# Patient Record
Sex: FEMALE | Race: WHITE | NOT HISPANIC OR LATINO | Employment: OTHER | URBAN - METROPOLITAN AREA
[De-identification: names, ages, dates, MRNs, and addresses within clinical notes are randomized per-mention and may not be internally consistent; named-entity substitution may affect disease eponyms.]

---

## 2017-01-16 DIAGNOSIS — F98.8 ADD (ATTENTION DEFICIT DISORDER): ICD-10-CM

## 2017-01-16 RX ORDER — DEXTROAMPHETAMINE SACCHARATE, AMPHETAMINE ASPARTATE, DEXTROAMPHETAMINE SULFATE AND AMPHETAMINE SULFATE 5; 5; 5; 5 MG/1; MG/1; MG/1; MG/1
1 TABLET ORAL 2 TIMES DAILY
Qty: 60 TABLET | Refills: 0 | Status: SHIPPED | OUTPATIENT
Start: 2017-01-16 | End: 2017-02-14 | Stop reason: SDUPTHER

## 2017-02-01 ENCOUNTER — OFFICE VISIT (OUTPATIENT)
Dept: INTERNAL MEDICINE | Facility: CLINIC | Age: 37
End: 2017-02-01
Payer: MEDICAID

## 2017-02-01 VITALS
DIASTOLIC BLOOD PRESSURE: 82 MMHG | RESPIRATION RATE: 20 BRPM | BODY MASS INDEX: 28.66 KG/M2 | HEIGHT: 65 IN | SYSTOLIC BLOOD PRESSURE: 100 MMHG | WEIGHT: 172 LBS

## 2017-02-01 DIAGNOSIS — M54.42 ACUTE LEFT-SIDED LOW BACK PAIN WITH LEFT-SIDED SCIATICA: Primary | ICD-10-CM

## 2017-02-01 PROCEDURE — 99214 OFFICE O/P EST MOD 30 MIN: CPT | Mod: S$PBB,,, | Performed by: INTERNAL MEDICINE

## 2017-02-01 PROCEDURE — 99999 PR PBB SHADOW E&M-EST. PATIENT-LVL III: CPT | Mod: PBBFAC,,, | Performed by: INTERNAL MEDICINE

## 2017-02-01 PROCEDURE — 99213 OFFICE O/P EST LOW 20 MIN: CPT | Mod: PBBFAC,PN,25 | Performed by: INTERNAL MEDICINE

## 2017-02-01 PROCEDURE — 96372 THER/PROPH/DIAG INJ SC/IM: CPT | Mod: PBBFAC,PN

## 2017-02-01 RX ORDER — METHYLPREDNISOLONE ACETATE 40 MG/ML
40 INJECTION, SUSPENSION INTRA-ARTICULAR; INTRALESIONAL; INTRAMUSCULAR; SOFT TISSUE
Status: COMPLETED | OUTPATIENT
Start: 2017-02-01 | End: 2017-02-01

## 2017-02-01 RX ORDER — GABAPENTIN 100 MG/1
100 CAPSULE ORAL 3 TIMES DAILY
Qty: 90 CAPSULE | Refills: 5 | Status: SHIPPED | OUTPATIENT
Start: 2017-02-01 | End: 2017-06-17

## 2017-02-01 RX ORDER — KETOROLAC TROMETHAMINE 30 MG/ML
30 INJECTION, SOLUTION INTRAMUSCULAR; INTRAVENOUS
Status: COMPLETED | OUTPATIENT
Start: 2017-02-01 | End: 2017-02-01

## 2017-02-01 RX ADMIN — KETOROLAC TROMETHAMINE 30 MG: 30 INJECTION, SOLUTION INTRAMUSCULAR; INTRAVENOUS at 08:02

## 2017-02-01 RX ADMIN — METHYLPREDNISOLONE ACETATE 40 MG: 40 INJECTION, SUSPENSION INTRA-ARTICULAR; INTRALESIONAL; INTRAMUSCULAR; SOFT TISSUE at 08:02

## 2017-02-01 NOTE — PATIENT INSTRUCTIONS
Back Care Tips    Caring for your back  These are things you can do to prevent a recurrence of acute back pain and to reduce symptoms from chronic back pain:  · Maintain a healthy weight. If you are overweight, losing weight will help most types of back pain.  · Exercise is an important part of recovery from most types of back pain. The muscles behind and in front of the spine support the back. This means strengthening both the back muscles and the abdominal muscles will provide better support for your spine.   · Swimming and brisk walking are good overall exercises to improve your fitness level.  · Practice safe lifting methods (below).  · Practice good posture when sitting, standing and walking. Avoid prolonged sitting. This puts more stress on the lower back than standing or walking.  · Wear quality shoes with sufficient arch support. Foot and ankle alignment can affect back symptoms. Women should avoid wearing high heels.  · Therapeutic massage can help relax the back muscles without stretching them.  · During the first 24 to 72 hours after an acute injury or flare-up of chronic back pain, apply an ice pack to the painful area for 20 minutes and then remove it for 20 minutes, over a period of 60 to 90 minutes, or several times a day. As a safety precaution, do not use a heating pad at bedtime. Sleeping on a heating pad can lead to skin burns or tissue damage.  · You can alternate ice and heat therapies.  Medications  Talk to your healthcare provider before using medicines, especially if you have other medical problems or are taking other medicines.  · You may use acetaminophen or ibuprofen to control pain, unless your healthcare provider prescribed other pain medicine. If you have chronic conditions like diabetes, liver or kidney disease, stomach ulcers, or gastrointestinal bleeding, or are taking blood thinners, talk with your healthcare provider before taking any medicines.  · Be careful if you are given  prescription pain medicines, narcotics, or medicine for muscle spasm. They can cause drowsiness, affect your coordination, reflexes, and judgment. Do not drive or operate heavy machinery while taking these types of medicines. Take prescription pain medicine only as prescribed by your healthcare provider.  Lumbar stretch  Here is a simple stretching exercise that will help relax muscle spasm and keep your back more limber. If exercise makes your back pain worse, dont do it.  · Lie on your back with your knees bent and both feet on the ground.  · Slowly raise your left knee to your chest as you flatten your lower back against the floor. Hold for 5 seconds.  · Relax and repeat the exercise with your right knee.  · Do 10 of these exercises for each leg.  Safe lifting method  · Dont bend over at the waist to lift an object off the floor.  Instead, bend your knees and hips in a squat.   · Keep your back and head upright  · Hold the object close to your body, directly in front of you.  · Straighten your legs to lift the object.   · Lower the object to the floor in the reverse fashion.  · If you must slide something across the floor, push it.  Posture tips  Sitting  Sit in chairs with straight backs or low-back support. Keep your knees lower than your hips, with your feet flat on the floor.  When driving, sit up straight. Adjust the seat forward so you are not leaning toward the steering wheel.  A small pillow or rolled towel behind your lower back may help if you are driving long distances.   Standing  When standing for long periods, shift most of your weight to one leg at a time. Alternate legs every few minutes.   Sleeping  The best way to sleep is on your side with your knees bent. Put a low pillow under your head to support your neck in a neutral spine position. Avoid thick pillows that bend your neck to one side. Put a pillow between your legs to further relax your lower back. If you sleep on your back, put pillows  under your knees to support your legs in a slightly flexed position. Use a firm mattress. If your mattress sags, replace it, or use a 1/2-inch plywood board under the mattress to add support.  Follow-up care  Follow up with your healthcare provider, or as advised.  If X-rays, a CT scan or an MRI scan were taken, they will be reviewed by a radiologist. You will be notified of any new findings that may affect your care.  Call 911  Seek emergency medical care if any of the following occur:  · Trouble breathing  · Confusion  · Very drowsy  · Fainting or loss of consciousness  · Rapid or very slow heart rate  · Loss of  bowel or bladder control  When to seek medical care  Call your healthcare provider if any of the following occur:  · Pain becomes worse or spreads to your arms or legs  · Weakness or numbness in one or both arms or legs  · Numbness in the groin area  © 7209-9401 [employer]. [address] [place] [address], [place] [address]. All rights reserved. This information is not intended as a substitute for professional medical care. Always follow your healthcare professional's instructions.          Take 100mg gabapentin every day for 3 days then,  Take 100mg gabapentin twice a day for 3 days then,  Take 100mg gabapentin three times a day    Call if not better and we will increase the dose.

## 2017-02-01 NOTE — PROGRESS NOTES
Subjective:       Patient ID: Joe Henson is a 36 y.o. female.    Chief Complaint: Back Pain (in L. side ; pain running from lower back down into leg PS:10)      HPI:  Patient is new to me but known to clinic and presents with left sided back pain. Has had pain for 6-7 weeks. She is waiting on neurosurgery appointment at [facility]. Pain is getting worse. She is taking tramadol, oxaprozin and Ibuprofen 800mg all without relief. Pain is worse with standing or walking. Pain is constant. Pain is left is left sided and radiates down the back of leg. Described as throbbing sensation and shooting down the leg. She denies saddles anesthesia, bowel/bladder incontinence. No new injuries. She is tearful during interview due to pain.    MRI L-spine from 16 reviewed today showing L5-S1 spondylolithesis and left sided foraminal encroachment, severe.    Past Medical History   Diagnosis Date    Abnormal Pap smear age 30     Pos. HPV,     ADHD (attention deficit hyperactivity disorder)     Arthritis     OCD (obsessive compulsive disorder)        Family History   Problem Relation Age of Onset    Diabetes Maternal Grandmother     Hypertension Father     Colon cancer Father 60    Breast cancer Paternal Aunt     Breast cancer Paternal Aunt     Breast cancer Paternal Aunt     Breast cancer Paternal Aunt     Coronary artery disease Maternal Grandfather      labor Neg Hx        Social History     Social History    Marital status: Single     Spouse name: N/A    Number of children: N/A    Years of education: N/A     Occupational History     [employer]     Social History Main Topics    Smoking status: Current Every Day Smoker     Packs/day: 1.00     Years: 15.00     Start date: 1997    Smokeless tobacco: Never Used      Comment: told about brochure and smoking clinic program    Alcohol use Yes      Comment: Socially-2 times a week-3 beer    Drug use: No    Sexual activity: Yes     Partners: Male     Birth  control/ protection: Surgical, See Surgical Hx      Comment: - has a boyfriend     Other Topics Concern    Not on file     Social History Narrative       Review of Systems   Constitutional: Negative for activity change, fatigue, fever and unexpected weight change.   HENT: Negative for congestion, ear pain, hearing loss, rhinorrhea and sore throat.    Eyes: Negative for pain, redness and visual disturbance.   Respiratory: Negative for cough, shortness of breath and wheezing.    Cardiovascular: Negative for chest pain, palpitations and leg swelling.   Gastrointestinal: Negative for abdominal pain, constipation, diarrhea, nausea and vomiting.   Genitourinary: Negative for dysuria, frequency, pelvic pain and urgency.   Musculoskeletal: Positive for back pain. Negative for joint swelling and neck pain.   Skin: Negative for color change, rash and wound.   Neurological: Positive for numbness (left leg). Negative for dizziness, tremors, weakness, light-headedness and headaches.         Objective:      Physical Exam   Constitutional: She is oriented to person, place, and time. She appears well-developed and well-nourished. No distress.   HENT:   Head: Normocephalic and atraumatic.   Right Ear: External ear normal.   Left Ear: External ear normal.   Eyes: Conjunctivae and EOM are normal. Pupils are equal, round, and reactive to light. Right eye exhibits no discharge. Left eye exhibits no discharge.   Neck: Neck supple. No tracheal deviation present.   Cardiovascular: Normal rate and regular rhythm.  Exam reveals no gallop and no friction rub.    No murmur heard.  Pulmonary/Chest: Effort normal and breath sounds normal. No respiratory distress. She has no wheezes. She has no rales.   Abdominal: Soft. Bowel sounds are normal. She exhibits no distension. There is no tenderness.   Musculoskeletal: She exhibits tenderness (left low back).   Lymphadenopathy:     She has no cervical adenopathy.   Neurological: She is alert  and oriented to person, place, and time. No cranial nerve deficit.   Skin: Skin is warm and dry.   Psychiatric: She has a normal mood and affect. Her behavior is normal.   Vitals reviewed.      Assessment:       1. Acute left-sided low back pain with left-sided sciatica        Plan:       Joe was seen today for back pain.    Diagnoses and all orders for this visit:    Acute left-sided low back pain with left-sided sciatica  -     gabapentin (NEURONTIN) 100 MG capsule; Take 1 capsule (100 mg total) by mouth 3 (three) times daily.  -     ketorolac injection 30 mg; Inject 30 mg into the muscle one time.  -     methylPREDNISolone acetate injection 40 mg; Inject 1 mL (40 mg total) into the muscle one time.  Add gabapentin, titrate up dose if not effective  Consider cymbalta  Trying to avoid narcotics  Can increase tramadol to 100mg from 50mg  Continue Ibuprofen  Keep neurosurg appt  Advised to try and find pain management that will accept her insurance for injections

## 2017-02-01 NOTE — MR AVS SNAPSHOT
[facility] Internal Medicine  [address] [place] [address]  Phone: [phone]  Fax: [phone]                  Joe Henson   2017 9:15 AM   Office Visit    Description:  Female : 1980   Provider:  Little Espinosa MD   Department:  [facility] Internal Medicine           Reason for Visit     Back Pain           Diagnoses this Visit        Comments    Acute left-sided low back pain with left-sided sciatica    -  Primary            To Do List           Future Appointments        Provider Department Dept Phone    2017 9:15 AM Little Espinosa MD [facility] Medicine [phone]      Goals (5 Years of Data)     None      Follow-Up and Disposition     Return if symptoms worsen or fail to improve.       These Medications        Disp Refills Start End    gabapentin (NEURONTIN) 100 MG capsule 90 capsule 5 2017    Take 1 capsule (100 mg total) by mouth 3 (three) times daily. - Oral    Pharmacy: [facility] [address] Ph #: [phone]         [facility] On Call     [facility] On Call Nurse Care Line -  Assistance  Registered nurses in the [facility] On Call Center provide clinical advisement, health education, appointment booking, and other advisory services.  Call for this free service at [phone].             Medications           Message regarding Medications     Verify the changes and/or additions to your medication regime listed below are the same as discussed with your clinician today.  If any of these changes or additions are incorrect, please notify your healthcare provider.        START taking these NEW medications        Refills    gabapentin (NEURONTIN) 100 MG capsule 5    Sig: Take 1 capsule (100 mg total) by mouth 3 (three) times daily.    Class: Normal    Route: Oral      These medications were administered today        Dose Freq    ketorolac injection 30 mg 30 mg Clinic/HOD 1 time    Sig: Inject 30 mg  "into the muscle one time.    Class: Normal    Route: Intramuscular    methylPREDNISolone acetate injection 40 mg 40 mg Clinic/HOD 1 time    Sig: Inject 1 mL (40 mg total) into the muscle one time.    Class: Normal    Route: Intramuscular           Verify that the below list of medications is an accurate representation of the medications you are currently taking.  If none reported, the list may be blank. If incorrect, please contact your healthcare provider. Carry this list with you in case of emergency.           Current Medications     albuterol 90 mcg/actuation inhaler Inhale 2 puffs into the lungs every 6 (six) hours as needed for Wheezing.    clonazePAM (KLONOPIN) 0.5 MG tablet Take 1 tablet (0.5 mg total) by mouth 3 (three) times daily as needed for Anxiety.    dextroamphetamine-amphetamine (ADDERALL) 20 mg tablet Take 1 tablet by mouth 2 (two) times daily.    diclofenac (VOLTAREN) 75 MG EC tablet Take 1 tablet (75 mg total) by mouth 2 (two) times daily.    oxaprozin (DAYPRO) 600 mg tablet Take 1 tablet (600 mg total) by mouth once daily.    tramadol (ULTRAM) 50 mg tablet Take 1-2 tablets ( mg total) by mouth every 6 (six) hours as needed for Pain.    gabapentin (NEURONTIN) 100 MG capsule Take 1 capsule (100 mg total) by mouth 3 (three) times daily.           Clinical Reference Information           Vital Signs - Last Recorded  Most recent update: 2/1/2017  8:37 AM by Anya Carmona MA    BP Resp Ht Wt LMP BMI    100/82 20 5' 5" (1.651 m) 78 kg (172 lb) 12/01/2016 28.62 kg/m2      Blood Pressure          Most Recent Value    BP  100/82      Allergies as of 2/1/2017     No Known Allergies      Immunizations Administered on Date of Encounter - 2/1/2017     None      Instructions      Back Care Tips    Caring for your back  These are things you can do to prevent a recurrence of acute back pain and to reduce symptoms from chronic back pain:  · Maintain a healthy weight. If you are overweight, losing " weight will help most types of back pain.  · Exercise is an important part of recovery from most types of back pain. The muscles behind and in front of the spine support the back. This means strengthening both the back muscles and the abdominal muscles will provide better support for your spine.   · Swimming and brisk walking are good overall exercises to improve your fitness level.  · Practice safe lifting methods (below).  · Practice good posture when sitting, standing and walking. Avoid prolonged sitting. This puts more stress on the lower back than standing or walking.  · Wear quality shoes with sufficient arch support. Foot and ankle alignment can affect back symptoms. Women should avoid wearing high heels.  · Therapeutic massage can help relax the back muscles without stretching them.  · During the first 24 to 72 hours after an acute injury or flare-up of chronic back pain, apply an ice pack to the painful area for 20 minutes and then remove it for 20 minutes, over a period of 60 to 90 minutes, or several times a day. As a safety precaution, do not use a heating pad at bedtime. Sleeping on a heating pad can lead to skin burns or tissue damage.  · You can alternate ice and heat therapies.  Medications  Talk to your healthcare provider before using medicines, especially if you have other medical problems or are taking other medicines.  · You may use acetaminophen or ibuprofen to control pain, unless your healthcare provider prescribed other pain medicine. If you have chronic conditions like diabetes, liver or kidney disease, stomach ulcers, or gastrointestinal bleeding, or are taking blood thinners, talk with your healthcare provider before taking any medicines.  · Be careful if you are given prescription pain medicines, narcotics, or medicine for muscle spasm. They can cause drowsiness, affect your coordination, reflexes, and judgment. Do not drive or operate heavy machinery while taking these types of  medicines. Take prescription pain medicine only as prescribed by your healthcare provider.  Lumbar stretch  Here is a simple stretching exercise that will help relax muscle spasm and keep your back more limber. If exercise makes your back pain worse, dont do it.  · Lie on your back with your knees bent and both feet on the ground.  · Slowly raise your left knee to your chest as you flatten your lower back against the floor. Hold for 5 seconds.  · Relax and repeat the exercise with your right knee.  · Do 10 of these exercises for each leg.  Safe lifting method  · Dont bend over at the waist to lift an object off the floor.  Instead, bend your knees and hips in a squat.   · Keep your back and head upright  · Hold the object close to your body, directly in front of you.  · Straighten your legs to lift the object.   · Lower the object to the floor in the reverse fashion.  · If you must slide something across the floor, push it.  Posture tips  Sitting  Sit in chairs with straight backs or low-back support. Keep your knees lower than your hips, with your feet flat on the floor.  When driving, sit up straight. Adjust the seat forward so you are not leaning toward the steering wheel.  A small pillow or rolled towel behind your lower back may help if you are driving long distances.   Standing  When standing for long periods, shift most of your weight to one leg at a time. Alternate legs every few minutes.   Sleeping  The best way to sleep is on your side with your knees bent. Put a low pillow under your head to support your neck in a neutral spine position. Avoid thick pillows that bend your neck to one side. Put a pillow between your legs to further relax your lower back. If you sleep on your back, put pillows under your knees to support your legs in a slightly flexed position. Use a firm mattress. If your mattress sags, replace it, or use a 1/2-inch plywood board under the mattress to add support.  Follow-up care  Follow  up with your healthcare provider, or as advised.  If X-rays, a CT scan or an MRI scan were taken, they will be reviewed by a radiologist. You will be notified of any new findings that may affect your care.  Call 911  Seek emergency medical care if any of the following occur:  · Trouble breathing  · Confusion  · Very drowsy  · Fainting or loss of consciousness  · Rapid or very slow heart rate  · Loss of  bowel or bladder control  When to seek medical care  Call your healthcare provider if any of the following occur:  · Pain becomes worse or spreads to your arms or legs  · Weakness or numbness in one or both arms or legs  · Numbness in the groin area  © 1483-1496 [employer]. [address] [place] [address], [place] [address]. All rights reserved. This information is not intended as a substitute for professional medical care. Always follow your healthcare professional's instructions.          Take 100mg gabapentin every day for 3 days then,  Take 100mg gabapentin twice a day for 3 days then,  Take 100mg gabapentin three times a day    Call if not better and we will increase the dose.        Smoking Cessation     If you would like to quit smoking:   You may be eligible for free services if you are a [place] resident and started smoking cigarettes before September 1, 1988.  Call the [employer]oking [employer]essation [employer] (S[employer]) toll free at (731) 878-9695 or (188) 275-3032.   Call 3-800-QUIT-NOW if you do not meet the above criteria.

## 2017-02-14 DIAGNOSIS — R06.02 SOB (SHORTNESS OF BREATH) ON EXERTION: ICD-10-CM

## 2017-02-14 DIAGNOSIS — F98.8 ADD (ATTENTION DEFICIT DISORDER): ICD-10-CM

## 2017-02-15 RX ORDER — DEXTROAMPHETAMINE SACCHARATE, AMPHETAMINE ASPARTATE, DEXTROAMPHETAMINE SULFATE AND AMPHETAMINE SULFATE 5; 5; 5; 5 MG/1; MG/1; MG/1; MG/1
1 TABLET ORAL 2 TIMES DAILY
Qty: 60 TABLET | Refills: 0 | Status: SHIPPED | OUTPATIENT
Start: 2017-02-15 | End: 2017-03-13 | Stop reason: SDUPTHER

## 2017-02-15 RX ORDER — ALBUTEROL SULFATE 90 UG/1
AEROSOL, METERED RESPIRATORY (INHALATION)
Qty: 1 INHALER | Refills: 5 | Status: SHIPPED | OUTPATIENT
Start: 2017-02-15 | End: 2017-03-13 | Stop reason: SDUPTHER

## 2017-03-13 DIAGNOSIS — F98.8 ADD (ATTENTION DEFICIT DISORDER): ICD-10-CM

## 2017-03-13 DIAGNOSIS — R06.02 SOB (SHORTNESS OF BREATH) ON EXERTION: ICD-10-CM

## 2017-03-13 RX ORDER — DEXTROAMPHETAMINE SACCHARATE, AMPHETAMINE ASPARTATE, DEXTROAMPHETAMINE SULFATE AND AMPHETAMINE SULFATE 5; 5; 5; 5 MG/1; MG/1; MG/1; MG/1
1 TABLET ORAL 2 TIMES DAILY
Qty: 60 TABLET | Refills: 0 | Status: SHIPPED | OUTPATIENT
Start: 2017-03-13 | End: 2017-04-12 | Stop reason: SDUPTHER

## 2017-03-13 RX ORDER — ALBUTEROL SULFATE 90 UG/1
AEROSOL, METERED RESPIRATORY (INHALATION)
Qty: 1 INHALER | Refills: 5 | Status: SHIPPED | OUTPATIENT
Start: 2017-03-13 | End: 2017-04-12 | Stop reason: SDUPTHER

## 2017-03-13 NOTE — TELEPHONE ENCOUNTER
----- Message from Ventura Cote sent at 3/13/2017 10:02 AM CDT -----  Contact: Patient  Joe Henson  MRN: [id number]  : 1980  PCP: Estela Munguia  Home Phone      [phone]  Work Phone      Not on file.  Mobile          [phone]      MESSAGE: requesting Rx for Adderall & refill on Albuterol inhaler -- uses [facility]    Call [phone]    PCP: Ezra

## 2017-04-12 DIAGNOSIS — R06.02 SOB (SHORTNESS OF BREATH) ON EXERTION: ICD-10-CM

## 2017-04-12 DIAGNOSIS — F98.8 ADD (ATTENTION DEFICIT DISORDER): ICD-10-CM

## 2017-04-12 RX ORDER — ALBUTEROL SULFATE 90 UG/1
AEROSOL, METERED RESPIRATORY (INHALATION)
Qty: 1 INHALER | Refills: 5 | Status: SHIPPED | OUTPATIENT
Start: 2017-04-12 | End: 2017-05-15 | Stop reason: SDUPTHER

## 2017-04-12 RX ORDER — DEXTROAMPHETAMINE SACCHARATE, AMPHETAMINE ASPARTATE, DEXTROAMPHETAMINE SULFATE AND AMPHETAMINE SULFATE 5; 5; 5; 5 MG/1; MG/1; MG/1; MG/1
1 TABLET ORAL 2 TIMES DAILY
Qty: 60 TABLET | Refills: 0 | Status: SHIPPED | OUTPATIENT
Start: 2017-04-12 | End: 2017-05-11 | Stop reason: SDUPTHER

## 2017-04-12 NOTE — TELEPHONE ENCOUNTER
----- Message from Tea Vanessa sent at 2017  3:14 PM CDT -----  Contact: self  Joe Henson  MRN: [id number]  : 1980  PCP: Estela Munguia  Home Phone      [phone]  Work Phone      Not on file.  Mobile          [phone]      MESSAGE:   Needs a refill on adderall and inhaler    Phone:   [phone]    [facility]:   [facility] [place]

## 2017-04-13 DIAGNOSIS — F41.1 GAD (GENERALIZED ANXIETY DISORDER): ICD-10-CM

## 2017-04-13 DIAGNOSIS — G47.00 INSOMNIA: ICD-10-CM

## 2017-04-13 DIAGNOSIS — F41.0 PANIC: ICD-10-CM

## 2017-04-13 RX ORDER — CLONAZEPAM 0.5 MG/1
0.5 TABLET ORAL 3 TIMES DAILY PRN
Qty: 90 TABLET | Refills: 5 | Status: SHIPPED | OUTPATIENT
Start: 2017-04-13 | End: 2017-05-15 | Stop reason: SDUPTHER

## 2017-04-13 RX ORDER — CLONAZEPAM 0.5 MG/1
TABLET ORAL
Qty: 90 TABLET | Refills: 3 | OUTPATIENT
Start: 2017-04-13

## 2017-04-13 NOTE — TELEPHONE ENCOUNTER
----- Message from Ventura Cote sent at 2017  1:14 PM CDT -----  Contact: Patient  Joe Henson  MRN: [id number]  : 1980  PCP: Estela Munguia  Home Phone      [phone]  Work Phone      Not on file.  Mobile          [phone]      MESSAGE: requesting refill on Klonopin -- uses [facility] (close @ 6:00)    Call [phone]    PCP: Ezra

## 2017-04-28 ENCOUNTER — HOSPITAL ENCOUNTER (EMERGENCY)
Facility: HOSPITAL | Age: 37
Discharge: HOME | End: 2017-04-28
Attending: SURGERY
Payer: MEDICAID

## 2017-04-28 VITALS
SYSTOLIC BLOOD PRESSURE: 119 MMHG | RESPIRATION RATE: 20 BRPM | DIASTOLIC BLOOD PRESSURE: 75 MMHG | HEART RATE: 88 BPM | TEMPERATURE: 98 F

## 2017-04-28 DIAGNOSIS — L73.9 FOLLICULITIS: Primary | ICD-10-CM

## 2017-04-28 PROCEDURE — 63600175 PHARM REV CODE 636 W HCPCS: Performed by: SURGERY

## 2017-04-28 PROCEDURE — 99283 EMERGENCY DEPT VISIT LOW MDM: CPT | Mod: 25

## 2017-04-28 PROCEDURE — 96372 THER/PROPH/DIAG INJ SC/IM: CPT

## 2017-04-28 PROCEDURE — 25000003 PHARM REV CODE 250: Performed by: SURGERY

## 2017-04-28 RX ORDER — KETOROLAC TROMETHAMINE 30 MG/ML
60 INJECTION, SOLUTION INTRAMUSCULAR; INTRAVENOUS
Status: COMPLETED | OUTPATIENT
Start: 2017-04-28 | End: 2017-04-28

## 2017-04-28 RX ORDER — CLINDAMYCIN PHOSPHATE 150 MG/ML
600 INJECTION, SOLUTION INTRAVENOUS
Status: DISCONTINUED | OUTPATIENT
Start: 2017-04-28 | End: 2017-04-28

## 2017-04-28 RX ORDER — CLINDAMYCIN HYDROCHLORIDE 300 MG/1
300 CAPSULE ORAL 4 TIMES DAILY
Qty: 28 CAPSULE | Refills: 0 | Status: SHIPPED | OUTPATIENT
Start: 2017-04-28 | End: 2017-05-05

## 2017-04-28 RX ORDER — MUPIROCIN 20 MG/G
OINTMENT TOPICAL 3 TIMES DAILY
Qty: 15 G | Refills: 0 | Status: SHIPPED | OUTPATIENT
Start: 2017-04-28 | End: 2017-05-08

## 2017-04-28 RX ORDER — KETOROLAC TROMETHAMINE 10 MG/1
10 TABLET, FILM COATED ORAL EVERY 6 HOURS PRN
Qty: 15 TABLET | Refills: 0 | Status: SHIPPED | OUTPATIENT
Start: 2017-04-28 | End: 2017-06-17

## 2017-04-28 RX ORDER — CLINDAMYCIN PHOSPHATE 150 MG/ML
600 INJECTION, SOLUTION INTRAVENOUS
Status: COMPLETED | OUTPATIENT
Start: 2017-04-28 | End: 2017-04-28

## 2017-04-28 RX ADMIN — KETOROLAC TROMETHAMINE 60 MG: 30 INJECTION, SOLUTION INTRAMUSCULAR at 04:04

## 2017-04-28 RX ADMIN — CLINDAMYCIN PHOSPHATE 600 MG: 150 INJECTION, SOLUTION INTRAMUSCULAR; INTRAVENOUS at 04:04

## 2017-04-28 NOTE — ED AVS SNAPSHOT
[facility] [address]  [place] [address]               Joe Henson   2017  4:25 PM   ED    Description:  Female : 1980   Department:  [facility]           Your Care was Coordinated By:     Provider Role From To    Hitesh Gilliam MD Attending Provider 17 0516 --      Reason for Visit     Recurrent Skin Infections           Diagnoses this Visit        Comments    Folliculitis    -  Primary       ED Disposition     ED Disposition Condition Comment    Discharge             To Do List           Follow-up Information     Follow up with Estela Munguia NP. Schedule an appointment as soon as possible for a visit in 2 days.    Specialty:  Family Medicine    Contact information:    [address] [place] [address]  [phone]         These Medications        Disp Refills Start End    mupirocin (BACTROBAN) 2 % ointment 15 g 0 2017    Apply topically 3 (three) times daily. - Topical (Top)    Pharmacy: [address] Ph #: [phone]       clindamycin (CLEOCIN) 300 MG capsule 28 capsule 0 2017    Take 1 capsule (300 mg total) by mouth 4 (four) times daily. - Oral    Pharmacy: [address] Ph #: [phone]       ketorolac (TORADOL) 10 mg tablet 15 tablet 0 2017     Take 1 tablet (10 mg total) by mouth every 6 (six) hours as needed for Pain. - Oral    Pharmacy: [address] Ph #: [phone]         [facility]     [facility] Care [facility] -  Assistance  Unless otherwise directed by your provider, please contact [facility], our nurse care line that is available for  assistance.     Registered nurses in the [facility] provide: appointment scheduling, clinical advisement, health education, and other advisory services.  Call:  [phone] (toll free)               Medications           Message regarding Medications     Verify the changes and/or additions to your medication regime listed below are the same as discussed with your clinician today.  If any of these changes or additions are incorrect, please notify your healthcare provider.        START taking these NEW medications        Refills    mupirocin (BACTROBAN) 2 % ointment 0    Sig: Apply topically 3 (three) times daily.    Class: Normal    Route: Topical (Top)    clindamycin (CLEOCIN) 300 MG capsule 0    Sig: Take 1 capsule (300 mg total) by mouth 4 (four) times daily.    Class: Normal    Route: Oral    ketorolac (TORADOL) 10 mg tablet 0    Sig: Take 1 tablet (10 mg total) by mouth every 6 (six) hours as needed for Pain.    Class: Normal    Route: Oral      These medications were administered today        Dose Freq    clindamycin injection 600 mg 600 mg ED 1 Time    Sig: Inject 4 mLs (600 mg total) into the muscle ED 1 Time.    Class: Normal    Route: Intramuscular    ketorolac injection 60 mg 60 mg ED 1 Time    Sig: Inject 2 mLs (60 mg total) into the muscle ED 1 Time.    Class: Normal    Route: Intramuscular           Verify that the below list of medications is an accurate representation of the medications you are currently taking.  If none reported, the list may be blank. If incorrect, please contact your healthcare provider. Carry this list with you in case of emergency.           Current Medications     clindamycin (CLEOCIN) 300 MG capsule Take 1 capsule (300 mg total) by mouth 4 (four) times daily.    clindamycin injection 600 mg Inject 4 mLs (600 mg total) into the muscle ED 1 Time.    clonazePAM (KLONOPIN) 0.5 MG tablet Take 1 tablet (0.5 mg total) by mouth 3 (three) times daily as needed for Anxiety.    dextroamphetamine-amphetamine (ADDERALL) 20 mg tablet Take 1 tablet by mouth 2 (two) times daily.    diclofenac (VOLTAREN) 75 MG EC tablet Take 1 tablet (75 mg total)  by mouth 2 (two) times daily.    gabapentin (NEURONTIN) 100 MG capsule Take 1 capsule (100 mg total) by mouth 3 (three) times daily.    ketorolac (TORADOL) 10 mg tablet Take 1 tablet (10 mg total) by mouth every 6 (six) hours as needed for Pain.    ketorolac injection 60 mg Inject 2 mLs (60 mg total) into the muscle ED 1 Time.    mupirocin (BACTROBAN) 2 % ointment Apply topically 3 (three) times daily.    oxaprozin (DAYPRO) 600 mg tablet Take 1 tablet (600 mg total) by mouth once daily.    tramadol (ULTRAM) 50 mg tablet Take 1-2 tablets ( mg total) by mouth every 6 (six) hours as needed for Pain.    VENTOLIN HFA 90 mcg/actuation inhaler INHALE 2 PUFFS EVERY 6   HOURS AS NEEDED FOR      WHEEZING           Clinical Reference Information           Your Vitals Were     BP Pulse Temp Resp          119/75 88 98.1 °F (36.7 °C) (Oral) 20        Allergies as of 4/28/2017     No Known Allergies      Immunizations Administered on Date of Encounter - 4/28/2017     None      ED Micro, Lab, POCT     None      ED Imaging Orders     None        Discharge Instructions         Folliculitis  Folliculitis is an infection of a hair follicle. A hair follicle is the little pocket where a hair grows out of the skin. Bacteria normally live on the skin. But sometimes bacteria can get trapped in a follicle and cause inflammation. This causes a bumpy rash. The area over the follicles is red and raised. It may itch or be painful. The bumps may have fluid (pus) inside. The pus may leak and then form crusts. Sores can spread to other areas of the body. Once it goes away, folliculitis can come back at any time. Severe cases may cause permanent hair loss and scarring.  Folliculitis can happen anywhere on the body that hair grows. It can be caused by rubbing from tight clothing. Ingrown hairs can cause it. Soaking in a hot tub or swimming pool that has bacteria in the water can cause it. It may also occur if a hair follicle is blocked by a  bandage.  Sores often go away in a few days with no treatment. In some cases, medication may be given. A small piece of skin or pus may be taken to find the type of bacteria causing the infection.  Home care  The health care provider may prescribe an antibiotic cream or ointment.  Oral antibiotics may also be prescribed. Or you may be told to use an over-the-counter antibiotic cream. Follow all instructions when using any of these medications.  General care:  · Apply warm, moist compresses to the sores for 20 minutes up to 3 times a day. You can make a compress by soaking a cloth in warm water. Squeeze out excess water.  · Dont cut, poke, or squeeze the sores. This can be painful and spread infection.  · Dont scratch the affected area. Scratching can delay healing.  · Dont shave the areas affected by folliculitis.  · If the sores leak fluid, cover the area with a nonstick gauze bandage. Use as little tape as possible. Carefully discard all soiled bandages.  · Dress in loose cotton clothing.  · Change sheets and blankets if they are soiled by pus. Wash all clothes, towels, sheets, and cloth diapers in soap and hot water. Do not share clothes, towels, or sheets with other family members.  · Do not soak the sores in bath water. This can spread infection. Instead, keep the area clean by gently washing sores with soap and warm water.  · Wash your hands or use antibacterial gels often to prevent spreading the bacteria.  Follow-up care  Follow up with your health care provider.  When to seek medical advice  Call your health care provider right away if any of these occur:  · Fever of 100.4°F (38°C) or higher, rectal  · Spreading of the rash  · Rash does not get better with treatment  · Redness or swelling that gets worse  · Rash becomes more painful  · Foul-smelling fluid leaking from the skin  · Rash improves, but then comes back   Date Last Reviewed: 7/23/2014  © 2052-9748 [employer]. [address] [place] [address]  Road, [place] [address]. All rights reserved. This information is not intended as a substitute for professional medical care. Always follow your healthcare professional's instructions.           [facility] complies with applicable Federal civil rights laws and does not discriminate on the basis of race, color, national origin, age, disability, or sex.        Language Assistance Services     ATTENTION: Language assistance services are available, free of charge. Please call [phone].      ATENCIÓN: Si habla español, tiene a stein disposición servicios gratuitos de asistencia lingüística. Llame al [phone].     CHÚ Ý: N?u b?n nói Ti?ng Vi?t, có các d?ch v? h? tr? ngôn ng? mi?n phí dành cho b?n. G?i s? [phone].

## 2017-04-28 NOTE — ED PROVIDER NOTES
[facility]                                        2017                   Chief Complaint  36 y.o. female with Recurrent Skin Infections (perineal area and posterior left leg)    History of Present Illness  Joe Henson presents to the emergency room with folliculitis issues this week  Patient on exam has mild folliculitis without abscess or cellulitic spread noted  Patient has a history of MRSA infections, no abscess noted on evaluation today  Patient has mild folliculitis without fluctuance or drainage, shaved this past week  Patient has no systemic fever or other complications, afebrile and VSS on arrival    The history is provided by the patient    Past Medical History   -- Abnormal Pap smear    -- ADHD (attention deficit hyperactivity disorder)    -- Arthritis    -- OCD (obsessive compulsive disorder)      Past Surgical History   --  SECTION     -- DILATION AND CURETTAGE OF UTERUS     -- ENDOMETRIAL ABLATION     -- KNEE SURGERY     -- TUBAL LIGATION        No Known Allergies     Review of Systems and Physical Exam     Review of Systems  -- Constitution - no fever, denies fatigue, no weakness, no chills  -- Eyes - no tearing or redness, no visual disturbance  -- Ear, Nose - no tinnitus or earache, no nasal congestion or discharge  -- Mouth,Throat - no sore throat, no toothache, normal voice, normal swallowing  -- Respiratory - denies cough and congestion, no shortness of breath, no OCONNELL  -- Cardiovascular - denies chest pain, no palpitations, denies claudication  -- Gastrointestinal - denies abdominal pain, nausea, vomiting, or diarrhea  -- Musculoskeletal - denies back pain, negative for myalgias and arthralgias   -- Neurological - no headache, denies weakness or seizure; no LOC  -- Skin - folliculitis on the lower abdomen    Vital Signs  -- Her blood pressure is 119/75 and her pulse is 88. Her respiration is 20.      Physical Exam  -- Nursing note and vitals reviewed  --  Constitutional: Appears well-developed and well-nourished  -- Head: Atraumatic. Normocephalic. No obvious abnormality  -- Eyes: Pupils are equal and reactive to light. Normal conjunctiva and lids  -- Cardiac: Normal rate, regular rhythm and normal heart sounds  -- Pulmonary: Normal respiratory effort, breath sounds clear to auscultation  -- Abdominal: Soft, no tenderness. Normal bowel sounds. Normal liver edge  -- Musculoskeletal: Normal range of motion, no effusions. Joints stable   -- Neurological: No focal deficits. Showed good interaction with staffed  -- Skin: folliculitis on the lower abdomen    Emergency Room Course     Medications Given  -- clindamycin injection 600 mg (not administered)   -- ketorolac injection 60 mg (not administered)     Diagnosis  -- The encounter diagnosis was Folliculitis.    Disposition and Plan  -- Disposition: home  -- Condition: stable  -- Follow-up: Patient to follow up with Estela Munguia NP in 1-2 days.  -- I advised the patient that we have found no life threatening condition today  -- At this time, I believe the patient is clinically stable for discharge.   -- The patient acknowledges that close follow up with a MD is required   -- Patient agrees to comply with all instruction and direction given in the ER    This note is dictated on Dragon Natural Speaking word recognition program.  There are word recognition mistakes that are occasionally missed on review.           Hitesh Gilliam MD  04/28/17 2785

## 2017-04-28 NOTE — DISCHARGE INSTRUCTIONS
Folliculitis  Folliculitis is an infection of a hair follicle. A hair follicle is the little pocket where a hair grows out of the skin. Bacteria normally live on the skin. But sometimes bacteria can get trapped in a follicle and cause inflammation. This causes a bumpy rash. The area over the follicles is red and raised. It may itch or be painful. The bumps may have fluid (pus) inside. The pus may leak and then form crusts. Sores can spread to other areas of the body. Once it goes away, folliculitis can come back at any time. Severe cases may cause permanent hair loss and scarring.  Folliculitis can happen anywhere on the body that hair grows. It can be caused by rubbing from tight clothing. Ingrown hairs can cause it. Soaking in a hot tub or swimming pool that has bacteria in the water can cause it. It may also occur if a hair follicle is blocked by a bandage.  Sores often go away in a few days with no treatment. In some cases, medication may be given. A small piece of skin or pus may be taken to find the type of bacteria causing the infection.  Home care  The health care provider may prescribe an antibiotic cream or ointment.  Oral antibiotics may also be prescribed. Or you may be told to use an over-the-counter antibiotic cream. Follow all instructions when using any of these medications.  General care:  · Apply warm, moist compresses to the sores for 20 minutes up to 3 times a day. You can make a compress by soaking a cloth in warm water. Squeeze out excess water.  · Dont cut, poke, or squeeze the sores. This can be painful and spread infection.  · Dont scratch the affected area. Scratching can delay healing.  · Dont shave the areas affected by folliculitis.  · If the sores leak fluid, cover the area with a nonstick gauze bandage. Use as little tape as possible. Carefully discard all soiled bandages.  · Dress in loose cotton clothing.  · Change sheets and blankets if they are soiled by pus. Wash all clothes,  towels, sheets, and cloth diapers in soap and hot water. Do not share clothes, towels, or sheets with other family members.  · Do not soak the sores in bath water. This can spread infection. Instead, keep the area clean by gently washing sores with soap and warm water.  · Wash your hands or use antibacterial gels often to prevent spreading the bacteria.  Follow-up care  Follow up with your health care provider.  When to seek medical advice  Call your health care provider right away if any of these occur:  · Fever of 100.4°F (38°C) or higher, rectal  · Spreading of the rash  · Rash does not get better with treatment  · Redness or swelling that gets worse  · Rash becomes more painful  · Foul-smelling fluid leaking from the skin  · Rash improves, but then comes back   Date Last Reviewed: 7/23/2014  © 8677-7452 [employer]. [address] [place] [address], [place] [address]. All rights reserved. This information is not intended as a substitute for professional medical care. Always follow your healthcare professional's instructions.

## 2017-05-11 DIAGNOSIS — F98.8 ADD (ATTENTION DEFICIT DISORDER): ICD-10-CM

## 2017-05-11 RX ORDER — DEXTROAMPHETAMINE SACCHARATE, AMPHETAMINE ASPARTATE, DEXTROAMPHETAMINE SULFATE AND AMPHETAMINE SULFATE 5; 5; 5; 5 MG/1; MG/1; MG/1; MG/1
1 TABLET ORAL 2 TIMES DAILY
Qty: 60 TABLET | Refills: 0 | Status: SHIPPED | OUTPATIENT
Start: 2017-05-11 | End: 2017-06-07 | Stop reason: SDUPTHER

## 2017-05-11 NOTE — TELEPHONE ENCOUNTER
----- Message from [place] sent at 2017 11:21 AM CDT -----  Contact: self  Joe Henson  MRN: [id number]  : 1980  PCP: Estela Munguia  Home Phone      [phone]  Work Phone      Not on file.  Mobile          [phone]      MESSAGE: needs refill on adderall    Phone: eugene    Phone: [phone]

## 2017-05-15 DIAGNOSIS — R06.02 SOB (SHORTNESS OF BREATH) ON EXERTION: ICD-10-CM

## 2017-05-15 DIAGNOSIS — F41.1 GAD (GENERALIZED ANXIETY DISORDER): ICD-10-CM

## 2017-05-15 DIAGNOSIS — F41.0 PANIC: ICD-10-CM

## 2017-05-15 RX ORDER — ALBUTEROL SULFATE 90 UG/1
AEROSOL, METERED RESPIRATORY (INHALATION)
Qty: 1 INHALER | Refills: 5 | Status: SHIPPED | OUTPATIENT
Start: 2017-05-15 | End: 2017-08-30 | Stop reason: SDUPTHER

## 2017-05-15 RX ORDER — CLONAZEPAM 0.5 MG/1
0.5 TABLET ORAL 3 TIMES DAILY PRN
Qty: 90 TABLET | Refills: 0 | Status: SHIPPED | OUTPATIENT
Start: 2017-05-15 | End: 2017-06-07 | Stop reason: SDUPTHER

## 2017-05-15 NOTE — TELEPHONE ENCOUNTER
----- Message from Jyothi Garza sent at 5/15/2017 10:10 AM CDT -----  Contact: SELF   Joe Henson  MRN: [id number]  : 1980  PCP: Estela Munguia  Home Phone      [phone]  Work Phone      Not on file.  Mobile          [phone]      MESSAGE: NEEDS REFILL ON KLONOPIN AND INHALER    PHONE: [phone]    PHARMACY: [facility]

## 2017-06-06 ENCOUNTER — TELEPHONE (OUTPATIENT)
Dept: FAMILY MEDICINE | Facility: CLINIC | Age: 37
End: 2017-06-06

## 2017-06-06 DIAGNOSIS — F98.8 ADD (ATTENTION DEFICIT DISORDER): ICD-10-CM

## 2017-06-06 DIAGNOSIS — F41.1 GAD (GENERALIZED ANXIETY DISORDER): ICD-10-CM

## 2017-06-06 DIAGNOSIS — F41.0 PANIC: ICD-10-CM

## 2017-06-06 NOTE — TELEPHONE ENCOUNTER
----- Message from Jyothi Garza sent at 2017 12:27 PM CDT -----  Contact: self  Joe Henson  MRN: [id number]  : 1980  PCP: Estela Munguia  Home Phone      [phone]  Work Phone      Not on file.  Mobile          [phone]      MESSAGE: NEEDS REFILL ON ADDERALL AND KLONOPIN. PT IS COMPLETELY OUT. IF IT CAN BE FILLED BY SOMEONE ELSE TODAY, SHE WOULD LIKE IT TO BE DONE.     PHONE: [phone]    PHARMACY: [facility]

## 2017-06-07 RX ORDER — DEXTROAMPHETAMINE SACCHARATE, AMPHETAMINE ASPARTATE, DEXTROAMPHETAMINE SULFATE AND AMPHETAMINE SULFATE 5; 5; 5; 5 MG/1; MG/1; MG/1; MG/1
1 TABLET ORAL 2 TIMES DAILY
Qty: 60 TABLET | Refills: 0 | Status: SHIPPED | OUTPATIENT
Start: 2017-06-07 | End: 2017-08-30 | Stop reason: SDUPTHER

## 2017-06-07 RX ORDER — CLONAZEPAM 0.5 MG/1
0.5 TABLET ORAL 3 TIMES DAILY PRN
Qty: 90 TABLET | Refills: 0 | Status: ON HOLD | OUTPATIENT
Start: 2017-06-07 | End: 2017-11-27

## 2017-06-17 ENCOUNTER — HOSPITAL ENCOUNTER (EMERGENCY)
Facility: HOSPITAL | Age: 37
Discharge: HOME | End: 2017-06-17
Attending: SURGERY
Payer: MEDICAID

## 2017-06-17 VITALS
SYSTOLIC BLOOD PRESSURE: 135 MMHG | WEIGHT: 173 LBS | BODY MASS INDEX: 28.82 KG/M2 | TEMPERATURE: 98 F | OXYGEN SATURATION: 99 % | HEART RATE: 87 BPM | DIASTOLIC BLOOD PRESSURE: 81 MMHG | RESPIRATION RATE: 20 BRPM | HEIGHT: 65 IN

## 2017-06-17 DIAGNOSIS — G89.29 CHRONIC DENTAL PAIN: Primary | ICD-10-CM

## 2017-06-17 DIAGNOSIS — K08.9 CHRONIC DENTAL PAIN: Primary | ICD-10-CM

## 2017-06-17 PROCEDURE — 96372 THER/PROPH/DIAG INJ SC/IM: CPT

## 2017-06-17 PROCEDURE — 63600175 PHARM REV CODE 636 W HCPCS: Performed by: SURGERY

## 2017-06-17 PROCEDURE — 99283 EMERGENCY DEPT VISIT LOW MDM: CPT | Mod: 25

## 2017-06-17 RX ORDER — AMOXICILLIN 875 MG/1
875 TABLET, FILM COATED ORAL 2 TIMES DAILY
Qty: 14 TABLET | Refills: 0 | Status: SHIPPED | OUTPATIENT
Start: 2017-06-17 | End: 2017-06-17

## 2017-06-17 RX ORDER — ONDANSETRON 2 MG/ML
4 INJECTION INTRAMUSCULAR; INTRAVENOUS
Status: COMPLETED | OUTPATIENT
Start: 2017-06-17 | End: 2017-06-17

## 2017-06-17 RX ORDER — KETOROLAC TROMETHAMINE 10 MG/1
10 TABLET, FILM COATED ORAL EVERY 6 HOURS PRN
Qty: 15 TABLET | Refills: 0 | Status: SHIPPED | OUTPATIENT
Start: 2017-06-17 | End: 2017-08-30

## 2017-06-17 RX ORDER — AMOXICILLIN 875 MG/1
875 TABLET, FILM COATED ORAL 2 TIMES DAILY
Qty: 14 TABLET | Refills: 0 | Status: SHIPPED | OUTPATIENT
Start: 2017-06-17 | End: 2017-06-24

## 2017-06-17 RX ORDER — HYDROMORPHONE HYDROCHLORIDE 1 MG/ML
1 INJECTION, SOLUTION INTRAMUSCULAR; INTRAVENOUS; SUBCUTANEOUS
Status: COMPLETED | OUTPATIENT
Start: 2017-06-17 | End: 2017-06-17

## 2017-06-17 RX ORDER — KETOROLAC TROMETHAMINE 10 MG/1
10 TABLET, FILM COATED ORAL EVERY 6 HOURS PRN
Qty: 15 TABLET | Refills: 0 | Status: SHIPPED | OUTPATIENT
Start: 2017-06-17 | End: 2017-06-17

## 2017-06-17 RX ADMIN — ONDANSETRON 4 MG: 2 INJECTION INTRAMUSCULAR; INTRAVENOUS at 05:06

## 2017-06-17 RX ADMIN — HYDROMORPHONE HYDROCHLORIDE 1 MG: 1 INJECTION, SOLUTION INTRAMUSCULAR; INTRAVENOUS; SUBCUTANEOUS at 05:06

## 2017-06-17 RX ADMIN — PENICILLIN G BENZATHINE 1.2 MILLION UNITS: 1200000 INJECTION, SUSPENSION INTRAMUSCULAR at 05:06

## 2017-06-17 NOTE — ED PROVIDER NOTES
[facility]                                        2017                   Chief Complaint  36 y.o. female with Dental Pain    History of Present Illness  Joe Henson presents to the emergency room with chronic dental pain issues  Patient states she cracked a right upper molar last week, severe dental cavities  Patient on exam is upper lower molar cavities with no facial swelling or abscess  Patient has no fever on presentation, 10/10 dental pain this past week with meals  Patient states that she cannot afford a dentist due to her financial hardship lately  Patient states she is going to local clinic this week in [place] for free dental care    The history is provided by the patient     Past Medical History   -- Abnormal Pap smear     -- ADHD (attention deficit hyperactivity disorder)     -- Arthritis     -- OCD (obsessive compulsive disorder)        Past Surgical History   --  SECTION       -- DILATION AND CURETTAGE OF UTERUS       -- ENDOMETRIAL ABLATION       -- KNEE SURGERY       -- TUBAL LIGATION          No Known Allergies     Review of Systems and Physical Exam     Review of Systems  -- Constitution - no fever, denies fatigue, no weakness, no chills  -- Eyes - no tearing or redness, no visual disturbance  -- Ear, Nose - no tinnitus or earache, no nasal congestion or discharge  -- Mouth,Throat - toothache, normal voice, normal swallowing  -- Respiratory - denies cough and congestion, no shortness of breath, no OCONNELL  -- Cardiovascular - denies chest pain, no palpitations, denies claudication  -- Gastrointestinal - denies abdominal pain, nausea, vomiting, or diarrhea  -- Musculoskeletal - denies back pain, negative for myalgias and arthralgias   -- Neurological - no headache, denies weakness or seizure; no LOC  -- Skin - denies pallor, rash, or changes in skin. no hives or welts noted    Vital Signs  -- Her tympanic temperature is 98.5 °F (36.9 °C).   -- Her blood pressure is  139/91 (abnormal) and her pulse is 91.   -- Her respiration is 18 and oxygen saturation is 99%.      Physical Exam  -- Nursing note and vitals reviewed  -- Constitutional: Appears well-developed and well-nourished  -- Head: Atraumatic. Normocephalic. No obvious abnormality  -- Eyes: Pupils are equal and reactive to light. Normal conjunctiva and lids  -- Nose: Nose normal in appearance, nares grossly normal. No discharge  -- Throat: several upper and lower bilateral molar cavities with no facial swelling     -- Ears: External ears and TM normal bilaterally. Normal hearing and no drainage  -- Neck: Normal range of motion. Neck supple. No masses, trachea midline  -- Cardiac: Normal rate, regular rhythm and normal heart sounds  -- Pulmonary: Normal respiratory effort, breath sounds clear to auscultation  -- Abdominal: Soft, no tenderness. Normal bowel sounds. Normal liver edge  -- Musculoskeletal: Normal range of motion, no effusions. Joints stable   -- Neurological: No focal deficits. Showed good interaction with staff    Emergency Room Course     Treatment and Evaluation  -- IM 1.2 million units Bicillin given today in the ER  -- IM 1 mg Dilaudid given today in the ER  -- IM 4 mg Zofran given today in the ER     Diagnosis  -- The encounter diagnosis was Chronic dental pain.    Disposition and Plan  -- Disposition: to dentist ASAP  -- Condition: stable  -- Pt given instructions; take all medications prescribed in the ER as directed.   -- Patient agrees to comply with all instructions- needs appropriate dental care  -- Pt agrees to return to ER if any symptoms reoccur; symptom-free on discharge  -- Patient to follow up with a dentist in 1-2 days. Has been given follow up information  -- The patient acknowledges that dental follow up is required for this issue     This note is dictated on Dragon Natural Speaking word recognition program.  There are word recognition mistakes that are occasionally missed on  review.             Hitesh Gilliam MD  06/17/17 6178

## 2017-07-13 ENCOUNTER — HOSPITAL ENCOUNTER (EMERGENCY)
Facility: HOSPITAL | Age: 37
Discharge: HOME | End: 2017-07-13
Attending: SURGERY
Payer: MEDICAID

## 2017-07-13 VITALS
SYSTOLIC BLOOD PRESSURE: 119 MMHG | WEIGHT: 170 LBS | RESPIRATION RATE: 18 BRPM | DIASTOLIC BLOOD PRESSURE: 80 MMHG | HEART RATE: 86 BPM | TEMPERATURE: 96 F | BODY MASS INDEX: 28.29 KG/M2

## 2017-07-13 DIAGNOSIS — K08.9 CHRONIC DENTAL PAIN: Primary | ICD-10-CM

## 2017-07-13 DIAGNOSIS — G89.29 CHRONIC DENTAL PAIN: Primary | ICD-10-CM

## 2017-07-13 PROCEDURE — 96372 THER/PROPH/DIAG INJ SC/IM: CPT

## 2017-07-13 PROCEDURE — 63600175 PHARM REV CODE 636 W HCPCS: Performed by: SURGERY

## 2017-07-13 PROCEDURE — 99283 EMERGENCY DEPT VISIT LOW MDM: CPT | Mod: 25

## 2017-07-13 RX ORDER — KETOROLAC TROMETHAMINE 30 MG/ML
60 INJECTION, SOLUTION INTRAMUSCULAR; INTRAVENOUS
Status: COMPLETED | OUTPATIENT
Start: 2017-07-13 | End: 2017-07-13

## 2017-07-13 RX ORDER — AMOXICILLIN 875 MG/1
875 TABLET, FILM COATED ORAL 2 TIMES DAILY
Qty: 14 TABLET | Refills: 0 | Status: SHIPPED | OUTPATIENT
Start: 2017-07-13 | End: 2017-07-20

## 2017-07-13 RX ORDER — HYDROCODONE BITARTRATE AND ACETAMINOPHEN 5; 325 MG/1; MG/1
1 TABLET ORAL EVERY 4 HOURS PRN
Qty: 8 TABLET | Refills: 0 | Status: SHIPPED | OUTPATIENT
Start: 2017-07-13 | End: 2017-07-23

## 2017-07-13 RX ADMIN — PENICILLIN G BENZATHINE 1.2 MILLION UNITS: 1200000 INJECTION, SUSPENSION INTRAMUSCULAR at 11:07

## 2017-07-13 RX ADMIN — KETOROLAC TROMETHAMINE 60 MG: 30 INJECTION, SOLUTION INTRAMUSCULAR at 11:07

## 2017-07-13 NOTE — ED NOTES
No s/s adverse reaction to medications given.  Discharge instructions and rx x2 given, patient voiced understanding.  Discharged to home in stable condition, ambulatory to discharge window w steady gait, NAD.

## 2017-07-13 NOTE — ED PROVIDER NOTES
[facility]                                        2017                   Chief Complaint  37 y.o. female with Dental Pain    History of Present Illness  Joe Henson presents to the emergency room with dental pain today  Patient on exam is upper lower molar cavities with no facial swelling  Patient has no fever or signs of dental abscess on ER evaluation today  Patient states she has 10/10 pain with any chewing or meals the last week  Patient states she has a dental appointment on Monday, afebrile and VSS    The history is provided by the patient     Past Medical History   -- Abnormal Pap smear     -- ADHD (attention deficit hyperactivity disorder)     -- Arthritis     -- OCD (obsessive compulsive disorder)        Past Surgical History   --  SECTION       -- DILATION AND CURETTAGE OF UTERUS       -- ENDOMETRIAL ABLATION       -- KNEE SURGERY       -- TUBAL LIGATION          No Known Allergies     Review of Systems and Physical Exam     Review of Systems  -- Constitution - no fever, denies fatigue, no weakness, no chills  -- Eyes - no tearing or redness, no visual disturbance  -- Ear, Nose - no tinnitus or earache, no nasal congestion or discharge  -- Mouth,Throat - toothache, normal voice, normal swallowing  -- Respiratory - denies cough and congestion, no shortness of breath, no OCONNELL  -- Cardiovascular - denies chest pain, no palpitations, denies claudication  -- Gastrointestinal - denies abdominal pain, nausea, vomiting, or diarrhea  -- Musculoskeletal - denies back pain, negative for myalgias and arthralgias   -- Neurological - no headache, denies weakness or seizure; no LOC  -- Skin - denies pallor, rash, or changes in skin. no hives or welts noted    Vital Signs  -- Her blood pressure is 119/80 and her pulse is 86. Her respiration is 18.      Physical Exam  -- Nursing note and vitals reviewed  -- Constitutional: Appears well-developed and well-nourished  -- Head: Atraumatic.  Normocephalic. No obvious abnormality  -- Eyes: Pupils are equal and reactive to light. Normal conjunctiva and lids  -- Nose: Nose normal in appearance, nares grossly normal. No discharge  -- Throat: several upper and lower bilateral molar cavities with no facial swelling     -- Ears: External ears and TM normal bilaterally. Normal hearing and no drainage  -- Neck: Normal range of motion. Neck supple. No masses, trachea midline  -- Cardiac: Normal rate, regular rhythm and normal heart sounds  -- Pulmonary: Normal respiratory effort, breath sounds clear to auscultation  -- Abdominal: Soft, no tenderness. Normal bowel sounds. Normal liver edge  -- Musculoskeletal: Normal range of motion, no effusions. Joints stable   -- Neurological: No focal deficits. Showed good interaction with staff    Emergency Room Course     Treatment and Evaluation  -- IM 1.2 million units Bicillin given today in the ER  -- IM 60 mg Toradol given today in the ER    Diagnosis  -- The encounter diagnosis was Chronic dental pain.    Disposition and Plan  -- Disposition: to dentist ASAP  -- Condition: stable  -- Pt given instructions; take all medications prescribed in the ER as directed.   -- Patient agrees to comply with all instructions- needs appropriate dental care  -- Pt agrees to return to ER if any symptoms reoccur; symptom-free on discharge  -- Patient to follow up with a dentist in 1-2 days. Has been given follow up information  -- The patient acknowledges that dental follow up is required for this issue     This note is dictated on Dragon [employer] word recognition program.  There are word recognition mistakes that are occasionally missed on review.           Hitesh Gilliam MD  07/13/17 1940

## 2017-07-14 ENCOUNTER — HOSPITAL ENCOUNTER (EMERGENCY)
Facility: HOSPITAL | Age: 37
Discharge: PSYCHIATRIC OR SUBSTANCE USE FACILITY | End: 2017-07-15
Attending: SURGERY
Payer: MEDICAID

## 2017-07-14 DIAGNOSIS — F32.A DEPRESSION WITH SUICIDAL IDEATION: Primary | ICD-10-CM

## 2017-07-14 DIAGNOSIS — R45.851 DEPRESSION WITH SUICIDAL IDEATION: Primary | ICD-10-CM

## 2017-07-14 LAB
25(OH)D3+25(OH)D2 SERPL-MCNC: 34 NG/ML
ALBUMIN SERPL BCP-MCNC: 3.7 G/DL
ALP SERPL-CCNC: 81 U/L
ALT SERPL W/O P-5'-P-CCNC: 13 U/L
AMPHET+METHAMPHET UR QL: NEGATIVE
ANION GAP SERPL CALC-SCNC: 9 MMOL/L
APAP SERPL-MCNC: <3 UG/ML
AST SERPL-CCNC: 11 U/L
B-HCG UR QL: NEGATIVE
BACTERIA #/AREA URNS HPF: NORMAL /HPF
BARBITURATES UR QL SCN>200 NG/ML: NEGATIVE
BASOPHILS # BLD AUTO: 0.04 K/UL
BASOPHILS NFR BLD: 0.4 %
BENZODIAZ UR QL SCN>200 NG/ML: NEGATIVE
BILIRUB SERPL-MCNC: 0.1 MG/DL
BILIRUB UR QL STRIP: NEGATIVE
BUN SERPL-MCNC: 13 MG/DL
BZE UR QL SCN: NEGATIVE
CALCIUM SERPL-MCNC: 9.1 MG/DL
CANNABINOIDS UR QL SCN: NEGATIVE
CHLORIDE SERPL-SCNC: 106 MMOL/L
CLARITY UR: CLEAR
CO2 SERPL-SCNC: 24 MMOL/L
COLOR UR: YELLOW
CREAT SERPL-MCNC: 0.8 MG/DL
CREAT UR-MCNC: 72.7 MG/DL
DIFFERENTIAL METHOD: ABNORMAL
EOSINOPHIL # BLD AUTO: 0.1 K/UL
EOSINOPHIL NFR BLD: 1.4 %
ERYTHROCYTE [DISTWIDTH] IN BLOOD BY AUTOMATED COUNT: 14 %
EST. GFR  (AFRICAN AMERICAN): >60 ML/MIN/1.73 M^2
EST. GFR  (NON AFRICAN AMERICAN): >60 ML/MIN/1.73 M^2
ETHANOL SERPL-MCNC: <10 MG/DL
GLUCOSE SERPL-MCNC: 105 MG/DL
GLUCOSE UR QL STRIP: NEGATIVE
HCT VFR BLD AUTO: 39.9 %
HGB BLD-MCNC: 13 G/DL
HGB UR QL STRIP: ABNORMAL
KETONES UR QL STRIP: NEGATIVE
LEUKOCYTE ESTERASE UR QL STRIP: NEGATIVE
LYMPHOCYTES # BLD AUTO: 2.5 K/UL
LYMPHOCYTES NFR BLD: 25.3 %
MCH RBC QN AUTO: 29.7 PG
MCHC RBC AUTO-ENTMCNC: 32.6 %
MCV RBC AUTO: 91 FL
METHADONE UR QL SCN>300 NG/ML: NEGATIVE
MICROSCOPIC COMMENT: NORMAL
MONOCYTES # BLD AUTO: 0.8 K/UL
MONOCYTES NFR BLD: 8 %
NEUTROPHILS # BLD AUTO: 6.5 K/UL
NEUTROPHILS NFR BLD: 64.9 %
NITRITE UR QL STRIP: NEGATIVE
OPIATES UR QL SCN: NORMAL
PCP UR QL SCN>25 NG/ML: NEGATIVE
PH UR STRIP: 6 [PH] (ref 5–8)
PLATELET # BLD AUTO: 376 K/UL
PMV BLD AUTO: 10.4 FL
POTASSIUM SERPL-SCNC: 4.1 MMOL/L
PROT SERPL-MCNC: 7.5 G/DL
PROT UR QL STRIP: NEGATIVE
RBC # BLD AUTO: 4.37 M/UL
RBC #/AREA URNS HPF: 2 /HPF (ref 0–4)
SALICYLATES SERPL-MCNC: <5 MG/DL
SODIUM SERPL-SCNC: 139 MMOL/L
SP GR UR STRIP: 1.02 (ref 1–1.03)
T3FREE SERPL-MCNC: 2.3 PG/ML
T4 FREE SERPL-MCNC: 0.87 NG/DL
TOXICOLOGY INFORMATION: NORMAL
TSH SERPL DL<=0.005 MIU/L-ACNC: 0.64 UIU/ML
URN SPEC COLLECT METH UR: ABNORMAL
UROBILINOGEN UR STRIP-ACNC: NEGATIVE EU/DL
WBC # BLD AUTO: 10.05 K/UL
WBC #/AREA URNS HPF: 2 /HPF (ref 0–5)

## 2017-07-14 PROCEDURE — 36415 COLL VENOUS BLD VENIPUNCTURE: CPT

## 2017-07-14 PROCEDURE — 84443 ASSAY THYROID STIM HORMONE: CPT

## 2017-07-14 PROCEDURE — 99285 EMERGENCY DEPT VISIT HI MDM: CPT | Mod: 25

## 2017-07-14 PROCEDURE — 81000 URINALYSIS NONAUTO W/SCOPE: CPT

## 2017-07-14 PROCEDURE — 80329 ANALGESICS NON-OPIOID 1 OR 2: CPT

## 2017-07-14 PROCEDURE — 80307 DRUG TEST PRSMV CHEM ANLYZR: CPT

## 2017-07-14 PROCEDURE — 82607 VITAMIN B-12: CPT

## 2017-07-14 PROCEDURE — 82746 ASSAY OF FOLIC ACID SERUM: CPT

## 2017-07-14 PROCEDURE — 25000003 PHARM REV CODE 250: Performed by: SURGERY

## 2017-07-14 PROCEDURE — 80053 COMPREHEN METABOLIC PANEL: CPT

## 2017-07-14 PROCEDURE — 96372 THER/PROPH/DIAG INJ SC/IM: CPT

## 2017-07-14 PROCEDURE — 85025 COMPLETE CBC W/AUTO DIFF WBC: CPT

## 2017-07-14 PROCEDURE — 84439 ASSAY OF FREE THYROXINE: CPT

## 2017-07-14 PROCEDURE — 84481 FREE ASSAY (FT-3): CPT

## 2017-07-14 PROCEDURE — 80320 DRUG SCREEN QUANTALCOHOLS: CPT

## 2017-07-14 PROCEDURE — 82306 VITAMIN D 25 HYDROXY: CPT

## 2017-07-14 PROCEDURE — 81025 URINE PREGNANCY TEST: CPT

## 2017-07-14 PROCEDURE — 63600175 PHARM REV CODE 636 W HCPCS: Performed by: SURGERY

## 2017-07-14 RX ORDER — AMOXICILLIN 500 MG/1
1000 CAPSULE ORAL
Status: COMPLETED | OUTPATIENT
Start: 2017-07-14 | End: 2017-07-14

## 2017-07-14 RX ORDER — HYDROCODONE BITARTRATE AND ACETAMINOPHEN 5; 325 MG/1; MG/1
1 TABLET ORAL
Status: COMPLETED | OUTPATIENT
Start: 2017-07-14 | End: 2017-07-14

## 2017-07-14 RX ORDER — HALOPERIDOL 5 MG/ML
5 INJECTION INTRAMUSCULAR EVERY 4 HOURS PRN
Status: DISCONTINUED | OUTPATIENT
Start: 2017-07-14 | End: 2017-07-15 | Stop reason: HOSPADM

## 2017-07-14 RX ORDER — IBUPROFEN 200 MG
1 TABLET ORAL
Status: DISCONTINUED | OUTPATIENT
Start: 2017-07-14 | End: 2017-07-15 | Stop reason: HOSPADM

## 2017-07-14 RX ORDER — DIPHENHYDRAMINE HYDROCHLORIDE 50 MG/ML
50 INJECTION INTRAMUSCULAR; INTRAVENOUS EVERY 4 HOURS PRN
Status: DISCONTINUED | OUTPATIENT
Start: 2017-07-14 | End: 2017-07-15 | Stop reason: HOSPADM

## 2017-07-14 RX ORDER — LORAZEPAM 2 MG/ML
2 INJECTION INTRAMUSCULAR EVERY 4 HOURS PRN
Status: DISCONTINUED | OUTPATIENT
Start: 2017-07-14 | End: 2017-07-15 | Stop reason: HOSPADM

## 2017-07-14 RX ADMIN — HALOPERIDOL LACTATE 5 MG: 5 INJECTION, SOLUTION INTRAMUSCULAR at 08:07

## 2017-07-14 RX ADMIN — DIPHENHYDRAMINE HYDROCHLORIDE 50 MG: 50 INJECTION, SOLUTION INTRAMUSCULAR; INTRAVENOUS at 08:07

## 2017-07-14 RX ADMIN — AMOXICILLIN 1000 MG: 500 CAPSULE ORAL at 07:07

## 2017-07-14 RX ADMIN — LORAZEPAM 2 MG: 2 INJECTION INTRAMUSCULAR; INTRAVENOUS at 08:07

## 2017-07-14 RX ADMIN — NICOTINE 1 PATCH: 21 PATCH TRANSDERMAL at 07:07

## 2017-07-14 RX ADMIN — HYDROCODONE BITARTRATE AND ACETAMINOPHEN 1 TABLET: 5; 325 TABLET ORAL at 07:07

## 2017-07-14 NOTE — ED NOTES
Spoke with Saranya simpson at [facility]. She reports they do possibly have a bed on [facility] for patient. Faxed: face sheet, suicide note, and PEC to [facility]. Fax number [phone] (provided by Saranya robles supervisor). Patient awaiting medical clearance.

## 2017-07-14 NOTE — ED PROVIDER NOTES
[facility]                                        2017                   Chief Complaint  37 y.o. female with Depression    History of Present Illness  Joe Henson presents to the emergency room with depression issues today  Patient brought in by the police out of concern of the family this afternoon PTA  Patient had written goodbye letters in her journal, 12-year-old son read letter  Patient has been on the inpatient psychiatric unit 5 times over the past years  Pt states she is depressed but not suicidal, recently broke up with her boyfriend  Family is was concerned that the patient is suicidal, wanted her to be evaluated    The history is provided by the patient     Past Medical History   -- Abnormal Pap smear     -- ADHD (attention deficit hyperactivity disorder)     -- Arthritis     -- OCD (obsessive compulsive disorder)        Past Surgical History   --  SECTION       -- DILATION AND CURETTAGE OF UTERUS       -- ENDOMETRIAL ABLATION       -- KNEE SURGERY       -- TUBAL LIGATION          No Known Allergies      Review of Systems and Physical Exam     Review of Systems  -- Constitution - no fever, denies fatigue, no weakness, no chills  -- Eyes - no tearing or redness, no visual disturbance  -- Ear, Nose - no tinnitus or earache, no nasal congestion or discharge  -- Mouth,Throat - no sore throat, no toothache, normal voice, normal swallowing  -- Respiratory - denies cough and congestion, no shortness of breath, no OCONNELL  -- Cardiovascular - denies chest pain, no palpitations, denies claudication  -- Gastrointestinal - denies abdominal pain, nausea, vomiting, or diarrhea  -- Genitourinary - no dysuria, no denies flank pain, no hematuria or frequency   -- Musculoskeletal - denies back pain, negative for myalgias and arthralgias   -- Neurological - no headache, denies weakness or seizure; no LOC  -- Psychiatric - suicidal ideation, no psychosis or fractured thought noted      Vital Signs  -- Her blood pressure is 180/95 and her pulse is 89. Her respiration is 18.      Physical Exam  -- Nursing note and vitals reviewed  -- Constitutional: Appears well-developed and well-nourished  -- Head: Atraumatic. Normocephalic. No obvious abnormality  -- Eyes: Pupils are equal and reactive to light. Normal conjunctiva and lids  -- Cardiac: Normal rate, regular rhythm and normal heart sounds  -- Pulmonary: Normal respiratory effort, breath sounds clear to auscultation  -- Abdominal: Soft, no tenderness. Normal bowel sounds. Normal liver edge  -- Musculoskeletal: Normal range of motion, no effusions. Joints stable   -- Neurological: No focal deficits. Showed good interaction with staff  -- Vascular: Posterior tibial, dorsalis pedis and radial pulses 2+ bilaterally      Emergency Room Course     Diagnosis  -- The encounter diagnosis was Depression with suicidal ideation.    Disposition and Plan  -- Disposition: PEC  -- Condition: stable  -- Pt will be placed in a psychiatric facility  -- The patient is a direct observation until placement  -- The patient has been made aware of his or her rights while under PEC in the ER  -- All questions have been answered; will follow ER protocols until placement    Lab work was performed in the ER, this patient is cleared for psychiatric placement     This note is dictated on Dragon Natural Speaking word recognition program.  There are word recognition mistakes that are occasionally missed on review.           Hitesh Gilliam MD  07/14/17 4088

## 2017-07-15 VITALS
TEMPERATURE: 98 F | HEART RATE: 74 BPM | RESPIRATION RATE: 16 BRPM | DIASTOLIC BLOOD PRESSURE: 68 MMHG | WEIGHT: 170 LBS | SYSTOLIC BLOOD PRESSURE: 102 MMHG | BODY MASS INDEX: 28.29 KG/M2

## 2017-07-15 LAB
FOLATE SERPL-MCNC: 6.5 NG/ML
VIT B12 SERPL-MCNC: 299 PG/ML

## 2017-07-15 PROCEDURE — 25000003 PHARM REV CODE 250: Performed by: INTERNAL MEDICINE

## 2017-07-15 RX ORDER — HYDROCODONE BITARTRATE AND ACETAMINOPHEN 5; 325 MG/1; MG/1
1 TABLET ORAL
Status: COMPLETED | OUTPATIENT
Start: 2017-07-15 | End: 2017-07-15

## 2017-07-15 RX ADMIN — HYDROCODONE BITARTRATE AND ACETAMINOPHEN 1 TABLET: 5; 325 TABLET ORAL at 05:07

## 2017-07-15 NOTE — ED NOTES
Hourly rounding completed. ID band on. Patient resting quietly. Updated on plan of care. Refer to flow sheet or progress note for assessment, vital signs, and medication administration. Bed locked and in lowest position, side rail up X1. Patient visualized per sitter at bedside. Elopement precautions maintained. Patient in no acute distress. Awaiting additional orders and/or placement. Will continue to monitor patient closely.

## 2017-07-26 ENCOUNTER — LAB VISIT (OUTPATIENT)
Dept: LAB | Facility: HOSPITAL | Age: 37
End: 2017-07-26
Attending: INTERNAL MEDICINE
Payer: MEDICAID

## 2017-07-26 DIAGNOSIS — Z51.81 ENCOUNTER FOR THERAPEUTIC DRUG MONITORING: Primary | ICD-10-CM

## 2017-07-26 LAB — LITHIUM SERPL-SCNC: 0.9 MMOL/L

## 2017-07-26 PROCEDURE — 36415 COLL VENOUS BLD VENIPUNCTURE: CPT

## 2017-07-26 PROCEDURE — 80178 ASSAY OF LITHIUM: CPT

## 2017-08-07 ENCOUNTER — TELEPHONE (OUTPATIENT)
Dept: SMOKING CESSATION | Facility: CLINIC | Age: 37
End: 2017-08-07

## 2017-08-07 NOTE — TELEPHONE ENCOUNTER
Left message for Ms. Diaz to call [phone] back if interested in rescheduling Smoking Cessation Intake appointment. Attempt # 1

## 2017-08-10 ENCOUNTER — CLINICAL SUPPORT (OUTPATIENT)
Dept: SMOKING CESSATION | Facility: CLINIC | Age: 37
End: 2017-08-10
Payer: COMMERCIAL

## 2017-08-10 DIAGNOSIS — F17.200 NICOTINE DEPENDENCE: Primary | ICD-10-CM

## 2017-08-10 PROCEDURE — 99404 PREV MED CNSL INDIV APPRX 60: CPT | Mod: S$GLB,,,

## 2017-08-10 RX ORDER — ASPIRIN/CALCIUM CARB/MAGNESIUM 325 MG
4 TABLET ORAL
Qty: 24 LOZENGE | Refills: 0 | Status: SHIPPED | OUTPATIENT
Start: 2017-08-10 | End: 2017-08-24

## 2017-08-10 RX ORDER — GABAPENTIN 600 MG/1
600 TABLET ORAL NIGHTLY
Status: ON HOLD | COMMUNITY
End: 2017-11-29 | Stop reason: HOSPADM

## 2017-08-10 RX ORDER — LITHIUM CARBONATE 600 MG/1
600 CAPSULE ORAL 2 TIMES DAILY WITH MEALS
Status: ON HOLD | COMMUNITY
End: 2017-11-27

## 2017-08-10 RX ORDER — TRAZODONE HYDROCHLORIDE 100 MG/1
100 TABLET ORAL NIGHTLY
COMMUNITY
End: 2017-08-30

## 2017-08-10 RX ORDER — IBUPROFEN 200 MG
1 TABLET ORAL DAILY
Qty: 14 PATCH | Refills: 0 | Status: SHIPPED | OUTPATIENT
Start: 2017-08-10 | End: 2017-08-24

## 2017-08-10 RX ORDER — DIPHENHYDRAMINE HCL 25 MG
4 CAPSULE ORAL
Qty: 40 EACH | Refills: 0 | Status: SHIPPED | OUTPATIENT
Start: 2017-08-10 | End: 2017-08-24

## 2017-08-10 RX ORDER — LURASIDONE HYDROCHLORIDE 40 MG/1
20 TABLET, FILM COATED ORAL
COMMUNITY
End: 2017-08-30

## 2017-08-14 ENCOUNTER — HOSPITAL ENCOUNTER (EMERGENCY)
Facility: HOSPITAL | Age: 37
Discharge: PSYCHIATRIC OR SUBSTANCE USE FACILITY | End: 2017-08-14
Attending: FAMILY MEDICINE
Payer: MEDICAID

## 2017-08-14 VITALS
DIASTOLIC BLOOD PRESSURE: 82 MMHG | TEMPERATURE: 98 F | SYSTOLIC BLOOD PRESSURE: 127 MMHG | RESPIRATION RATE: 18 BRPM | HEART RATE: 82 BPM | BODY MASS INDEX: 31.76 KG/M2 | WEIGHT: 185 LBS

## 2017-08-14 DIAGNOSIS — R45.851 SUICIDAL IDEATION: Primary | ICD-10-CM

## 2017-08-14 DIAGNOSIS — F32.A DEPRESSION, UNSPECIFIED DEPRESSION TYPE: ICD-10-CM

## 2017-08-14 LAB
ALBUMIN SERPL BCP-MCNC: 3.5 G/DL
ALP SERPL-CCNC: 86 U/L
ALT SERPL W/O P-5'-P-CCNC: 27 U/L
AMPHET+METHAMPHET UR QL: NEGATIVE
ANION GAP SERPL CALC-SCNC: 10 MMOL/L
APAP SERPL-MCNC: <3 UG/ML
AST SERPL-CCNC: 20 U/L
BARBITURATES UR QL SCN>200 NG/ML: NEGATIVE
BASOPHILS # BLD AUTO: 0.05 K/UL
BASOPHILS NFR BLD: 0.4 %
BENZODIAZ UR QL SCN>200 NG/ML: NEGATIVE
BILIRUB SERPL-MCNC: 0.2 MG/DL
BILIRUB UR QL STRIP: NEGATIVE
BUN SERPL-MCNC: 12 MG/DL
BZE UR QL SCN: NEGATIVE
CALCIUM SERPL-MCNC: 8.8 MG/DL
CANNABINOIDS UR QL SCN: NEGATIVE
CHLORIDE SERPL-SCNC: 106 MMOL/L
CLARITY UR: CLEAR
CO2 SERPL-SCNC: 22 MMOL/L
COLOR UR: YELLOW
CREAT SERPL-MCNC: 0.8 MG/DL
CREAT UR-MCNC: 79.3 MG/DL
DIFFERENTIAL METHOD: ABNORMAL
EOSINOPHIL # BLD AUTO: 0.2 K/UL
EOSINOPHIL NFR BLD: 2 %
ERYTHROCYTE [DISTWIDTH] IN BLOOD BY AUTOMATED COUNT: 14.1 %
EST. GFR  (AFRICAN AMERICAN): >60 ML/MIN/1.73 M^2
EST. GFR  (NON AFRICAN AMERICAN): >60 ML/MIN/1.73 M^2
ETHANOL SERPL-MCNC: <10 MG/DL
GLUCOSE SERPL-MCNC: 93 MG/DL
GLUCOSE UR QL STRIP: NEGATIVE
HCT VFR BLD AUTO: 35.7 %
HGB BLD-MCNC: 11.6 G/DL
HGB UR QL STRIP: ABNORMAL
KETONES UR QL STRIP: NEGATIVE
LEUKOCYTE ESTERASE UR QL STRIP: NEGATIVE
LYMPHOCYTES # BLD AUTO: 3 K/UL
LYMPHOCYTES NFR BLD: 25 %
MCH RBC QN AUTO: 29.1 PG
MCHC RBC AUTO-ENTMCNC: 32.5 G/DL
MCV RBC AUTO: 90 FL
METHADONE UR QL SCN>300 NG/ML: NEGATIVE
MONOCYTES # BLD AUTO: 0.9 K/UL
MONOCYTES NFR BLD: 7.2 %
NEUTROPHILS # BLD AUTO: 7.9 K/UL
NEUTROPHILS NFR BLD: 65.4 %
NITRITE UR QL STRIP: NEGATIVE
OPIATES UR QL SCN: NEGATIVE
PCP UR QL SCN>25 NG/ML: NEGATIVE
PH UR STRIP: 8 [PH] (ref 5–8)
PLATELET # BLD AUTO: 450 K/UL
PMV BLD AUTO: 9.8 FL
POTASSIUM SERPL-SCNC: 3.8 MMOL/L
PROT SERPL-MCNC: 7.3 G/DL
PROT UR QL STRIP: NEGATIVE
RBC # BLD AUTO: 3.98 M/UL
SODIUM SERPL-SCNC: 138 MMOL/L
SP GR UR STRIP: 1.02 (ref 1–1.03)
TOXICOLOGY INFORMATION: NORMAL
URN SPEC COLLECT METH UR: ABNORMAL
UROBILINOGEN UR STRIP-ACNC: NEGATIVE EU/DL
WBC # BLD AUTO: 12.02 K/UL

## 2017-08-14 PROCEDURE — 25000003 PHARM REV CODE 250: Performed by: FAMILY MEDICINE

## 2017-08-14 PROCEDURE — 85025 COMPLETE CBC W/AUTO DIFF WBC: CPT

## 2017-08-14 PROCEDURE — S4991 NICOTINE PATCH NONLEGEND: HCPCS | Performed by: FAMILY MEDICINE

## 2017-08-14 PROCEDURE — 80307 DRUG TEST PRSMV CHEM ANLYZR: CPT

## 2017-08-14 PROCEDURE — 36415 COLL VENOUS BLD VENIPUNCTURE: CPT

## 2017-08-14 PROCEDURE — 80329 ANALGESICS NON-OPIOID 1 OR 2: CPT

## 2017-08-14 PROCEDURE — 81003 URINALYSIS AUTO W/O SCOPE: CPT

## 2017-08-14 PROCEDURE — 80053 COMPREHEN METABOLIC PANEL: CPT

## 2017-08-14 PROCEDURE — 80320 DRUG SCREEN QUANTALCOHOLS: CPT

## 2017-08-14 PROCEDURE — 99285 EMERGENCY DEPT VISIT HI MDM: CPT

## 2017-08-14 RX ORDER — GABAPENTIN 300 MG/1
600 CAPSULE ORAL ONCE
Status: COMPLETED | OUTPATIENT
Start: 2017-08-14 | End: 2017-08-14

## 2017-08-14 RX ORDER — KETOROLAC TROMETHAMINE 10 MG/1
10 TABLET, FILM COATED ORAL
Status: COMPLETED | OUTPATIENT
Start: 2017-08-14 | End: 2017-08-14

## 2017-08-14 RX ORDER — IBUPROFEN 200 MG
1 TABLET ORAL
Status: DISCONTINUED | OUTPATIENT
Start: 2017-08-14 | End: 2017-08-14 | Stop reason: HOSPADM

## 2017-08-14 RX ORDER — ACETAMINOPHEN 500 MG
1000 TABLET ORAL
Status: COMPLETED | OUTPATIENT
Start: 2017-08-14 | End: 2017-08-14

## 2017-08-14 RX ORDER — HYDROXYZINE PAMOATE 25 MG/1
25 CAPSULE ORAL 2 TIMES DAILY
Status: ON HOLD | COMMUNITY
End: 2017-11-27

## 2017-08-14 RX ADMIN — KETOROLAC TROMETHAMINE 10 MG: 10 TABLET, FILM COATED ORAL at 11:08

## 2017-08-14 RX ADMIN — GABAPENTIN 600 MG: 300 CAPSULE ORAL at 02:08

## 2017-08-14 RX ADMIN — ACETAMINOPHEN 1000 MG: 500 TABLET ORAL at 12:08

## 2017-08-14 RX ADMIN — NICOTINE 1 PATCH: 21 PATCH, EXTENDED RELEASE TRANSDERMAL at 12:08

## 2017-08-14 NOTE — ED NOTES
Records faxed to [facility], [facility], [facility], [place], [facility], [facility], [facility], [place], [facility], [facility], [facility], [facility], [facility], [facility], [facility], [facility], [facility], [facility]

## 2017-08-14 NOTE — ED TRIAGE NOTES
37 y.o. female presents to ER ED 03 /ED 03B   Chief Complaint   Patient presents with    Depression   pt states she is having thoughts of hurting herself. NOMI

## 2017-08-14 NOTE — ED NOTES
The patient is resting comfortably with eyes closed, easily arousable. Airway is open and patent, respirations are spontaneous, normal respiratory effort and rate noted, skin warm and dry, full ROM in all extremities, appearance: no apparent distress noted, resting comfortably.  No change from previous assessment.  Bed in low, locked position, SR x2, HOB 30 degrees.  Pt able to change position independently.  Sitter present at bedside.  Will continue to monitor. Pt requesting home gabapentin, notified MD.

## 2017-08-14 NOTE — ED PROVIDER NOTES
Encounter Date: 8/14/2017       History     Chief Complaint   Patient presents with    Depression     The history is provided by the patient. No  was used.   Mental Health Problem   The primary symptoms include dysphoric mood and negative symptoms. The primary symptoms do not include delusions, hallucinations, bizarre behavior, disorganized speech or somatic symptoms. The current episode started several weeks ago. This is a recurrent problem.   The degree of incapacity that she is experiencing as a consequence of her illness is mild. Sequelae of the illness include harmed interpersonal relations. Additional symptoms of the illness include anhedonia and feelings of worthlessness. Additional symptoms of the illness do not include no insomnia, no hypersomnia, no appetite change, no unexpected weight change, no fatigue, no agitation, no psychomotor retardation, no attention impairment, no euphoric mood, no increased goal-directed activity, no flight of ideas, no inflated self-esteem, no decreased need for sleep, not distractible, no poor judgment, no visual change, no headaches, no abdominal pain or no seizures. She admits to suicidal ideas. She does not have a plan to commit suicide. She contemplates harming herself. She has not already injured self. She does not contemplate injuring another person. She has not already  injured another person. Risk factors that are present for mental illness include a history of mental illness.     Patient came in today because of increasing symptoms of depression and feelings of suicidal thoughts.  She was just discharged from [facility] in [facility] about 2 or 3 weeks ago.  She was feeling better but feels she needs a long-term care facility.  She has no plan for suicide but is feeling alone.  Her medications include lithium gabapentin Vistaril and trazodone.  She does smoke cigarettes.  She had a uterine ablation.  She's had some chronic left tooth ache  but no fever chills nausea or vomiting.    Review of patient's allergies indicates:  No Known Allergies  Past Medical History:   Diagnosis Date    Abnormal Pap smear age 30    Pos. HPV,     ADHD (attention deficit hyperactivity disorder)     Arthritis     Bipolar disorder     Depression     OCD (obsessive compulsive disorder)     PTSD (post-traumatic stress disorder)      Past Surgical History:   Procedure Laterality Date     SECTION  ;     DILATION AND CURETTAGE OF UTERUS  2016    ENDOMETRIAL ABLATION  2016    KNEE SURGERY Right 2011    hardware-bone from hip to repair    TUBAL LIGATION       Family History   Problem Relation Age of Onset    Diabetes Maternal Grandmother     Hypertension Father     Colon cancer Father 60    Breast cancer Paternal Aunt     Breast cancer Paternal Aunt     Breast cancer Paternal Aunt     Breast cancer Paternal Aunt     Coronary artery disease Maternal Grandfather      labor Neg Hx      Social History   Substance Use Topics    Smoking status: Current Every Day Smoker     Packs/day: 1.00     Years: 15.00     Types: Cigarettes     Start date: 1997    Smokeless tobacco: Never Used      Comment: told about brochure and smoking clinic program    Alcohol use Yes      Comment: Socially-2 times a week-3 beer     Review of Systems   Constitutional: Negative for appetite change, fatigue and unexpected weight change.   Gastrointestinal: Negative for abdominal pain.   Neurological: Negative for seizures and headaches.   Psychiatric/Behavioral: Positive for dysphoric mood. Negative for agitation and hallucinations. The patient does not have insomnia.        Physical Exam     Initial Vitals [17 1109]   BP Pulse Resp Temp SpO2   131/88 94 20 98 °F (36.7 °C) --      MAP       102.33         Physical Exam    Nursing note and vitals reviewed.  Constitutional: She appears well-developed and well-nourished.   Alert cooperative  patient in no distress.   HENT:   Head: Normocephalic and atraumatic.   Right Ear: External ear normal.   Left Ear: External ear normal.   Nose: Nose normal.   Mouth/Throat: Oropharynx is clear and moist. Dental caries present.   Eyes: Conjunctivae and EOM are normal. Pupils are equal, round, and reactive to light.   Neck: Normal range of motion. Neck supple.   Cardiovascular: Normal rate, regular rhythm and normal heart sounds.   Pulmonary/Chest: Breath sounds normal.   Abdominal: Soft. Bowel sounds are normal.   Musculoskeletal: Normal range of motion.   Neurological: She is alert and oriented to person, place, and time. She has normal strength.   Skin: Skin is warm and dry.   Psychiatric: Her speech is normal. Judgment normal. Her mood appears not anxious. Her affect is not angry, not blunt, not labile and not inappropriate. She is withdrawn. She is not agitated, not aggressive, not hyperactive, not slowed and not combative. Thought content is not paranoid and not delusional. Cognition and memory are normal. She does not exhibit a depressed mood. She expresses suicidal ideation. She expresses no homicidal ideation. She expresses no suicidal plans and no homicidal plans.         ED Course   Procedures  Labs Reviewed   CBC W/ AUTO DIFFERENTIAL   COMPREHENSIVE METABOLIC PANEL   URINALYSIS   DRUG SCREEN PANEL, URINE EMERGENCY   ALCOHOL,MEDICAL (ETHANOL)   ACETAMINOPHEN LEVEL                               ED Course     Clinical Impression:   The primary encounter diagnosis was Suicidal ideation. A diagnosis of Depression, unspecified depression type was also pertinent to this visit.                           Tay Maya MD  08/14/17 1509

## 2017-08-14 NOTE — ED NOTES
Meal tray provided to patient. The patient is resting comfortably with eyes closed, easily arousable. Airway is open and patent, respirations are spontaneous, normal respiratory effort and rate noted, skin warm and dry, full ROM in all extremities, appearance: no apparent distress noted, resting comfortably.  No change from previous assessment.  Bed in low, locked position, SR x2, HOB 30 degrees.  Pt able to change position independently.  Sitter present at bedside.  Will continue to monitor.

## 2017-08-24 ENCOUNTER — TELEPHONE (OUTPATIENT)
Dept: SMOKING CESSATION | Facility: CLINIC | Age: 37
End: 2017-08-24

## 2017-08-24 NOTE — TELEPHONE ENCOUNTER
Left message for Ms. Diaz to call [phone] back for phone follow up/medication review and to see if she was still interested in participating in the Smoking Cessation Program. Attempt # 1

## 2017-08-29 ENCOUNTER — TELEPHONE (OUTPATIENT)
Dept: SMOKING CESSATION | Facility: CLINIC | Age: 37
End: 2017-08-29

## 2017-08-29 NOTE — TELEPHONE ENCOUNTER
Left message for Ms. Diaz to call [phone] back for phone follow up/medication review and to see if she was still interested in participating in the Smoking Cessation Program. Attempt # 2

## 2017-08-30 ENCOUNTER — OFFICE VISIT (OUTPATIENT)
Dept: FAMILY MEDICINE | Facility: CLINIC | Age: 37
End: 2017-08-30
Payer: MEDICAID

## 2017-08-30 VITALS
SYSTOLIC BLOOD PRESSURE: 102 MMHG | HEART RATE: 64 BPM | HEIGHT: 65 IN | WEIGHT: 200 LBS | DIASTOLIC BLOOD PRESSURE: 64 MMHG | BODY MASS INDEX: 33.32 KG/M2

## 2017-08-30 DIAGNOSIS — F98.8 ATTENTION DEFICIT DISORDER, UNSPECIFIED HYPERACTIVITY PRESENCE: Primary | ICD-10-CM

## 2017-08-30 DIAGNOSIS — R06.02 SOB (SHORTNESS OF BREATH) ON EXERTION: ICD-10-CM

## 2017-08-30 PROCEDURE — 99212 OFFICE O/P EST SF 10 MIN: CPT | Mod: PBBFAC | Performed by: NURSE PRACTITIONER

## 2017-08-30 PROCEDURE — 99213 OFFICE O/P EST LOW 20 MIN: CPT | Mod: S$PBB,,, | Performed by: NURSE PRACTITIONER

## 2017-08-30 PROCEDURE — 99999 PR PBB SHADOW E&M-EST. PATIENT-LVL II: CPT | Mod: PBBFAC,,, | Performed by: NURSE PRACTITIONER

## 2017-08-30 PROCEDURE — 3008F BODY MASS INDEX DOCD: CPT | Mod: ,,, | Performed by: NURSE PRACTITIONER

## 2017-08-30 RX ORDER — QUETIAPINE FUMARATE 200 MG/1
TABLET, FILM COATED ORAL
COMMUNITY
Start: 2017-08-28 | End: 2017-10-23

## 2017-08-30 RX ORDER — DEXTROAMPHETAMINE SACCHARATE, AMPHETAMINE ASPARTATE, DEXTROAMPHETAMINE SULFATE AND AMPHETAMINE SULFATE 5; 5; 5; 5 MG/1; MG/1; MG/1; MG/1
1 TABLET ORAL 2 TIMES DAILY
Qty: 60 TABLET | Refills: 0 | Status: SHIPPED | OUTPATIENT
Start: 2017-08-30 | End: 2017-09-14 | Stop reason: SDUPTHER

## 2017-08-30 RX ORDER — ALBUTEROL SULFATE 90 UG/1
AEROSOL, METERED RESPIRATORY (INHALATION)
Qty: 1 INHALER | Refills: 5 | Status: SHIPPED | OUTPATIENT
Start: 2017-08-30 | End: 2017-09-14 | Stop reason: SDUPTHER

## 2017-08-30 RX ORDER — VENLAFAXINE 75 MG/1
TABLET ORAL
COMMUNITY
Start: 2017-08-28 | End: 2017-11-22

## 2017-08-30 RX ORDER — GABAPENTIN 300 MG/1
CAPSULE ORAL
COMMUNITY
Start: 2017-07-24 | End: 2017-08-30

## 2017-08-30 RX ORDER — SIMVASTATIN 20 MG/1
TABLET, FILM COATED ORAL
Status: ON HOLD | COMMUNITY
Start: 2017-08-17 | End: 2017-11-27

## 2017-08-30 RX ORDER — MIRTAZAPINE 15 MG/1
TABLET, FILM COATED ORAL
Status: ON HOLD | COMMUNITY
Start: 2017-08-28 | End: 2017-11-27

## 2017-08-30 NOTE — PROGRESS NOTES
Subjective:       Patient ID: Joe Henson is a 37 y.o. female.    Chief Complaint: Follow-up    Here for refill of meds      Review of Systems   Constitutional: Negative.  Negative for appetite change, fatigue and fever.   HENT: Negative.  Negative for congestion, ear pain and sore throat.    Eyes: Negative.  Negative for visual disturbance.   Respiratory: Negative.  Negative for cough, shortness of breath and wheezing.    Cardiovascular: Negative.    Gastrointestinal: Negative.  Negative for abdominal pain, diarrhea, nausea and vomiting.   Endocrine: Negative.    Genitourinary: Negative.  Negative for difficulty urinating and urgency.   Musculoskeletal: Negative.  Negative for arthralgias and myalgias.   Skin: Negative.  Negative for color change.   Allergic/Immunologic: Negative.    Neurological: Negative.  Negative for dizziness and headaches.   Hematological: Negative.    Psychiatric/Behavioral: Negative.  Negative for sleep disturbance.   All other systems reviewed and are negative.      Objective:      Physical Exam   Constitutional: She is oriented to person, place, and time. She appears well-developed and well-nourished.   HENT:   Head: Normocephalic and atraumatic.   Right Ear: External ear normal.   Left Ear: External ear normal.   Nose: Nose normal.   Eyes: Conjunctivae and EOM are normal. Pupils are equal, round, and reactive to light.   Neck: Normal range of motion. Neck supple. No thyromegaly present.   Cardiovascular: Normal rate, regular rhythm and normal heart sounds.    No murmur heard.  Pulmonary/Chest: Effort normal and breath sounds normal. No respiratory distress.   Abdominal: Soft.   Musculoskeletal: Normal range of motion.   Lymphadenopathy:     She has no cervical adenopathy.   Neurological: She is alert and oriented to person, place, and time. She has normal reflexes.   Skin: Skin is warm and dry. No rash noted.   Psychiatric: She has a normal mood and affect.   Nursing note and  vitals reviewed.      Assessment:       1. Attention deficit disorder, unspecified hyperactivity presence    2. SOB (shortness of breath) on exertion        Plan:   Joe was seen today for follow-up.    Diagnoses and all orders for this visit:    Attention deficit disorder, unspecified hyperactivity presence  -     dextroamphetamine-amphetamine (ADDERALL) 20 mg tablet; Take 1 tablet by mouth 2 (two) times daily.    SOB (shortness of breath) on exertion  -     VENTOLIN HFA 90 mcg/actuation inhaler; INHALE 2 PUFFS EVERY 6   HOURS AS NEEDED FOR      WHEEZING    RTC 3 months for recheck

## 2017-09-14 DIAGNOSIS — F98.8 ATTENTION DEFICIT DISORDER, UNSPECIFIED HYPERACTIVITY PRESENCE: ICD-10-CM

## 2017-09-14 DIAGNOSIS — R06.02 SOB (SHORTNESS OF BREATH) ON EXERTION: ICD-10-CM

## 2017-09-14 NOTE — TELEPHONE ENCOUNTER
----- Message from Hallie Galdamez sent at 2017 11:57 AM CDT -----  Contact: Self  Joe Henson  MRN: [id number]  : 1980  PCP: Estela Munguia  Home Phone      [phone]  Work Phone      Not on file.  Mobile          [phone]    MESSAGE:   Requesting RX dextroamphetamine-amphetamine (ADDERALL) 20 mg tablet refilled    Pharmacy: [facility] in [address]  in [place], [place]   [phone]    Phone:  [phone]

## 2017-09-15 RX ORDER — DEXTROAMPHETAMINE SACCHARATE, AMPHETAMINE ASPARTATE, DEXTROAMPHETAMINE SULFATE AND AMPHETAMINE SULFATE 5; 5; 5; 5 MG/1; MG/1; MG/1; MG/1
1 TABLET ORAL 2 TIMES DAILY
Qty: 60 TABLET | Refills: 0 | Status: SHIPPED | OUTPATIENT
Start: 2017-09-15 | End: 2017-10-04 | Stop reason: SDUPTHER

## 2017-09-15 RX ORDER — ALBUTEROL SULFATE 90 UG/1
AEROSOL, METERED RESPIRATORY (INHALATION)
Qty: 1 INHALER | Refills: 5 | Status: SHIPPED | OUTPATIENT
Start: 2017-09-15 | End: 2018-06-13 | Stop reason: SDUPTHER

## 2017-09-15 RX ORDER — DEXTROAMPHETAMINE SACCHARATE, AMPHETAMINE ASPARTATE, DEXTROAMPHETAMINE SULFATE AND AMPHETAMINE SULFATE 5; 5; 5; 5 MG/1; MG/1; MG/1; MG/1
1 TABLET ORAL 2 TIMES DAILY
Qty: 60 TABLET | Refills: 0 | Status: CANCELLED | OUTPATIENT
Start: 2017-09-15

## 2017-09-18 ENCOUNTER — TELEPHONE (OUTPATIENT)
Dept: SMOKING CESSATION | Facility: CLINIC | Age: 37
End: 2017-09-18

## 2017-09-18 NOTE — TELEPHONE ENCOUNTER
Left message for Ms. Diaz to call [phone] back for phone follow up/medication review and to see if she was still interested in participating in the Smoking Cessation Program.  Attempt # 3

## 2017-10-04 DIAGNOSIS — F98.8 ATTENTION DEFICIT DISORDER, UNSPECIFIED HYPERACTIVITY PRESENCE: ICD-10-CM

## 2017-10-04 RX ORDER — DEXTROAMPHETAMINE SACCHARATE, AMPHETAMINE ASPARTATE, DEXTROAMPHETAMINE SULFATE AND AMPHETAMINE SULFATE 5; 5; 5; 5 MG/1; MG/1; MG/1; MG/1
1 TABLET ORAL 2 TIMES DAILY
Qty: 60 TABLET | Refills: 0 | Status: SHIPPED | OUTPATIENT
Start: 2017-10-04 | End: 2017-11-03 | Stop reason: SDUPTHER

## 2017-10-04 NOTE — TELEPHONE ENCOUNTER
----- Message from Hallie Galdamez sent at 10/4/2017  3:56 PM CDT -----  Contact: Self  Joe Henson  MRN: [id number]  : 1980  PCP: Estela Munguia  Home Phone      [phone]  Work Phone      Not on file.  Mobile          [phone]    MESSAGE:   Needs RX dextroamphetamine-amphetamine (ADDERALL) 20 mg tablet refilled    Pharmacy:  [facility] in [place]    Phone:  [phone]

## 2017-10-17 ENCOUNTER — TELEPHONE (OUTPATIENT)
Dept: FAMILY MEDICINE | Facility: CLINIC | Age: 37
End: 2017-10-17

## 2017-10-17 DIAGNOSIS — M54.30 SCIATICA, UNSPECIFIED LATERALITY: Primary | ICD-10-CM

## 2017-10-17 NOTE — TELEPHONE ENCOUNTER
----- Message from Hallie Galdamez sent at 10/17/2017 11:06 AM CDT -----  Contact: Self  Joe Henson  MRN: [id number]  : 1980  PCP: Estela Munguia  Home Phone      [phone]  Work Phone      Not on file.  Mobile          [phone]    MESSAGE:   Requesting medication for sciatic nerve.   Medication that she is taking is not helping and she is having a lot of pain today.  Please call.    Pharmacy:  [facility]    Phone:  [phone]

## 2017-10-18 ENCOUNTER — HOSPITAL ENCOUNTER (EMERGENCY)
Facility: HOSPITAL | Age: 37
Discharge: HOME | End: 2017-10-18
Attending: EMERGENCY MEDICINE
Payer: MEDICAID

## 2017-10-18 VITALS
TEMPERATURE: 99 F | RESPIRATION RATE: 17 BRPM | HEART RATE: 85 BPM | DIASTOLIC BLOOD PRESSURE: 70 MMHG | SYSTOLIC BLOOD PRESSURE: 125 MMHG

## 2017-10-18 DIAGNOSIS — M54.32 SCIATICA OF LEFT SIDE: Primary | ICD-10-CM

## 2017-10-18 PROCEDURE — 63600175 PHARM REV CODE 636 W HCPCS: Performed by: EMERGENCY MEDICINE

## 2017-10-18 PROCEDURE — 96372 THER/PROPH/DIAG INJ SC/IM: CPT

## 2017-10-18 PROCEDURE — 99283 EMERGENCY DEPT VISIT LOW MDM: CPT | Mod: 25

## 2017-10-18 RX ORDER — HYDROMORPHONE HYDROCHLORIDE 1 MG/ML
1 INJECTION, SOLUTION INTRAMUSCULAR; INTRAVENOUS; SUBCUTANEOUS
Status: COMPLETED | OUTPATIENT
Start: 2017-10-18 | End: 2017-10-18

## 2017-10-18 RX ORDER — NAPROXEN SODIUM 550 MG/1
550 TABLET ORAL 2 TIMES DAILY WITH MEALS
Qty: 60 TABLET | Refills: 1 | Status: SHIPPED | OUTPATIENT
Start: 2017-10-18 | End: 2017-11-22

## 2017-10-18 RX ORDER — DEXAMETHASONE SODIUM PHOSPHATE 4 MG/ML
8 INJECTION, SOLUTION INTRA-ARTICULAR; INTRALESIONAL; INTRAMUSCULAR; INTRAVENOUS; SOFT TISSUE
Status: COMPLETED | OUTPATIENT
Start: 2017-10-18 | End: 2017-10-18

## 2017-10-18 RX ORDER — PROMETHAZINE HYDROCHLORIDE 25 MG/ML
12.5 INJECTION, SOLUTION INTRAMUSCULAR; INTRAVENOUS
Status: COMPLETED | OUTPATIENT
Start: 2017-10-18 | End: 2017-10-18

## 2017-10-18 RX ADMIN — DEXAMETHASONE SODIUM PHOSPHATE 8 MG: 4 INJECTION, SOLUTION INTRAMUSCULAR; INTRAVENOUS at 10:10

## 2017-10-18 RX ADMIN — PROMETHAZINE HYDROCHLORIDE 12.5 MG: 25 INJECTION INTRAMUSCULAR; INTRAVENOUS at 10:10

## 2017-10-18 RX ADMIN — HYDROMORPHONE HYDROCHLORIDE 1 MG: 1 INJECTION, SOLUTION INTRAMUSCULAR; INTRAVENOUS; SUBCUTANEOUS at 10:10

## 2017-10-19 NOTE — ED PROVIDER NOTES
Encounter Date: 10/18/2017       History     Chief Complaint   Patient presents with    Sciatica     left side     This 37-year-old white female complains of sciatic pain which been present for over a year.  She has pain in her left buttocks.  She feels like it was aggravated by working and standing on her feet all day yesterday.  She denies any acute trauma.  She reportedly had a bulging disc in her back.  She denies any change in bowel or bladder habits.  She's had no numbness paresthesias or weakness.  She says she has an appointment with her doctor tomorrow.          Review of patient's allergies indicates:   Allergen Reactions    Latex, natural rubber      Past Medical History:   Diagnosis Date    Abnormal Pap smear age 30    Pos. HPV,     ADHD (attention deficit hyperactivity disorder)     Arthritis     Bipolar disorder     Depression     OCD (obsessive compulsive disorder)     PTSD (post-traumatic stress disorder)      Past Surgical History:   Procedure Laterality Date     SECTION  ;     DILATION AND CURETTAGE OF UTERUS  2016    ENDOMETRIAL ABLATION  2016    KNEE SURGERY Right 2011    hardware-bone from hip to repair    TUBAL LIGATION       Family History   Problem Relation Age of Onset    Diabetes Maternal Grandmother     Hypertension Father     Colon cancer Father 60    Breast cancer Paternal Aunt     Breast cancer Paternal Aunt     Breast cancer Paternal Aunt     Breast cancer Paternal Aunt     Coronary artery disease Maternal Grandfather      labor Neg Hx      Social History   Substance Use Topics    Smoking status: Current Every Day Smoker     Packs/day: 1.00     Years: 15.00     Types: Cigarettes     Start date: 1997    Smokeless tobacco: Never Used      Comment: told about brochure and smoking clinic program    Alcohol use Yes      Comment: Socially-2 times a week-3 beer     Review of Systems   Musculoskeletal: Positive for back  pain.   All other systems reviewed and are negative.      Physical Exam     Initial Vitals [10/18/17 2139]   BP Pulse Resp Temp SpO2   127/75 100 18 98.5 °F (36.9 °C) --      MAP       92.33         Physical Exam    Nursing note and vitals reviewed.  Constitutional: She appears well-developed and well-nourished.   HENT:   Head: Normocephalic and atraumatic.   Eyes: Pupils are equal, round, and reactive to light.   Neck: Normal range of motion. Neck supple.   Cardiovascular: Normal rate and regular rhythm.   Pulmonary/Chest: Breath sounds normal. She is in respiratory distress.   Abdominal: Soft. Bowel sounds are normal.   Musculoskeletal: Normal range of motion. She exhibits no edema or tenderness.   This some tenderness sciatic notch.  There is no tenderness or spasm in the lumbar area.   Skin: Skin is warm and dry. Capillary refill takes less than 2 seconds.         ED Course   Procedures  Labs Reviewed - No data to display                            ED Course      Clinical Impression:   The encounter diagnosis was Sciatica of left side.    Disposition:   Disposition: Discharged  Condition: Stable                        Srinath Bowers MD  10/18/17 2240       Srinath Bowers MD  10/18/17 2241

## 2017-10-21 ENCOUNTER — HOSPITAL ENCOUNTER (EMERGENCY)
Facility: HOSPITAL | Age: 37
Discharge: HOME | End: 2017-10-21
Attending: SURGERY
Payer: MEDICAID

## 2017-10-21 VITALS
HEART RATE: 98 BPM | TEMPERATURE: 98 F | BODY MASS INDEX: 33.46 KG/M2 | RESPIRATION RATE: 16 BRPM | HEIGHT: 64 IN | DIASTOLIC BLOOD PRESSURE: 72 MMHG | SYSTOLIC BLOOD PRESSURE: 123 MMHG | OXYGEN SATURATION: 100 % | WEIGHT: 196 LBS

## 2017-10-21 DIAGNOSIS — M54.5 LOW BACK PAIN, UNSPECIFIED BACK PAIN LATERALITY, UNSPECIFIED CHRONICITY, WITH SCIATICA PRESENCE UNSPECIFIED: Primary | ICD-10-CM

## 2017-10-21 PROCEDURE — 63600175 PHARM REV CODE 636 W HCPCS: Performed by: SURGERY

## 2017-10-21 PROCEDURE — 96372 THER/PROPH/DIAG INJ SC/IM: CPT

## 2017-10-21 PROCEDURE — 99283 EMERGENCY DEPT VISIT LOW MDM: CPT

## 2017-10-21 RX ORDER — HYDROMORPHONE HYDROCHLORIDE 2 MG/ML
2 INJECTION, SOLUTION INTRAMUSCULAR; INTRAVENOUS; SUBCUTANEOUS
Status: COMPLETED | OUTPATIENT
Start: 2017-10-21 | End: 2017-10-21

## 2017-10-21 RX ORDER — ORPHENADRINE CITRATE 30 MG/ML
60 INJECTION INTRAMUSCULAR; INTRAVENOUS
Status: COMPLETED | OUTPATIENT
Start: 2017-10-21 | End: 2017-10-21

## 2017-10-21 RX ORDER — CYCLOBENZAPRINE HCL 10 MG
10 TABLET ORAL 3 TIMES DAILY PRN
Qty: 10 TABLET | Refills: 0 | Status: SHIPPED | OUTPATIENT
Start: 2017-10-21 | End: 2017-10-26

## 2017-10-21 RX ORDER — METHYLPREDNISOLONE 4 MG/1
TABLET ORAL
Qty: 1 PACKAGE | Refills: 0 | Status: SHIPPED | OUTPATIENT
Start: 2017-10-21 | End: 2017-11-22

## 2017-10-21 RX ORDER — METHYLPREDNISOLONE SOD SUCC 125 MG
125 VIAL (EA) INJECTION
Status: COMPLETED | OUTPATIENT
Start: 2017-10-21 | End: 2017-10-21

## 2017-10-21 RX ORDER — ONDANSETRON 2 MG/ML
4 INJECTION INTRAMUSCULAR; INTRAVENOUS
Status: COMPLETED | OUTPATIENT
Start: 2017-10-21 | End: 2017-10-21

## 2017-10-21 RX ORDER — KETOROLAC TROMETHAMINE 10 MG/1
10 TABLET, FILM COATED ORAL EVERY 6 HOURS PRN
Qty: 15 TABLET | Refills: 0 | Status: SHIPPED | OUTPATIENT
Start: 2017-10-21 | End: 2017-11-22

## 2017-10-21 RX ADMIN — ONDANSETRON 4 MG: 2 INJECTION INTRAMUSCULAR; INTRAVENOUS at 05:10

## 2017-10-21 RX ADMIN — HYDROMORPHONE HYDROCHLORIDE 2 MG: 2 INJECTION INTRAMUSCULAR; INTRAVENOUS; SUBCUTANEOUS at 05:10

## 2017-10-21 RX ADMIN — ORPHENADRINE CITRATE 60 MG: 30 INJECTION INTRAMUSCULAR; INTRAVENOUS at 05:10

## 2017-10-21 RX ADMIN — METHYLPREDNISOLONE SODIUM SUCCINATE 125 MG: 125 INJECTION, POWDER, FOR SOLUTION INTRAMUSCULAR; INTRAVENOUS at 05:10

## 2017-10-21 NOTE — ED PROVIDER NOTES
"[facility]                                     2017                   Chief Complaint  37 y.o. female with Back Pain (reports "sciatic pain" to the left side)    History of Present Illness  Joe Henson presents to the emergency room with chronic low back pain  The patient states that she has had low back pain for several years now, worse now   Patient states she has had recent MRI of the L-spine and awaits pain management  The patient denies any new injury, this is chronic pain on a daily basis for over a year    The history is provided by the patient     Past Medical History   -- Abnormal Pap smear     -- ADHD (attention deficit hyperactivity disorder)     -- Arthritis     -- OCD (obsessive compulsive disorder)        Past Surgical History   --  SECTION       -- DILATION AND CURETTAGE OF UTERUS       -- ENDOMETRIAL ABLATION       -- KNEE SURGERY       -- TUBAL LIGATION          No Known Allergies     Review of Systems and Physical Exam     Review of Systems  -- Constitution - no fever, denies fatigue, no weakness, no chills  -- Eyes - no tearing or redness, no visual disturbance  -- Ear, Nose - no tinnitus or earache, no nasal congestion or discharge  -- Mouth,Throat - no sore throat, no toothache, normal voice, normal swallowing  -- Respiratory - denies cough and congestion, no shortness of breath, no OCONNELL  -- Cardiovascular - denies chest pain, no palpitations, denies claudication  -- Gastrointestinal - denies abdominal pain, nausea, vomiting, or diarrhea  -- Genitourinary - no dysuria, no denies flank pain, no hematuria or frequency   -- Musculoskeletal - back pain, negative for myalgias and arthralgias   -- Neurological - no headache, denies weakness or seizure; no LOC  -- Skin - denies pallor, rash, or changes in skin. no hives or welts noted    Vital Signs  -- Her oral temperature is 97.6 °F (36.4 °C).   -- Her blood pressure is 123/72 and her pulse is 98.   -- " Her respiration is 16 and oxygen saturation is 100%.      Physical Exam  -- Nursing note and vitals reviewed  -- Head: Atraumatic. Normocephalic. No obvious abnormality  -- Eyes: Pupils are equal and reactive to light. Normal conjunctiva and lids  -- Neck: Normal range of motion. Neck supple. No masses, trachea midline  -- Cardiac: Normal rate, regular rhythm and normal heart sounds  -- Pulmonary: Normal respiratory effort, breath sounds clear to auscultation  -- Abdominal: Soft, no tenderness. Normal bowel sounds. Normal liver edge  -- Musculoskeletal: Normal range of motion, no effusions. Joints stable   -- Neurological: No focal deficits. Showed good interaction with staff  -- Vascular: Posterior tibial, dorsalis pedis and radial pulses 2+ bilaterally      Emergency Room Course     Treatment and Evaluation  --  mg Solumedrol given today in the ER  -- IM 2 mg Dilaudid given today in the ER  -- IM 4 mg Zofran given today in the ER   -- IM 60 mg Norflex given today in the ER    Diagnosis  -- Low back pain    Disposition and Plan  -- Disposition: home  -- Condition: stable  -- Follow-up: Patient to follow up with Estela Munguia NP in 1-2 days.  -- I advised the patient that we have found no life threatening condition today  -- At this time, I believe the patient is clinically stable for discharge.   -- The patient acknowledges that close follow up with a MD is required   -- Patient agrees to comply with all instruction and direction given in the ER    This note is dictated on Dragon Natural Speaking word recognition program.  There are word recognition mistakes that are occasionally missed on review.           Hitesh Gilliam MD  10/22/17 0553

## 2017-10-21 NOTE — ED TRIAGE NOTES
"37 y.o. female presents to ER ED 04/ED 04   Chief Complaint   Patient presents with    Back Pain     reports "sciatic pain" to the left side   .   "

## 2017-10-23 ENCOUNTER — OFFICE VISIT (OUTPATIENT)
Dept: FAMILY MEDICINE | Facility: CLINIC | Age: 37
End: 2017-10-23
Payer: MEDICAID

## 2017-10-23 VITALS
DIASTOLIC BLOOD PRESSURE: 62 MMHG | WEIGHT: 196.63 LBS | BODY MASS INDEX: 33.57 KG/M2 | SYSTOLIC BLOOD PRESSURE: 114 MMHG | HEART RATE: 84 BPM | HEIGHT: 64 IN

## 2017-10-23 DIAGNOSIS — M54.32 LEFT SCIATIC NERVE PAIN: Primary | ICD-10-CM

## 2017-10-23 DIAGNOSIS — M54.42 ACUTE LEFT-SIDED LOW BACK PAIN WITH LEFT-SIDED SCIATICA: ICD-10-CM

## 2017-10-23 PROCEDURE — 99213 OFFICE O/P EST LOW 20 MIN: CPT | Mod: S$PBB,,, | Performed by: NURSE PRACTITIONER

## 2017-10-23 PROCEDURE — 96372 THER/PROPH/DIAG INJ SC/IM: CPT | Mod: PBBFAC

## 2017-10-23 PROCEDURE — 99999 PR PBB SHADOW E&M-EST. PATIENT-LVL IV: CPT | Mod: PBBFAC,,, | Performed by: NURSE PRACTITIONER

## 2017-10-23 PROCEDURE — 99214 OFFICE O/P EST MOD 30 MIN: CPT | Mod: PBBFAC,25 | Performed by: NURSE PRACTITIONER

## 2017-10-23 RX ORDER — HYDROCODONE BITARTRATE AND ACETAMINOPHEN 10; 325 MG/1; MG/1
1 TABLET ORAL EVERY 12 HOURS PRN
Qty: 30 TABLET | Refills: 0 | Status: SHIPPED | OUTPATIENT
Start: 2017-10-23 | End: 2017-11-22 | Stop reason: SDUPTHER

## 2017-10-23 RX ORDER — KETOROLAC TROMETHAMINE 30 MG/ML
60 INJECTION, SOLUTION INTRAMUSCULAR; INTRAVENOUS
Status: COMPLETED | OUTPATIENT
Start: 2017-10-23 | End: 2017-10-23

## 2017-10-23 RX ADMIN — KETOROLAC TROMETHAMINE 60 MG: 60 INJECTION, SOLUTION INTRAMUSCULAR at 05:10

## 2017-10-23 NOTE — PROGRESS NOTES
Subjective:       Patient ID: Joe Henson is a 37 y.o. female.    Chief Complaint: Sciatica    Here with sciatica on the left. Seen in ER x 2. Did not get meds filled.      Review of Systems   Constitutional: Negative.    HENT: Negative.    Eyes: Negative.    Respiratory: Negative.    Cardiovascular: Negative.    Gastrointestinal: Negative.    Genitourinary: Negative.    Musculoskeletal: Positive for arthralgias (left sciatica) and back pain.   Skin: Negative.    Neurological: Negative.    Psychiatric/Behavioral: Negative.    All other systems reviewed and are negative.      Objective:      Physical Exam   Constitutional: She is oriented to person, place, and time. She appears well-developed and well-nourished. No distress.   HENT:   Head: Normocephalic and atraumatic.   Eyes: Pupils are equal, round, and reactive to light.   Neck: Normal range of motion. Neck supple.   Cardiovascular: Normal rate and regular rhythm.    No murmur heard.  Pulmonary/Chest: Effort normal and breath sounds normal. No respiratory distress.   Musculoskeletal:   Writhing in pain   Neurological: She is alert and oriented to person, place, and time.   Skin: Skin is warm and dry.   Psychiatric: She has a normal mood and affect.   Nursing note and vitals reviewed.      Assessment:       1. Left sciatic nerve pain    2. Acute left-sided low back pain with left-sided sciatica        Plan:   Joe was seen today for sciatica.    Diagnoses and all orders for this visit:    Left sciatic nerve pain  -     Ambulatory referral to Physical Medicine Rehab  -     hydrocodone-acetaminophen 10-325mg (NORCO)  mg Tab; Take 1 tablet by mouth every 12 (twelve) hours as needed for Pain.  -     ketorolac injection 60 mg; Inject 2 mLs (60 mg total) into the muscle one time.    Acute left-sided low back pain with left-sided sciatica  -     Ambulatory referral to Physical Medicine Rehab  -     hydrocodone-acetaminophen 10-325mg (NORCO)  mg Tab;  Take 1 tablet by mouth every 12 (twelve) hours as needed for Pain.  -     ketorolac injection 60 mg; Inject 2 mLs (60 mg total) into the muscle one time.    RTC 1 week for follow up

## 2017-11-03 DIAGNOSIS — F98.8 ATTENTION DEFICIT DISORDER, UNSPECIFIED HYPERACTIVITY PRESENCE: ICD-10-CM

## 2017-11-03 DIAGNOSIS — M54.32 LEFT SCIATIC NERVE PAIN: ICD-10-CM

## 2017-11-03 DIAGNOSIS — M54.42 ACUTE LEFT-SIDED LOW BACK PAIN WITH LEFT-SIDED SCIATICA: ICD-10-CM

## 2017-11-03 RX ORDER — HYDROCODONE BITARTRATE AND ACETAMINOPHEN 10; 325 MG/1; MG/1
1 TABLET ORAL EVERY 12 HOURS PRN
Qty: 30 TABLET | Refills: 0 | OUTPATIENT
Start: 2017-11-03

## 2017-11-03 RX ORDER — DEXTROAMPHETAMINE SACCHARATE, AMPHETAMINE ASPARTATE, DEXTROAMPHETAMINE SULFATE AND AMPHETAMINE SULFATE 5; 5; 5; 5 MG/1; MG/1; MG/1; MG/1
1 TABLET ORAL 2 TIMES DAILY
Qty: 60 TABLET | Refills: 0 | Status: ON HOLD | OUTPATIENT
Start: 2017-11-03 | End: 2017-11-27

## 2017-11-07 ENCOUNTER — HOSPITAL ENCOUNTER (EMERGENCY)
Facility: HOSPITAL | Age: 37
Discharge: HOME | End: 2017-11-07
Attending: SURGERY
Payer: MEDICAID

## 2017-11-07 VITALS
HEART RATE: 103 BPM | DIASTOLIC BLOOD PRESSURE: 88 MMHG | RESPIRATION RATE: 20 BRPM | WEIGHT: 196 LBS | TEMPERATURE: 98 F | BODY MASS INDEX: 33.64 KG/M2 | SYSTOLIC BLOOD PRESSURE: 147 MMHG

## 2017-11-07 DIAGNOSIS — G89.29 CHRONIC LOW BACK PAIN WITH RIGHT-SIDED SCIATICA, UNSPECIFIED BACK PAIN LATERALITY: Primary | ICD-10-CM

## 2017-11-07 DIAGNOSIS — M54.41 CHRONIC LOW BACK PAIN WITH RIGHT-SIDED SCIATICA, UNSPECIFIED BACK PAIN LATERALITY: Primary | ICD-10-CM

## 2017-11-07 PROCEDURE — 63600175 PHARM REV CODE 636 W HCPCS: Performed by: SURGERY

## 2017-11-07 PROCEDURE — 96372 THER/PROPH/DIAG INJ SC/IM: CPT

## 2017-11-07 PROCEDURE — 99283 EMERGENCY DEPT VISIT LOW MDM: CPT | Mod: 25

## 2017-11-07 RX ORDER — HYDROMORPHONE HYDROCHLORIDE 1 MG/ML
1 INJECTION, SOLUTION INTRAMUSCULAR; INTRAVENOUS; SUBCUTANEOUS
Status: COMPLETED | OUTPATIENT
Start: 2017-11-07 | End: 2017-11-07

## 2017-11-07 RX ORDER — ONDANSETRON 2 MG/ML
4 INJECTION INTRAMUSCULAR; INTRAVENOUS
Status: COMPLETED | OUTPATIENT
Start: 2017-11-07 | End: 2017-11-07

## 2017-11-07 RX ADMIN — HYDROMORPHONE HYDROCHLORIDE 1 MG: 1 INJECTION, SOLUTION INTRAMUSCULAR; INTRAVENOUS; SUBCUTANEOUS at 04:11

## 2017-11-07 RX ADMIN — ONDANSETRON 4 MG: 2 INJECTION INTRAMUSCULAR; INTRAVENOUS at 04:11

## 2017-11-07 NOTE — ED PROVIDER NOTES
[facility]                                     2017                   Chief Complaint  37 y.o. female with Sciatica    History of Present Illness  Joe Henson presents to the emergency room with low back pain issues  Patient on exam has a normal lumbar spine without step-off or deformity noted now   Patient has good distal pulses and capillary refill, neurovascular intact on exam now  Normal bowel movements and urination no signs of cauda equina syndrome noted   Patient has chronic low back pain issues, scheduled to see the neurologist in 1 week    The history is provided by the patient     Past Medical History   -- Abnormal Pap smear     -- ADHD (attention deficit hyperactivity disorder)     -- Arthritis     -- OCD (obsessive compulsive disorder)        Past Surgical History   --  SECTION       -- DILATION AND CURETTAGE OF UTERUS       -- ENDOMETRIAL ABLATION       -- KNEE SURGERY       -- TUBAL LIGATION          No Known Allergies     Review of Systems and Physical Exam     Review of Systems  -- Constitution - no fever, denies fatigue, no weakness, no chills  -- Eyes - no tearing or redness, no visual disturbance  -- Ear, Nose - no tinnitus or earache, no nasal congestion or discharge  -- Mouth,Throat - no sore throat, no toothache, normal voice, normal swallowing  -- Respiratory - denies cough and congestion, no shortness of breath, no OCONNELL  -- Cardiovascular - denies chest pain, no palpitations, denies claudication  -- Gastrointestinal - denies abdominal pain, nausea, vomiting, or diarrhea  -- Musculoskeletal - back pain, negative for myalgias and arthralgias   -- Neurological - no headache, denies weakness or seizure; no LOC  -- Skin - denies pallor, rash, or changes in skin. no hives or welts noted    Vital Signs  -- Her oral temperature is 98 °F (36.7 °C).   -- Her blood pressure is 147/88 and her pulse is 103.   -- Her respiration is 20.      Physical Exam  --  Nursing note and vitals reviewed  -- Head: Atraumatic. Normocephalic. No obvious abnormality  -- Eyes: Pupils are equal and reactive to light. Normal conjunctiva and lids  -- Cardiac: Normal rate, regular rhythm and normal heart sounds  -- Pulmonary: Normal respiratory effort, breath sounds clear to auscultation  -- Abdominal: Soft, no tenderness. Normal bowel sounds. Normal liver edge  -- Musculoskeletal: Normal range of motion, no effusions. Joints stable   -- Neurological: No focal deficits. Showed good interaction with staff  -- Vascular: Posterior tibial, dorsalis pedis and radial pulses 2+ bilaterally      Emergency Room Course     Treatment and Evaluation  -- IM 1 mg Dilaudid given today in the ER  -- IM 4 mg Zofran given today in the ER     Diagnosis  -- Chronic low back pain with right-sided sciatica, unspecified back pain laterality.    Disposition and Plan  -- Disposition: home  -- Condition: stable  -- Follow-up: Patient to follow up with Estela Munguia NP in 1-2 days.  -- I advised the patient that we have found no life threatening condition today  -- At this time, I believe the patient is clinically stable for discharge.   -- The patient acknowledges that close follow up with a MD is required   -- Patient agrees to comply with all instruction and direction given in the ER    This note is dictated on Dragon Natural Speaking word recognition program.  There are word recognition mistakes that are occasionally missed on review.           Hitesh Gilliam MD  11/07/17 5898

## 2017-11-21 ENCOUNTER — TELEPHONE (OUTPATIENT)
Dept: FAMILY MEDICINE | Facility: CLINIC | Age: 37
End: 2017-11-21

## 2017-11-21 ENCOUNTER — HOSPITAL ENCOUNTER (EMERGENCY)
Facility: HOSPITAL | Age: 37
Discharge: HOME | End: 2017-11-21
Attending: SURGERY
Payer: MEDICAID

## 2017-11-21 VITALS
TEMPERATURE: 99 F | RESPIRATION RATE: 16 BRPM | HEART RATE: 98 BPM | DIASTOLIC BLOOD PRESSURE: 69 MMHG | SYSTOLIC BLOOD PRESSURE: 135 MMHG

## 2017-11-21 DIAGNOSIS — G89.29 CHRONIC LOW BACK PAIN, UNSPECIFIED BACK PAIN LATERALITY, WITH SCIATICA PRESENCE UNSPECIFIED: Primary | ICD-10-CM

## 2017-11-21 DIAGNOSIS — M54.32 LEFT SCIATIC NERVE PAIN: ICD-10-CM

## 2017-11-21 DIAGNOSIS — M54.5 CHRONIC LOW BACK PAIN, UNSPECIFIED BACK PAIN LATERALITY, WITH SCIATICA PRESENCE UNSPECIFIED: Primary | ICD-10-CM

## 2017-11-21 DIAGNOSIS — M54.42 ACUTE LEFT-SIDED LOW BACK PAIN WITH LEFT-SIDED SCIATICA: ICD-10-CM

## 2017-11-21 PROCEDURE — 63600175 PHARM REV CODE 636 W HCPCS: Performed by: SURGERY

## 2017-11-21 PROCEDURE — 96372 THER/PROPH/DIAG INJ SC/IM: CPT

## 2017-11-21 PROCEDURE — 99283 EMERGENCY DEPT VISIT LOW MDM: CPT | Mod: 25

## 2017-11-21 RX ORDER — HYDROMORPHONE HYDROCHLORIDE 2 MG/ML
2 INJECTION, SOLUTION INTRAMUSCULAR; INTRAVENOUS; SUBCUTANEOUS
Status: COMPLETED | OUTPATIENT
Start: 2017-11-21 | End: 2017-11-21

## 2017-11-21 RX ORDER — ONDANSETRON 2 MG/ML
4 INJECTION INTRAMUSCULAR; INTRAVENOUS
Status: COMPLETED | OUTPATIENT
Start: 2017-11-21 | End: 2017-11-21

## 2017-11-21 RX ADMIN — ONDANSETRON 4 MG: 2 INJECTION INTRAMUSCULAR; INTRAVENOUS at 12:11

## 2017-11-21 RX ADMIN — HYDROMORPHONE HYDROCHLORIDE 2 MG: 2 INJECTION INTRAMUSCULAR; INTRAVENOUS; SUBCUTANEOUS at 12:11

## 2017-11-21 NOTE — TELEPHONE ENCOUNTER
----- Message from [place] sent at 2017 10:57 AM CST -----  Contact: Patient  Joe Henson  MRN: [id number]  : 1980  PCP: Estela Munguia  Home Phone      [phone]  Work Phone      Not on file.  Mobile          [phone]      MESSAGE: sciatic nerve pain -- requesting either appt today or Rx    Call [phone]    PCP: Ezra

## 2017-11-21 NOTE — ED PROVIDER NOTES
[facility]                                     2017                   Chief Complaint  37 y.o. female with Hip Pain (left)    History of Present Illness  Joe Henson presents to the emergency room with chronic low back pain  Patient is scheduled to see pain management next week, out of all pain meds now  Patient denies any acute injury, has a normal lumbar exam on evaluation in the ER  Patient has good distal pulses and capillary refill, neurovascular intact on exam  Normal bowel movement/urination; has no signs of cauda equina syndrome today    The history is provided by the patient     Past Medical History   -- Abnormal Pap smear     -- ADHD (attention deficit hyperactivity disorder)     -- Arthritis     -- OCD (obsessive compulsive disorder)        Past Surgical History   --  SECTION       -- DILATION AND CURETTAGE OF UTERUS       -- ENDOMETRIAL ABLATION       -- KNEE SURGERY       -- TUBAL LIGATION          No Known Allergies     Review of Systems and Physical Exam     Review of Systems  -- Constitution - no fever, denies fatigue, no weakness, no chills  -- Eyes - no tearing or redness, no visual disturbance  -- Ear, Nose - no tinnitus or earache, no nasal congestion or discharge  -- Mouth,Throat - no sore throat, no toothache, normal voice, normal swallowing  -- Respiratory - denies cough and congestion, no shortness of breath, no OCONNELL  -- Cardiovascular - denies chest pain, no palpitations, denies claudication  -- Gastrointestinal - denies abdominal pain, nausea, vomiting, or diarrhea  -- Genitourinary - no dysuria, no denies flank pain, no hematuria or frequency   -- Musculoskeletal - back pain, negative for myalgias and arthralgias   -- Neurological - no headache, denies weakness or seizure; no LOC  -- Skin - denies pallor, rash, or changes in skin. no hives or welts noted    Vital Signs  -- Her blood pressure is 135/69 and her pulse is 98. Her respiration is  16.      Physical Exam  -- Nursing note and vitals reviewed  -- Head: Atraumatic. Normocephalic. No obvious abnormality  -- Eyes: Pupils are equal and reactive to light. Normal conjunctiva and lids  -- Cardiac: Normal rate, regular rhythm and normal heart sounds  -- Pulmonary: Normal respiratory effort, breath sounds clear to auscultation  -- Abdominal: Soft, no tenderness. Normal bowel sounds. Normal liver edge  -- Musculoskeletal: Normal range of motion, no effusions. Joints stable   -- Neurological: No focal deficits. Showed good interaction with staff  -- Vascular: Posterior tibial, dorsalis pedis and radial pulses 2+ bilaterally      Emergency Room Course     Medications Given  -- IM 2 mg Dilaudid given today in the ER  -- IM 4 mg Zofran given today in the ER     Diagnosis  -- Chronic low back pain    Disposition and Plan  -- Disposition: home  -- Condition: stable  -- Follow-up: Patient to follow up with Estela Munguia NP in 1-2 days.  -- I advised the patient that we have found no life threatening condition today  -- At this time, I believe the patient is clinically stable for discharge.   -- The patient acknowledges that close follow up with a MD is required   -- Patient agrees to comply with all instruction and direction given in the ER    This note is dictated on Dragon Natural Speaking word recognition program.  There are word recognition mistakes that are occasionally missed on review.           Hitesh Gilliam MD  11/21/17 0531

## 2017-11-21 NOTE — TELEPHONE ENCOUNTER
Pt calling with c/o severe back pain, requesting [place] refill--already pending. Advised pt that refill request will not be addressed until tomorrow--Rosalba out of the office today. Pt does have an appt with her tomorrow. Pt will seek ER tx today.

## 2017-11-21 NOTE — ED NOTES
No s/s/ adverse reaction to medications given.  Discharge instructions given, patient voiced understanding.  Discharged to home in stable condition, ambulatory out of ER w steady gait, NAD.

## 2017-11-22 ENCOUNTER — OFFICE VISIT (OUTPATIENT)
Dept: FAMILY MEDICINE | Facility: CLINIC | Age: 37
End: 2017-11-22
Payer: MEDICAID

## 2017-11-22 VITALS
SYSTOLIC BLOOD PRESSURE: 118 MMHG | HEART RATE: 84 BPM | HEIGHT: 65 IN | WEIGHT: 198.19 LBS | DIASTOLIC BLOOD PRESSURE: 78 MMHG | BODY MASS INDEX: 33.02 KG/M2

## 2017-11-22 DIAGNOSIS — M54.42 ACUTE LEFT-SIDED LOW BACK PAIN WITH LEFT-SIDED SCIATICA: Primary | ICD-10-CM

## 2017-11-22 PROCEDURE — 99214 OFFICE O/P EST MOD 30 MIN: CPT | Mod: PBBFAC | Performed by: NURSE PRACTITIONER

## 2017-11-22 PROCEDURE — 99213 OFFICE O/P EST LOW 20 MIN: CPT | Mod: S$PBB,,, | Performed by: NURSE PRACTITIONER

## 2017-11-22 PROCEDURE — 99999 PR PBB SHADOW E&M-EST. PATIENT-LVL IV: CPT | Mod: PBBFAC,,, | Performed by: NURSE PRACTITIONER

## 2017-11-22 RX ORDER — HYDROCODONE BITARTRATE AND ACETAMINOPHEN 10; 325 MG/1; MG/1
1 TABLET ORAL EVERY 12 HOURS PRN
Qty: 30 TABLET | Refills: 0 | OUTPATIENT
Start: 2017-11-22

## 2017-11-22 RX ORDER — QUETIAPINE FUMARATE 200 MG/1
TABLET, FILM COATED ORAL
Status: ON HOLD | COMMUNITY
Start: 2017-10-25 | End: 2017-11-27

## 2017-11-22 RX ORDER — HYDROCODONE BITARTRATE AND ACETAMINOPHEN 10; 325 MG/1; MG/1
1 TABLET ORAL EVERY 12 HOURS PRN
Qty: 30 TABLET | Refills: 0 | Status: ON HOLD | OUTPATIENT
Start: 2017-11-22 | End: 2017-11-27

## 2017-11-22 RX ORDER — VENLAFAXINE HYDROCHLORIDE 150 MG/1
CAPSULE, EXTENDED RELEASE ORAL
Status: ON HOLD | COMMUNITY
Start: 2017-10-25 | End: 2017-11-29 | Stop reason: HOSPADM

## 2017-11-22 RX ORDER — TIZANIDINE 4 MG/1
4 TABLET ORAL EVERY 8 HOURS PRN
Qty: 90 TABLET | Refills: 0 | Status: SHIPPED | OUTPATIENT
Start: 2017-11-22 | End: 2017-11-26

## 2017-11-22 RX ORDER — KETOROLAC TROMETHAMINE 30 MG/ML
60 INJECTION, SOLUTION INTRAMUSCULAR; INTRAVENOUS
Status: COMPLETED | OUTPATIENT
Start: 2017-11-22 | End: 2017-11-22

## 2017-11-22 RX ORDER — DICLOFENAC SODIUM 75 MG/1
75 TABLET, DELAYED RELEASE ORAL 2 TIMES DAILY
Qty: 20 TABLET | Refills: 0 | Status: ON HOLD | OUTPATIENT
Start: 2017-11-22 | End: 2017-11-29 | Stop reason: HOSPADM

## 2017-11-22 RX ADMIN — KETOROLAC TROMETHAMINE 60 MG: 60 INJECTION, SOLUTION INTRAMUSCULAR at 02:11

## 2017-11-22 NOTE — PROGRESS NOTES
Subjective:       Patient ID: Joe Henson is a 37 y.o. female.    Chief Complaint: Sciatica    Here with sciatica. Seen in ER yesterday. Needs oral pain meds.      Review of Systems   Constitutional: Negative.    HENT: Negative.    Eyes: Negative.    Respiratory: Negative.    Cardiovascular: Negative.    Gastrointestinal: Negative.    Genitourinary: Negative.    Musculoskeletal: Positive for back pain (with sciatica).   Skin: Negative.    Neurological: Negative.    Psychiatric/Behavioral: Negative.    All other systems reviewed and are negative.      Objective:      Physical Exam   Constitutional: She is oriented to person, place, and time. She appears well-developed and well-nourished. No distress.   HENT:   Head: Normocephalic and atraumatic.   Eyes: Pupils are equal, round, and reactive to light.   Neck: Normal range of motion. Neck supple.   Cardiovascular: Normal rate and regular rhythm.    No murmur heard.  Pulmonary/Chest: Effort normal and breath sounds normal. No respiratory distress.   Musculoskeletal: Normal range of motion.   Neurological: She is alert and oriented to person, place, and time.   Skin: Skin is warm and dry.   Psychiatric: She has a normal mood and affect.   Nursing note and vitals reviewed.      Assessment:       1. Acute left-sided low back pain with left-sided sciatica        Plan:     Joe was seen today for sciatica.    Diagnoses and all orders for this visit:    Acute left-sided low back pain with left-sided sciatica  -     diclofenac (VOLTAREN) 75 MG EC tablet; Take 1 tablet (75 mg total) by mouth 2 (two) times daily.  -     tiZANidine (ZANAFLEX) 4 MG tablet; Take 1 tablet (4 mg total) by mouth every 8 (eight) hours as needed.  -     hydrocodone-acetaminophen 10-325mg (NORCO)  mg Tab; Take 1 tablet by mouth every 12 (twelve) hours as needed for Pain.  -     ketorolac injection 60 mg; Inject 2 mLs (60 mg total) into the muscle one time.    RTC PRN

## 2017-11-27 ENCOUNTER — HOSPITAL ENCOUNTER (INPATIENT)
Facility: HOSPITAL | Age: 37
LOS: 3 days | Discharge: HOME | DRG: 885 | End: 2017-11-30
Attending: PSYCHIATRY & NEUROLOGY | Admitting: PSYCHIATRY & NEUROLOGY
Payer: MEDICAID

## 2017-11-27 DIAGNOSIS — F31.4 BIPOLAR I DISORDER, MOST RECENT EPISODE DEPRESSED, SEVERE WITHOUT PSYCHOTIC FEATURES: Primary | ICD-10-CM

## 2017-11-27 DIAGNOSIS — F43.10 PTSD (POST-TRAUMATIC STRESS DISORDER): ICD-10-CM

## 2017-11-27 DIAGNOSIS — T14.91XA SUICIDAL BEHAVIOR WITH ATTEMPTED SELF-INJURY: ICD-10-CM

## 2017-11-27 DIAGNOSIS — M19.90 ARTHRITIS: ICD-10-CM

## 2017-11-27 DIAGNOSIS — F41.1 GAD (GENERALIZED ANXIETY DISORDER): ICD-10-CM

## 2017-11-27 DIAGNOSIS — E78.2 MIXED HYPERLIPIDEMIA: ICD-10-CM

## 2017-11-27 PROCEDURE — 99223 1ST HOSP IP/OBS HIGH 75: CPT | Mod: AF,HB,, | Performed by: PSYCHIATRY & NEUROLOGY

## 2017-11-27 PROCEDURE — 90833 PSYTX W PT W E/M 30 MIN: CPT | Mod: AF,HB,, | Performed by: PSYCHIATRY & NEUROLOGY

## 2017-11-27 PROCEDURE — 25000003 PHARM REV CODE 250: Performed by: PSYCHIATRY & NEUROLOGY

## 2017-11-27 PROCEDURE — 99232 SBSQ HOSP IP/OBS MODERATE 35: CPT | Mod: ,,, | Performed by: FAMILY MEDICINE

## 2017-11-27 PROCEDURE — 11400000 HC PSYCH PRIVATE ROOM

## 2017-11-27 RX ORDER — OLANZAPINE 5 MG/1
5 TABLET ORAL NIGHTLY
Status: DISCONTINUED | OUTPATIENT
Start: 2017-11-27 | End: 2017-11-29

## 2017-11-27 RX ORDER — VENLAFAXINE HYDROCHLORIDE 150 MG/1
150 CAPSULE, EXTENDED RELEASE ORAL DAILY
Status: DISCONTINUED | OUTPATIENT
Start: 2017-11-27 | End: 2017-11-28

## 2017-11-27 RX ORDER — IBUPROFEN 200 MG
1 TABLET ORAL DAILY PRN
Status: DISCONTINUED | OUTPATIENT
Start: 2017-11-27 | End: 2017-11-30 | Stop reason: HOSPADM

## 2017-11-27 RX ORDER — DIVALPROEX SODIUM 500 MG/1
500 TABLET, DELAYED RELEASE ORAL 2 TIMES DAILY
Status: DISCONTINUED | OUTPATIENT
Start: 2017-11-27 | End: 2017-11-30 | Stop reason: HOSPADM

## 2017-11-27 RX ORDER — ALBUTEROL SULFATE 90 UG/1
2 AEROSOL, METERED RESPIRATORY (INHALATION) EVERY 4 HOURS PRN
Status: DISCONTINUED | OUTPATIENT
Start: 2017-11-27 | End: 2017-11-30 | Stop reason: HOSPADM

## 2017-11-27 RX ORDER — OLANZAPINE 10 MG/1
10 TABLET ORAL EVERY 4 HOURS PRN
Status: DISCONTINUED | OUTPATIENT
Start: 2017-11-27 | End: 2017-11-30 | Stop reason: HOSPADM

## 2017-11-27 RX ORDER — ACETAMINOPHEN 325 MG/1
650 TABLET ORAL EVERY 6 HOURS PRN
Status: DISCONTINUED | OUTPATIENT
Start: 2017-11-27 | End: 2017-11-30 | Stop reason: HOSPADM

## 2017-11-27 RX ORDER — GUAIFENESIN 600 MG/1
600 TABLET, EXTENDED RELEASE ORAL 2 TIMES DAILY
Status: DISCONTINUED | OUTPATIENT
Start: 2017-11-27 | End: 2017-11-30 | Stop reason: HOSPADM

## 2017-11-27 RX ORDER — HYDROXYZINE PAMOATE 50 MG/1
50 CAPSULE ORAL EVERY 6 HOURS PRN
Status: DISCONTINUED | OUTPATIENT
Start: 2017-11-27 | End: 2017-11-30 | Stop reason: HOSPADM

## 2017-11-27 RX ORDER — OLANZAPINE 10 MG/2ML
10 INJECTION, POWDER, FOR SOLUTION INTRAMUSCULAR EVERY 4 HOURS PRN
Status: DISCONTINUED | OUTPATIENT
Start: 2017-11-27 | End: 2017-11-30 | Stop reason: HOSPADM

## 2017-11-27 RX ORDER — BENZONATATE 100 MG/1
100 CAPSULE ORAL 3 TIMES DAILY PRN
Status: DISCONTINUED | OUTPATIENT
Start: 2017-11-27 | End: 2017-11-30 | Stop reason: HOSPADM

## 2017-11-27 RX ADMIN — OLANZAPINE 5 MG: 5 TABLET, FILM COATED ORAL at 08:11

## 2017-11-27 RX ADMIN — OLANZAPINE 10 MG: 5 TABLET, FILM COATED ORAL at 08:11

## 2017-11-27 RX ADMIN — VENLAFAXINE HYDROCHLORIDE 150 MG: 150 CAPSULE, EXTENDED RELEASE ORAL at 04:11

## 2017-11-27 RX ADMIN — BENZONATATE 100 MG: 100 CAPSULE ORAL at 04:11

## 2017-11-27 RX ADMIN — GUAIFENESIN 600 MG: 600 TABLET, EXTENDED RELEASE ORAL at 04:11

## 2017-11-27 RX ADMIN — ACETAMINOPHEN 650 MG: 325 TABLET, FILM COATED ORAL at 01:11

## 2017-11-27 RX ADMIN — ACETAMINOPHEN 650 MG: 325 TABLET, FILM COATED ORAL at 09:11

## 2017-11-27 RX ADMIN — DIVALPROEX SODIUM 500 MG: 500 TABLET, DELAYED RELEASE ORAL at 08:11
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
## 2017-11-27 NOTE — PLAN OF CARE
Problem: Patient Care Overview (Adult)  Goal: Plan of Care Review  Outcome: Ongoing (interventions implemented as appropriate)  Pt has slept about 4 hours so far with one interruption.  NAD.  Resp even & unlabored.  Pathways clear and bed is low.  Q 15 minute safety checks ongoing.  All precautions maintained.

## 2017-11-27 NOTE — PLAN OF CARE
Problem: Overarching Goals (Adult)  Goal: Optimized Coping Skills in Response to Life Stressors  Patient refused to attend Psychotherapy Group. Patient isolating in room in bed. States she was not up to it and wanted to sleep. Will meet 1:1 with patient later.

## 2017-11-27 NOTE — CONSULTS
History & Physical      SUBJECTIVE:     History of Present Illness:  Patient is a 37 y.o. female presents with depression and suicidal ideation. Admitted to [facility].    No past medical history other than psych. No complaints today.    PTA Medications   Medication Sig    clonazePAM (KLONOPIN) 0.5 MG tablet Take 1 tablet (0.5 mg total) by mouth 3 (three) times daily as needed for Anxiety.    dextroamphetamine-amphetamine (ADDERALL) 20 mg tablet Take 1 tablet by mouth 2 (two) times daily.    diclofenac (VOLTAREN) 75 MG EC tablet Take 1 tablet (75 mg total) by mouth 2 (two) times daily.    gabapentin (NEURONTIN) 600 MG tablet Take 600 mg by mouth every evening.     hydrocodone-acetaminophen 10-325mg (NORCO)  mg Tab Take 1 tablet by mouth every 12 (twelve) hours as needed for Pain.    hydrOXYzine pamoate (VISTARIL) 25 MG Cap Take 25 mg by mouth 2 (two) times daily.    lithium 600 MG capsule Take 600 mg by mouth 2 (two) times daily with meals.    mirtazapine (REMERON) 15 MG tablet     QUEtiapine (SEROQUEL) 200 MG Tab     simvastatin (ZOCOR) 20 MG tablet     venlafaxine (EFFEXOR-XR) 150 MG Cp24     VENTOLIN HFA 90 mcg/actuation inhaler INHALE 2 PUFFS EVERY 6   HOURS AS NEEDED FOR      WHEEZING       Review of patient's allergies indicates:   Allergen Reactions    Latex, natural rubber        Past Medical History:   Diagnosis Date    Abnormal Pap smear age 30    Pos. HPV,     ADHD (attention deficit hyperactivity disorder)     Arthritis     Bipolar disorder     Depression     History of psychiatric hospitalization     Hx of psychiatric care     OCD (obsessive compulsive disorder)     Psychiatric problem     PTSD (post-traumatic stress disorder)     Suicide attempt     Therapy      Past Surgical History:   Procedure Laterality Date     SECTION  ;     DILATION AND CURETTAGE OF UTERUS  2016    ENDOMETRIAL ABLATION  2016    KNEE SURGERY Right     hardware-bone from  hip to repair    TUBAL LIGATION       Family History   Problem Relation Age of Onset    Diabetes Maternal Grandmother     Hypertension Father     Colon cancer Father 60    Breast cancer Paternal Aunt     Breast cancer Paternal Aunt     Breast cancer Paternal Aunt     Breast cancer Paternal Aunt     Coronary artery disease Maternal Grandfather      labor Neg Hx      Social History   Substance Use Topics    Smoking status: Current Every Day Smoker     Packs/day: 1.00     Years: 15.00     Types: Cigarettes     Start date: 1997    Smokeless tobacco: Never Used      Comment: told about brochure and smoking clinic program    Alcohol use Yes      Comment: Socially-2 times a week-4-5 beers        Review of Systems:  Constitutional: no fever or chills  Respiratory: no cough or shorness of breath  Cardiovascular: no chest pain or palpitations  Gastrointestinal: no nausea or vomiting, no abdominal pain or change in bowel habits  Musculoskeletal: no arthralgias or myalgias    OBJECTIVE:     Vital Signs (Most Recent)  Temp: 98.5 °F (36.9 °C) (17)  Pulse: 77 (17)  Resp: 18 (17)  BP: 135/75 (17)    Physical Exam:  General: well developed, well nourished  Lungs:  clear to auscultation bilaterally and normal respiratory effort  Cardiovascular: Heart: regular rate and rhythm, S1, S2 normal, no murmur, click, rub or gallop. Chest Wall: no tenderness. Extremities: no cyanosis or edema, or clubbing. Pulses: 2+ and symmetric.  Abdomen/Rectal: Abdomen: soft, non-tender non-distented; bowel sounds normal; no masses,  no organomegaly. Rectal: not examined  Skin: Skin color, texture, turgor normal. No rashes or lesions  Musculoskeletal:normal gait  Psych:   Neuro: Cranial nerves:  CN II Visual fields full to confrontation.   CN III, IV, VI Pupils are equal, round, and reactive to light.  CN III: no palsy  Nystagmus: none   Diplopia: none  Ophthalmoparesis: none  CN V  Facial sensation intact.   CN VII Facial expression full, symmetric.   CN VIII normal.   CN IX normal.   CN X normal.   CN XI normal.   CN XII normal.      Laboratory  CBC:     Recent Labs  Lab 11/26/17  1029   WBC 10.83   RBC 4.39   HGB 12.6   HCT 37.8   *   MCV 86   MCH 28.7   MCHC 33.3     CMP:     Recent Labs  Lab 11/26/17  1029   GLU 90   CALCIUM 8.7   ALBUMIN 3.2*   PROT 7.2      K 3.6   CO2 16*      BUN 9   CREATININE 0.7   ALKPHOS 85   *   *   BILITOT 0.3       Recent Labs  Lab 11/26/17  1253   COLORU Yellow   SPECGRAV 1.020   PHUR 6.0   PROTEINUA Negative   BACTERIA Occasional   NITRITE Negative   LEUKOCYTESUR Negative   UROBILINOGEN Negative     TSH   Date Value Ref Range Status   11/26/2017 0.066 (L) 0.400 - 4.000 uIU/mL Final     Results for orders placed or performed in visit on 06/15/11   RPR   Result Value Ref Range    RPR Non-Reactive Non-Reactive     Alcohol, Medical, Serum   Date Value Ref Range Status   11/26/2017 <10 <10 mg/dL Final     Acetaminophen (Tylenol), Serum   Date Value Ref Range Status   11/26/2017 <3.0 (L) 10.0 - 20.0 ug/mL Final     Comment:     Toxic Levels:  Adults (4 hr post-ingestion).........>150 ug/mL  Adults (12 hr post-ingestion)........>40 ug/mL  Peds (2 hr post-ingestion, liquid)...>225 ug/mL       Results for orders placed or performed during the hospital encounter of 11/26/17   Salicylate level   Result Value Ref Range    Salicylate Lvl <5.0 (L) 15.0 - 30.0 mg/dL     Results for orders placed or performed during the hospital encounter of 11/26/17   Drug screen panel, emergency   Result Value Ref Range    Benzodiazepines Negative     Methadone metabolites Negative     Cocaine (Metab.) Negative     Opiate Scrn, Ur Presumptive Positive     Barbiturate Screen, Ur Negative     Amphetamine Screen, Ur Presumptive Positive     THC Negative     Phencyclidine Presumptive Positive     Creatinine, Random Ur 88.5 15.0 - 325.0 mg/dL    Toxicology  Information SEE COMMENT      Preg Test, Ur   Date Value Ref Range Status   11/26/2017 Negative  Final       ASSESSMENT/PLAN:     Active Hospital Problems    Diagnosis  POA    Suicidal behavior with attempted self-injury [T14.91XA]  Yes    Arthritis [M19.90]  Yes    Mixed hyperlipidemia [E78.2]  Unknown      Resolved Hospital Problems    Diagnosis Date Resolved POA   No resolved problems to display.       Plan: Further orders for psych.  Continue home meds

## 2017-11-27 NOTE — PLAN OF CARE
Problem: Patient Care Overview (Adult)  Goal: Plan of Care Review  Outcome: Ongoing (interventions implemented as appropriate)  Pt in bed resting.Pt reports sleeping well last night.Appitite fair.Pt reports suicidal thoughts on and off but no current plan.Mood depressed.Steady on her feet when up.

## 2017-11-28 LAB — GLUCOSE SERPL-MCNC: 97 MG/DL

## 2017-11-28 PROCEDURE — 25000003 PHARM REV CODE 250: Performed by: PSYCHIATRY & NEUROLOGY

## 2017-11-28 PROCEDURE — 97802 MEDICAL NUTRITION INDIV IN: CPT

## 2017-11-28 PROCEDURE — 82947 ASSAY GLUCOSE BLOOD QUANT: CPT

## 2017-11-28 PROCEDURE — 90833 PSYTX W PT W E/M 30 MIN: CPT | Mod: AF,HB,, | Performed by: PSYCHIATRY & NEUROLOGY

## 2017-11-28 PROCEDURE — S4991 NICOTINE PATCH NONLEGEND: HCPCS | Performed by: PSYCHIATRY & NEUROLOGY

## 2017-11-28 PROCEDURE — 11400000 HC PSYCH PRIVATE ROOM

## 2017-11-28 PROCEDURE — 99233 SBSQ HOSP IP/OBS HIGH 50: CPT | Mod: AF,HB,, | Performed by: PSYCHIATRY & NEUROLOGY

## 2017-11-28 PROCEDURE — 36415 COLL VENOUS BLD VENIPUNCTURE: CPT

## 2017-11-28 RX ADMIN — DIVALPROEX SODIUM 500 MG: 500 TABLET, DELAYED RELEASE ORAL at 08:11

## 2017-11-28 RX ADMIN — VENLAFAXINE HYDROCHLORIDE 150 MG: 150 CAPSULE, EXTENDED RELEASE ORAL at 08:11

## 2017-11-28 RX ADMIN — GUAIFENESIN 600 MG: 600 TABLET, EXTENDED RELEASE ORAL at 08:11

## 2017-11-28 RX ADMIN — NICOTINE 1 PATCH: 14 PATCH, EXTENDED RELEASE TRANSDERMAL at 01:11

## 2017-11-28 RX ADMIN — OLANZAPINE 5 MG: 5 TABLET, FILM COATED ORAL at 08:11

## 2017-11-28 NOTE — PLAN OF CARE
Problem: Patient Care Overview (Adult)  Goal: Plan of Care Review  Goals:   1. Increase PO intake to >75% to meet EEN/EPN  2. Acceptance of snacks/boost  Nutrition Goal Status: new  Communication of RD Recs: reviewed with RN    Nutrition Discharge Plannin. Continue regular diet   2. Increase PO intake to >75% to meet EEN/EPN

## 2017-11-28 NOTE — PLAN OF CARE
Problem: Patient Care Overview (Adult)  Goal: Plan of Care Review  Goals:   1. Cont. Regular diet   2. Increase PO intake to >75% to meet EEN/EPN  3. Acceptance of snacks/boost  Nutrition Goal Status: new  Communication of RD Recs: reviewed with RN  Nutrition Discharge Plannin. Continue regular diet   2. Increase PO intake to >75% to meet EEN/EPN

## 2017-11-28 NOTE — PLAN OF CARE
Problem: Patient Care Overview (Adult)  Goal: Plan of Care Review  Outcome: Ongoing (interventions implemented as appropriate)  Pt has slept 10 hours with 2 interruptions.  NAD.  Resp even & unlabored.  Pathways clear and bed is low.  Q 15 minute safety checks ongoing.  All precautions maintained.

## 2017-11-28 NOTE — PROGRESS NOTES
"[facility]  Adult Nutrition  Consult Note    SUMMARY     Recommendations    Recommendation/Intervention:   1. Cont. regular diet   2. Encourage PO intake of >75%    3. If PO intake does not improve to >75% by day 3, provide Boost or snacks between meals     Goals:   1. Increase PO intake to >75% to meet EEN/EPN  2. Acceptance of snacks/boost  Nutrition Goal Status: new  Communication of RD Recs: reviewed with RN    Nutrition Discharge Plannin. Continue regular diet   2. Increase PO intake to >75% to meet EEN/EPN   Reason for Assessment    Reason for Assessment: physician consult  Diagnosis: psychological disorder, other (see comments) (Overdose with suicide attempt)  Relevent Medical History: Depression, ADHD, OCD, Bipolar disorder, Suicide Attempt      General Information Comments: PO intake of 50%      Nutrition Prescription Ordered    Current Diet Order: Regular diet       Evaluation of Received Nutrients/Fluid Intake  % Intake of Estimated Energy Needs: 25 - 50 %  % Meal Intake: 50%     Nutrition Risk Screen     Nutrition Risk Screen: no indicators present    Nutrition/Diet History     Food Preferences: no cultural/relgious pref at this time     Labs/Tests/Procedures/Meds    Pertinent Labs Reviewed: reviewed, pertinent  Pertinent Labs Comments: Albumin 3.2, ,  CPK 3644  Pertinent Medications Reviewed: reviewed      Anthropometrics    Temp: 98.5 °F (36.9 °C)     Height: 5' 5" (165.1 cm)  Weight Method: Standard Scale  Weight: 90.3 kg (199 lb 1.2 oz)     Ideal Body Weight (IBW), Female: 125 lb     % Ideal Body Weight, Female (lb): 159.26 lb  BMI (Calculated): 33.2  BMI Grade: 30 - 34.9- obesity - grade I       Estimated/Assessed Needs    Weight Used For Calorie Calculations: 90.3 kg (199 lb 1.2 oz)   Height (cm): 165.1 cm  Energy Calorie Requirements (kcal):  (1.3 AF)  Energy Need Method: Iredell-St Denzel  RMR (Iredell-St. Jeor Equation): 1578.88  Weight Used For Protein " Calculations: 90.3 kg (199 lb 1.2 oz)  Protein Requirements: 72-90g (0.8-1.0g)  RDA Method (mL): 2051kcals     Assessment and Plan    Nutrition Problem  Inadequate Oral Intake    Related to (etiology):   Inability to consume adequate nutrition     Signs and Symptoms (as evidenced by):   PO intake of 50% and fair appetite     Interventions/Recommendations (treatment strategy):  See RD recs above    Nutrition Diagnosis Status:   New      Monitor and Evaluation    Food and Nutrient Intake: energy intake, food and beverage intake  Food and Nutrient Adminstration: diet order     Physical Activity and Function: nutrition-related ADLs and IADLs  Anthropometric Measurements: weight change, weight, body mass index  Biochemical Data, Medical Tests and Procedures: electrolyte and renal panel, gastrointestinal profile, glucose/endocrine profile, inflammatory profile, lipid profile       Nutrition Risk    Level of Risk: moderate    Nutrition Follow-Up    RD Follow-up?: Yes

## 2017-11-28 NOTE — PLAN OF CARE
Problem: Patient Care Overview (Adult)  Goal: Plan of Care Review  Outcome: Ongoing (interventions implemented as appropriate)  Plan of care reviewed with patient, patient agrees with plan.  Continues to feel depressed and have some suicidal ideations, counseled and monitored for safety.  Accepts meals, snacks, and medications.  Gait steady, no falls.  Promoted an individualized safety plan, effective coping strategies, and motivators to improve mood and resolve depression.  Will continue to monitor for safety.
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
## 2017-11-28 NOTE — PLAN OF CARE
"Problem: Overarching Goals (Adult)  Goal: Develops/Participates in Therapeutic Big [place] to Support Successful Transition    Intervention: Foster Therapeutic Big Pool  Attempted to meet with Patient to complete assessment. Patient refused. Patient isolating in room in bed saying "Not right now." Will attempt again later.         "

## 2017-11-28 NOTE — PLAN OF CARE
Problem: Patient Care Overview (Adult)  Goal: Plan of Care Review  Outcome: Ongoing (interventions implemented as appropriate)  Pt in bed majority of shift.Up for meals and to see doctor.Pt reports good sleep last night and apitite fair.Pt voices feeling better today and now denies any suicidal thoughts.Medication compliant.
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
## 2017-11-28 NOTE — PLAN OF CARE
Problem: Overarching Goals (Adult)  Goal: Optimized Coping Skills in Response to Life Stressors  Patient did not attend psychotherapy group. Patient isolating in her room. Will continue to encourage patient participation in group.

## 2017-11-29 PROBLEM — T14.91XA SUICIDAL BEHAVIOR WITH ATTEMPTED SELF-INJURY: Status: RESOLVED | Noted: 2017-11-27 | Resolved: 2017-11-29

## 2017-11-29 PROCEDURE — 99233 SBSQ HOSP IP/OBS HIGH 50: CPT | Mod: AF,HB,, | Performed by: PSYCHIATRY & NEUROLOGY

## 2017-11-29 PROCEDURE — 11400000 HC PSYCH PRIVATE ROOM

## 2017-11-29 PROCEDURE — S4991 NICOTINE PATCH NONLEGEND: HCPCS | Performed by: PSYCHIATRY & NEUROLOGY

## 2017-11-29 PROCEDURE — 25000003 PHARM REV CODE 250: Performed by: PSYCHIATRY & NEUROLOGY

## 2017-11-29 PROCEDURE — 90833 PSYTX W PT W E/M 30 MIN: CPT | Mod: AF,HB,, | Performed by: PSYCHIATRY & NEUROLOGY

## 2017-11-29 RX ORDER — IBUPROFEN 200 MG
1 TABLET ORAL DAILY PRN
Refills: 0 | COMMUNITY
Start: 2017-11-29 | End: 2017-12-13

## 2017-11-29 RX ORDER — DIVALPROEX SODIUM 500 MG/1
500 TABLET, DELAYED RELEASE ORAL 2 TIMES DAILY
Qty: 60 TABLET | Refills: 1 | Status: SHIPPED | OUTPATIENT
Start: 2017-11-29 | End: 2017-12-13

## 2017-11-29 RX ORDER — VENLAFAXINE HYDROCHLORIDE 75 MG/1
225 CAPSULE, EXTENDED RELEASE ORAL DAILY
Qty: 90 CAPSULE | Refills: 1 | Status: SHIPPED | OUTPATIENT
Start: 2017-11-30 | End: 2017-12-13

## 2017-11-29 RX ORDER — HYDROXYZINE PAMOATE 50 MG/1
50 CAPSULE ORAL EVERY 6 HOURS PRN
Qty: 60 CAPSULE | Refills: 1 | Status: SHIPPED | OUTPATIENT
Start: 2017-11-29 | End: 2017-12-13

## 2017-11-29 RX ORDER — OLANZAPINE 7.5 MG/1
7.5 TABLET ORAL NIGHTLY
Qty: 30 TABLET | Refills: 1 | Status: SHIPPED | OUTPATIENT
Start: 2017-11-29 | End: 2017-12-13

## 2017-11-29 RX ADMIN — GUAIFENESIN 600 MG: 600 TABLET, EXTENDED RELEASE ORAL at 08:11

## 2017-11-29 RX ADMIN — DIVALPROEX SODIUM 500 MG: 500 TABLET, DELAYED RELEASE ORAL at 08:11

## 2017-11-29 RX ADMIN — OLANZAPINE 7.5 MG: 5 TABLET, FILM COATED ORAL at 08:11

## 2017-11-29 RX ADMIN — ACETAMINOPHEN 650 MG: 325 TABLET, FILM COATED ORAL at 09:11

## 2017-11-29 RX ADMIN — NICOTINE 1 PATCH: 14 PATCH, EXTENDED RELEASE TRANSDERMAL at 01:11

## 2017-11-29 RX ADMIN — VENLAFAXINE HYDROCHLORIDE 225 MG: 150 CAPSULE, EXTENDED RELEASE ORAL at 08:11

## 2017-11-29 NOTE — PLAN OF CARE
Problem: Patient Care Overview (Adult)  Goal: Plan of Care Review  Outcome: Ongoing (interventions implemented as appropriate)  Pt mostly isolating in bed.Pt up for meals and brief periods.Sleeping and eating good.Pt denies suicidal thoughts.Medication compliant.Mood improved.
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
## 2017-11-29 NOTE — PLAN OF CARE
Problem: Patient Care Overview (Adult)  Goal: Plan of Care Review  Outcome: Ongoing (interventions implemented as appropriate)  No falls noted, accepting meals and meds, rested most of the night, slept about 8 hours, calm and cooperative,
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
## 2017-11-29 NOTE — PLAN OF CARE
"  Treatment Recommendation:   1:1 Intervention (as needed)    Cognitive Stimulation Skilled Activity  Creative Expression Skilled Activity  Self Expression Skilled Activity  Mild Exercises Skilled Activity  Stress Management Skilled Activity  Coping Skilled Activity  Leisure Education and Awareness Skilled Activity    Treatment Goal(s):  Long Term Goals Refer To Master Treatment Plan    Short Term Treatment Goal(s)  Patient Will:  Exhibit Improvement in Mood  Demonstrate Constructive Expression of Feelings and Behavior  Identify (+) Leisure / Recreation Activities that Promote Wellness and Promote a Sober Lifestyle    Discharge Recommendations:  Encourage Patient to Utilize Coping Skills on a Regular Basis to Reduce the Risk of Decompensating and Re-Hospitalizations  Follow Up with After Care Appointments  Continue with Current Leisure Activities     Patient presents with irritable, anxious affect and "aggravated" mood. Patient states her admit is due to drug overdose(used prescription medications) and suicidal thoughts. Patient admits to negative leisure lifestyle of drinking beer, smoking cigarettes, denies drug use, Utox positive for PC. Patient reports she is , has 2 children, high school education, wears glasses, mild hearing loss both ears, unemployed(applied for disability), patient and youngest lives with her mother in [place]. Patient reports she isolates in her room because "I don't like people." Patient states "I don't have a goal."  "

## 2017-11-29 NOTE — PLAN OF CARE
Problem: Patient Care Overview (Adult)  Goal: Discharge Needs Assessment  Patient will discharge to home and follow up with [facility]. Patient states she will stay with her friend Rivera or Angelo who has an empty trailer he will let her stay in rent free for a while. Patient completed safety plan.

## 2017-11-30 VITALS
TEMPERATURE: 97 F | HEIGHT: 65 IN | WEIGHT: 199.06 LBS | RESPIRATION RATE: 18 BRPM | DIASTOLIC BLOOD PRESSURE: 90 MMHG | HEART RATE: 92 BPM | SYSTOLIC BLOOD PRESSURE: 119 MMHG | BODY MASS INDEX: 33.16 KG/M2

## 2017-11-30 LAB
ALBUMIN SERPL BCP-MCNC: 3.3 G/DL
ALP SERPL-CCNC: 80 U/L
ALT SERPL W/O P-5'-P-CCNC: 37 U/L
ANION GAP SERPL CALC-SCNC: 11 MMOL/L
AST SERPL-CCNC: 14 U/L
BASOPHILS # BLD AUTO: 0.05 K/UL
BASOPHILS NFR BLD: 0.7 %
BILIRUB SERPL-MCNC: 0.2 MG/DL
BUN SERPL-MCNC: 13 MG/DL
CALCIUM SERPL-MCNC: 9.2 MG/DL
CHLORIDE SERPL-SCNC: 104 MMOL/L
CO2 SERPL-SCNC: 25 MMOL/L
CREAT SERPL-MCNC: 0.8 MG/DL
DIFFERENTIAL METHOD: ABNORMAL
EOSINOPHIL # BLD AUTO: 0.2 K/UL
EOSINOPHIL NFR BLD: 2.8 %
ERYTHROCYTE [DISTWIDTH] IN BLOOD BY AUTOMATED COUNT: 14.4 %
EST. GFR  (AFRICAN AMERICAN): >60 ML/MIN/1.73 M^2
EST. GFR  (NON AFRICAN AMERICAN): >60 ML/MIN/1.73 M^2
GLUCOSE SERPL-MCNC: 85 MG/DL
HCT VFR BLD AUTO: 41.2 %
HGB BLD-MCNC: 13.6 G/DL
LYMPHOCYTES # BLD AUTO: 3.5 K/UL
LYMPHOCYTES NFR BLD: 46.1 %
MCH RBC QN AUTO: 28 PG
MCHC RBC AUTO-ENTMCNC: 33 G/DL
MCV RBC AUTO: 85 FL
MONOCYTES # BLD AUTO: 0.5 K/UL
MONOCYTES NFR BLD: 6.2 %
NEUTROPHILS # BLD AUTO: 3.4 K/UL
NEUTROPHILS NFR BLD: 44.2 %
PLATELET # BLD AUTO: 494 K/UL
PMV BLD AUTO: 9.7 FL
POTASSIUM SERPL-SCNC: 4.5 MMOL/L
PROT SERPL-MCNC: 7.5 G/DL
RBC # BLD AUTO: 4.85 M/UL
SODIUM SERPL-SCNC: 140 MMOL/L
VALPROATE SERPL-MCNC: 73 UG/ML
WBC # BLD AUTO: 7.59 K/UL

## 2017-11-30 PROCEDURE — 80053 COMPREHEN METABOLIC PANEL: CPT

## 2017-11-30 PROCEDURE — 25000003 PHARM REV CODE 250: Performed by: PSYCHIATRY & NEUROLOGY

## 2017-11-30 PROCEDURE — 36415 COLL VENOUS BLD VENIPUNCTURE: CPT

## 2017-11-30 PROCEDURE — 80164 ASSAY DIPROPYLACETIC ACD TOT: CPT

## 2017-11-30 PROCEDURE — 90833 PSYTX W PT W E/M 30 MIN: CPT | Mod: AF,HB,, | Performed by: PSYCHIATRY & NEUROLOGY

## 2017-11-30 PROCEDURE — 85025 COMPLETE CBC W/AUTO DIFF WBC: CPT

## 2017-11-30 PROCEDURE — 99239 HOSP IP/OBS DSCHRG MGMT >30: CPT | Mod: AF,HB,, | Performed by: PSYCHIATRY & NEUROLOGY

## 2017-11-30 RX ADMIN — DIVALPROEX SODIUM 500 MG: 500 TABLET, DELAYED RELEASE ORAL at 08:11

## 2017-11-30 RX ADMIN — GUAIFENESIN 600 MG: 600 TABLET, EXTENDED RELEASE ORAL at 08:11

## 2017-11-30 RX ADMIN — VENLAFAXINE HYDROCHLORIDE 225 MG: 150 CAPSULE, EXTENDED RELEASE ORAL at 08:11

## 2017-11-30 NOTE — PLAN OF CARE
Problem: Patient Care Overview (Adult)  Goal: Plan of Care Review  Outcome: Ongoing (interventions implemented as appropriate)  Shift note : patient is stable for discharge

## 2017-11-30 NOTE — PLAN OF CARE
Problem: Patient Care Overview (Adult)  Goal: Plan of Care Review  Outcome: Ongoing (interventions implemented as appropriate)  Pt. Was visible in the milieu at start of the shift, in day room interacting with peers. During one to one pt. Report mood as good and is happy to be going home today. Mood and affect noted to be euthymic. Pt. Slept this shift without problems . Safety checks q 15 min done this shift. Cont. Plan.

## 2017-11-30 NOTE — PLAN OF CARE
Problem: Patient Care Overview (Adult)  Goal: Plan of Care Review  Outcome: Ongoing (interventions implemented as appropriate)  Lying quiet in bed, eyes closed and respiration even and unlabored, appeared asleep.  Slept well all shift, achieving 8.5 hours of sleep so far.  Safety precautions maintained, bed low & pathway kept clear.
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
## 2017-11-30 NOTE — PSYCH
Patient will be following up with [facility] at [address] [place] [address] In [place], [phone].  [facility] will see patient at her home, beginning on 11/30/2017 between 2 and 3 pm.  Patient will receive tobacco cessation therapy along with substance abuse therapy from [facility] also.  This is due to a positive UDS on admit.  AVS faxed on 11/30/2017 at 10:03 am.

## 2017-11-30 NOTE — PROGRESS NOTES
PSYCHOTHERAPY ADD-ON +85545   30 (16-37*) minutes    Time: 16 minutes  Participants: Met with patient    Therapeutic Intervention Type: insight oriented psychotherapy, behavior modifying psychotherapy, supportive psychotherapy  Why chosen therapy is appropriate versus another modality: relevant to diagnosis, patient responds to this modality, evidence based practice    Target symptoms: depression, anxiety   Primary focus: depression, anxiety  Psychotherapeutic techniques: supportive, behavioral, and cognitive techniques;  Psycho-education; safety planning and wrap up session    Outcome monitoring methods: self-report, observation    Patient's response to intervention:  The patient's response to intervention is accepting.    Progress toward goals:  The patient's progress toward goals is good.    Srinath Fowler MD  Psychiatry

## 2017-11-30 NOTE — NURSING
Discharge note : patient is cooperative . She denies suicidal , homicidal ideations and is not gravely disabled . She has all of her belongings . Her mother is picking her up and is taking her home . avs reviewed with patient and patient expresses understanding . Smoking cessation appointment to be done at the mental health clinic .

## 2017-12-14 ENCOUNTER — PATIENT MESSAGE (OUTPATIENT)
Dept: FAMILY MEDICINE | Facility: CLINIC | Age: 37
End: 2017-12-14

## 2017-12-14 NOTE — TELEPHONE ENCOUNTER
LOV 11/22/17, rx last printed on 7/13/17, please advise, thank you    hydrocodone-acetaminophen 5-325mg (NORCO) 5-325 mg per tablet 8 tablet 0 7/13/2017 7/23/2017 --   Sig - Route: Take 1 tablet by mouth every 4 (four) hours as needed for Pain. - Oral   Class: Print   Order: [id number]   Date/Time Signed: 7/13/2017 11:40

## 2017-12-22 DIAGNOSIS — F98.8 ATTENTION DEFICIT DISORDER, UNSPECIFIED HYPERACTIVITY PRESENCE: ICD-10-CM

## 2017-12-22 RX ORDER — DEXTROAMPHETAMINE SACCHARATE, AMPHETAMINE ASPARTATE, DEXTROAMPHETAMINE SULFATE AND AMPHETAMINE SULFATE 5; 5; 5; 5 MG/1; MG/1; MG/1; MG/1
1 TABLET ORAL 2 TIMES DAILY
Qty: 60 TABLET | Refills: 0 | Status: SHIPPED | OUTPATIENT
Start: 2017-12-22 | End: 2018-01-19 | Stop reason: SDUPTHER

## 2017-12-22 NOTE — TELEPHONE ENCOUNTER
----- Message from Bing Lang sent at 2017 11:47 AM CST -----  Contact: Pt  Joe Henson  MRN: [id number]  : 1980  PCP: Estela Munguia  Home Phone      [phone]  Work Phone      Not on file.  Mobile          [phone]      MESSAGE:  Pt calling to get a refill on Adderall. Please advise.  PHONE:  [phone]

## 2018-01-03 ENCOUNTER — TELEPHONE (OUTPATIENT)
Dept: FAMILY MEDICINE | Facility: CLINIC | Age: 38
End: 2018-01-03

## 2018-01-03 NOTE — TELEPHONE ENCOUNTER
----- Message from Jasmeet Alvarado MA sent at 1/3/2018 10:37 AM CST -----  Contact: patient  Pt states she has coughing and congestion and wants to know [facility]  Pt may be reached at phone: [phone]  Pharmacy: [facility]

## 2018-01-19 ENCOUNTER — TELEPHONE (OUTPATIENT)
Dept: FAMILY MEDICINE | Facility: CLINIC | Age: 38
End: 2018-01-19

## 2018-01-19 DIAGNOSIS — F98.8 ATTENTION DEFICIT DISORDER, UNSPECIFIED HYPERACTIVITY PRESENCE: ICD-10-CM

## 2018-01-19 RX ORDER — DEXTROAMPHETAMINE SACCHARATE, AMPHETAMINE ASPARTATE, DEXTROAMPHETAMINE SULFATE AND AMPHETAMINE SULFATE 5; 5; 5; 5 MG/1; MG/1; MG/1; MG/1
1 TABLET ORAL 2 TIMES DAILY
Qty: 60 TABLET | Refills: 0 | Status: SHIPPED | OUTPATIENT
Start: 2018-01-19 | End: 2018-02-15 | Stop reason: SDUPTHER

## 2018-01-19 NOTE — TELEPHONE ENCOUNTER
----- Message from Ventura Cote sent at 2018 12:59 PM CST -----  Contact: Daron @ [facility]  Joe Henson  MRN: [id number]  : 1980  PCP: Estela Munguia  Home Phone      [phone]  Work Phone      Not on file.  Mobile          [phone]      MESSAGE: received e-script -- Adderall being denied by patient's medicaid due to patient being over 18 yrs of age -- he is unable to print request thru [facility] -- probably requires PA     Call Daron Schmitt if needed    PCP: Ezra

## 2018-01-19 NOTE — TELEPHONE ENCOUNTER
----- Message from Ventura Cote sent at 2018 10:13 AM CST -----  Contact: Patient  Joe Henson  MRN: [id number]  : 1980  PCP: Estela Munguia  Home Phone      [phone]  Work Phone      Not on file.  Mobile          [phone]      MESSAGE: requesting refills on Adderall & Albuterol inhaler -- uses [facility]    Call [phone]    PCP: Ezra

## 2018-01-22 ENCOUNTER — TELEPHONE (OUTPATIENT)
Dept: FAMILY MEDICINE | Facility: CLINIC | Age: 38
End: 2018-01-22

## 2018-01-22 NOTE — TELEPHONE ENCOUNTER
----- Message from Radha Lea MA sent at 2018 10:22 AM CST -----  Contact: Self  Joe Henson  MRN: [id number]  : 1980  PCP: Estela Munguia  Home Phone      [phone]  Work Phone      Not on file.  Mobile          [phone]      MESSAGE: Patient wants to speak to nurse to see if PA was approved. Please call and advise  Phone: [phone]

## 2018-01-22 NOTE — TELEPHONE ENCOUNTER
Received approval from [facility]/medicaid for Adderall until 1/20/19. Faxed a copy of approval to [facility] Left a  msg for pt with info.

## 2018-02-15 DIAGNOSIS — F98.8 ATTENTION DEFICIT DISORDER, UNSPECIFIED HYPERACTIVITY PRESENCE: ICD-10-CM

## 2018-02-15 RX ORDER — DEXTROAMPHETAMINE SACCHARATE, AMPHETAMINE ASPARTATE, DEXTROAMPHETAMINE SULFATE AND AMPHETAMINE SULFATE 5; 5; 5; 5 MG/1; MG/1; MG/1; MG/1
1 TABLET ORAL 2 TIMES DAILY
Qty: 60 TABLET | Refills: 0 | Status: SHIPPED | OUTPATIENT
Start: 2018-02-15 | End: 2018-03-19 | Stop reason: SDUPTHER

## 2018-02-15 NOTE — TELEPHONE ENCOUNTER
----- Message from Yolanda Kan sent at 2/15/2018  9:15 AM CST -----  Contact: Self  Joe Henson  MRN: [id number]  : 1980  PCP: Estela Munguia  Home Phone      [phone]  Work Phone      Not on file.  Mobile          [phone]      MESSAGE:   Patient needs refill  dextroamphetamine-amphetamine (ADDERALL) 20 mg tablet    [facility]

## 2018-03-19 DIAGNOSIS — F98.8 ATTENTION DEFICIT DISORDER, UNSPECIFIED HYPERACTIVITY PRESENCE: ICD-10-CM

## 2018-03-19 RX ORDER — DEXTROAMPHETAMINE SACCHARATE, AMPHETAMINE ASPARTATE, DEXTROAMPHETAMINE SULFATE AND AMPHETAMINE SULFATE 5; 5; 5; 5 MG/1; MG/1; MG/1; MG/1
1 TABLET ORAL 2 TIMES DAILY
Qty: 60 TABLET | Refills: 0 | Status: SHIPPED | OUTPATIENT
Start: 2018-03-19 | End: 2018-04-11 | Stop reason: SDUPTHER

## 2018-03-19 NOTE — TELEPHONE ENCOUNTER
----- Message from Yolanda Kan sent at 3/19/2018  9:44 AM CDT -----  Contact: Self  Joe Henson  MRN: [id number]  : 1980  PCP: Estela Munguia  Home Phone      [phone]  Work Phone      Not on file.  Mobile          [phone]      MESSAGE:   Patient needs refill  dextroamphetamine-amphetamine (ADDERALL) 20 mg tablet    [facility]

## 2018-03-20 ENCOUNTER — OFFICE VISIT (OUTPATIENT)
Dept: OBSTETRICS AND GYNECOLOGY | Facility: CLINIC | Age: 38
End: 2018-03-20
Payer: MEDICAID

## 2018-03-20 VITALS
RESPIRATION RATE: 15 BRPM | SYSTOLIC BLOOD PRESSURE: 118 MMHG | HEART RATE: 100 BPM | BODY MASS INDEX: 34.32 KG/M2 | WEIGHT: 206 LBS | DIASTOLIC BLOOD PRESSURE: 76 MMHG | HEIGHT: 65 IN

## 2018-03-20 DIAGNOSIS — Z12.4 CERVICAL CANCER SCREENING: ICD-10-CM

## 2018-03-20 DIAGNOSIS — Z01.419 ENCOUNTER FOR GYNECOLOGICAL EXAMINATION WITHOUT ABNORMAL FINDING: Primary | ICD-10-CM

## 2018-03-20 DIAGNOSIS — N94.10 DYSPAREUNIA IN FEMALE: ICD-10-CM

## 2018-03-20 DIAGNOSIS — R23.2 HOT FLASHES: ICD-10-CM

## 2018-03-20 DIAGNOSIS — Z80.3 FAMILY HISTORY OF BREAST CANCER: ICD-10-CM

## 2018-03-20 DIAGNOSIS — N89.8 VAGINAL DRYNESS: ICD-10-CM

## 2018-03-20 DIAGNOSIS — Z83.49 FAMILY HISTORY OF THYROID DISEASE IN SISTER: ICD-10-CM

## 2018-03-20 PROCEDURE — 99395 PREV VISIT EST AGE 18-39: CPT | Mod: S$PBB,,, | Performed by: OBSTETRICS & GYNECOLOGY

## 2018-03-20 PROCEDURE — 88175 CYTOPATH C/V AUTO FLUID REDO: CPT

## 2018-03-20 PROCEDURE — 99999 PR PBB SHADOW E&M-EST. PATIENT-LVL IV: CPT | Mod: PBBFAC,,, | Performed by: OBSTETRICS & GYNECOLOGY

## 2018-03-20 PROCEDURE — 99214 OFFICE O/P EST MOD 30 MIN: CPT | Mod: PBBFAC | Performed by: OBSTETRICS & GYNECOLOGY

## 2018-03-20 RX ORDER — METFORMIN HYDROCHLORIDE 500 MG/1
500 TABLET ORAL 2 TIMES DAILY WITH MEALS
COMMUNITY
End: 2018-06-29

## 2018-03-20 RX ORDER — DOXYCYCLINE 100 MG/1
CAPSULE ORAL
COMMUNITY
Start: 2018-02-22 | End: 2018-07-14

## 2018-03-20 RX ORDER — MIRTAZAPINE 7.5 MG/1
TABLET, FILM COATED ORAL
COMMUNITY
Start: 2018-03-08 | End: 2018-06-29

## 2018-03-20 RX ORDER — ESTRADIOL 0.1 MG/G
1 CREAM VAGINAL
Qty: 1 TUBE | Refills: 6 | Status: SHIPPED | OUTPATIENT
Start: 2018-03-20 | End: 2018-10-30

## 2018-03-20 RX ORDER — BENZOYL PEROXIDE 50 MG/ML
LIQUID TOPICAL
COMMUNITY
Start: 2018-02-22 | End: 2020-03-05

## 2018-03-20 RX ORDER — RISPERIDONE 2 MG/1
TABLET ORAL
COMMUNITY
Start: 2018-03-07 | End: 2018-06-29

## 2018-03-20 RX ORDER — BUSPIRONE HYDROCHLORIDE 15 MG/1
TABLET ORAL
COMMUNITY
Start: 2018-03-08 | End: 2018-10-30

## 2018-03-20 RX ORDER — CLINDAMYCIN PHOSPHATE 11.9 MG/ML
SOLUTION TOPICAL
COMMUNITY
Start: 2018-02-23 | End: 2018-07-14

## 2018-03-20 RX ORDER — SPIRONOLACTONE 25 MG/1
TABLET ORAL
COMMUNITY
Start: 2018-02-22 | End: 2018-10-30

## 2018-03-20 RX ORDER — PALIPERIDONE PALMITATE 234 MG/1.5ML
INJECTION INTRAMUSCULAR
COMMUNITY
Start: 2018-01-12 | End: 2020-03-05

## 2018-03-20 NOTE — PROGRESS NOTES
Subjective:       Patient ID: Joe Henson is a 37 y.o. female.    Chief Complaint:  Well Woman (pap)      History of Present Illness  Patient resents for annual exam.  Patient admits to symptoms of hot flashes.  Upon further questioning she admits she has a sister with thyroid disease.  Patient also complaining of vaginal dryness which leads to some pain with intercourse and loss of libido.  Counseling was done.  Patient has not had menstrual bleeding since her ablation 2 years ago.    Menstrual History:  OB History      Para Term  AB Living    2 2       2    SAB TAB Ectopic Multiple Live Births                      Menarche age:   No LMP recorded. Patient has had an ablation.         Review of Systems  Review of Systems   Constitutional: Positive for appetite change and diaphoresis. Negative for activity change, chills, fatigue, fever and unexpected weight change.   HENT: Negative for congestion, dental problem, drooling, ear discharge, ear pain, facial swelling, hearing loss, mouth sores, nosebleeds, postnasal drip, rhinorrhea, sinus pressure, sneezing, sore throat, tinnitus, trouble swallowing and voice change.    Eyes: Negative for photophobia, pain, discharge, redness, itching and visual disturbance.   Respiratory: Positive for shortness of breath and wheezing. Negative for apnea, cough, choking, chest tightness and stridor.    Cardiovascular: Negative for chest pain, palpitations and leg swelling.   Gastrointestinal: Negative for abdominal distention, abdominal pain, anal bleeding, blood in stool, constipation, diarrhea, nausea, rectal pain and vomiting.   Endocrine: Positive for polyphagia. Negative for cold intolerance, heat intolerance, polydipsia and polyuria.   Genitourinary: Negative for decreased urine volume, difficulty urinating, dyspareunia, dysuria, enuresis, flank pain, frequency, genital sores, hematuria, menstrual problem, pelvic pain, urgency, vaginal bleeding, vaginal  discharge and vaginal pain.   Musculoskeletal: Negative for arthralgias, back pain, gait problem, joint swelling, myalgias, neck pain and neck stiffness.   Skin: Negative for color change, pallor, rash and wound.   Allergic/Immunologic: Negative for environmental allergies, food allergies and immunocompromised state.   Neurological: Negative for dizziness, tremors, seizures, syncope, facial asymmetry, speech difficulty, weakness, light-headedness, numbness and headaches.   Hematological: Negative for adenopathy. Does not bruise/bleed easily.   Psychiatric/Behavioral: Negative for agitation, behavioral problems, confusion, decreased concentration, dysphoric mood, hallucinations, self-injury, sleep disturbance and suicidal ideas. The patient is not nervous/anxious and is not hyperactive.            Objective:    Physical Exam   Constitutional: She is oriented to person, place, and time. She appears well-developed and well-nourished.   Neck: No thyromegaly present.   Cardiovascular: Normal rate and regular rhythm.    Pulmonary/Chest: Effort normal and breath sounds normal. Right breast exhibits no inverted nipple, no mass, no nipple discharge, no skin change and no tenderness. Left breast exhibits no inverted nipple, no mass, no nipple discharge, no skin change and no tenderness. Breasts are symmetrical.   Abdominal: Soft. Bowel sounds are normal. She exhibits no mass. There is no tenderness. Hernia confirmed negative in the right inguinal area and confirmed negative in the left inguinal area.   Genitourinary: Vagina normal and uterus normal. Rectal exam shows no external hemorrhoid. No breast tenderness or discharge. Uterus is not enlarged and not tender. Cervix exhibits no motion tenderness, no discharge and no friability. Right adnexum displays no mass, no tenderness and no fullness. Left adnexum displays no mass, no tenderness and no fullness. No tenderness in the vagina. No foreign body in the vagina. No vaginal  discharge found.   Musculoskeletal: Normal range of motion.   Lymphadenopathy:        Right: No inguinal adenopathy present.        Left: No inguinal adenopathy present.   Neurological: She is alert and oriented to person, place, and time. She has normal reflexes.   Skin: Skin is dry.   Psychiatric: She has a normal mood and affect. Her behavior is normal. Judgment and thought content normal.   Nursing note and vitals reviewed.        Assessment:        1. Encounter for gynecological examination without abnormal finding    2. Family history of breast cancer    3. Cervical cancer screening    4. Vaginal dryness    5. Hot flashes    6. Dyspareunia in female    7. Family history of thyroid disease in sister               Plan:        Joe was seen today for well woman.    Diagnoses and all orders for this visit:    Encounter for gynecological examination without abnormal finding    Family history of breast cancer    Cervical cancer screening  -     Liquid-based pap smear, diagnostic    Vaginal dryness  -     estradiol (ESTRACE) 0.01 % (0.1 mg/gram) vaginal cream; Place 1 g vaginally every 7 days.    Hot flashes  -     TSH; Future    Dyspareunia in female  -     estradiol (ESTRACE) 0.01 % (0.1 mg/gram) vaginal cream; Place 1 g vaginally every 7 days.    Family history of thyroid disease in sister  -     TSH; Future

## 2018-03-21 ENCOUNTER — LAB VISIT (OUTPATIENT)
Dept: LAB | Facility: HOSPITAL | Age: 38
End: 2018-03-21
Attending: OBSTETRICS & GYNECOLOGY
Payer: MEDICAID

## 2018-03-21 DIAGNOSIS — R23.2 HOT FLASHES: ICD-10-CM

## 2018-03-21 DIAGNOSIS — Z83.49 FAMILY HISTORY OF THYROID DISEASE IN SISTER: ICD-10-CM

## 2018-03-21 LAB — TSH SERPL DL<=0.005 MIU/L-ACNC: 0.71 UIU/ML

## 2018-03-21 PROCEDURE — 36415 COLL VENOUS BLD VENIPUNCTURE: CPT

## 2018-03-21 PROCEDURE — 84443 ASSAY THYROID STIM HORMONE: CPT

## 2018-03-29 ENCOUNTER — HOSPITAL ENCOUNTER (EMERGENCY)
Facility: HOSPITAL | Age: 38
Discharge: HOME | End: 2018-03-29
Attending: EMERGENCY MEDICINE
Payer: MEDICAID

## 2018-03-29 VITALS
WEIGHT: 206 LBS | TEMPERATURE: 99 F | SYSTOLIC BLOOD PRESSURE: 131 MMHG | RESPIRATION RATE: 18 BRPM | DIASTOLIC BLOOD PRESSURE: 81 MMHG | HEART RATE: 119 BPM | BODY MASS INDEX: 34.28 KG/M2

## 2018-03-29 DIAGNOSIS — G89.29 CHRONIC LEFT-SIDED LOW BACK PAIN WITH LEFT-SIDED SCIATICA: ICD-10-CM

## 2018-03-29 DIAGNOSIS — M54.42 CHRONIC LEFT-SIDED LOW BACK PAIN WITH LEFT-SIDED SCIATICA: ICD-10-CM

## 2018-03-29 DIAGNOSIS — M54.32 SCIATICA OF LEFT SIDE: Primary | ICD-10-CM

## 2018-03-29 PROCEDURE — 99283 EMERGENCY DEPT VISIT LOW MDM: CPT | Mod: 25

## 2018-03-29 PROCEDURE — 96372 THER/PROPH/DIAG INJ SC/IM: CPT

## 2018-03-29 PROCEDURE — 63600175 PHARM REV CODE 636 W HCPCS: Performed by: EMERGENCY MEDICINE

## 2018-03-29 RX ORDER — HYDROMORPHONE HYDROCHLORIDE 1 MG/ML
2 INJECTION, SOLUTION INTRAMUSCULAR; INTRAVENOUS; SUBCUTANEOUS
Status: DISCONTINUED | OUTPATIENT
Start: 2018-03-29 | End: 2018-03-29

## 2018-03-29 RX ORDER — TRAMADOL HYDROCHLORIDE 50 MG/1
50 TABLET ORAL EVERY 6 HOURS PRN
Qty: 12 TABLET | Refills: 0 | Status: SHIPPED | OUTPATIENT
Start: 2018-03-29 | End: 2018-04-08

## 2018-03-29 RX ORDER — ONDANSETRON 2 MG/ML
4 INJECTION INTRAMUSCULAR; INTRAVENOUS
Status: COMPLETED | OUTPATIENT
Start: 2018-03-29 | End: 2018-03-29

## 2018-03-29 RX ORDER — HYDROMORPHONE HYDROCHLORIDE 1 MG/ML
2 INJECTION, SOLUTION INTRAMUSCULAR; INTRAVENOUS; SUBCUTANEOUS
Status: COMPLETED | OUTPATIENT
Start: 2018-03-29 | End: 2018-03-29

## 2018-03-29 RX ORDER — DEXAMETHASONE SODIUM PHOSPHATE 4 MG/ML
4 INJECTION, SOLUTION INTRA-ARTICULAR; INTRALESIONAL; INTRAMUSCULAR; INTRAVENOUS; SOFT TISSUE
Status: COMPLETED | OUTPATIENT
Start: 2018-03-29 | End: 2018-03-29

## 2018-03-29 RX ADMIN — ONDANSETRON 4 MG: 2 INJECTION INTRAMUSCULAR; INTRAVENOUS at 06:03

## 2018-03-29 RX ADMIN — DEXAMETHASONE SODIUM PHOSPHATE 4 MG: 4 INJECTION, SOLUTION INTRAMUSCULAR; INTRAVENOUS at 06:03

## 2018-03-29 RX ADMIN — HYDROMORPHONE HYDROCHLORIDE 2 MG: 1 INJECTION, SOLUTION INTRAMUSCULAR; INTRAVENOUS; SUBCUTANEOUS at 06:03

## 2018-03-29 NOTE — ED PROVIDER NOTES
Encounter Date: 3/29/2018       History     Chief Complaint   Patient presents with    Sciatica     This 37-year-old white female complains exam exacerbation or left sides that sciatica.  She had MRI and CT in November.  This is not a new problem.  She is has not fallen and has had no trauma.  She denies any urinary or bladder incontinence.  She denies any paresthesias although it feels heavy in the left leg.  She is working as a  at a gas station.  She is on her feet all the time and a new job which she thinks may exacerbate it.          Review of patient's allergies indicates:   Allergen Reactions    Latex, natural rubber      Past Medical History:   Diagnosis Date    Abnormal Pap smear age 30    Pos. HPV,     Abnormal Pap smear of cervix     ADHD (attention deficit hyperactivity disorder)     Arthritis     Bipolar disorder     Depression     History of psychiatric hospitalization     Hx of psychiatric care     OCD (obsessive compulsive disorder)     Psychiatric problem     PTSD (post-traumatic stress disorder)     Suicide attempt     Therapy      Past Surgical History:   Procedure Laterality Date     SECTION  ;     DILATION AND CURETTAGE OF UTERUS  2016    ENDOMETRIAL ABLATION  2016    KNEE SURGERY Right     hardware-bone from hip to repair    TUBAL LIGATION       Family History   Problem Relation Age of Onset    Diabetes Maternal Grandmother     Hypertension Father     Colon cancer Father 60    Breast cancer Paternal Aunt     Breast cancer Paternal Aunt     Breast cancer Paternal Aunt     Breast cancer Paternal Aunt     Coronary artery disease Maternal Grandfather      labor Neg Hx      Social History   Substance Use Topics    Smoking status: Current Every Day Smoker     Packs/day: 1.00     Years: 15.00     Types: Cigarettes     Start date: 1997    Smokeless tobacco: Never Used      Comment: told about brochure and smoking  clinic program    Alcohol use Yes      Comment: Socially-2 times a week-4-5 beers     Review of Systems   Musculoskeletal: Positive for back pain.   All other systems reviewed and are negative.      Physical Exam     Initial Vitals [03/29/18 1730]   BP Pulse Resp Temp SpO2   131/81 (!) 119 18 98.5 °F (36.9 °C) --      MAP       97.67         Physical Exam    Nursing note and vitals reviewed.  Constitutional: She appears well-developed and well-nourished.   HENT:   Mouth/Throat: Oropharynx is clear and moist.   Eyes: Conjunctivae are normal. Pupils are equal, round, and reactive to light.   Neck: Normal range of motion. Neck supple.   Cardiovascular: Normal heart sounds and intact distal pulses.   Pulmonary/Chest: Breath sounds normal.   Abdominal: Soft. Bowel sounds are normal.   Musculoskeletal: Normal range of motion.   Neurological: She is alert and oriented to person, place, and time.   Skin: Skin is warm and dry.   Psychiatric: She has a normal mood and affect.         ED Course   Procedures  Labs Reviewed - No data to display                               Clinical Impression:   The primary encounter diagnosis was Sciatica of left side. A diagnosis of Chronic left-sided low back pain with left-sided sciatica was also pertinent to this visit.    Disposition:   Disposition: Discharged  Condition: Stable                        Srinath Bowers MD  03/29/18 4227

## 2018-04-09 ENCOUNTER — HOSPITAL ENCOUNTER (EMERGENCY)
Facility: HOSPITAL | Age: 38
Discharge: HOME | End: 2018-04-09
Attending: SURGERY
Payer: MEDICAID

## 2018-04-09 VITALS
TEMPERATURE: 97 F | WEIGHT: 206 LBS | SYSTOLIC BLOOD PRESSURE: 137 MMHG | DIASTOLIC BLOOD PRESSURE: 97 MMHG | HEART RATE: 96 BPM | RESPIRATION RATE: 17 BRPM | BODY MASS INDEX: 34.28 KG/M2

## 2018-04-09 DIAGNOSIS — G89.29 CHRONIC LEFT-SIDED LOW BACK PAIN WITH SCIATICA, SCIATICA LATERALITY UNSPECIFIED: ICD-10-CM

## 2018-04-09 DIAGNOSIS — M54.40 CHRONIC LEFT-SIDED LOW BACK PAIN WITH SCIATICA, SCIATICA LATERALITY UNSPECIFIED: ICD-10-CM

## 2018-04-09 DIAGNOSIS — F45.42 PAIN DISORDER ASSOCIATED WITH PSYCHOLOGICAL AND PHYSICAL FACTORS: Primary | ICD-10-CM

## 2018-04-09 PROCEDURE — 99283 EMERGENCY DEPT VISIT LOW MDM: CPT

## 2018-04-09 RX ORDER — METHYLPREDNISOLONE 4 MG/1
TABLET ORAL
Qty: 1 PACKAGE | Refills: 0 | Status: SHIPPED | OUTPATIENT
Start: 2018-04-09 | End: 2018-06-29

## 2018-04-09 RX ORDER — CYCLOBENZAPRINE HCL 10 MG
10 TABLET ORAL 3 TIMES DAILY PRN
Qty: 10 TABLET | Refills: 0 | Status: SHIPPED | OUTPATIENT
Start: 2018-04-09 | End: 2018-04-14

## 2018-04-09 RX ORDER — KETOROLAC TROMETHAMINE 10 MG/1
10 TABLET, FILM COATED ORAL EVERY 6 HOURS PRN
Qty: 15 TABLET | Refills: 0 | Status: SHIPPED | OUTPATIENT
Start: 2018-04-09 | End: 2018-07-14

## 2018-04-09 RX ORDER — METHYLPREDNISOLONE SOD SUCC 125 MG
125 VIAL (EA) INJECTION
Status: DISCONTINUED | OUTPATIENT
Start: 2018-04-09 | End: 2018-04-09 | Stop reason: HOSPADM

## 2018-04-09 RX ORDER — KETOROLAC TROMETHAMINE 30 MG/ML
60 INJECTION, SOLUTION INTRAMUSCULAR; INTRAVENOUS
Status: DISCONTINUED | OUTPATIENT
Start: 2018-04-09 | End: 2018-04-09 | Stop reason: HOSPADM

## 2018-04-09 RX ORDER — ORPHENADRINE CITRATE 30 MG/ML
60 INJECTION INTRAMUSCULAR; INTRAVENOUS
Status: DISCONTINUED | OUTPATIENT
Start: 2018-04-09 | End: 2018-04-09 | Stop reason: HOSPADM

## 2018-04-10 NOTE — ED PROVIDER NOTES
[facility]                                                 Chief Complaint  37 y.o. female with Sciatica    History of Present Illness  Joe Henson presents to the emergency room with low back pain today  Patient has chronic low back pain issues for several years, no new trauma noted  The patient has been to the ER several times last 6 months, is a noted superutilizer   Patient immediately asked for Dilaudid when I entered the room, declined in the ER  Patient has a normal lumbar spine without any bruising or deformity noted this p.m.  Normal neurologic exam and no signs of cauda equina syndrome in the ER this PM  Patient filled a prescription of Ultram 3 days ago, declined Dilaudid injection tonight    The history is provided by the patient     Past Medical History   -- Abnormal Pap smear     -- ADHD (attention deficit hyperactivity disorder)     -- Arthritis     -- OCD (obsessive compulsive disorder)        Past Surgical History   --  SECTION       -- DILATION AND CURETTAGE OF UTERUS       -- ENDOMETRIAL ABLATION       -- KNEE SURGERY       -- TUBAL LIGATION          No Known Allergies     Review of Systems and Physical Exam      Review of Systems  -- Constitution - no fever, denies fatigue, no weakness, no chills  -- Eyes - no tearing or redness, no visual disturbance  -- Ear, Nose - no tinnitus or earache, no nasal congestion or discharge  -- Mouth,Throat - no sore throat, no toothache, normal voice, normal swallowing  -- Respiratory - denies cough and congestion, no shortness of breath, no OCONNELL  -- Cardiovascular - denies chest pain, no palpitations, denies claudication  -- Gastrointestinal - denies abdominal pain, nausea, vomiting, or diarrhea  -- Genitourinary - no dysuria, no denies flank pain, no hematuria or frequency   -- Musculoskeletal - chronic back pain, negative for myalgias and arthralgias   -- Neurological - no headache, denies weakness or seizure; no LOC  -- Skin  - denies pallor, rash, or changes in skin. no hives or welts noted    BP (!) 137/97   Pulse 96   Temp 97 °F (36.1 °C) (Oral)   Resp 17    Physical Exam  -- Nursing note and vitals reviewed  -- Constitutional: Appears well-developed and well-nourished  -- Head: Atraumatic. Normocephalic. No obvious abnormality  -- Eyes: Pupils are equal and reactive to light. Normal conjunctiva and lids  -- Cardiac: Normal rate, regular rhythm and normal heart sounds  -- Pulmonary: Normal respiratory effort, breath sounds clear to auscultation  -- Abdominal: Soft, no tenderness. Normal bowel sounds. Normal liver edge  -- Musculoskeletal: Normal range of motion, no effusions. Joints stable   -- Neurological: No focal deficits. Showed good interaction with staff  -- Vascular: Posterior tibial, dorsalis pedis and radial pulses 2+ bilaterally      Emergency Room Course      Treatment and Evaluation  --  mg Solumedrol given today in the ER  -- IM 60 mg Toradol given today in the ER  -- IM 60 mg Norflex given today in the ER    Diagnosis  -- Pain disorder associated with psychological and physical factors  -- Chronic left-sided low back pain with sciatic    Disposition and Plan  -- Disposition: home  -- Condition: stable  -- Follow-up: Patient to follow up with Estela Munguia NP in 1-2 days.  -- I advised the patient that we have found no life threatening condition today  -- At this time, I believe the patient is clinically stable for discharge.   -- The patient acknowledges that close follow up with a MD is required   -- Patient agrees to comply with all instruction and direction given in the ER    This note is dictated on Dragon Natural Speaking word recognition program.  There are word recognition mistakes that are occasionally missed on review.           Hitesh Gilliam MD  04/09/18 2033

## 2018-04-11 DIAGNOSIS — F98.8 ATTENTION DEFICIT DISORDER, UNSPECIFIED HYPERACTIVITY PRESENCE: ICD-10-CM

## 2018-04-11 RX ORDER — DEXTROAMPHETAMINE SACCHARATE, AMPHETAMINE ASPARTATE, DEXTROAMPHETAMINE SULFATE AND AMPHETAMINE SULFATE 5; 5; 5; 5 MG/1; MG/1; MG/1; MG/1
1 TABLET ORAL 2 TIMES DAILY
Qty: 60 TABLET | Refills: 0 | Status: SHIPPED | OUTPATIENT
Start: 2018-04-11 | End: 2018-05-10 | Stop reason: SDUPTHER

## 2018-04-11 NOTE — TELEPHONE ENCOUNTER
----- Message from Yolanda Kan sent at 2018  4:06 PM CDT -----  Contact: Self  Joe Henson  MRN: [id number]  : 1980  PCP: Estela Munguia  Home Phone      [phone]  Work Phone      Not on file.  Mobile          [phone]      MESSAGE:    Patient needs refill:  dextroamphetamine-amphetamine (ADDERALL) 20 mg tablet    Pharmacy:  [facility]

## 2018-05-02 ENCOUNTER — TELEPHONE (OUTPATIENT)
Dept: SMOKING CESSATION | Facility: CLINIC | Age: 38
End: 2018-05-02

## 2018-05-07 ENCOUNTER — TELEPHONE (OUTPATIENT)
Dept: SMOKING CESSATION | Facility: CLINIC | Age: 38
End: 2018-05-07

## 2018-05-10 DIAGNOSIS — F98.8 ATTENTION DEFICIT DISORDER, UNSPECIFIED HYPERACTIVITY PRESENCE: ICD-10-CM

## 2018-05-10 RX ORDER — DEXTROAMPHETAMINE SACCHARATE, AMPHETAMINE ASPARTATE, DEXTROAMPHETAMINE SULFATE AND AMPHETAMINE SULFATE 5; 5; 5; 5 MG/1; MG/1; MG/1; MG/1
1 TABLET ORAL 2 TIMES DAILY
Qty: 60 TABLET | Refills: 0 | Status: SHIPPED | OUTPATIENT
Start: 2018-05-10 | End: 2018-06-13 | Stop reason: SDUPTHER

## 2018-05-10 NOTE — TELEPHONE ENCOUNTER
LOV 11/22/17    Requesting refill on adderall and albuterol inhaler. Albuterol is not on current med list. Please advise, thank you.

## 2018-05-10 NOTE — TELEPHONE ENCOUNTER
----- Message from Ventura Cote sent at 5/10/2018  8:41 AM CDT -----  Contact: Patient  Joe Henson  MRN: [id number]  : 1980  PCP: Estela Munguia  Home Phone      [phone]  Work Phone      Not on file.  Mobile          [phone]      MESSAGE: requesting refills on Adderall & Albuterol inhaler -- uses [facility]    Call [phone]    PCP: Ezra

## 2018-05-18 ENCOUNTER — TELEPHONE (OUTPATIENT)
Dept: SMOKING CESSATION | Facility: CLINIC | Age: 38
End: 2018-05-18

## 2018-06-13 DIAGNOSIS — R06.02 SOB (SHORTNESS OF BREATH) ON EXERTION: ICD-10-CM

## 2018-06-13 DIAGNOSIS — F98.8 ATTENTION DEFICIT DISORDER, UNSPECIFIED HYPERACTIVITY PRESENCE: ICD-10-CM

## 2018-06-13 RX ORDER — DEXTROAMPHETAMINE SACCHARATE, AMPHETAMINE ASPARTATE, DEXTROAMPHETAMINE SULFATE AND AMPHETAMINE SULFATE 5; 5; 5; 5 MG/1; MG/1; MG/1; MG/1
1 TABLET ORAL 2 TIMES DAILY
Qty: 60 TABLET | Refills: 0 | Status: SHIPPED | OUTPATIENT
Start: 2018-06-13 | End: 2018-07-10 | Stop reason: SDUPTHER

## 2018-06-13 RX ORDER — ALBUTEROL SULFATE 90 UG/1
AEROSOL, METERED RESPIRATORY (INHALATION)
Qty: 1 INHALER | Refills: 5 | Status: SHIPPED | OUTPATIENT
Start: 2018-06-13 | End: 2019-08-05 | Stop reason: SDUPTHER

## 2018-06-13 NOTE — TELEPHONE ENCOUNTER
----- Message from Ventura Cote sent at 2018  9:08 AM CDT -----  Contact: Patient  Joe Henson  MRN: [id number]  : 1980  PCP: Estela Munguia  Home Phone      [phone]  Work Phone      Not on file.  Mobile          [phone]      MESSAGE: requesting refills on Adderall & inhaler -- uses [facility]    Call [phone]    PCP: Ezra

## 2018-06-29 ENCOUNTER — OFFICE VISIT (OUTPATIENT)
Dept: FAMILY MEDICINE | Facility: CLINIC | Age: 38
End: 2018-06-29
Payer: MEDICAID

## 2018-06-29 VITALS
HEIGHT: 65 IN | WEIGHT: 225.63 LBS | DIASTOLIC BLOOD PRESSURE: 72 MMHG | BODY MASS INDEX: 37.59 KG/M2 | SYSTOLIC BLOOD PRESSURE: 118 MMHG | HEART RATE: 84 BPM

## 2018-06-29 DIAGNOSIS — M54.32 SCIATICA OF LEFT SIDE: Primary | ICD-10-CM

## 2018-06-29 PROCEDURE — 99999 PR PBB SHADOW E&M-EST. PATIENT-LVL III: CPT | Mod: PBBFAC,,, | Performed by: NURSE PRACTITIONER

## 2018-06-29 PROCEDURE — 99213 OFFICE O/P EST LOW 20 MIN: CPT | Mod: PBBFAC | Performed by: NURSE PRACTITIONER

## 2018-06-29 PROCEDURE — 99213 OFFICE O/P EST LOW 20 MIN: CPT | Mod: S$PBB,,, | Performed by: NURSE PRACTITIONER

## 2018-06-29 RX ORDER — METHYLPREDNISOLONE ACETATE 40 MG/ML
60 INJECTION, SUSPENSION INTRA-ARTICULAR; INTRALESIONAL; INTRAMUSCULAR; SOFT TISSUE
Status: COMPLETED | OUTPATIENT
Start: 2018-06-29 | End: 2018-06-29

## 2018-06-29 RX ORDER — KETOROLAC TROMETHAMINE 30 MG/ML
60 INJECTION, SOLUTION INTRAMUSCULAR; INTRAVENOUS
Status: COMPLETED | OUTPATIENT
Start: 2018-06-29 | End: 2018-06-29

## 2018-06-29 RX ORDER — HYDROCODONE BITARTRATE AND ACETAMINOPHEN 10; 325 MG/1; MG/1
1 TABLET ORAL EVERY 6 HOURS PRN
Qty: 30 TABLET | Refills: 0 | Status: SHIPPED | OUTPATIENT
Start: 2018-06-29 | End: 2018-07-09

## 2018-06-29 RX ADMIN — KETOROLAC TROMETHAMINE 60 MG: 30 INJECTION, SOLUTION INTRAMUSCULAR at 02:06

## 2018-06-29 RX ADMIN — METHYLPREDNISOLONE ACETATE 60 MG: 40 INJECTION, SUSPENSION INTRA-ARTICULAR; INTRALESIONAL; INTRAMUSCULAR; SOFT TISSUE at 02:06

## 2018-06-29 NOTE — PROGRESS NOTES
Subjective:       Patient ID: Joe Henson is a 37 y.o. female.    Chief Complaint: Sciatica    Recurrent left sciatica. Started 2 days ago.       Review of Systems   Constitutional: Negative.  Negative for appetite change, fatigue and fever.   HENT: Negative.  Negative for congestion, ear pain and sore throat.    Eyes: Negative.  Negative for visual disturbance.   Respiratory: Negative.  Negative for cough, shortness of breath and wheezing.    Cardiovascular: Negative.    Gastrointestinal: Negative.  Negative for abdominal pain, diarrhea, nausea and vomiting.   Endocrine: Negative.    Genitourinary: Negative.  Negative for difficulty urinating and urgency.   Musculoskeletal: Positive for arthralgias (left sciatica). Negative for myalgias.   Skin: Negative.  Negative for color change.   Allergic/Immunologic: Negative.    Neurological: Negative.  Negative for dizziness and headaches.   Hematological: Negative.    Psychiatric/Behavioral: Negative.  Negative for sleep disturbance.   All other systems reviewed and are negative.      Objective:      Physical Exam   Constitutional: She is oriented to person, place, and time. She appears well-developed and well-nourished. No distress.   HENT:   Head: Normocephalic and atraumatic.   Eyes: Pupils are equal, round, and reactive to light.   Neck: Normal range of motion. Neck supple.   Cardiovascular: Normal rate, regular rhythm and normal heart sounds.    No murmur heard.  Pulmonary/Chest: Effort normal and breath sounds normal. No respiratory distress.   Musculoskeletal:   Decreased ROM related to sciatica     Neurological: She is alert and oriented to person, place, and time.   Skin: Skin is warm and dry.   Psychiatric: She has a normal mood and affect.   Nursing note and vitals reviewed.      Assessment:       1. Sciatica of left side        Plan:   Joe was seen today for sciatica.    Diagnoses and all orders for this visit:    Sciatica of left side  -      methylPREDNISolone acetate injection 60 mg; Inject 1.5 mLs (60 mg total) into the muscle one time.  -     ketorolac injection 60 mg; Inject 2 mLs (60 mg total) into the muscle one time.  -     HYDROcodone-acetaminophen (NORCO)  mg per tablet; Take 1 tablet by mouth every 6 (six) hours as needed.    RTC PRN

## 2018-07-10 DIAGNOSIS — M54.32 SCIATICA OF LEFT SIDE: ICD-10-CM

## 2018-07-10 DIAGNOSIS — F98.8 ATTENTION DEFICIT DISORDER, UNSPECIFIED HYPERACTIVITY PRESENCE: ICD-10-CM

## 2018-07-10 NOTE — TELEPHONE ENCOUNTER
----- Message from Michaela Villanueva sent at 7/10/2018 10:05 AM CDT -----  Contact: Self  Joe Henson  MRN: [id number]  : 1980  PCP: Estela Munguia  Home Phone      [phone]  Work Phone      Not on file.  Mobile          [phone]      MESSAGE:   Pt requesting refill or new Rx.   Is this a refill or new RX:  Refill   RX name and strength:   dextroamphetamine-amphetamine (ADDERALL) 20 mg tablet  HYDROcodone-acetaminophen (NORCO)  mg per tablet ()  Last office visit: 18  Is this a 30-day or 90-day RX:  30-Day  Pharmacy name and location:  [facility]  Comments:      Phone:  [phone]

## 2018-07-11 RX ORDER — HYDROCODONE BITARTRATE AND ACETAMINOPHEN 10; 325 MG/1; MG/1
1 TABLET ORAL EVERY 6 HOURS PRN
Qty: 30 TABLET | Refills: 0 | OUTPATIENT
Start: 2018-07-11 | End: 2018-07-21

## 2018-07-11 RX ORDER — DEXTROAMPHETAMINE SACCHARATE, AMPHETAMINE ASPARTATE, DEXTROAMPHETAMINE SULFATE AND AMPHETAMINE SULFATE 5; 5; 5; 5 MG/1; MG/1; MG/1; MG/1
1 TABLET ORAL 2 TIMES DAILY
Qty: 60 TABLET | Refills: 0 | Status: SHIPPED | OUTPATIENT
Start: 2018-07-11 | End: 2018-08-17 | Stop reason: SDUPTHER

## 2018-07-13 ENCOUNTER — HOSPITAL ENCOUNTER (EMERGENCY)
Facility: HOSPITAL | Age: 38
Discharge: HOME | End: 2018-07-14
Attending: SURGERY
Payer: MEDICAID

## 2018-07-13 DIAGNOSIS — M54.32 LEFT SIDED SCIATICA: Primary | ICD-10-CM

## 2018-07-13 PROCEDURE — 96372 THER/PROPH/DIAG INJ SC/IM: CPT | Performed by: SURGERY

## 2018-07-13 PROCEDURE — 99283 EMERGENCY DEPT VISIT LOW MDM: CPT | Mod: 25 | Performed by: SURGERY

## 2018-07-14 VITALS
WEIGHT: 220 LBS | DIASTOLIC BLOOD PRESSURE: 88 MMHG | SYSTOLIC BLOOD PRESSURE: 128 MMHG | BODY MASS INDEX: 36.61 KG/M2 | TEMPERATURE: 97 F | RESPIRATION RATE: 17 BRPM | HEART RATE: 96 BPM

## 2018-07-14 PROCEDURE — 63600175 PHARM REV CODE 636 W HCPCS: Performed by: SURGERY

## 2018-07-14 RX ORDER — IBUPROFEN 800 MG/1
800 TABLET ORAL EVERY 6 HOURS PRN
Qty: 20 TABLET | Refills: 0 | Status: SHIPPED | OUTPATIENT
Start: 2018-07-14 | End: 2018-10-30

## 2018-07-14 RX ORDER — MORPHINE SULFATE 4 MG/ML
4 INJECTION, SOLUTION INTRAMUSCULAR; INTRAVENOUS
Status: COMPLETED | OUTPATIENT
Start: 2018-07-14 | End: 2018-07-14

## 2018-07-14 RX ORDER — ONDANSETRON 2 MG/ML
4 INJECTION INTRAMUSCULAR; INTRAVENOUS
Status: COMPLETED | OUTPATIENT
Start: 2018-07-14 | End: 2018-07-14

## 2018-07-14 RX ORDER — METHYLPREDNISOLONE 4 MG/1
TABLET ORAL
Qty: 1 PACKAGE | Refills: 0 | Status: SHIPPED | OUTPATIENT
Start: 2018-07-14 | End: 2018-10-30

## 2018-07-14 RX ORDER — CYCLOBENZAPRINE HCL 10 MG
10 TABLET ORAL 3 TIMES DAILY PRN
Qty: 10 TABLET | Refills: 0 | Status: SHIPPED | OUTPATIENT
Start: 2018-07-14 | End: 2018-07-19

## 2018-07-14 RX ADMIN — MORPHINE SULFATE 4 MG: 4 INJECTION INTRAVENOUS at 01:07

## 2018-07-14 RX ADMIN — ONDANSETRON 4 MG: 2 INJECTION INTRAMUSCULAR; INTRAVENOUS at 01:07

## 2018-07-14 NOTE — ED PROVIDER NOTES
[facility]                                                 Chief Complaint  38 y.o. female with Back Pain    History of Present Illness  Joe Henson presents to the emergency room with left-sided sciatica  Patient has had longstanding issues with low back pain and arthritis related pain  Patient states she started a new job as a , standing long hours this week  Patient states this has aggravated her left-sided sciatica, denies any new trauma  Patient on exam has a normal lumbar spine without step-off or deformity noted   Normal urination/bowel movements; no signs of cauda equina syndrome today    The history is provided by the patient   device was not used during this ER visit    Past Medical History   -- Abnormal Pap smear     -- ADHD (attention deficit hyperactivity disorder)     -- Arthritis     -- OCD (obsessive compulsive disorder)        Past Surgical History   --  SECTION       -- DILATION AND CURETTAGE OF UTERUS       -- ENDOMETRIAL ABLATION       -- KNEE SURGERY       -- TUBAL LIGATION          No Known Allergies     Review of Systems and Physical Exam      Review of Systems  -- Constitution - no fever, denies fatigue, no weakness, no chills  -- Eyes - no tearing or redness, no visual disturbance  -- Ear, Nose - no tinnitus or earache, no nasal congestion or discharge  -- Mouth,Throat - no sore throat, no toothache, normal voice, normal swallowing  -- Respiratory - denies cough and congestion, no shortness of breath, no OCONNELL  -- Cardiovascular - denies chest pain, no palpitations, denies claudication  -- Gastrointestinal - denies abdominal pain, nausea, vomiting, or diarrhea  -- Genitourinary - no dysuria, denies flank pain, no hematuria, no STD risk  -- Musculoskeletal - back pain, negative for myalgias and arthralgias   -- Neurological - no headache, denies weakness or seizure; no LOC  -- Skin - denies pallor, rash, or changes in skin. no hives  or welts noted  -- Psychiatric - Denies SI or HI, no psychosis or fractured thought noted     /88  Pulse 96   Temp 96.7 °F (35.9 °C) (Tympanic)   Resp 17    Physical Exam  -- Nursing note and vitals reviewed  -- Constitutional: Appears well-developed and well-nourished  -- Head: Atraumatic. Normocephalic. No obvious abnormality  -- Eyes: Pupils are equal and reactive to light. Normal conjunctiva and lids  -- Cardiac: Normal rate, regular rhythm and normal heart sounds  -- Pulmonary: Normal respiratory effort, breath sounds clear to auscultation  -- Abdominal: Soft, no tenderness. Normal bowel sounds. Normal liver edge  -- Musculoskeletal: Normal range of motion, no effusions. Joints stable   -- Neurological: No focal deficits. Showed good interaction with staff  -- Vascular: Posterior tibial, dorsalis pedis and radial pulses 2+ bilaterally      Emergency Room Course      Treatment and Evaluation  -- IM 4 mg Morphine given in the ER  -- IM 4 mg Zofran given today in the ER     Diagnosis  -- The encounter diagnosis was Left sided sciatica.    Disposition and Plan  -- Disposition: home  -- Condition: stable  -- Follow-up: Patient to follow up with Estela Munguia NP in 1-2 days.  -- I advised the patient that we have found no life threatening condition today  -- At this time, I believe the patient is clinically stable for discharge.   -- The patient acknowledges that close follow up with a MD is required   -- Patient agrees to comply with all instruction and direction given in the ER    This note is dictated on Dragon Natural Speaking word recognition program.  There are word recognition mistakes that are occasionally missed on review.            Hitesh Gilliam MD  07/14/18 0104

## 2018-07-14 NOTE — ED NOTES
No s/s adverse reaction to medications given. Discharge instructions given, patient and friend voiced understanding.  Discharged to home in stable condition, ambulatory out of ER w steady gait, NAD.

## 2018-07-19 ENCOUNTER — TELEPHONE (OUTPATIENT)
Dept: SMOKING CESSATION | Facility: CLINIC | Age: 38
End: 2018-07-19

## 2018-07-23 ENCOUNTER — OFFICE VISIT (OUTPATIENT)
Dept: FAMILY MEDICINE | Facility: CLINIC | Age: 38
End: 2018-07-23
Payer: MEDICAID

## 2018-07-23 VITALS
HEART RATE: 80 BPM | WEIGHT: 216.38 LBS | DIASTOLIC BLOOD PRESSURE: 62 MMHG | HEIGHT: 65 IN | BODY MASS INDEX: 36.05 KG/M2 | SYSTOLIC BLOOD PRESSURE: 108 MMHG

## 2018-07-23 DIAGNOSIS — M43.17 SPONDYLOLISTHESIS OF LUMBOSACRAL REGION: ICD-10-CM

## 2018-07-23 DIAGNOSIS — M54.32 SCIATICA OF LEFT SIDE: Primary | ICD-10-CM

## 2018-07-23 DIAGNOSIS — M54.42 ACUTE LEFT-SIDED LOW BACK PAIN WITH LEFT-SIDED SCIATICA: ICD-10-CM

## 2018-07-23 PROCEDURE — 99213 OFFICE O/P EST LOW 20 MIN: CPT | Mod: S$PBB,,, | Performed by: NURSE PRACTITIONER

## 2018-07-23 PROCEDURE — 99214 OFFICE O/P EST MOD 30 MIN: CPT | Mod: PBBFAC | Performed by: NURSE PRACTITIONER

## 2018-07-23 PROCEDURE — 99999 PR PBB SHADOW E&M-EST. PATIENT-LVL IV: CPT | Mod: PBBFAC,,, | Performed by: NURSE PRACTITIONER

## 2018-07-23 RX ORDER — KETOROLAC TROMETHAMINE 30 MG/ML
60 INJECTION, SOLUTION INTRAMUSCULAR; INTRAVENOUS
Status: COMPLETED | OUTPATIENT
Start: 2018-07-23 | End: 2018-07-23

## 2018-07-23 RX ORDER — OXCARBAZEPINE 600 MG/1
TABLET, FILM COATED ORAL
COMMUNITY
Start: 2018-06-06 | End: 2018-11-12

## 2018-07-23 RX ORDER — PREGABALIN 50 MG/1
50 CAPSULE ORAL 3 TIMES DAILY
Qty: 90 CAPSULE | Refills: 6 | Status: SHIPPED | OUTPATIENT
Start: 2018-07-23 | End: 2019-08-05

## 2018-07-23 RX ORDER — TRAMADOL HYDROCHLORIDE 50 MG/1
50 TABLET ORAL EVERY 12 HOURS PRN
Qty: 60 TABLET | Refills: 2 | Status: SHIPPED | OUTPATIENT
Start: 2018-07-23 | End: 2018-11-12

## 2018-07-23 RX ORDER — PALIPERIDONE PALMITATE 156 MG/ML
INJECTION INTRAMUSCULAR
COMMUNITY
Start: 2018-04-20 | End: 2020-03-05

## 2018-07-23 RX ORDER — OXAPROZIN 600 MG/1
600 TABLET, FILM COATED ORAL EVERY 12 HOURS
Qty: 60 TABLET | Refills: 5 | Status: SHIPPED | OUTPATIENT
Start: 2018-07-23 | End: 2018-10-30

## 2018-07-23 RX ORDER — HALOPERIDOL 10 MG/1
TABLET ORAL
COMMUNITY
Start: 2018-06-06 | End: 2018-10-30

## 2018-07-23 RX ADMIN — KETOROLAC TROMETHAMINE 60 MG: 30 INJECTION, SOLUTION INTRAMUSCULAR at 02:07

## 2018-07-23 NOTE — PROGRESS NOTES
Subjective:       Patient ID: Joe Henson is a 38 y.o. female.    Chief Complaint: Follow-up    Here for follow up for back pain with sciatica. Has spondylolisthesis L5-S1.        Review of Systems   Constitutional: Negative.  Negative for appetite change, fatigue and fever.   HENT: Negative.  Negative for congestion, ear pain and sore throat.    Eyes: Negative.  Negative for visual disturbance.   Respiratory: Negative.  Negative for cough, shortness of breath and wheezing.    Cardiovascular: Negative.    Gastrointestinal: Negative.  Negative for abdominal pain, diarrhea, nausea and vomiting.   Endocrine: Negative.    Genitourinary: Negative.  Negative for difficulty urinating and urgency.   Musculoskeletal: Positive for back pain (with left sciatica). Negative for arthralgias and myalgias.   Skin: Negative.  Negative for color change.   Allergic/Immunologic: Negative.    Neurological: Negative.  Negative for dizziness and headaches.   Hematological: Negative.    Psychiatric/Behavioral: Negative.  Negative for sleep disturbance.   All other systems reviewed and are negative.      Objective:      Physical Exam   Constitutional: She is oriented to person, place, and time. She appears well-developed and well-nourished. No distress.   HENT:   Head: Normocephalic and atraumatic.   Eyes: Pupils are equal, round, and reactive to light.   Neck: Normal range of motion. Neck supple.   Cardiovascular: Normal rate and regular rhythm.    No murmur heard.  Pulmonary/Chest: Effort normal and breath sounds normal. No respiratory distress.   Neurological: She is alert and oriented to person, place, and time.   Skin: Skin is warm and dry.   Psychiatric: She has a normal mood and affect.   Nursing note and vitals reviewed.      Assessment:       1. Sciatica of left side    2. Acute left-sided low back pain with left-sided sciatica    3. Spondylolisthesis of lumbosacral region        Plan:     Joe was seen today for  follow-up.    Diagnoses and all orders for this visit:    Sciatica of left side  -     oxaprozin (DAYPRO) 600 mg tablet; Take 1 tablet (600 mg total) by mouth every 12 (twelve) hours.  -     traMADol (ULTRAM) 50 mg tablet; Take 1 tablet (50 mg total) by mouth every 12 (twelve) hours as needed for Pain.  -     ketorolac injection 60 mg; Inject 2 mLs (60 mg total) into the muscle one time.  -     Ambulatory referral to Neurosurgery  -     pregabalin (LYRICA) 50 MG capsule; Take 1 capsule (50 mg total) by mouth 3 (three) times daily.    Acute left-sided low back pain with left-sided sciatica  -     oxaprozin (DAYPRO) 600 mg tablet; Take 1 tablet (600 mg total) by mouth every 12 (twelve) hours.  -     traMADol (ULTRAM) 50 mg tablet; Take 1 tablet (50 mg total) by mouth every 12 (twelve) hours as needed for Pain.  -     ketorolac injection 60 mg; Inject 2 mLs (60 mg total) into the muscle one time.  -     Ambulatory referral to Neurosurgery  -     pregabalin (LYRICA) 50 MG capsule; Take 1 capsule (50 mg total) by mouth 3 (three) times daily.    Spondylolisthesis of lumbosacral region  -     ketorolac injection 60 mg; Inject 2 mLs (60 mg total) into the muscle one time.  -     Ambulatory referral to Neurosurgery  -     pregabalin (LYRICA) 50 MG capsule; Take 1 capsule (50 mg total) by mouth 3 (three) times daily.    RTC 2 weeks for recheck

## 2018-07-25 ENCOUNTER — TELEPHONE (OUTPATIENT)
Dept: FAMILY MEDICINE | Facility: CLINIC | Age: 38
End: 2018-07-25

## 2018-07-25 NOTE — TELEPHONE ENCOUNTER
PA for Lyrica submitted to insurance company via [employer] web site. Key: [id number]  Awaiting insurance company response/ decision.

## 2018-07-26 NOTE — TELEPHONE ENCOUNTER
Received determination of approval for Lyrica 07/25/2019 pharmacy notified and will contact patient.

## 2018-08-06 ENCOUNTER — TELEPHONE (OUTPATIENT)
Dept: SMOKING CESSATION | Facility: CLINIC | Age: 38
End: 2018-08-06

## 2018-08-17 DIAGNOSIS — F98.8 ATTENTION DEFICIT DISORDER, UNSPECIFIED HYPERACTIVITY PRESENCE: ICD-10-CM

## 2018-08-17 RX ORDER — DEXTROAMPHETAMINE SACCHARATE, AMPHETAMINE ASPARTATE, DEXTROAMPHETAMINE SULFATE AND AMPHETAMINE SULFATE 5; 5; 5; 5 MG/1; MG/1; MG/1; MG/1
1 TABLET ORAL 2 TIMES DAILY
Qty: 60 TABLET | Refills: 0 | Status: SHIPPED | OUTPATIENT
Start: 2018-08-17 | End: 2018-09-11 | Stop reason: SDUPTHER

## 2018-08-17 NOTE — TELEPHONE ENCOUNTER
----- Message from Michaela Villanueva sent at 2018  8:16 AM CDT -----  Contact: Self  Joe Henson  MRN: [id number]  : 1980  PCP: Estela Munguia  Home Phone      [phone]  Work Phone      Not on file.  Mobile          [phone]      MESSAGE:   Pt requesting refill or new Rx.   Is this a refill or new RX:  refill  RX name and strength: dextroamphetamine-amphetamine (ADDERALL) 20 mg tablet  Last office visit: 18  Is this a 30-day or 90-day RX:  30-Day  Pharmacy name and location:  [facility]  Comments:      Phone:  [phone]

## 2018-08-31 ENCOUNTER — CLINICAL SUPPORT (OUTPATIENT)
Dept: SMOKING CESSATION | Facility: CLINIC | Age: 38
End: 2018-08-31
Payer: COMMERCIAL

## 2018-08-31 DIAGNOSIS — F17.200 NICOTINE DEPENDENCE: Primary | ICD-10-CM

## 2018-08-31 PROCEDURE — 99407 BEHAV CHNG SMOKING > 10 MIN: CPT | Mod: S$GLB,,,

## 2018-08-31 NOTE — PROGRESS NOTES
Successful contact with patient regarding tobacco cessation quit #1. Pt states, she was unable to become tobacco free, she currently smoke one pack of cigarettes day, and she is not ready to return to the program at this time.  Pt informed of her current benefit status and contact information to schedule an appointment when she is ready. Will update the tobacco cessation smart form for 3,6,and 12 months for quit #1 and resolve the episode.

## 2018-09-07 DIAGNOSIS — Z12.39 BREAST CANCER SCREENING: ICD-10-CM

## 2018-09-11 DIAGNOSIS — F98.8 ATTENTION DEFICIT DISORDER, UNSPECIFIED HYPERACTIVITY PRESENCE: ICD-10-CM

## 2018-09-11 NOTE — TELEPHONE ENCOUNTER
----- Message from Yolanda Kan sent at 2018  5:18 PM CDT -----  Contact: self  Joe Henson  MRN: [id number]  : 1980  PCP: Estela Munguia  Home Phone      [phone]  Work Phone      Not on file.  Mobile          [phone]      MESSAGE:   Pt requesting refill or new Rx.   Is this a refill or new RX:  Refill  RX name and strength: dextroamphetamine-amphetamine (ADDERALL) 20 mg tablet  Last office visit:   Is this a 30-day or 90-day RX:  30  Pharmacy name and location:  [facility]  Comments:      Phone:  [phone]

## 2018-09-12 RX ORDER — DEXTROAMPHETAMINE SACCHARATE, AMPHETAMINE ASPARTATE, DEXTROAMPHETAMINE SULFATE AND AMPHETAMINE SULFATE 5; 5; 5; 5 MG/1; MG/1; MG/1; MG/1
1 TABLET ORAL 2 TIMES DAILY
Qty: 60 TABLET | Refills: 0 | Status: SHIPPED | OUTPATIENT
Start: 2018-09-12 | End: 2018-10-11 | Stop reason: SDUPTHER

## 2018-10-11 DIAGNOSIS — F98.8 ATTENTION DEFICIT DISORDER, UNSPECIFIED HYPERACTIVITY PRESENCE: ICD-10-CM

## 2018-10-12 ENCOUNTER — LAB VISIT (OUTPATIENT)
Dept: LAB | Facility: HOSPITAL | Age: 38
End: 2018-10-12
Attending: NURSE PRACTITIONER
Payer: MEDICAID

## 2018-10-12 DIAGNOSIS — F31.60 MIXED BIPOLAR I DISORDER: Primary | ICD-10-CM

## 2018-10-12 LAB
ANION GAP SERPL CALC-SCNC: 12 MMOL/L
BUN SERPL-MCNC: 8 MG/DL
CALCIUM SERPL-MCNC: 9 MG/DL
CHLORIDE SERPL-SCNC: 107 MMOL/L
CHOLEST SERPL-MCNC: 194 MG/DL
CHOLEST/HDLC SERPL: 5.5 {RATIO}
CO2 SERPL-SCNC: 20 MMOL/L
CREAT SERPL-MCNC: 0.7 MG/DL
EST. GFR  (AFRICAN AMERICAN): >60 ML/MIN/1.73 M^2
EST. GFR  (NON AFRICAN AMERICAN): >60 ML/MIN/1.73 M^2
ESTIMATED AVG GLUCOSE: 154 MG/DL
GLUCOSE SERPL-MCNC: 105 MG/DL
HBA1C MFR BLD HPLC: 7 %
HDLC SERPL-MCNC: 35 MG/DL
HDLC SERPL: 18 %
LDLC SERPL CALC-MCNC: 120 MG/DL
LITHIUM SERPL-SCNC: <0.1 MMOL/L
NONHDLC SERPL-MCNC: 159 MG/DL
POTASSIUM SERPL-SCNC: 3.7 MMOL/L
SODIUM SERPL-SCNC: 139 MMOL/L
TRIGL SERPL-MCNC: 195 MG/DL
TSH SERPL DL<=0.005 MIU/L-ACNC: 1.59 UIU/ML

## 2018-10-12 PROCEDURE — 80048 BASIC METABOLIC PNL TOTAL CA: CPT

## 2018-10-12 PROCEDURE — 83036 HEMOGLOBIN GLYCOSYLATED A1C: CPT

## 2018-10-12 PROCEDURE — 36415 COLL VENOUS BLD VENIPUNCTURE: CPT

## 2018-10-12 PROCEDURE — 84443 ASSAY THYROID STIM HORMONE: CPT

## 2018-10-12 PROCEDURE — 80178 ASSAY OF LITHIUM: CPT

## 2018-10-12 PROCEDURE — 80061 LIPID PANEL: CPT

## 2018-10-12 RX ORDER — DEXTROAMPHETAMINE SACCHARATE, AMPHETAMINE ASPARTATE, DEXTROAMPHETAMINE SULFATE AND AMPHETAMINE SULFATE 5; 5; 5; 5 MG/1; MG/1; MG/1; MG/1
1 TABLET ORAL 2 TIMES DAILY
Qty: 60 TABLET | Refills: 0 | Status: SHIPPED | OUTPATIENT
Start: 2018-10-12 | End: 2018-11-12 | Stop reason: SDUPTHER

## 2018-10-19 DIAGNOSIS — E11.9 TYPE 2 DIABETES MELLITUS WITHOUT COMPLICATION: ICD-10-CM

## 2018-10-30 ENCOUNTER — OFFICE VISIT (OUTPATIENT)
Dept: FAMILY MEDICINE | Facility: CLINIC | Age: 38
End: 2018-10-30
Payer: MEDICAID

## 2018-10-30 VITALS
HEART RATE: 90 BPM | SYSTOLIC BLOOD PRESSURE: 118 MMHG | HEIGHT: 64 IN | DIASTOLIC BLOOD PRESSURE: 76 MMHG | WEIGHT: 223.63 LBS | RESPIRATION RATE: 18 BRPM | BODY MASS INDEX: 38.18 KG/M2

## 2018-10-30 DIAGNOSIS — M54.50 ACUTE LEFT-SIDED LOW BACK PAIN WITHOUT SCIATICA: Primary | ICD-10-CM

## 2018-10-30 PROCEDURE — 99213 OFFICE O/P EST LOW 20 MIN: CPT | Mod: S$PBB,,, | Performed by: FAMILY MEDICINE

## 2018-10-30 PROCEDURE — 99999 PR PBB SHADOW E&M-EST. PATIENT-LVL III: CPT | Mod: PBBFAC,,, | Performed by: FAMILY MEDICINE

## 2018-10-30 PROCEDURE — 99213 OFFICE O/P EST LOW 20 MIN: CPT | Mod: PBBFAC | Performed by: FAMILY MEDICINE

## 2018-10-30 RX ORDER — METHYLPREDNISOLONE ACETATE 40 MG/ML
60 INJECTION, SUSPENSION INTRA-ARTICULAR; INTRALESIONAL; INTRAMUSCULAR; SOFT TISSUE
Status: COMPLETED | OUTPATIENT
Start: 2018-10-30 | End: 2018-10-30

## 2018-10-30 RX ORDER — DICLOFENAC SODIUM 75 MG/1
75 TABLET, DELAYED RELEASE ORAL 2 TIMES DAILY PRN
Qty: 60 TABLET | Refills: 1 | Status: SHIPPED | OUTPATIENT
Start: 2018-10-30 | End: 2018-11-12

## 2018-10-30 RX ORDER — RISPERIDONE 2 MG/1
TABLET ORAL
Refills: 6 | COMMUNITY
Start: 2018-10-09 | End: 2018-11-12

## 2018-10-30 RX ORDER — KETOROLAC TROMETHAMINE 30 MG/ML
60 INJECTION, SOLUTION INTRAMUSCULAR; INTRAVENOUS
Status: COMPLETED | OUTPATIENT
Start: 2018-10-30 | End: 2018-10-30

## 2018-10-30 RX ADMIN — METHYLPREDNISOLONE ACETATE 60 MG: 40 INJECTION, SUSPENSION INTRA-ARTICULAR; INTRALESIONAL; INTRAMUSCULAR; SOFT TISSUE at 11:10

## 2018-10-30 RX ADMIN — KETOROLAC TROMETHAMINE 60 MG: 30 INJECTION, SOLUTION INTRAMUSCULAR at 11:10

## 2018-10-30 NOTE — PROGRESS NOTES
Subjective:       Patient ID: Joe Henson is a 38 y.o. female.    Chief Complaint: Sciatica    Pt is a 38 y.o. female who presents for evaluation and management of   Encounter Diagnosis   Name Primary?    Acute left-sided low back pain without sciatica Yes   .1 week   Pain radiates down to ankle   tramadol not helping   Walking the track makes it worse   Can't get comfortable at night   Squatting position is only position that is semi-comfortable     Doing well on current meds. Denies any side effects. Prevention is up to date.    Review of Systems   Musculoskeletal: Positive for back pain.   Neurological: Positive for weakness and numbness.       Objective:      Physical Exam   Constitutional: She is oriented to person, place, and time. She appears well-developed and well-nourished.   HENT:   Head: Normocephalic and atraumatic.   Right Ear: External ear normal.   Left Ear: External ear normal.   Mouth/Throat: Oropharynx is clear and moist.   Eyes: Conjunctivae are normal. Pupils are equal, round, and reactive to light.   Neck: Normal range of motion. Neck supple.   Cardiovascular: Normal rate and regular rhythm.   Pulmonary/Chest: Effort normal and breath sounds normal.   Abdominal: Soft. Bowel sounds are normal. There is no tenderness. There is no CVA tenderness.   Musculoskeletal: Normal range of motion.   Pos TTP over piriformis     Neg SLR    Neurological: She is alert and oriented to person, place, and time. She displays normal reflexes.   Neg SLR    5/5 strength LE B   Symmetric DTRs    Skin: Skin is warm and dry.   Psychiatric: She has a normal mood and affect.       Assessment:       1. Acute left-sided low back pain without sciatica        Plan:   Joe was seen today for sciatica.    Diagnoses and all orders for this visit:    Acute left-sided low back pain without sciatica  -     methylPREDNISolone acetate injection 60 mg  -     ketorolac injection 60 mg  -     diclofenac (VOLTAREN) 75 MG EC  tablet; Take 1 tablet (75 mg total) by mouth 2 (two) times daily as needed.      Problem List Items Addressed This Visit     None      Visit Diagnoses     Acute left-sided low back pain without sciatica    -  Primary    Relevant Medications    methylPREDNISolone acetate injection 60 mg (Start on 10/30/2018 11:15 AM)    ketorolac injection 60 mg (Start on 10/30/2018 11:15 AM)    diclofenac (VOLTAREN) 75 MG EC tablet        H/o L5/S1 disc bulge and foraminal stenosis. No neuro deficits on exam today. Treat sx. Consider PT if not improving   No Follow-up on file.

## 2018-11-12 ENCOUNTER — OFFICE VISIT (OUTPATIENT)
Dept: FAMILY MEDICINE | Facility: CLINIC | Age: 38
End: 2018-11-12
Payer: MEDICAID

## 2018-11-12 VITALS
BODY MASS INDEX: 38.76 KG/M2 | SYSTOLIC BLOOD PRESSURE: 104 MMHG | HEART RATE: 102 BPM | RESPIRATION RATE: 20 BRPM | WEIGHT: 227.06 LBS | DIASTOLIC BLOOD PRESSURE: 68 MMHG | HEIGHT: 64 IN

## 2018-11-12 DIAGNOSIS — G47.00 INSOMNIA, UNSPECIFIED TYPE: ICD-10-CM

## 2018-11-12 DIAGNOSIS — F98.8 ATTENTION DEFICIT DISORDER, UNSPECIFIED HYPERACTIVITY PRESENCE: ICD-10-CM

## 2018-11-12 DIAGNOSIS — E11.9 TYPE 2 DIABETES MELLITUS WITHOUT COMPLICATION, WITHOUT LONG-TERM CURRENT USE OF INSULIN: Primary | ICD-10-CM

## 2018-11-12 PROCEDURE — 99214 OFFICE O/P EST MOD 30 MIN: CPT | Mod: PBBFAC | Performed by: NURSE PRACTITIONER

## 2018-11-12 PROCEDURE — 99213 OFFICE O/P EST LOW 20 MIN: CPT | Mod: S$PBB,,, | Performed by: NURSE PRACTITIONER

## 2018-11-12 PROCEDURE — 99999 PR PBB SHADOW E&M-EST. PATIENT-LVL IV: CPT | Mod: PBBFAC,,, | Performed by: NURSE PRACTITIONER

## 2018-11-12 RX ORDER — METFORMIN HYDROCHLORIDE 500 MG/1
500 TABLET ORAL 2 TIMES DAILY WITH MEALS
Qty: 60 TABLET | Refills: 1 | Status: SHIPPED | OUTPATIENT
Start: 2018-11-12 | End: 2019-01-07 | Stop reason: SDUPTHER

## 2018-11-12 RX ORDER — HYDROXYZINE PAMOATE 50 MG/1
CAPSULE ORAL
Refills: 0 | COMMUNITY
Start: 2018-11-06 | End: 2019-08-05

## 2018-11-12 RX ORDER — QUETIAPINE FUMARATE 100 MG/1
TABLET, FILM COATED ORAL
Refills: 0 | COMMUNITY
Start: 2018-11-06 | End: 2018-11-12

## 2018-11-12 RX ORDER — DEXTROAMPHETAMINE SACCHARATE, AMPHETAMINE ASPARTATE, DEXTROAMPHETAMINE SULFATE AND AMPHETAMINE SULFATE 5; 5; 5; 5 MG/1; MG/1; MG/1; MG/1
TABLET ORAL
Qty: 60 TABLET | Refills: 0 | Status: SHIPPED | OUTPATIENT
Start: 2018-11-12 | End: 2018-12-07 | Stop reason: SDUPTHER

## 2018-11-12 RX ORDER — BUSPIRONE HYDROCHLORIDE 15 MG/1
TABLET ORAL
Refills: 0 | COMMUNITY
Start: 2018-11-06 | End: 2018-11-12

## 2018-11-12 RX ORDER — ZOLPIDEM TARTRATE 10 MG/1
10 TABLET ORAL NIGHTLY PRN
Qty: 30 TABLET | Refills: 5 | Status: SHIPPED | OUTPATIENT
Start: 2018-11-12 | End: 2019-08-05

## 2018-11-12 NOTE — PROGRESS NOTES
Subjective:       Patient ID: Joe Henson is a 38 y.o. female.    Chief Complaint: Follow-up (BW )    Here for blood work review and treatment for diabetes.      Review of Systems   Constitutional: Positive for unexpected weight change (rapid weight gain with steroid shots for the back). Negative for appetite change, fatigue and fever.   HENT: Negative.  Negative for congestion, ear pain and sore throat.    Eyes: Negative.  Negative for visual disturbance.   Respiratory: Negative.  Negative for cough, shortness of breath and wheezing.    Cardiovascular: Negative.    Gastrointestinal: Positive for constipation. Negative for abdominal pain, diarrhea, nausea and vomiting.   Endocrine: Negative.    Genitourinary: Negative.  Negative for difficulty urinating and urgency.   Musculoskeletal: Negative.  Negative for arthralgias and myalgias.   Skin: Negative.  Negative for color change.   Allergic/Immunologic: Negative.    Neurological: Negative.  Negative for dizziness and headaches.   Hematological: Negative.    Psychiatric/Behavioral: Positive for sleep disturbance (difficulty sleeping).   All other systems reviewed and are negative.      Objective:      Physical Exam   Constitutional: She is oriented to person, place, and time. She appears well-developed and well-nourished. No distress.   HENT:   Head: Normocephalic and atraumatic.   Eyes: Pupils are equal, round, and reactive to light.   Neck: Normal range of motion. Neck supple.   Cardiovascular: Normal rate, regular rhythm and normal heart sounds.   No murmur heard.  Pulmonary/Chest: Effort normal and breath sounds normal. No respiratory distress.   Musculoskeletal: Normal range of motion.   Neurological: She is alert and oriented to person, place, and time.   Skin: Skin is warm and dry.   Psychiatric: She has a normal mood and affect.   Nursing note and vitals reviewed.      Assessment:       1. Type 2 diabetes mellitus without complication, without long-term  current use of insulin    2. Insomnia, unspecified type        Plan:   Joe was seen today for follow-up.    Diagnoses and all orders for this visit:    Type 2 diabetes mellitus without complication, without long-term current use of insulin  -     metFORMIN (GLUCOPHAGE) 500 MG tablet; Take 1 tablet (500 mg total) by mouth 2 (two) times daily with meals.    Insomnia, unspecified type  -     zolpidem (AMBIEN) 10 mg Tab; Take 1 tablet (10 mg total) by mouth nightly as needed.    RTC 2 months for recheck - Education given regarding DM diet and portion sizes

## 2018-12-07 DIAGNOSIS — F98.8 ATTENTION DEFICIT DISORDER, UNSPECIFIED HYPERACTIVITY PRESENCE: ICD-10-CM

## 2018-12-10 RX ORDER — DEXTROAMPHETAMINE SACCHARATE, AMPHETAMINE ASPARTATE, DEXTROAMPHETAMINE SULFATE AND AMPHETAMINE SULFATE 5; 5; 5; 5 MG/1; MG/1; MG/1; MG/1
TABLET ORAL
Qty: 60 TABLET | Refills: 0 | Status: SHIPPED | OUTPATIENT
Start: 2018-12-10 | End: 2019-01-07

## 2018-12-13 DIAGNOSIS — E11.9 TYPE 2 DIABETES MELLITUS WITHOUT COMPLICATION, UNSPECIFIED WHETHER LONG TERM INSULIN USE: ICD-10-CM

## 2019-01-07 ENCOUNTER — OFFICE VISIT (OUTPATIENT)
Dept: FAMILY MEDICINE | Facility: CLINIC | Age: 39
End: 2019-01-07
Payer: MEDICAID

## 2019-01-07 VITALS
DIASTOLIC BLOOD PRESSURE: 80 MMHG | HEIGHT: 65 IN | OXYGEN SATURATION: 97 % | SYSTOLIC BLOOD PRESSURE: 122 MMHG | RESPIRATION RATE: 18 BRPM | WEIGHT: 224.38 LBS | HEART RATE: 100 BPM | BODY MASS INDEX: 37.38 KG/M2

## 2019-01-07 DIAGNOSIS — Z79.899 LONG-TERM USE OF HIGH-RISK MEDICATION: ICD-10-CM

## 2019-01-07 DIAGNOSIS — F98.8 ATTENTION DEFICIT DISORDER, UNSPECIFIED HYPERACTIVITY PRESENCE: ICD-10-CM

## 2019-01-07 DIAGNOSIS — F50.81 BINGE EATING DISORDER: ICD-10-CM

## 2019-01-07 DIAGNOSIS — E11.9 TYPE 2 DIABETES MELLITUS WITHOUT COMPLICATION, WITHOUT LONG-TERM CURRENT USE OF INSULIN: Primary | ICD-10-CM

## 2019-01-07 PROCEDURE — 99999 PR PBB SHADOW E&M-EST. PATIENT-LVL V: ICD-10-PCS | Mod: PBBFAC,,, | Performed by: NURSE PRACTITIONER

## 2019-01-07 PROCEDURE — 99215 OFFICE O/P EST HI 40 MIN: CPT | Mod: PBBFAC | Performed by: NURSE PRACTITIONER

## 2019-01-07 PROCEDURE — 80178 ASSAY OF LITHIUM: CPT

## 2019-01-07 PROCEDURE — 99213 PR OFFICE/OUTPT VISIT, EST, LEVL III, 20-29 MIN: ICD-10-PCS | Mod: 25,S$PBB,, | Performed by: NURSE PRACTITIONER

## 2019-01-07 PROCEDURE — 99213 OFFICE O/P EST LOW 20 MIN: CPT | Mod: 25,S$PBB,, | Performed by: NURSE PRACTITIONER

## 2019-01-07 PROCEDURE — 36415 COLL VENOUS BLD VENIPUNCTURE: CPT | Mod: PBBFAC

## 2019-01-07 PROCEDURE — 83036 HEMOGLOBIN GLYCOSYLATED A1C: CPT

## 2019-01-07 PROCEDURE — 99999 PR PBB SHADOW E&M-EST. PATIENT-LVL V: CPT | Mod: PBBFAC,,, | Performed by: NURSE PRACTITIONER

## 2019-01-07 RX ORDER — LITHIUM CARBONATE 300 MG/1
600 CAPSULE ORAL
Refills: 3 | COMMUNITY
Start: 2018-12-11 | End: 2020-03-05

## 2019-01-07 RX ORDER — LURASIDONE HYDROCHLORIDE 40 MG/1
TABLET, FILM COATED ORAL
Refills: 3 | COMMUNITY
Start: 2018-12-12 | End: 2019-08-05

## 2019-01-07 RX ORDER — LISDEXAMFETAMINE DIMESYLATE CAPSULES 20 MG/1
20 CAPSULE ORAL EVERY MORNING
Qty: 30 CAPSULE | Refills: 0 | Status: SHIPPED | OUTPATIENT
Start: 2019-01-07 | End: 2019-02-05 | Stop reason: SDUPTHER

## 2019-01-07 RX ORDER — VENLAFAXINE HYDROCHLORIDE 75 MG/1
CAPSULE, EXTENDED RELEASE ORAL
Refills: 3 | COMMUNITY
Start: 2018-12-11 | End: 2019-08-05

## 2019-01-07 RX ORDER — METFORMIN HYDROCHLORIDE 500 MG/1
500 TABLET ORAL 2 TIMES DAILY WITH MEALS
Qty: 60 TABLET | Refills: 5 | Status: SHIPPED | OUTPATIENT
Start: 2019-01-07 | End: 2019-10-14 | Stop reason: SDUPTHER

## 2019-01-07 NOTE — PROGRESS NOTES
Subjective:       Patient ID: Joe Henson is a 38 y.o. female.    Chief Complaint: Follow-up    Here for blood work review and treatment for diabetes.      Review of Systems   Constitutional: Positive for unexpected weight change (rapid weight gain with steroid shots for the back). Negative for appetite change, fatigue and fever.   HENT: Negative.  Negative for congestion, ear pain and sore throat.    Eyes: Negative.  Negative for visual disturbance.   Respiratory: Negative.  Negative for cough, shortness of breath and wheezing.    Cardiovascular: Negative.    Gastrointestinal: Positive for constipation. Negative for abdominal pain, diarrhea, nausea and vomiting.   Endocrine: Negative.    Genitourinary: Negative.  Negative for difficulty urinating and urgency.   Musculoskeletal: Negative.  Negative for arthralgias and myalgias.   Skin: Negative.  Negative for color change.   Allergic/Immunologic: Negative.    Neurological: Negative.  Negative for dizziness and headaches.   Hematological: Negative.    Psychiatric/Behavioral: Positive for sleep disturbance (difficulty sleeping).   All other systems reviewed and are negative.      Objective:      Physical Exam   Constitutional: She is oriented to person, place, and time. She appears well-developed and well-nourished. No distress.   HENT:   Head: Normocephalic and atraumatic.   Eyes: Pupils are equal, round, and reactive to light.   Neck: Normal range of motion. Neck supple.   Cardiovascular: Normal rate, regular rhythm and normal heart sounds.   No murmur heard.  Pulmonary/Chest: Effort normal and breath sounds normal. No respiratory distress.   Musculoskeletal: Normal range of motion.   Neurological: She is alert and oriented to person, place, and time.   Skin: Skin is warm and dry.   Psychiatric: She has a normal mood and affect.   Nursing note and vitals reviewed.      Assessment:       1. Type 2 diabetes mellitus without complication, without long-term  current use of insulin    2. Attention deficit disorder, unspecified hyperactivity presence    3. Binge eating disorder    4. Long-term use of high-risk medication        Plan:     Type 2 diabetes mellitus without complication, without long-term current use of insulin  -     metFORMIN (GLUCOPHAGE) 500 MG tablet; Take 1 tablet (500 mg total) by mouth 2 (two) times daily with meals.  Dispense: 60 tablet; Refill: 5  -     Hemoglobin A1c    Attention deficit disorder, unspecified hyperactivity presence        -     D/C adderall  -     lisdexamfetamine (VYVANSE) 20 MG capsule; Take 1 capsule (20 mg total) by mouth every morning.  Dispense: 30 capsule; Refill: 0    Binge eating disorder  -     lisdexamfetamine (VYVANSE) 20 MG capsule; Take 1 capsule (20 mg total) by mouth every morning.  Dispense: 30 capsule; Refill: 0    Long-term use of high-risk medication  -     Lithium level    RTC 4 weeks for recheck

## 2019-01-08 LAB
ESTIMATED AVG GLUCOSE: 126 MG/DL
HBA1C MFR BLD HPLC: 6 %
LITHIUM SERPL-SCNC: <0.1 MMOL/L

## 2019-01-09 ENCOUNTER — PATIENT MESSAGE (OUTPATIENT)
Dept: FAMILY MEDICINE | Facility: CLINIC | Age: 39
End: 2019-01-09

## 2019-02-05 DIAGNOSIS — F98.8 ATTENTION DEFICIT DISORDER, UNSPECIFIED HYPERACTIVITY PRESENCE: ICD-10-CM

## 2019-02-05 DIAGNOSIS — F50.81 BINGE EATING DISORDER: ICD-10-CM

## 2019-02-06 RX ORDER — LISDEXAMFETAMINE DIMESYLATE 20 MG/1
CAPSULE ORAL
Qty: 30 CAPSULE | Refills: 0 | Status: SHIPPED | OUTPATIENT
Start: 2019-02-06 | End: 2019-03-07 | Stop reason: SDUPTHER

## 2019-03-07 DIAGNOSIS — F98.8 ATTENTION DEFICIT DISORDER, UNSPECIFIED HYPERACTIVITY PRESENCE: ICD-10-CM

## 2019-03-07 DIAGNOSIS — F50.81 BINGE EATING DISORDER: ICD-10-CM

## 2019-03-07 RX ORDER — LISDEXAMFETAMINE DIMESYLATE 20 MG/1
CAPSULE ORAL
Qty: 30 CAPSULE | Refills: 0 | Status: SHIPPED | OUTPATIENT
Start: 2019-03-07 | End: 2019-04-12 | Stop reason: SDUPTHER

## 2019-04-12 DIAGNOSIS — F98.8 ATTENTION DEFICIT DISORDER, UNSPECIFIED HYPERACTIVITY PRESENCE: ICD-10-CM

## 2019-04-12 DIAGNOSIS — F50.81 BINGE EATING DISORDER: ICD-10-CM

## 2019-04-12 RX ORDER — LISDEXAMFETAMINE DIMESYLATE 20 MG/1
CAPSULE ORAL
Qty: 30 CAPSULE | Refills: 0 | Status: SHIPPED | OUTPATIENT
Start: 2019-04-12 | End: 2019-05-15 | Stop reason: SDUPTHER

## 2019-04-15 ENCOUNTER — TELEPHONE (OUTPATIENT)
Dept: FAMILY MEDICINE | Facility: CLINIC | Age: 39
End: 2019-04-15

## 2019-04-15 NOTE — TELEPHONE ENCOUNTER
----- Message from Ventura Cote sent at 4/15/2019  9:33 AM CDT -----  Contact: Patient  Joe Henson  MRN: [id number]  : 1980  PCP: Estela Munguia  Home Phone      [phone]  Work Phone      Not on file.  Mobile          [phone]      MESSAGE: states hard to breathe -- requesting appt today    Call [phone]    PCP: Ezra

## 2019-04-15 NOTE — TELEPHONE ENCOUNTER
----- Message from Radha Lara sent at 4/15/2019  9:57 AM CDT -----  Contact: fransico Henson  MRN: [id number]  : 1980  PCP: Estela Munguia  Home Phone      [phone]  Work Phone      Not on file.  Mobile          [phone]      MESSAGE:   Patient is returning a phone call.  Who left a message for the patient:    Does patient know what this is regarding:  An appt.  Comments:      Phone:  [phone]

## 2019-05-06 ENCOUNTER — TELEPHONE (OUTPATIENT)
Dept: FAMILY MEDICINE | Facility: CLINIC | Age: 39
End: 2019-05-06

## 2019-05-06 NOTE — TELEPHONE ENCOUNTER
----- Message from Ventura Cote sent at 2019  3:08 PM CDT -----  Contact: Patient  Joe Henson  MRN: [id number]  : 1980  PCP: Estela Munguia  Home Phone      [phone]  Work Phone      Not on file.  Mobile          [phone]      MESSAGE: SOB for 1 1/2 wks -- requesting appt     Call [phone]    PCP: Ezra

## 2019-05-09 ENCOUNTER — APPOINTMENT (OUTPATIENT)
Dept: RADIOLOGY | Facility: CLINIC | Age: 39
End: 2019-05-09
Attending: NURSE PRACTITIONER
Payer: MEDICAID

## 2019-05-09 ENCOUNTER — OFFICE VISIT (OUTPATIENT)
Dept: FAMILY MEDICINE | Facility: CLINIC | Age: 39
End: 2019-05-09
Payer: MEDICAID

## 2019-05-09 VITALS
BODY MASS INDEX: 39.22 KG/M2 | OXYGEN SATURATION: 98 % | HEART RATE: 100 BPM | SYSTOLIC BLOOD PRESSURE: 126 MMHG | RESPIRATION RATE: 18 BRPM | HEIGHT: 65 IN | WEIGHT: 235.44 LBS | DIASTOLIC BLOOD PRESSURE: 70 MMHG

## 2019-05-09 DIAGNOSIS — R06.02 SOB (SHORTNESS OF BREATH) ON EXERTION: Primary | ICD-10-CM

## 2019-05-09 DIAGNOSIS — R06.02 SOB (SHORTNESS OF BREATH) ON EXERTION: ICD-10-CM

## 2019-05-09 PROCEDURE — 99214 PR OFFICE/OUTPT VISIT, EST, LEVL IV, 30-39 MIN: ICD-10-PCS | Mod: S$PBB,,, | Performed by: NURSE PRACTITIONER

## 2019-05-09 PROCEDURE — 71046 X-RAY EXAM CHEST 2 VIEWS: CPT | Mod: TC,PO

## 2019-05-09 PROCEDURE — 99215 OFFICE O/P EST HI 40 MIN: CPT | Mod: PBBFAC,25 | Performed by: NURSE PRACTITIONER

## 2019-05-09 PROCEDURE — 99999 PR PBB SHADOW E&M-EST. PATIENT-LVL V: ICD-10-PCS | Mod: PBBFAC,,, | Performed by: NURSE PRACTITIONER

## 2019-05-09 PROCEDURE — 99214 OFFICE O/P EST MOD 30 MIN: CPT | Mod: S$PBB,,, | Performed by: NURSE PRACTITIONER

## 2019-05-09 PROCEDURE — 99999 PR PBB SHADOW E&M-EST. PATIENT-LVL V: CPT | Mod: PBBFAC,,, | Performed by: NURSE PRACTITIONER

## 2019-05-09 RX ORDER — FLUTICASONE FUROATE AND VILANTEROL 100; 25 UG/1; UG/1
1 POWDER RESPIRATORY (INHALATION) DAILY
Qty: 1 EACH | Refills: 5 | Status: SHIPPED | OUTPATIENT
Start: 2019-05-09 | End: 2019-08-05 | Stop reason: SDUPTHER

## 2019-05-09 NOTE — PROGRESS NOTES
Subjective:       Patient ID: Joe Henson is a 38 y.o. female.    Chief Complaint: Shortness of Breath and Numbness (in legs)    SOB on exertion for about 10 days. Has had excessive weight gain over the last year or so. Inhaler not working to relieve SOB.    Review of Systems   Constitutional: Positive for unexpected weight change. Negative for appetite change (increased), fatigue and fever.   HENT: Negative.  Negative for congestion, ear pain and sore throat.    Eyes: Negative.  Negative for visual disturbance.   Respiratory: Positive for shortness of breath (on exertion). Negative for cough and wheezing.    Cardiovascular: Negative.    Gastrointestinal: Negative.  Negative for abdominal pain, diarrhea, nausea and vomiting.   Endocrine: Negative.    Genitourinary: Negative.  Negative for difficulty urinating and urgency.   Musculoskeletal: Negative.  Negative for arthralgias and myalgias.   Skin: Negative.  Negative for color change.   Allergic/Immunologic: Negative.    Neurological: Positive for numbness (right leg numbness with activity). Negative for dizziness and headaches.   Hematological: Negative.    Psychiatric/Behavioral: Negative.  Negative for sleep disturbance.   All other systems reviewed and are negative.      Objective:      Physical Exam   Constitutional: She is oriented to person, place, and time. She appears well-developed and well-nourished. No distress.   HENT:   Head: Normocephalic and atraumatic.   Eyes: Pupils are equal, round, and reactive to light.   Neck: Normal range of motion. Neck supple.   Cardiovascular: Normal rate, regular rhythm and normal heart sounds.   No murmur heard.  Pulmonary/Chest: Effort normal and breath sounds normal. No respiratory distress.   SOB on exertion noted   Musculoskeletal: Normal range of motion.   Neurological: She is alert and oriented to person, place, and time.   Skin: Skin is warm and dry.   Psychiatric: She has a normal mood and affect.   Nursing  note and vitals reviewed.      Assessment:       1. SOB (shortness of breath) on exertion        Plan:   Joe was seen today for shortness of breath and numbness.    Diagnoses and all orders for this visit:    SOB (shortness of breath) on exertion  -     X-Ray Chest PA And Lateral; Future  -     fluticasone furoate-vilanterol (BREO ELLIPTA) 100-25 mcg/dose diskus inhaler; Inhale 1 puff into the lungs once daily. Controller  -     Complete PFT with bronchodilator; Future  -     PULSE OXIMETRY WITH REST - PULM; Future  -     Ambulatory referral to Cardiology    RTC 1 month for recheck

## 2019-05-15 ENCOUNTER — HOSPITAL ENCOUNTER (OUTPATIENT)
Dept: PULMONOLOGY | Facility: HOSPITAL | Age: 39
Discharge: HOME | End: 2019-05-15
Attending: NURSE PRACTITIONER
Payer: MEDICAID

## 2019-05-15 VITALS — OXYGEN SATURATION: 97 %

## 2019-05-15 DIAGNOSIS — F98.8 ATTENTION DEFICIT DISORDER, UNSPECIFIED HYPERACTIVITY PRESENCE: ICD-10-CM

## 2019-05-15 DIAGNOSIS — F50.81 BINGE EATING DISORDER: ICD-10-CM

## 2019-05-15 DIAGNOSIS — R06.02 SOB (SHORTNESS OF BREATH) ON EXERTION: ICD-10-CM

## 2019-05-15 PROCEDURE — 94729 DIFFUSING CAPACITY: CPT

## 2019-05-15 PROCEDURE — 94727 GAS DIL/WSHOT DETER LNG VOL: CPT

## 2019-05-15 PROCEDURE — 99900031 HC PATIENT EDUCATION (STAT)

## 2019-05-15 PROCEDURE — 94760 N-INVAS EAR/PLS OXIMETRY 1: CPT

## 2019-05-15 PROCEDURE — 94060 EVALUATION OF WHEEZING: CPT

## 2019-05-15 RX ORDER — LISDEXAMFETAMINE DIMESYLATE 20 MG/1
CAPSULE ORAL
Qty: 30 CAPSULE | Refills: 0 | Status: SHIPPED | OUTPATIENT
Start: 2019-05-15 | End: 2019-07-12 | Stop reason: SDUPTHER

## 2019-05-23 ENCOUNTER — PATIENT OUTREACH (OUTPATIENT)
Dept: ADMINISTRATIVE | Facility: HOSPITAL | Age: 39
End: 2019-05-23

## 2019-05-24 ENCOUNTER — HOSPITAL ENCOUNTER (OUTPATIENT)
Dept: RADIOLOGY | Facility: HOSPITAL | Age: 39
Discharge: HOME | End: 2019-05-24
Attending: INTERNAL MEDICINE
Payer: MEDICAID

## 2019-05-24 DIAGNOSIS — R79.89 ELEVATED D-DIMER: ICD-10-CM

## 2019-05-24 PROCEDURE — 71275 CT ANGIOGRAPHY CHEST: CPT | Mod: 26,,, | Performed by: RADIOLOGY

## 2019-05-24 PROCEDURE — 71275 CT ANGIOGRAPHY CHEST: CPT | Mod: TC

## 2019-05-24 PROCEDURE — 71275 CTA CHEST NON CORONARY: ICD-10-PCS | Mod: 26,,, | Performed by: RADIOLOGY

## 2019-05-24 PROCEDURE — 25500020 PHARM REV CODE 255: Performed by: INTERNAL MEDICINE

## 2019-05-24 RX ADMIN — IOHEXOL 100 ML: 350 INJECTION, SOLUTION INTRAVENOUS at 02:05

## 2019-06-02 ENCOUNTER — HOSPITAL ENCOUNTER (EMERGENCY)
Facility: HOSPITAL | Age: 39
Discharge: HOME | End: 2019-06-02
Attending: SURGERY
Payer: MEDICAID

## 2019-06-02 VITALS
HEART RATE: 101 BPM | TEMPERATURE: 98 F | BODY MASS INDEX: 37.28 KG/M2 | DIASTOLIC BLOOD PRESSURE: 76 MMHG | WEIGHT: 224 LBS | SYSTOLIC BLOOD PRESSURE: 122 MMHG | OXYGEN SATURATION: 96 % | RESPIRATION RATE: 20 BRPM

## 2019-06-02 DIAGNOSIS — F41.9 ANXIETY: Primary | ICD-10-CM

## 2019-06-02 DIAGNOSIS — M79.89 LEFT LEG SWELLING: ICD-10-CM

## 2019-06-02 LAB
ALBUMIN SERPL BCP-MCNC: 3.4 G/DL (ref 3.5–5.2)
ALP SERPL-CCNC: 106 U/L (ref 55–135)
ALT SERPL W/O P-5'-P-CCNC: 53 U/L (ref 10–44)
ANION GAP SERPL CALC-SCNC: 12 MMOL/L (ref 8–16)
APTT BLDCRRT: 26 SEC (ref 21–32)
AST SERPL-CCNC: 44 U/L (ref 10–40)
BASOPHILS # BLD AUTO: 0.04 K/UL (ref 0–0.2)
BASOPHILS NFR BLD: 0.3 % (ref 0–1.9)
BILIRUB SERPL-MCNC: 0.4 MG/DL (ref 0.1–1)
BNP SERPL-MCNC: <10 PG/ML (ref 0–99)
BUN SERPL-MCNC: 8 MG/DL (ref 6–20)
CALCIUM SERPL-MCNC: 9.1 MG/DL (ref 8.7–10.5)
CHLORIDE SERPL-SCNC: 106 MMOL/L (ref 95–110)
CK MB SERPL-MCNC: 9.7 NG/ML (ref 0.1–6.5)
CK MB SERPL-RTO: 1.8 % (ref 0–5)
CK SERPL-CCNC: 543 U/L (ref 20–180)
CK SERPL-CCNC: 543 U/L (ref 20–180)
CO2 SERPL-SCNC: 20 MMOL/L (ref 23–29)
CREAT SERPL-MCNC: 0.9 MG/DL (ref 0.5–1.4)
D DIMER PPP IA.FEU-MCNC: 0.56 MG/L FEU
DIFFERENTIAL METHOD: ABNORMAL
EOSINOPHIL # BLD AUTO: 0.1 K/UL (ref 0–0.5)
EOSINOPHIL NFR BLD: 0.8 % (ref 0–8)
ERYTHROCYTE [DISTWIDTH] IN BLOOD BY AUTOMATED COUNT: 15.2 % (ref 11.5–14.5)
EST. GFR  (AFRICAN AMERICAN): >60 ML/MIN/1.73 M^2
EST. GFR  (NON AFRICAN AMERICAN): >60 ML/MIN/1.73 M^2
GLUCOSE SERPL-MCNC: 149 MG/DL (ref 70–110)
HCT VFR BLD AUTO: 37.2 % (ref 37–48.5)
HGB BLD-MCNC: 12.3 G/DL (ref 12–16)
INR PPP: 1 (ref 0.8–1.2)
LYMPHOCYTES # BLD AUTO: 2 K/UL (ref 1–4.8)
LYMPHOCYTES NFR BLD: 17.4 % (ref 18–48)
MCH RBC QN AUTO: 28.7 PG (ref 27–31)
MCHC RBC AUTO-ENTMCNC: 33.1 G/DL (ref 32–36)
MCV RBC AUTO: 87 FL (ref 82–98)
MONOCYTES # BLD AUTO: 0.6 K/UL (ref 0.3–1)
MONOCYTES NFR BLD: 5.1 % (ref 4–15)
NEUTROPHILS # BLD AUTO: 8.8 K/UL (ref 1.8–7.7)
NEUTROPHILS NFR BLD: 76.4 % (ref 38–73)
PLATELET # BLD AUTO: 447 K/UL (ref 150–350)
PMV BLD AUTO: 9.8 FL (ref 9.2–12.9)
POTASSIUM SERPL-SCNC: 3.8 MMOL/L (ref 3.5–5.1)
PROT SERPL-MCNC: 7.4 G/DL (ref 6–8.4)
PROTHROMBIN TIME: 10.3 SEC (ref 9–12.5)
RBC # BLD AUTO: 4.28 M/UL (ref 4–5.4)
SODIUM SERPL-SCNC: 138 MMOL/L (ref 136–145)
TROPONIN I SERPL DL<=0.01 NG/ML-MCNC: <0.006 NG/ML (ref 0–0.03)
WBC # BLD AUTO: 11.52 K/UL (ref 3.9–12.7)

## 2019-06-02 PROCEDURE — 99284 EMERGENCY DEPT VISIT MOD MDM: CPT

## 2019-06-02 PROCEDURE — 85610 PROTHROMBIN TIME: CPT

## 2019-06-02 PROCEDURE — 93010 ELECTROCARDIOGRAM REPORT: CPT | Mod: ,,, | Performed by: INTERNAL MEDICINE

## 2019-06-02 PROCEDURE — 93010 EKG 12-LEAD: ICD-10-PCS | Mod: ,,, | Performed by: INTERNAL MEDICINE

## 2019-06-02 PROCEDURE — 83880 ASSAY OF NATRIURETIC PEPTIDE: CPT

## 2019-06-02 PROCEDURE — 36415 COLL VENOUS BLD VENIPUNCTURE: CPT

## 2019-06-02 PROCEDURE — 82553 CREATINE MB FRACTION: CPT

## 2019-06-02 PROCEDURE — 93005 ELECTROCARDIOGRAM TRACING: CPT

## 2019-06-02 PROCEDURE — 85025 COMPLETE CBC W/AUTO DIFF WBC: CPT

## 2019-06-02 PROCEDURE — 80053 COMPREHEN METABOLIC PANEL: CPT

## 2019-06-02 PROCEDURE — 82550 ASSAY OF CK (CPK): CPT

## 2019-06-02 PROCEDURE — 85379 FIBRIN DEGRADATION QUANT: CPT

## 2019-06-02 PROCEDURE — 84484 ASSAY OF TROPONIN QUANT: CPT

## 2019-06-02 PROCEDURE — 85730 THROMBOPLASTIN TIME PARTIAL: CPT

## 2019-06-02 NOTE — ED TRIAGE NOTES
"Patient reports left lower leg swelling onset today. Also reports SOB with exertion "for a while". Reports she drank "2 diet drinks" as reason for high pulse.  "

## 2019-06-03 ENCOUNTER — HOSPITAL ENCOUNTER (OUTPATIENT)
Dept: SLEEP MEDICINE | Facility: HOSPITAL | Age: 39
Discharge: HOME | End: 2019-06-03
Attending: INTERNAL MEDICINE
Payer: MEDICAID

## 2019-06-03 DIAGNOSIS — G47.30 INSOMNIA WITH SLEEP APNEA: ICD-10-CM

## 2019-06-03 DIAGNOSIS — G47.00 INSOMNIA WITH SLEEP APNEA: ICD-10-CM

## 2019-06-03 DIAGNOSIS — G47.33 OSA (OBSTRUCTIVE SLEEP APNEA): ICD-10-CM

## 2019-06-03 PROCEDURE — 95810 POLYSOM 6/> YRS 4/> PARAM: CPT | Mod: 26,,, | Performed by: INTERNAL MEDICINE

## 2019-06-03 PROCEDURE — 95810 PR POLYSOMNOGRAPHY, 4 OR MORE: ICD-10-PCS | Mod: 26,,, | Performed by: INTERNAL MEDICINE

## 2019-06-03 PROCEDURE — 95810 POLYSOM 6/> YRS 4/> PARAM: CPT

## 2019-06-03 NOTE — ED PROVIDER NOTES
[facility]                                                 Chief Complaint  38 y.o. female with Leg Swelling    History of Present Illness  Joe Henson presents to the emergency room with left leg swelling  Patient states she has had left leg swelling this week, no history of leg swelling  Patient however has been extensively worked up in last month for possible PE  Patient has been complaining of shortness of breath to her primary care NP  Patient had a CT PE study ordered by Cardiology also last week, was negative  Patient was talking with her mom with significant anxiety about leg swelling today  I appreciate no leg swelling from this pt, drinks lots of caffeine/drinks every day  Pt appears to be very anxious, states he DVT ultrasound with ease her mind    The history is provided by the patient   device was not used during this ER visit     Past Medical History   -- Abnormal Pap smear     -- ADHD (attention deficit hyperactivity disorder)     -- Arthritis     -- OCD (obsessive compulsive disorder)        Past Surgical History   --  SECTION       -- DILATION AND CURETTAGE OF UTERUS       -- ENDOMETRIAL ABLATION       -- KNEE SURGERY       -- TUBAL LIGATION          No Known Allergies     I have reviewed all of this patient's past medical, surgical, family, and social   histories as well as active allergies and medications documented in the  electronic medical record    Review of Systems and Physical Exam      Review of Systems  -- Constitution - no fever, denies fatigue, no weakness, no chills  -- Eyes - no tearing or redness, no visual disturbance  -- Ear, Nose - no tinnitus or earache, no nasal congestion or discharge  -- Mouth,Throat - no sore throat, no toothache, normal voice, normal swallowing  -- Respiratory - denies cough and congestion, no shortness of breath, no OCONNELL  -- Cardiovascular - denies chest pain, no palpitations, denies claudication  --  Gastrointestinal - denies abdominal pain, nausea, vomiting, or diarrhea  -- Genitourinary - no dysuria, no hematuria, no flank pain, no bladder pain  -- Musculoskeletal - reported left leg swelling this weekend  -- Neurological - no headache, denies weakness or seizure; no LOC  -- Skin - denies pallor, rash, or changes in skin. no hives or welts noted    Vital Signs  Her oral temperature is 97.6 °F (36.4 °C).   Her blood pressure is 122/76 and her pulse is 122   Her respiration is 20 and oxygen saturation is 96%.     Physical Exam  -- Nursing note and vitals reviewed  -- Constitutional: Appears well-developed and well-nourished  -- Head: Atraumatic. Normocephalic. No obvious abnormality  -- Eyes: Pupils are equal and reactive to light. Normal conjunctiva and lids  -- Cardiac: Normal rate, regular rhythm and normal heart sounds  -- Pulmonary: Normal respiratory effort, breath sounds clear to auscultation  -- Abdominal: Soft, no tenderness. Normal bowel sounds. Normal liver edge  -- Musculoskeletal: Normal range of motion, no effusions. Joints stable   -- Neurological: No focal deficits. Showed good interaction with staff  -- Skin: Warm and dry. No evidence of rash or cellulitis    Emergency Room Course      Lab Results     K 3.8      CO2 20 (L)   BUN 8   CREATININE 0.9    (H)   ALKPHOS 106   AST 44 (H)   ALT 53 (H)   BILITOT 0.4   ALBUMIN 3.4 (L)   PROT 7.4   WBC 11.52   HGB 12.3   HCT 37.2    (H)    (H)    (H)   CPKMB 9.7 (H)   TROPONINI <0.006   INR 1.0   BNP <10   DDIMER 0.56 (H)     EKG   -- The EKG findings today were without concerning findings from baseline    Radiology  -- The US of the lower extremity performed in the ER today was negative for DVT      Diagnosis  -- The primary encounter diagnosis was Anxiety.   -- A diagnosis of Left leg swelling was also pertinent to this visit.    Disposition and Plan  -- Disposition: home  -- Condition: stable  -- Follow-up:  Patient to follow up with Estela Munguia NP in 1-2 days.  -- I advised the patient that we have found no life threatening condition today  -- At this time, I believe the patient is clinically stable for discharge.   -- The patient acknowledges that close follow up with a MD is required   -- Patient agrees to comply with all instruction and direction given in the ER    This note is dictated on M*Modal word recognition program.  There are word recognition mistakes that are occasionally missed on review.          Hitesh Gilliam MD  06/02/19 [id number]

## 2019-07-12 DIAGNOSIS — F98.8 ATTENTION DEFICIT DISORDER, UNSPECIFIED HYPERACTIVITY PRESENCE: ICD-10-CM

## 2019-07-12 DIAGNOSIS — F50.81 BINGE EATING DISORDER: ICD-10-CM

## 2019-07-15 RX ORDER — LISDEXAMFETAMINE DIMESYLATE 20 MG/1
CAPSULE ORAL
Qty: 30 CAPSULE | Refills: 0 | Status: SHIPPED | OUTPATIENT
Start: 2019-07-15 | End: 2019-08-05 | Stop reason: SDUPTHER

## 2019-07-19 DIAGNOSIS — E11.9 TYPE 2 DIABETES MELLITUS WITHOUT COMPLICATION: ICD-10-CM

## 2019-08-05 ENCOUNTER — TELEPHONE (OUTPATIENT)
Dept: FAMILY MEDICINE | Facility: CLINIC | Age: 39
End: 2019-08-05

## 2019-08-05 ENCOUNTER — OFFICE VISIT (OUTPATIENT)
Dept: FAMILY MEDICINE | Facility: CLINIC | Age: 39
End: 2019-08-05
Payer: MEDICAID

## 2019-08-05 VITALS
DIASTOLIC BLOOD PRESSURE: 74 MMHG | WEIGHT: 235.38 LBS | HEIGHT: 65 IN | HEART RATE: 96 BPM | RESPIRATION RATE: 20 BRPM | BODY MASS INDEX: 39.22 KG/M2 | SYSTOLIC BLOOD PRESSURE: 120 MMHG

## 2019-08-05 DIAGNOSIS — R06.02 SOB (SHORTNESS OF BREATH) ON EXERTION: ICD-10-CM

## 2019-08-05 DIAGNOSIS — F50.81 BINGE EATING DISORDER: ICD-10-CM

## 2019-08-05 DIAGNOSIS — F98.8 ATTENTION DEFICIT DISORDER, UNSPECIFIED HYPERACTIVITY PRESENCE: ICD-10-CM

## 2019-08-05 PROCEDURE — 99999 PR PBB SHADOW E&M-EST. PATIENT-LVL III: ICD-10-PCS | Mod: PBBFAC,,, | Performed by: NURSE PRACTITIONER

## 2019-08-05 PROCEDURE — 99213 OFFICE O/P EST LOW 20 MIN: CPT | Mod: PBBFAC | Performed by: NURSE PRACTITIONER

## 2019-08-05 PROCEDURE — 99213 OFFICE O/P EST LOW 20 MIN: CPT | Mod: S$PBB,,, | Performed by: NURSE PRACTITIONER

## 2019-08-05 PROCEDURE — 99213 PR OFFICE/OUTPT VISIT, EST, LEVL III, 20-29 MIN: ICD-10-PCS | Mod: S$PBB,,, | Performed by: NURSE PRACTITIONER

## 2019-08-05 PROCEDURE — 99999 PR PBB SHADOW E&M-EST. PATIENT-LVL III: CPT | Mod: PBBFAC,,, | Performed by: NURSE PRACTITIONER

## 2019-08-05 RX ORDER — LISDEXAMFETAMINE DIMESYLATE CAPSULES 20 MG/1
CAPSULE ORAL
Qty: 30 CAPSULE | Refills: 0 | Status: SHIPPED | OUTPATIENT
Start: 2019-08-05 | End: 2019-09-19 | Stop reason: SDUPTHER

## 2019-08-05 RX ORDER — ALBUTEROL SULFATE 90 UG/1
AEROSOL, METERED RESPIRATORY (INHALATION)
Qty: 1 INHALER | Refills: 5 | Status: SHIPPED | OUTPATIENT
Start: 2019-08-05 | End: 2020-02-05 | Stop reason: SDUPTHER

## 2019-08-05 RX ORDER — BUSPIRONE HYDROCHLORIDE 15 MG/1
TABLET ORAL
COMMUNITY
Start: 2019-08-04 | End: 2020-03-05

## 2019-08-05 RX ORDER — VENLAFAXINE HYDROCHLORIDE 150 MG/1
CAPSULE, EXTENDED RELEASE ORAL
COMMUNITY
Start: 2019-08-04 | End: 2020-03-05

## 2019-08-05 RX ORDER — TRAZODONE HYDROCHLORIDE 100 MG/1
TABLET ORAL
COMMUNITY
Start: 2019-08-04 | End: 2020-03-05

## 2019-08-05 RX ORDER — QUETIAPINE FUMARATE 300 MG/1
TABLET, FILM COATED ORAL
COMMUNITY
Start: 2019-08-04 | End: 2020-06-10

## 2019-08-05 RX ORDER — FLUTICASONE FUROATE AND VILANTEROL 100; 25 UG/1; UG/1
1 POWDER RESPIRATORY (INHALATION) DAILY
Qty: 1 EACH | Refills: 5 | Status: SHIPPED | OUTPATIENT
Start: 2019-08-05 | End: 2020-02-05 | Stop reason: SDUPTHER

## 2019-08-05 NOTE — PROGRESS NOTES
Subjective:       Patient ID: Joe Henson is a 39 y.o. female.    Chief Complaint: paperwork    Here f or med check up and paperwork for gastric sleeve.    Review of Systems   Constitutional: Negative.  Negative for appetite change, fatigue and fever.   HENT: Negative.  Negative for congestion, ear pain and sore throat.    Eyes: Negative.  Negative for visual disturbance.   Respiratory: Negative.  Negative for cough, shortness of breath and wheezing.    Cardiovascular: Negative.    Gastrointestinal: Negative.  Negative for abdominal pain, diarrhea, nausea and vomiting.   Endocrine: Negative.    Genitourinary: Negative.  Negative for difficulty urinating and urgency.   Musculoskeletal: Negative.  Negative for arthralgias and myalgias.   Skin: Negative.  Negative for color change.   Allergic/Immunologic: Negative.    Neurological: Negative.  Negative for dizziness and headaches.   Hematological: Negative.    Psychiatric/Behavioral: Negative.  Negative for sleep disturbance.   All other systems reviewed and are negative.      Objective:      Physical Exam   Constitutional: She is oriented to person, place, and time. She appears well-developed and well-nourished. No distress.   HENT:   Head: Normocephalic and atraumatic.   Eyes: Pupils are equal, round, and reactive to light.   Neck: Normal range of motion. Neck supple.   Cardiovascular: Normal rate, regular rhythm and normal heart sounds.   No murmur heard.  Pulmonary/Chest: Effort normal and breath sounds normal. No respiratory distress.   Musculoskeletal: Normal range of motion.   Neurological: She is alert and oriented to person, place, and time.   Protective Sensation (w/ 10 gram monofilament):  Right: Intact  Left: Intact    Visual Inspection:  Normal -  Right     Pedal Pulses:   Right: Present  Left: Present    Posterior tibialis:   Right:Present  Left: Present   Skin: Skin is warm and dry.   Psychiatric: She has a normal mood and affect.   Nursing note  and vitals reviewed.      Assessment:       1. SOB (shortness of breath) on exertion    2. Attention deficit disorder, unspecified hyperactivity presence    3. Binge eating disorder        Plan:   Joe was seen today for paperwork.    Diagnoses and all orders for this visit:    SOB (shortness of breath) on exertion  -     fluticasone furoate-vilanterol (BREO ELLIPTA) 100-25 mcg/dose diskus inhaler; Inhale 1 puff into the lungs once daily. Controller  -     VENTOLIN HFA 90 mcg/actuation inhaler; INHALE 2 PUFFS EVERY 6   HOURS AS NEEDED FOR      WHEEZING    Attention deficit disorder, unspecified hyperactivity presence  -     lisdexamfetamine (VYVANSE) 20 MG capsule; TAKE ONE CAPSULE BY MOUTH EACH MORNING.    Binge eating disorder  -     lisdexamfetamine (VYVANSE) 20 MG capsule; TAKE ONE CAPSULE BY MOUTH EACH MORNING.    RTC 3 months for recheck

## 2019-08-05 NOTE — TELEPHONE ENCOUNTER
Spoke to pt stated that she had referral paperwork that needs to be completed to have her sleeve bio something done Per pt just to see the doctor

## 2019-08-05 NOTE — TELEPHONE ENCOUNTER
----- Message from Venutra Cote sent at 2019  8:21 AM CDT -----  Contact: Patient  Joe Henson  MRN: [id number]  : 1980  PCP: Estela Munguia  Home Phone      [phone]  Work Phone      Not on file.  Mobile          [phone]      MESSAGE: requesting appt with Rosalba today -- needs referral paperwork filled out    Call [phone]    PCP: Ezra

## 2019-09-19 DIAGNOSIS — F50.81 BINGE EATING DISORDER: ICD-10-CM

## 2019-09-19 DIAGNOSIS — F98.8 ATTENTION DEFICIT DISORDER, UNSPECIFIED HYPERACTIVITY PRESENCE: ICD-10-CM

## 2019-09-19 RX ORDER — LISDEXAMFETAMINE DIMESYLATE CAPSULES 20 MG/1
CAPSULE ORAL
Qty: 30 CAPSULE | Refills: 0 | Status: SHIPPED | OUTPATIENT
Start: 2019-09-19 | End: 2020-03-05

## 2019-10-14 DIAGNOSIS — E11.9 TYPE 2 DIABETES MELLITUS WITHOUT COMPLICATION, WITHOUT LONG-TERM CURRENT USE OF INSULIN: ICD-10-CM

## 2019-10-14 RX ORDER — METFORMIN HYDROCHLORIDE 500 MG/1
500 TABLET ORAL 2 TIMES DAILY WITH MEALS
Qty: 60 TABLET | Refills: 5 | Status: SHIPPED | OUTPATIENT
Start: 2019-10-14 | End: 2020-03-05

## 2019-10-14 NOTE — TELEPHONE ENCOUNTER
----- Message from Yolanda Kan sent at 10/14/2019  3:46 PM CDT -----  Contact: Self  Joe Henson  MRN: [id number]  : 1980  PCP: Estela Munguia  Home Phone      [phone]  Work Phone      Not on file.  Mobile          [phone]      MESSAGE:   Pt requesting refill or new Rx.   Is this a refill or new RX:  refill  RX name and strength:   metoformin    Breo  Last office visit:   Is this a 30-day or 90-day RX:  30  Pharmacy name and location:  [facility]  Comments:      Phone:  [phone]     Hashimoto's thyroiditis and hypothyroidism and to increase to 200mcg/day  TSH level in 2 months  Urticaria associated with hypothyroidism and goal of TSH of 0.4  Ibuprofen question if allergy avoid. Discussed nasal polyps and asthma. Note asthma as child

## 2019-10-18 ENCOUNTER — TELEPHONE (OUTPATIENT)
Dept: FAMILY MEDICINE | Facility: CLINIC | Age: 39
End: 2019-10-18

## 2019-10-18 DIAGNOSIS — E11.9 TYPE 2 DIABETES MELLITUS WITHOUT COMPLICATION: ICD-10-CM

## 2019-10-18 DIAGNOSIS — R06.02 SOB (SHORTNESS OF BREATH) ON EXERTION: Primary | ICD-10-CM

## 2019-10-18 NOTE — TELEPHONE ENCOUNTER
Received a PA request from [employer] for werner kapadia  Spoke with Mel at [facility]--prior auth done on phone, pending review [id number]  [phone]

## 2019-10-22 ENCOUNTER — LAB VISIT (OUTPATIENT)
Dept: LAB | Facility: HOSPITAL | Age: 39
End: 2019-10-22
Attending: NURSE PRACTITIONER
Payer: MEDICAID

## 2019-10-22 DIAGNOSIS — F25.0 SCHIZOAFFECTIVE DISORDER, BIPOLAR TYPE: Primary | ICD-10-CM

## 2019-10-22 LAB
ALBUMIN SERPL BCP-MCNC: 3.4 G/DL (ref 3.5–5.2)
ALP SERPL-CCNC: 112 U/L (ref 55–135)
ALT SERPL W/O P-5'-P-CCNC: 37 U/L (ref 10–44)
AMPHET+METHAMPHET UR QL: NORMAL
ANION GAP SERPL CALC-SCNC: 11 MMOL/L (ref 8–16)
AST SERPL-CCNC: 23 U/L (ref 10–40)
BARBITURATES UR QL SCN>200 NG/ML: NEGATIVE
BASOPHILS # BLD AUTO: 0.07 K/UL (ref 0–0.2)
BASOPHILS NFR BLD: 0.5 % (ref 0–1.9)
BENZODIAZ UR QL SCN>200 NG/ML: NEGATIVE
BILIRUB SERPL-MCNC: 0.3 MG/DL (ref 0.1–1)
BUN SERPL-MCNC: 10 MG/DL (ref 6–20)
BZE UR QL SCN: NEGATIVE
CALCIUM SERPL-MCNC: 8.9 MG/DL (ref 8.7–10.5)
CANNABINOIDS UR QL SCN: NEGATIVE
CHLORIDE SERPL-SCNC: 104 MMOL/L (ref 95–110)
CHOLEST SERPL-MCNC: 183 MG/DL (ref 120–199)
CHOLEST/HDLC SERPL: 4.7 {RATIO} (ref 2–5)
CO2 SERPL-SCNC: 23 MMOL/L (ref 23–29)
CREAT SERPL-MCNC: 0.9 MG/DL (ref 0.5–1.4)
CREAT UR-MCNC: 103.7 MG/DL (ref 15–325)
DIFFERENTIAL METHOD: ABNORMAL
EOSINOPHIL # BLD AUTO: 0.2 K/UL (ref 0–0.5)
EOSINOPHIL NFR BLD: 1.8 % (ref 0–8)
ERYTHROCYTE [DISTWIDTH] IN BLOOD BY AUTOMATED COUNT: 14.6 % (ref 11.5–14.5)
EST. GFR  (AFRICAN AMERICAN): >60 ML/MIN/1.73 M^2
EST. GFR  (NON AFRICAN AMERICAN): >60 ML/MIN/1.73 M^2
ESTIMATED AVG GLUCOSE: 126 MG/DL (ref 68–131)
GLUCOSE SERPL-MCNC: 109 MG/DL (ref 70–110)
HBA1C MFR BLD HPLC: 6 % (ref 4–5.6)
HCT VFR BLD AUTO: 41.5 % (ref 37–48.5)
HDLC SERPL-MCNC: 39 MG/DL (ref 40–75)
HDLC SERPL: 21.3 % (ref 20–50)
HGB BLD-MCNC: 13.3 G/DL (ref 12–16)
IMM GRANULOCYTES # BLD AUTO: 0.06 K/UL (ref 0–0.04)
IMM GRANULOCYTES NFR BLD AUTO: 0.5 % (ref 0–0.5)
LDLC SERPL CALC-MCNC: 109.6 MG/DL (ref 63–159)
LITHIUM SERPL-SCNC: 0.2 MMOL/L (ref 0.6–1.2)
LYMPHOCYTES # BLD AUTO: 3.6 K/UL (ref 1–4.8)
LYMPHOCYTES NFR BLD: 28.2 % (ref 18–48)
MCH RBC QN AUTO: 28.4 PG (ref 27–31)
MCHC RBC AUTO-ENTMCNC: 32 G/DL (ref 32–36)
MCV RBC AUTO: 89 FL (ref 82–98)
METHADONE UR QL SCN>300 NG/ML: NEGATIVE
MONOCYTES # BLD AUTO: 0.7 K/UL (ref 0.3–1)
MONOCYTES NFR BLD: 5 % (ref 4–15)
NEUTROPHILS # BLD AUTO: 8.3 K/UL (ref 1.8–7.7)
NEUTROPHILS NFR BLD: 64 % (ref 38–73)
NONHDLC SERPL-MCNC: 144 MG/DL
NRBC BLD-RTO: 0 /100 WBC
OPIATES UR QL SCN: NEGATIVE
PCP UR QL SCN>25 NG/ML: NEGATIVE
PLATELET # BLD AUTO: 456 K/UL (ref 150–350)
PMV BLD AUTO: 9.6 FL (ref 9.2–12.9)
POTASSIUM SERPL-SCNC: 4.1 MMOL/L (ref 3.5–5.1)
PROT SERPL-MCNC: 7.5 G/DL (ref 6–8.4)
RBC # BLD AUTO: 4.68 M/UL (ref 4–5.4)
SODIUM SERPL-SCNC: 138 MMOL/L (ref 136–145)
TOXICOLOGY INFORMATION: NORMAL
TRIGL SERPL-MCNC: 172 MG/DL (ref 30–150)
WBC # BLD AUTO: 12.89 K/UL (ref 3.9–12.7)

## 2019-10-22 PROCEDURE — 80061 LIPID PANEL: CPT

## 2019-10-22 PROCEDURE — 83036 HEMOGLOBIN GLYCOSYLATED A1C: CPT

## 2019-10-22 PROCEDURE — 80053 COMPREHEN METABOLIC PANEL: CPT

## 2019-10-22 PROCEDURE — 36415 COLL VENOUS BLD VENIPUNCTURE: CPT

## 2019-10-22 PROCEDURE — 80178 ASSAY OF LITHIUM: CPT

## 2019-10-22 PROCEDURE — 85025 COMPLETE CBC W/AUTO DIFF WBC: CPT

## 2019-10-22 PROCEDURE — 80307 DRUG TEST PRSMV CHEM ANLYZR: CPT

## 2019-10-28 NOTE — TELEPHONE ENCOUNTER
Received a denial letter from [facility] for Yue Moreno  Preferred products are Dulera or Symbicort. Do you wish to change meds? Please advise  Contact [phone]

## 2019-10-30 RX ORDER — BUDESONIDE AND FORMOTEROL FUMARATE DIHYDRATE 160; 4.5 UG/1; UG/1
2 AEROSOL RESPIRATORY (INHALATION) EVERY 12 HOURS
Qty: 1 INHALER | Refills: 5 | Status: SHIPPED | OUTPATIENT
Start: 2019-10-30 | End: 2020-03-05

## 2019-10-30 NOTE — TELEPHONE ENCOUNTER
Medicaid's formulary has changed since the last time the patient has filled Breo. Breo is now on the Non Preferred Drug List that will require PA, but isn't guaranteed approval.

## 2019-11-08 DIAGNOSIS — Z12.39 BREAST CANCER SCREENING: ICD-10-CM

## 2019-11-27 LAB
LEFT EYE DM RETINOPATHY: NEGATIVE
RIGHT EYE DM RETINOPATHY: NEGATIVE

## 2019-11-29 ENCOUNTER — TELEPHONE (OUTPATIENT)
Dept: ADMINISTRATIVE | Facility: HOSPITAL | Age: 39
End: 2019-11-29

## 2019-12-19 DIAGNOSIS — E11.9 TYPE 2 DIABETES MELLITUS WITHOUT COMPLICATION: ICD-10-CM

## 2020-02-05 DIAGNOSIS — R06.02 SOB (SHORTNESS OF BREATH) ON EXERTION: ICD-10-CM

## 2020-02-05 RX ORDER — ALBUTEROL SULFATE 90 UG/1
AEROSOL, METERED RESPIRATORY (INHALATION)
Qty: 18 G | Refills: 5 | Status: ON HOLD | OUTPATIENT
Start: 2020-02-05 | End: 2022-10-23 | Stop reason: HOSPADM

## 2020-02-05 RX ORDER — FLUTICASONE FUROATE AND VILANTEROL 100; 25 UG/1; UG/1
1 POWDER RESPIRATORY (INHALATION) DAILY
Qty: 1 EACH | Refills: 5 | Status: SHIPPED | OUTPATIENT
Start: 2020-02-05 | End: 2020-08-05 | Stop reason: SDUPTHER

## 2020-02-05 NOTE — TELEPHONE ENCOUNTER
----- Message from Radha Ewing sent at 2020  8:37 AM CST -----  Contact: self  Joe Henson  MRN: [id number]  : 1980  PCP: Estela Munguia  Home Phone      [phone]  Work Phone      Not on file.  Mobile          [phone]      MESSAGE:   Pt requesting refill or new Rx.   Is this a refill or new RX:  refill  RX name and strength: fluticasone furoate-vilanterol (BREO ELLIPTA) 100-25 mcg/dose diskus inhaler    VENTOLIN HFA 90 mcg/actuation inhaler    Last office visit: 19  Is this a 30-day or 90-day RX:  30  [facility] [place]  Comments:      Phone:  [phone]

## 2020-03-05 ENCOUNTER — OFFICE VISIT (OUTPATIENT)
Dept: INTERNAL MEDICINE | Facility: CLINIC | Age: 40
End: 2020-03-05
Payer: COMMERCIAL

## 2020-03-05 VITALS
SYSTOLIC BLOOD PRESSURE: 115 MMHG | HEART RATE: 100 BPM | OXYGEN SATURATION: 96 % | DIASTOLIC BLOOD PRESSURE: 62 MMHG | RESPIRATION RATE: 20 BRPM | HEIGHT: 65 IN | BODY MASS INDEX: 43.12 KG/M2 | WEIGHT: 258.81 LBS | TEMPERATURE: 97 F

## 2020-03-05 DIAGNOSIS — B34.9 VIRAL SYNDROME: Primary | ICD-10-CM

## 2020-03-05 DIAGNOSIS — J02.9 SORETHROAT: ICD-10-CM

## 2020-03-05 DIAGNOSIS — J11.1 INFLUENZA-LIKE ILLNESS: ICD-10-CM

## 2020-03-05 LAB
CTP QC/QA: YES
CTP QC/QA: YES
MOLECULAR STREP A: NEGATIVE
POC MOLECULAR INFLUENZA A AGN: NEGATIVE
POC MOLECULAR INFLUENZA B AGN: NEGATIVE

## 2020-03-05 PROCEDURE — 87651 STREP A DNA AMP PROBE: CPT | Mod: QW,S$GLB,, | Performed by: NURSE PRACTITIONER

## 2020-03-05 PROCEDURE — 3008F BODY MASS INDEX DOCD: CPT | Mod: CPTII,S$GLB,, | Performed by: NURSE PRACTITIONER

## 2020-03-05 PROCEDURE — 99213 OFFICE O/P EST LOW 20 MIN: CPT | Mod: S$GLB,,, | Performed by: NURSE PRACTITIONER

## 2020-03-05 PROCEDURE — 87502 INFLUENZA DNA AMP PROBE: CPT | Mod: QW,S$GLB,, | Performed by: NURSE PRACTITIONER

## 2020-03-05 PROCEDURE — 87502 POCT INFLUENZA A/B MOLECULAR: ICD-10-PCS | Mod: QW,S$GLB,, | Performed by: NURSE PRACTITIONER

## 2020-03-05 PROCEDURE — 99213 PR OFFICE/OUTPT VISIT, EST, LEVL III, 20-29 MIN: ICD-10-PCS | Mod: S$GLB,,, | Performed by: NURSE PRACTITIONER

## 2020-03-05 PROCEDURE — 99999 PR PBB SHADOW E&M-EST. PATIENT-LVL IV: ICD-10-PCS | Mod: PBBFAC,,, | Performed by: NURSE PRACTITIONER

## 2020-03-05 PROCEDURE — 99999 PR PBB SHADOW E&M-EST. PATIENT-LVL IV: CPT | Mod: PBBFAC,,, | Performed by: NURSE PRACTITIONER

## 2020-03-05 PROCEDURE — 3008F PR BODY MASS INDEX (BMI) DOCUMENTED: ICD-10-PCS | Mod: CPTII,S$GLB,, | Performed by: NURSE PRACTITIONER

## 2020-03-05 PROCEDURE — 87651 POCT STREP A MOLECULAR: ICD-10-PCS | Mod: QW,S$GLB,, | Performed by: NURSE PRACTITIONER

## 2020-03-05 RX ORDER — OSELTAMIVIR PHOSPHATE 75 MG/1
75 CAPSULE ORAL 2 TIMES DAILY
Qty: 10 CAPSULE | Refills: 0 | Status: SHIPPED | OUTPATIENT
Start: 2020-03-05 | End: 2020-03-10

## 2020-03-05 NOTE — PROGRESS NOTES
Subjective:           Patient ID: Joe Hesnon is a 39 y.o. female.    Chief Complaint: Generalized Body Aches; Sore Throat; and Otalgia    Joe Henson is a 39 y.o. Female here with c/o severe sore throat, body aches and congestion that started last PM  Chills.   + smoker   Negative flu, strep    Review of Systems   Constitutional: Positive for chills and fatigue. Negative for activity change, appetite change, diaphoresis and unexpected weight change.   HENT: Positive for congestion. Negative for dental problem, drooling, ear discharge, ear pain, facial swelling, mouth sores, nosebleeds, sinus pain, sneezing, sore throat and trouble swallowing.    Eyes: Negative for pain, discharge and itching.   Respiratory: Positive for cough. Negative for wheezing.    Cardiovascular: Negative for palpitations and leg swelling.   Gastrointestinal: Negative.  Negative for abdominal distention, abdominal pain, blood in stool, diarrhea and nausea.   Endocrine: Negative.    Genitourinary: Negative.  Negative for difficulty urinating, frequency and hematuria.   Musculoskeletal: Positive for myalgias. Negative for back pain, gait problem, joint swelling, neck pain and neck stiffness.   Skin: Negative for color change, pallor, rash and wound.   Neurological: Negative for dizziness, tremors, seizures, syncope, light-headedness and numbness.   Psychiatric/Behavioral: Negative.  Negative for agitation and self-injury. The patient is not nervous/anxious.        Objective:      Physical Exam   Constitutional: She is oriented to person, place, and time. She appears well-developed and well-nourished.   HENT:   Head: Normocephalic and atraumatic.   Nose: Mucosal edema present.   Mouth/Throat: Posterior oropharyngeal erythema present.   Eyes: Pupils are equal, round, and reactive to light. EOM are normal.   Neck: Normal range of motion. Neck supple.   Cardiovascular: Normal rate and regular rhythm.   No murmur  heard.  Pulmonary/Chest: Effort normal and breath sounds normal.   Abdominal: Soft. Bowel sounds are normal.   Musculoskeletal: Normal range of motion. She exhibits no edema.   Neurological: She is alert and oriented to person, place, and time.   Skin: Skin is warm and dry. Capillary refill takes less than 2 seconds.   Psychiatric: She has a normal mood and affect. Her behavior is normal. Judgment and thought content normal.       Assessment:       1. Viral syndrome    2. Sorethroat    3. Influenza-like illness        Plan:   Joe was seen today for generalized body aches, sore throat and otalgia.    Diagnoses and all orders for this visit:    Viral syndrome  -     oseltamivir (TAMIFLU) 75 MG capsule; Take 1 capsule (75 mg total) by mouth 2 (two) times daily. for 5 days    Sorethroat  -     POCT Influenza A/B Molecular  -     POCT Strep A, Molecular    Influenza-like illness  -     oseltamivir (TAMIFLU) 75 MG capsule; Take 1 capsule (75 mg total) by mouth 2 (two) times daily. for 5 days      Problem List Items Addressed This Visit     None      Visit Diagnoses     Viral syndrome    -  Primary    Relevant Medications    oseltamivir (TAMIFLU) 75 MG capsule    Sorethroat        Relevant Orders    POCT Influenza A/B Molecular (Completed)    POCT Strep A, Molecular (Completed)    Influenza-like illness        Relevant Medications    oseltamivir (TAMIFLU) 75 MG capsule

## 2020-03-05 NOTE — PATIENT INSTRUCTIONS
"Supportive care/Symptomatic therapy suggested: rest, increase fluids, gargle prn for sore throat, apply heat to sinuses prn, warm showers, use mist of vaporizer prn, Alternate OTC acetaminophen, ibuprofen, if no allergies.   Antihistamine and cough suppressant of choice,   avoid caffeine    Call or return to clinic prn if these symptoms worsen or fail to improve as anticipated within 3 days       Viral Syndrome (Adult)  A viral illness may cause a number of symptoms. The symptoms depend on the part of the body that the virus affects. If it settles in your nose, throat, and lungs, it may cause cough, sore throat, congestion, and sometimes headache. If it settles in your stomach and intestinal tract, it may cause vomiting and diarrhea. Sometimes it causes vague symptoms like "aching all over," feeling tired, loss of appetite, or fever.  A viral illness usually lasts 1 to 2 weeks, but sometimes it lasts longer. In some cases, a more serious infection can look like a viral syndrome in the first few days of the illness. You may need another exam and additional tests to know the difference. Watch for the warning signs listed below.  Home care  Follow these guidelines for taking care of yourself at home:  · If symptoms are severe, rest at home for the first 2 to 3 days.  · Stay away from cigarette smoke - both your smoke and the smoke from others.  · You may use over-the-counter acetaminophen or ibuprofen for fever, muscle aching, and headache, unless another medicine was prescribed for this. If you have chronic liver or kidney disease or ever had a stomach ulcer or GI bleeding, talk with your doctor before using these medicines. No one who is younger than 18 and ill with a fever should take aspirin. It may cause severe disease or death.  · Your appetite may be poor, so a light diet is fine. Avoid dehydration by drinking 8 to 12 8-ounce glasses of fluids each day. This may include water; orange juice; lemonade; apple, " grape, and cranberry juice; clear fruit drinks; electrolyte replacement and sports drinks; and decaffeinated teas and coffee. If you have been diagnosed with a kidney disease, ask your doctor how much and what types of fluids you should drink to prevent dehydration. If you have kidney disease, drinking too much fluid can cause it build up in the your body and be dangerous to your health.  · Over-the-counter remedies won't shorten the length of the illness but may be helpful for cough, sore throat; and nasal and sinus congestion. Don't use decongestants if you have high blood pressure.  Follow-up care  Follow up with your healthcare provider if you do not improve over the next week.  Call 911  Get emergency medical care if any of the following occur:  · Convulsion  · Feeling weak, dizzy, or like you are going to faint  · Chest pain, shortness of breath, wheezing, or difficulty breathing  When to seek medical advice  Call your healthcare provider right away if any of these occur:  · Cough with lots of colored sputum (mucus) or blood in your sputum  · Chest pain, shortness of breath, wheezing, or difficulty breathing  · Severe headache; face, neck, or ear pain  · Severe, constant pain in the lower right side of your belly (abdominal)  · Continued vomiting (cant keep liquids down)  · Frequent diarrhea (more than 5 times a day); blood (red or black color) or mucus in diarrhea  · Feeling weak, dizzy, or like you are going to faint  · Extreme thirst  · Fever of 100.4°F (38°C) or higher, or as directed by your healthcare provider  Date Last Reviewed: 9/25/2015  © 1600-7234 [employer]. [address] [place] [address], [place] [address]. All rights reserved. This information is not intended as a substitute for professional medical care. Always follow your healthcare professional's instructions.

## 2020-04-20 ENCOUNTER — TELEPHONE (OUTPATIENT)
Dept: FAMILY MEDICINE | Facility: CLINIC | Age: 40
End: 2020-04-20

## 2020-04-20 NOTE — TELEPHONE ENCOUNTER
LOV 08/05/2019    Pt requesting refill on Klonopin. Not on current med list. Please advise, thank you.    Pharmacy: [facility]

## 2020-04-20 NOTE — TELEPHONE ENCOUNTER
I haven't seen her since August of 19 and haven't prescribed clonazepam for her since 2017. She can call her psychiatrist

## 2020-06-10 ENCOUNTER — OFFICE VISIT (OUTPATIENT)
Dept: FAMILY MEDICINE | Facility: CLINIC | Age: 40
End: 2020-06-10
Payer: COMMERCIAL

## 2020-06-10 VITALS
RESPIRATION RATE: 16 BRPM | BODY MASS INDEX: 39.63 KG/M2 | SYSTOLIC BLOOD PRESSURE: 122 MMHG | WEIGHT: 237.88 LBS | TEMPERATURE: 98 F | HEART RATE: 82 BPM | DIASTOLIC BLOOD PRESSURE: 88 MMHG | HEIGHT: 65 IN

## 2020-06-10 DIAGNOSIS — F41.9 ANXIETY: Primary | ICD-10-CM

## 2020-06-10 PROCEDURE — 99213 OFFICE O/P EST LOW 20 MIN: CPT | Mod: S$GLB,,, | Performed by: NURSE PRACTITIONER

## 2020-06-10 PROCEDURE — 99999 PR PBB SHADOW E&M-EST. PATIENT-LVL III: CPT | Mod: PBBFAC,,, | Performed by: NURSE PRACTITIONER

## 2020-06-10 PROCEDURE — 99213 PR OFFICE/OUTPT VISIT, EST, LEVL III, 20-29 MIN: ICD-10-PCS | Mod: S$GLB,,, | Performed by: NURSE PRACTITIONER

## 2020-06-10 PROCEDURE — 99999 PR PBB SHADOW E&M-EST. PATIENT-LVL III: ICD-10-PCS | Mod: PBBFAC,,, | Performed by: NURSE PRACTITIONER

## 2020-06-10 PROCEDURE — 3008F BODY MASS INDEX DOCD: CPT | Mod: CPTII,S$GLB,, | Performed by: NURSE PRACTITIONER

## 2020-06-10 PROCEDURE — 3008F PR BODY MASS INDEX (BMI) DOCUMENTED: ICD-10-PCS | Mod: CPTII,S$GLB,, | Performed by: NURSE PRACTITIONER

## 2020-06-10 RX ORDER — CLONAZEPAM 1 MG/1
1 TABLET ORAL 2 TIMES DAILY PRN
Qty: 60 TABLET | Refills: 5 | Status: SHIPPED | OUTPATIENT
Start: 2020-06-10 | End: 2020-11-19

## 2020-06-10 NOTE — PROGRESS NOTES
Subjective:       Patient ID: Joe Henson is a 39 y.o. female.    Chief Complaint: Follow-up    Here for med check up and refills. Getting tested and prepping for gastric sleeve.    Review of Systems   Constitutional: Negative.  Negative for appetite change, fatigue and fever.   HENT: Negative.  Negative for congestion, ear pain and sore throat.    Eyes: Negative.  Negative for visual disturbance.   Respiratory: Negative.  Negative for cough, shortness of breath and wheezing.    Cardiovascular: Negative.    Gastrointestinal: Negative.  Negative for abdominal pain, diarrhea, nausea and vomiting.   Endocrine: Negative.    Genitourinary: Negative.  Negative for difficulty urinating and urgency.   Musculoskeletal: Negative.  Negative for arthralgias and myalgias.   Skin: Negative.  Negative for color change.   Allergic/Immunologic: Negative.    Neurological: Negative.  Negative for dizziness and headaches.   Hematological: Negative.    Psychiatric/Behavioral: Negative.  Negative for sleep disturbance.   All other systems reviewed and are negative.      Objective:      Physical Exam   Constitutional: She is oriented to person, place, and time. She appears well-developed and well-nourished. No distress.   HENT:   Head: Normocephalic and atraumatic.   Eyes: Pupils are equal, round, and reactive to light.   Neck: Normal range of motion. Neck supple.   Cardiovascular: Normal rate, regular rhythm and normal heart sounds.   No murmur heard.  Pulmonary/Chest: Effort normal and breath sounds normal. No respiratory distress.   Musculoskeletal: Normal range of motion.   Neurological: She is alert and oriented to person, place, and time.   Skin: Skin is warm and dry.   Psychiatric: She has a normal mood and affect.   Nursing note and vitals reviewed.      Assessment:       1. Anxiety        Plan:   Joe was seen today for follow-up.    Diagnoses and all orders for this visit:    Anxiety  -     clonazePAM (KLONOPIN) 1 MG  tablet; Take 1 tablet (1 mg total) by mouth 2 (two) times daily as needed for Anxiety.    RTC 6 months for recheck

## 2020-07-14 ENCOUNTER — HOSPITAL ENCOUNTER (EMERGENCY)
Facility: HOSPITAL | Age: 40
Discharge: HOME | End: 2020-07-14
Attending: SURGERY
Payer: COMMERCIAL

## 2020-07-14 VITALS
SYSTOLIC BLOOD PRESSURE: 142 MMHG | TEMPERATURE: 99 F | RESPIRATION RATE: 24 BRPM | HEART RATE: 117 BPM | OXYGEN SATURATION: 99 % | DIASTOLIC BLOOD PRESSURE: 85 MMHG

## 2020-07-14 DIAGNOSIS — Z53.29 LEFT AGAINST MEDICAL ADVICE: Primary | ICD-10-CM

## 2020-07-14 DIAGNOSIS — R06.02 SOB (SHORTNESS OF BREATH): ICD-10-CM

## 2020-07-14 LAB
ALBUMIN SERPL BCP-MCNC: 3 G/DL (ref 3.5–5.2)
ALP SERPL-CCNC: 139 U/L (ref 55–135)
ALT SERPL W/O P-5'-P-CCNC: 56 U/L (ref 10–44)
AMPHET+METHAMPHET UR QL: NORMAL
ANION GAP SERPL CALC-SCNC: 9 MMOL/L (ref 8–16)
AST SERPL-CCNC: 36 U/L (ref 10–40)
BARBITURATES UR QL SCN>200 NG/ML: NEGATIVE
BASOPHILS # BLD AUTO: 0.07 K/UL (ref 0–0.2)
BASOPHILS NFR BLD: 0.5 % (ref 0–1.9)
BENZODIAZ UR QL SCN>200 NG/ML: NORMAL
BILIRUB SERPL-MCNC: 0.3 MG/DL (ref 0.1–1)
BILIRUB UR QL STRIP: NEGATIVE
BUN SERPL-MCNC: 5 MG/DL (ref 6–20)
BZE UR QL SCN: NEGATIVE
CALCIUM SERPL-MCNC: 8.7 MG/DL (ref 8.7–10.5)
CANNABINOIDS UR QL SCN: NEGATIVE
CHLORIDE SERPL-SCNC: 108 MMOL/L (ref 95–110)
CLARITY UR: CLEAR
CO2 SERPL-SCNC: 23 MMOL/L (ref 23–29)
COLOR UR: YELLOW
CREAT SERPL-MCNC: 0.9 MG/DL (ref 0.5–1.4)
CREAT UR-MCNC: 55.9 MG/DL (ref 15–325)
DIFFERENTIAL METHOD: ABNORMAL
EOSINOPHIL # BLD AUTO: 0.2 K/UL (ref 0–0.5)
EOSINOPHIL NFR BLD: 1.3 % (ref 0–8)
ERYTHROCYTE [DISTWIDTH] IN BLOOD BY AUTOMATED COUNT: 15.1 % (ref 11.5–14.5)
EST. GFR  (AFRICAN AMERICAN): >60 ML/MIN/1.73 M^2
EST. GFR  (NON AFRICAN AMERICAN): >60 ML/MIN/1.73 M^2
GLUCOSE SERPL-MCNC: 206 MG/DL (ref 70–110)
GLUCOSE UR QL STRIP: NEGATIVE
HCT VFR BLD AUTO: 42.1 % (ref 37–48.5)
HGB BLD-MCNC: 13.2 G/DL (ref 12–16)
HGB UR QL STRIP: ABNORMAL
IMM GRANULOCYTES # BLD AUTO: 0.06 K/UL (ref 0–0.04)
IMM GRANULOCYTES NFR BLD AUTO: 0.4 % (ref 0–0.5)
KETONES UR QL STRIP: NEGATIVE
LACTATE SERPL-SCNC: 2.4 MMOL/L (ref 0.5–2.2)
LEUKOCYTE ESTERASE UR QL STRIP: NEGATIVE
LYMPHOCYTES # BLD AUTO: 2.7 K/UL (ref 1–4.8)
LYMPHOCYTES NFR BLD: 18.7 % (ref 18–48)
MCH RBC QN AUTO: 27.5 PG (ref 27–31)
MCHC RBC AUTO-ENTMCNC: 31.4 G/DL (ref 32–36)
MCV RBC AUTO: 88 FL (ref 82–98)
METHADONE UR QL SCN>300 NG/ML: NEGATIVE
MONOCYTES # BLD AUTO: 0.7 K/UL (ref 0.3–1)
MONOCYTES NFR BLD: 4.9 % (ref 4–15)
NEUTROPHILS # BLD AUTO: 10.7 K/UL (ref 1.8–7.7)
NEUTROPHILS NFR BLD: 74.2 % (ref 38–73)
NITRITE UR QL STRIP: NEGATIVE
NRBC BLD-RTO: 0 /100 WBC
OPIATES UR QL SCN: NEGATIVE
PCP UR QL SCN>25 NG/ML: NEGATIVE
PH UR STRIP: 6 [PH] (ref 5–8)
PLATELET # BLD AUTO: 581 K/UL (ref 150–350)
PMV BLD AUTO: 9.7 FL (ref 9.2–12.9)
POTASSIUM SERPL-SCNC: 3.7 MMOL/L (ref 3.5–5.1)
PROT SERPL-MCNC: 7.1 G/DL (ref 6–8.4)
PROT UR QL STRIP: NEGATIVE
RBC # BLD AUTO: 4.8 M/UL (ref 4–5.4)
SARS-COV-2 RDRP RESP QL NAA+PROBE: NEGATIVE
SODIUM SERPL-SCNC: 140 MMOL/L (ref 136–145)
SP GR UR STRIP: 1.01 (ref 1–1.03)
TOXICOLOGY INFORMATION: NORMAL
URN SPEC COLLECT METH UR: ABNORMAL
UROBILINOGEN UR STRIP-ACNC: NEGATIVE EU/DL
WBC # BLD AUTO: 14.35 K/UL (ref 3.9–12.7)

## 2020-07-14 PROCEDURE — 81003 URINALYSIS AUTO W/O SCOPE: CPT | Mod: 59

## 2020-07-14 PROCEDURE — 80307 DRUG TEST PRSMV CHEM ANLYZR: CPT

## 2020-07-14 PROCEDURE — U0002 COVID-19 LAB TEST NON-CDC: HCPCS

## 2020-07-14 PROCEDURE — 36415 COLL VENOUS BLD VENIPUNCTURE: CPT

## 2020-07-14 PROCEDURE — 80053 COMPREHEN METABOLIC PANEL: CPT

## 2020-07-14 PROCEDURE — 85025 COMPLETE CBC W/AUTO DIFF WBC: CPT

## 2020-07-14 PROCEDURE — 99285 EMERGENCY DEPT VISIT HI MDM: CPT | Mod: 25

## 2020-07-14 PROCEDURE — 87040 BLOOD CULTURE FOR BACTERIA: CPT

## 2020-07-14 PROCEDURE — 93005 ELECTROCARDIOGRAM TRACING: CPT

## 2020-07-14 PROCEDURE — 25000242 PHARM REV CODE 250 ALT 637 W/ HCPCS: Performed by: SURGERY

## 2020-07-14 PROCEDURE — 93010 ELECTROCARDIOGRAM REPORT: CPT | Mod: ,,, | Performed by: INTERNAL MEDICINE

## 2020-07-14 PROCEDURE — 93010 EKG 12-LEAD: ICD-10-PCS | Mod: ,,, | Performed by: INTERNAL MEDICINE

## 2020-07-14 PROCEDURE — 83605 ASSAY OF LACTIC ACID: CPT

## 2020-07-14 PROCEDURE — 94640 AIRWAY INHALATION TREATMENT: CPT

## 2020-07-14 RX ORDER — IPRATROPIUM BROMIDE AND ALBUTEROL SULFATE 2.5; .5 MG/3ML; MG/3ML
3 SOLUTION RESPIRATORY (INHALATION)
Status: COMPLETED | OUTPATIENT
Start: 2020-07-14 | End: 2020-07-14

## 2020-07-14 RX ADMIN — IPRATROPIUM BROMIDE AND ALBUTEROL SULFATE 3 ML: .5; 3 SOLUTION RESPIRATORY (INHALATION) at 01:07

## 2020-07-14 NOTE — ED PROVIDER NOTES
Encounter Date: 2020       History     Chief Complaint   Patient presents with    Shortness of Breath     40-year-old white female who presents with 1-2 day history of shortness of breath.  Dry cough is reported.  Patient is heavy cigarette smoker.  Patient also has a history of methamphetamine abuse.        Review of patient's allergies indicates:   Allergen Reactions    Latex, natural rubber      Past Medical History:   Diagnosis Date    Abnormal Pap smear age 30    Pos. HPV,     Abnormal Pap smear of cervix     ADHD (attention deficit hyperactivity disorder)     Arthritis     Bipolar disorder     Depression     History of psychiatric hospitalization     Hx of psychiatric care     OCD (obsessive compulsive disorder)     Psychiatric problem     PTSD (post-traumatic stress disorder)     Suicide attempt     Therapy      Past Surgical History:   Procedure Laterality Date     SECTION  ;     DILATION AND CURETTAGE OF UTERUS  2016    ENDOMETRIAL ABLATION  2016    KNEE SURGERY Right 2011    hardware-bone from hip to repair    TUBAL LIGATION       Family History   Problem Relation Age of Onset    Diabetes Maternal Grandmother     Hypertension Father     Colon cancer Father 60    Breast cancer Paternal Aunt     Breast cancer Paternal Aunt     Breast cancer Paternal Aunt     Breast cancer Paternal Aunt     Coronary artery disease Maternal Grandfather      labor Neg Hx      Social History     Tobacco Use    Smoking status: Current Every Day Smoker     Packs/day: 1.00     Years: 15.00     Pack years: 15.00     Types: Cigarettes     Start date: 1997    Smokeless tobacco: Never Used    Tobacco comment: told about brochure and smoking clinic program   Substance Use Topics    Alcohol use: Yes     Frequency: Monthly or less     Drinks per session: 1 or 2     Binge frequency: Never     Comment: Socially-2 times a week-4-5 beers    Drug use: No      Review of Systems   Constitutional: Negative for fever.   HENT: Negative for congestion, ear pain, rhinorrhea, sore throat and trouble swallowing.    Eyes: Negative for pain.   Respiratory: Positive for cough and shortness of breath. Negative for wheezing.    Cardiovascular: Negative for chest pain, palpitations and leg swelling.   Gastrointestinal: Negative for abdominal pain, constipation, diarrhea and nausea.   Genitourinary: Negative for difficulty urinating, dysuria, flank pain, frequency, hematuria and urgency.   Musculoskeletal: Negative for arthralgias, back pain, myalgias and neck pain.   Skin: Negative for rash and wound.   Neurological: Negative for speech difficulty, weakness and headaches.   Hematological: Does not bruise/bleed easily.       Physical Exam     Initial Vitals [07/14/20 1305]   BP Pulse Resp Temp SpO2   133/89 (!) 120 (!) 24 98.6 °F (37 °C) 97 %      MAP       --         Physical Exam    Nursing note and vitals reviewed.  Constitutional: She appears well-developed and well-nourished. No distress.   HENT:   Head: Normocephalic and atraumatic.   Nose: Nose normal.   Mouth/Throat: Oropharynx is clear and moist.   Eyes: Conjunctivae and EOM are normal. Pupils are equal, round, and reactive to light.   Neck: Normal range of motion. Neck supple.   Cardiovascular: Normal rate, regular rhythm, normal heart sounds and intact distal pulses.   Pulmonary/Chest: Breath sounds normal.   Abdominal: Soft. Bowel sounds are normal. There is no abdominal tenderness.   Musculoskeletal: Normal range of motion.   Neurological: She is alert and oriented to person, place, and time. She has normal strength.   Skin: Skin is warm and dry.   Psychiatric:   Flat affect         ED Course   Procedures  Labs Reviewed   CBC W/ AUTO DIFFERENTIAL - Abnormal; Notable for the following components:       Result Value    WBC 14.35 (*)     Mean Corpuscular Hemoglobin Conc 31.4 (*)     RDW 15.1 (*)     Platelets 581 (*)      Gran # (ANC) 10.7 (*)     Immature Grans (Abs) 0.06 (*)     Gran% 74.2 (*)     All other components within normal limits   COMPREHENSIVE METABOLIC PANEL - Abnormal; Notable for the following components:    Glucose 206 (*)     BUN, Bld 5 (*)     Albumin 3.0 (*)     Alkaline Phosphatase 139 (*)     ALT 56 (*)     All other components within normal limits   LACTIC ACID, PLASMA - Abnormal; Notable for the following components:    Lactate (Lactic Acid) 2.4 (*)     All other components within normal limits   URINALYSIS, REFLEX TO URINE CULTURE - Abnormal; Notable for the following components:    Occult Blood UA Trace (*)     All other components within normal limits    Narrative:     Specimen Source->Urine   CULTURE, BLOOD   CULTURE, BLOOD   SARS-COV-2 RNA AMPLIFICATION, QUAL   DRUG SCREEN PANEL, URINE EMERGENCY    Narrative:     Specimen Source->Urine        ECG Results          EKG 12-lead (Final result)  Result time 07/14/20 15:25:01    Final result by Interface, Lab In [facility] (07/14/20 15:25:01)                 Narrative:    Test Reason : R06.02    Vent. Rate : 118 BPM     Atrial Rate : 118 BPM     P-R Int : 138 ms          QRS Dur : 080 ms      QT Int : 320 ms       P-R-T Axes : 062 -29 073 degrees     QTc Int : 448 ms    Sinus tachycardia  Borderline left axis deviation  Abnormal R wave progression in the precordial leads  Borderline Abnormal ECG  When compared with ECG of 02-JUN-2019 17:55,  No significant change was found  Confirmed by Srinath Carrasco MD ([id number]) on 7/14/2020 3:24:54 PM    Referred By: [email or web address]   SELF           Confirmed By:Srinath Carrasco MD                            Imaging Results          X-Ray Chest AP Portable (Final result)  Result time 07/14/20 13:54:13    Final result by Acacia Schmidt MD (07/14/20 13:54:13)                 Impression:      As above.      Electronically signed by: Acacia Schmidt MD  Date:    07/14/2020  Time:    13:54             Narrative:    EXAMINATION:  XR CHEST AP  PORTABLE    CLINICAL HISTORY:  Sepsis;    TECHNIQUE:  Single frontal view of the chest was performed.    COMPARISON:  05/09/2019    FINDINGS:  Bibasilar subsegmental atelectasis.  No consolidation or pleural effusions.  Heart size is normal.  Skeletal structures are intact.                                                                 Clinical Impression:       ICD-10-CM ICD-9-CM   1. Left against medical advice  Z53.20 V64.2   2. SOB (shortness of breath)  R06.02 786.05         Disposition:   Disposition: AMA  Condition: Stable     ED Disposition Condition    AMA                           Blanco Becerril Jr., MD  07/14/20 2430

## 2020-07-14 NOTE — ED NOTES
"Upon entering patient's room, pt states " I am ready to go, I feel better now and I want to leave" I reported patient's request to Dr Jone WHITNEY, PT was advised of medical condition and need for further treatment and states " I understand I am not discharged but I want to leave now"   "

## 2020-07-14 NOTE — ED NOTES
Patient is awake, alert and calm, NADN, RR labored, call be within reach, instructed on how to use call bell. Instructed to call for assistance and/or needs. Patient verbalized understanding. Patient denies needs or concerns at this time.

## 2020-07-20 LAB
BACTERIA BLD CULT: NORMAL
BACTERIA BLD CULT: NORMAL

## 2020-07-31 ENCOUNTER — TELEPHONE (OUTPATIENT)
Dept: INTERNAL MEDICINE | Facility: CLINIC | Age: 40
End: 2020-07-31

## 2020-07-31 NOTE — TELEPHONE ENCOUNTER
She needs to come in to get back on this med. Her blood pressure and heart rate were elevated the last time she was here.

## 2020-07-31 NOTE — TELEPHONE ENCOUNTER
----- Message from Susan Flores sent at 2020 10:10 AM CDT -----  Contact: self  Joe Henson  MRN: [id number]  : 1980  PCP: Estela Munguia  Home Phone      [phone]  Work Phone      Not on file.  Mobile          [phone]      MESSAGE:   Pt requesting refill or new Rx.   Is this a refill or new RX:  refill  RX name and strength: dextroamphetamine-amphetamine (ADDERALL) 20 mg tablet   Last office visit: 06/10/2020  Is this a 30-day or 90-day RX:  30  Pharmacy name and location: [facility]  Comments:      Phone:  [phone]

## 2020-07-31 NOTE — TELEPHONE ENCOUNTER
----- Message from Michaela Villanueva sent at 2020  2:17 PM CDT -----  Regarding: med refill  Contact: Self  Joe Henson  MRN: [id number]  : 1980  PCP: Estela Munguia  Home Phone      [phone]  Work Phone      Not on file.  Mobile          [phone]      MESSAGE:   Pt requesting refill or new Rx.   Is this a refill or new RX:  refill  RX name and strength: lisdexamfetamine (VYVANSE) 20 MG capsule  Last appt date: 6/10/2020  Is this a 30-day or 90-day RX:  30-Day  Pharmacy name and location:  [facility]  Comments:      Phone:  [phone]

## 2020-08-05 ENCOUNTER — OFFICE VISIT (OUTPATIENT)
Dept: FAMILY MEDICINE | Facility: CLINIC | Age: 40
End: 2020-08-05
Payer: COMMERCIAL

## 2020-08-05 ENCOUNTER — TELEPHONE (OUTPATIENT)
Dept: FAMILY MEDICINE | Facility: CLINIC | Age: 40
End: 2020-08-05

## 2020-08-05 VITALS
BODY MASS INDEX: 39.12 KG/M2 | DIASTOLIC BLOOD PRESSURE: 86 MMHG | OXYGEN SATURATION: 97 % | TEMPERATURE: 98 F | WEIGHT: 234.81 LBS | HEIGHT: 65 IN | SYSTOLIC BLOOD PRESSURE: 130 MMHG

## 2020-08-05 DIAGNOSIS — F50.81 BINGE EATING DISORDER: ICD-10-CM

## 2020-08-05 DIAGNOSIS — F98.8 ATTENTION DEFICIT DISORDER, UNSPECIFIED HYPERACTIVITY PRESENCE: Primary | ICD-10-CM

## 2020-08-05 DIAGNOSIS — R53.83 FATIGUE, UNSPECIFIED TYPE: ICD-10-CM

## 2020-08-05 DIAGNOSIS — R06.02 SOB (SHORTNESS OF BREATH) ON EXERTION: ICD-10-CM

## 2020-08-05 PROCEDURE — 99999 PR PBB SHADOW E&M-EST. PATIENT-LVL IV: ICD-10-PCS | Mod: PBBFAC,,, | Performed by: NURSE PRACTITIONER

## 2020-08-05 PROCEDURE — 3008F PR BODY MASS INDEX (BMI) DOCUMENTED: ICD-10-PCS | Mod: CPTII,S$GLB,, | Performed by: NURSE PRACTITIONER

## 2020-08-05 PROCEDURE — 99999 PR PBB SHADOW E&M-EST. PATIENT-LVL IV: CPT | Mod: PBBFAC,,, | Performed by: NURSE PRACTITIONER

## 2020-08-05 PROCEDURE — 3008F BODY MASS INDEX DOCD: CPT | Mod: CPTII,S$GLB,, | Performed by: NURSE PRACTITIONER

## 2020-08-05 PROCEDURE — 99214 PR OFFICE/OUTPT VISIT, EST, LEVL IV, 30-39 MIN: ICD-10-PCS | Mod: S$GLB,,, | Performed by: NURSE PRACTITIONER

## 2020-08-05 PROCEDURE — 99214 OFFICE O/P EST MOD 30 MIN: CPT | Mod: S$GLB,,, | Performed by: NURSE PRACTITIONER

## 2020-08-05 RX ORDER — TRAZODONE HYDROCHLORIDE 150 MG/1
TABLET ORAL
COMMUNITY
Start: 2020-07-17 | End: 2021-03-24 | Stop reason: ALTCHOICE

## 2020-08-05 RX ORDER — LITHIUM CARBONATE 600 MG/1
CAPSULE ORAL
COMMUNITY
Start: 2020-07-17 | End: 2021-01-25

## 2020-08-05 RX ORDER — LISDEXAMFETAMINE DIMESYLATE 30 MG/1
30 CAPSULE ORAL EVERY MORNING
Qty: 30 CAPSULE | Refills: 0 | Status: SHIPPED | OUTPATIENT
Start: 2020-08-05 | End: 2020-09-02 | Stop reason: SDUPTHER

## 2020-08-05 RX ORDER — TIOTROPIUM BROMIDE 18 UG/1
18 CAPSULE ORAL; RESPIRATORY (INHALATION) DAILY
Qty: 30 CAPSULE | Refills: 5 | Status: SHIPPED | OUTPATIENT
Start: 2020-08-05 | End: 2021-01-14 | Stop reason: SDUPTHER

## 2020-08-05 RX ORDER — PALIPERIDONE PALMITATE 234 MG/1.5ML
INJECTION INTRAMUSCULAR
Status: ON HOLD | COMMUNITY
Start: 2020-07-21 | End: 2021-12-15 | Stop reason: HOSPADM

## 2020-08-05 RX ORDER — QUETIAPINE FUMARATE 300 MG/1
900 TABLET, FILM COATED ORAL NIGHTLY
Status: ON HOLD | COMMUNITY
Start: 2020-08-03 | End: 2021-09-27 | Stop reason: HOSPADM

## 2020-08-05 RX ORDER — FLUTICASONE FUROATE AND VILANTEROL TRIFENATATE 100; 25 UG/1; UG/1
1 POWDER RESPIRATORY (INHALATION) DAILY
Qty: 1 EACH | Refills: 5 | Status: SHIPPED | OUTPATIENT
Start: 2020-08-05 | End: 2021-01-14 | Stop reason: SDUPTHER

## 2020-08-05 RX ORDER — TIOTROPIUM BROMIDE 18 UG/1
18 CAPSULE ORAL; RESPIRATORY (INHALATION) DAILY
Qty: 30 CAPSULE | Refills: 5 | Status: SHIPPED | OUTPATIENT
Start: 2020-08-05 | End: 2020-08-05 | Stop reason: SDUPTHER

## 2020-08-05 RX ORDER — METOPROLOL SUCCINATE 25 MG/1
TABLET, EXTENDED RELEASE ORAL
COMMUNITY
Start: 2020-07-09 | End: 2021-01-25

## 2020-08-05 NOTE — TELEPHONE ENCOUNTER
PA request from [facility] for Vyvanse  Submitted through [facility] Key: [id number] - PA Case ID: [id number], waiting on response.

## 2020-08-05 NOTE — PROGRESS NOTES
Subjective:       Patient ID: Joe Henson is a 40 y.o. female.    Chief Complaint: Shortness of Breath and Medication Refill (Vyvanse)    Seen in ER 7/14 for SOB. Labs were okay. CXR okay. Has SOB in spite of Breo and albuterol HFA    Shortness of Breath  This is a chronic problem. Associated symptoms include orthopnea. Pertinent negatives include no abdominal pain, chest pain, ear pain, fever, headaches, sore throat, vomiting or wheezing. She has tried beta agonist inhalers and steroid inhalers for the symptoms.   Medication Refill  Pertinent negatives include no abdominal pain, arthralgias, chest pain, congestion, coughing, fatigue, fever, headaches, myalgias, nausea, sore throat or vomiting.     Review of Systems   Constitutional: Negative.  Negative for appetite change, fatigue and fever.        Readying for gastric bypass surgery   HENT: Negative.  Negative for congestion, ear pain and sore throat.    Eyes: Negative.  Negative for visual disturbance.   Respiratory: Positive for shortness of breath. Negative for cough and wheezing.    Cardiovascular: Positive for orthopnea. Negative for chest pain.   Gastrointestinal: Negative.  Negative for abdominal pain, diarrhea, nausea and vomiting.   Endocrine: Negative.    Genitourinary: Negative.  Negative for difficulty urinating and urgency.   Musculoskeletal: Negative.  Negative for arthralgias and myalgias.   Skin: Negative.  Negative for color change.   Allergic/Immunologic: Negative.    Neurological: Negative.  Negative for dizziness and headaches.   Hematological: Negative.    Psychiatric/Behavioral: Negative.  Negative for sleep disturbance.   All other systems reviewed and are negative.      Objective:      Physical Exam  Vitals signs and nursing note reviewed.   Constitutional:       General: She is not in acute distress.     Appearance: Normal appearance. She is well-developed.   HENT:      Head: Normocephalic and atraumatic.   Eyes:      Pupils: Pupils  are equal, round, and reactive to light.   Neck:      Musculoskeletal: Normal range of motion and neck supple.   Cardiovascular:      Rate and Rhythm: Normal rate and regular rhythm.      Pulses: Normal pulses.      Heart sounds: Normal heart sounds. No murmur.   Pulmonary:      Effort: Pulmonary effort is normal. No respiratory distress.      Breath sounds: Normal breath sounds.   Musculoskeletal: Normal range of motion.   Skin:     General: Skin is warm and dry.   Neurological:      Mental Status: She is alert and oriented to person, place, and time.         Assessment:       1. Attention deficit disorder, unspecified hyperactivity presence    2. SOB (shortness of breath) on exertion    3. Binge eating disorder    4. Fatigue, unspecified type        Plan:   Joe was seen today for shortness of breath and medication refill.    Diagnoses and all orders for this visit:    Attention deficit disorder, unspecified hyperactivity presence  -     lisdexamfetamine (VYVANSE) 30 MG capsule; Take 1 capsule (30 mg total) by mouth every morning.    SOB (shortness of breath) on exertion  -     Discontinue: tiotropium (SPIRIVA) 18 mcg inhalation capsule; Inhale 1 capsule (18 mcg total) into the lungs once daily.  -     fluticasone furoate-vilanteroL (BREO ELLIPTA) 100-25 mcg/dose diskus inhaler; Inhale 1 puff into the lungs once daily. Controller  -     tiotropium (SPIRIVA) 18 mcg inhalation capsule; Inhale 1 capsule (18 mcg total) into the lungs once daily.    Binge eating disorder  -     lisdexamfetamine (VYVANSE) 30 MG capsule; Take 1 capsule (30 mg total) by mouth every morning.    Fatigue, unspecified type  -     lisdexamfetamine (VYVANSE) 30 MG capsule; Take 1 capsule (30 mg total) by mouth every morning.    RTC 2 weeks for recheck

## 2020-08-11 NOTE — TELEPHONE ENCOUNTER
Key: [id number] - PA Case ID: [id number] - Rx #: [id number]   Need help? Call us at [phone]       Outcome    Approvedon August 6  PA Case: [id number], Status: Approved, Coverage Starts on: 1/1/2020 12:00:00 AM, Coverage Ends on: 12/31/2020 12:00:00 AM. Questions? Contact [phone].  Copy of approval faxed to [facility]

## 2020-09-02 DIAGNOSIS — R53.83 FATIGUE, UNSPECIFIED TYPE: ICD-10-CM

## 2020-09-02 DIAGNOSIS — F50.81 BINGE EATING DISORDER: ICD-10-CM

## 2020-09-02 DIAGNOSIS — F98.8 ATTENTION DEFICIT DISORDER, UNSPECIFIED HYPERACTIVITY PRESENCE: ICD-10-CM

## 2020-09-02 RX ORDER — LISDEXAMFETAMINE DIMESYLATE 30 MG/1
30 CAPSULE ORAL EVERY MORNING
Qty: 30 CAPSULE | Refills: 0 | Status: SHIPPED | OUTPATIENT
Start: 2020-09-02 | End: 2020-10-05

## 2020-09-02 NOTE — TELEPHONE ENCOUNTER
LOV: 08/05/2020    Patient is requesting a refill for:    1.) Vyvanse  Last RX: 08/05/2020    Pharmacy: [facility].

## 2020-09-02 NOTE — TELEPHONE ENCOUNTER
----- Message from Susan Flores sent at 2020 11:09 AM CDT -----  Contact: fax  Joe Henson  MRN: [id number]  : 1980  PCP: Estela Munguia  Home Phone      [phone]  Work Phone      Not on file.  Mobile          [phone]      MESSAGE:   Pt requesting refill or new Rx.   Is this a refill or new RX:  new  RX name and strength: lisdexamfetamine (VYVANSE) 30 MG capsule  Last office visit: 2020  Is this a 30-day or 90-day RX:  30  Pharmacy name and location:  [facility]  Comments:      Phone:  [phone]

## 2020-09-15 ENCOUNTER — TELEPHONE (OUTPATIENT)
Dept: ADMINISTRATIVE | Facility: HOSPITAL | Age: 40
End: 2020-09-15

## 2020-09-16 DIAGNOSIS — E11.65 TYPE 2 DIABETES MELLITUS WITH HYPERGLYCEMIA, WITHOUT LONG-TERM CURRENT USE OF INSULIN: Primary | ICD-10-CM

## 2020-10-05 ENCOUNTER — PATIENT MESSAGE (OUTPATIENT)
Dept: ADMINISTRATIVE | Facility: HOSPITAL | Age: 40
End: 2020-10-05

## 2020-10-30 DIAGNOSIS — R53.83 FATIGUE, UNSPECIFIED TYPE: ICD-10-CM

## 2020-10-30 DIAGNOSIS — F50.81 BINGE EATING DISORDER: ICD-10-CM

## 2020-10-30 DIAGNOSIS — F98.8 ATTENTION DEFICIT DISORDER, UNSPECIFIED HYPERACTIVITY PRESENCE: ICD-10-CM

## 2020-10-30 RX ORDER — LISDEXAMFETAMINE DIMESYLATE 30 MG/1
CAPSULE ORAL
Qty: 30 CAPSULE | Refills: 0 | Status: SHIPPED | OUTPATIENT
Start: 2020-10-30 | End: 2020-12-02

## 2020-10-30 NOTE — TELEPHONE ENCOUNTER
----- Message from Susan Flores sent at 10/30/2020 10:43 AM CDT -----  Contact: fax  Joe Henson  MRN: [id number]  : 1980  PCP: Estela Munguia  Home Phone      [phone]  Work Phone      Not on file.  Mobile          [phone]      MESSAGE:   Pt requesting refill or new Rx.   Is this a refill or new RX:  new  RX name and strength: VYVANSE 30 mg capsule  Last office visit: 2020  Is this a 30-day or 90-day RX:  30  Pharmacy name and location:  [facility]  Comments:      Phone:  [phone]

## 2020-11-03 ENCOUNTER — PATIENT OUTREACH (OUTPATIENT)
Dept: ADMINISTRATIVE | Facility: HOSPITAL | Age: 40
End: 2020-11-03

## 2020-12-01 ENCOUNTER — TELEPHONE (OUTPATIENT)
Dept: ADMINISTRATIVE | Facility: HOSPITAL | Age: 40
End: 2020-12-01

## 2020-12-01 DIAGNOSIS — F50.81 BINGE EATING DISORDER: ICD-10-CM

## 2020-12-01 DIAGNOSIS — R53.83 FATIGUE, UNSPECIFIED TYPE: ICD-10-CM

## 2020-12-01 DIAGNOSIS — F98.8 ATTENTION DEFICIT DISORDER, UNSPECIFIED HYPERACTIVITY PRESENCE: ICD-10-CM

## 2020-12-02 RX ORDER — LISDEXAMFETAMINE DIMESYLATE 30 MG/1
CAPSULE ORAL
Qty: 30 CAPSULE | Refills: 0 | Status: SHIPPED | OUTPATIENT
Start: 2020-12-02 | End: 2020-12-30

## 2020-12-27 ENCOUNTER — HOSPITAL ENCOUNTER (EMERGENCY)
Facility: HOSPITAL | Age: 40
Discharge: HOME | End: 2020-12-27
Attending: INTERNAL MEDICINE
Payer: COMMERCIAL

## 2020-12-27 VITALS
SYSTOLIC BLOOD PRESSURE: 107 MMHG | RESPIRATION RATE: 18 BRPM | DIASTOLIC BLOOD PRESSURE: 66 MMHG | BODY MASS INDEX: 35.51 KG/M2 | OXYGEN SATURATION: 97 % | HEIGHT: 64 IN | WEIGHT: 208 LBS | TEMPERATURE: 97 F | HEART RATE: 92 BPM

## 2020-12-27 DIAGNOSIS — R52 PAIN: ICD-10-CM

## 2020-12-27 DIAGNOSIS — M25.572 ACUTE LEFT ANKLE PAIN: Primary | ICD-10-CM

## 2020-12-27 DIAGNOSIS — S93.402A SPRAIN OF LEFT ANKLE, UNSPECIFIED LIGAMENT, INITIAL ENCOUNTER: ICD-10-CM

## 2020-12-27 PROCEDURE — 96372 THER/PROPH/DIAG INJ SC/IM: CPT

## 2020-12-27 PROCEDURE — 25000003 PHARM REV CODE 250: Performed by: INTERNAL MEDICINE

## 2020-12-27 PROCEDURE — 99284 EMERGENCY DEPT VISIT MOD MDM: CPT | Mod: 25

## 2020-12-27 PROCEDURE — 63600175 PHARM REV CODE 636 W HCPCS: Performed by: INTERNAL MEDICINE

## 2020-12-27 RX ORDER — HYDROCODONE BITARTRATE AND ACETAMINOPHEN 5; 325 MG/1; MG/1
2 TABLET ORAL
Status: COMPLETED | OUTPATIENT
Start: 2020-12-27 | End: 2020-12-27

## 2020-12-27 RX ORDER — KETOROLAC TROMETHAMINE 10 MG/1
10 TABLET, FILM COATED ORAL EVERY 6 HOURS PRN
Qty: 15 TABLET | Refills: 0 | Status: SHIPPED | OUTPATIENT
Start: 2020-12-27 | End: 2021-01-25

## 2020-12-27 RX ORDER — KETOROLAC TROMETHAMINE 30 MG/ML
60 INJECTION, SOLUTION INTRAMUSCULAR; INTRAVENOUS
Status: COMPLETED | OUTPATIENT
Start: 2020-12-27 | End: 2020-12-27

## 2020-12-27 RX ADMIN — KETOROLAC TROMETHAMINE 60 MG: 30 INJECTION, SOLUTION INTRAMUSCULAR at 08:12

## 2020-12-27 RX ADMIN — HYDROCODONE BITARTRATE AND ACETAMINOPHEN 2 TABLET: 5; 325 TABLET ORAL at 08:12

## 2020-12-29 DIAGNOSIS — F98.8 ATTENTION DEFICIT DISORDER, UNSPECIFIED HYPERACTIVITY PRESENCE: ICD-10-CM

## 2020-12-29 DIAGNOSIS — F50.81 BINGE EATING DISORDER: ICD-10-CM

## 2020-12-29 DIAGNOSIS — R53.83 FATIGUE, UNSPECIFIED TYPE: ICD-10-CM

## 2020-12-30 ENCOUNTER — TELEPHONE (OUTPATIENT)
Dept: FAMILY MEDICINE | Facility: CLINIC | Age: 40
End: 2020-12-30

## 2020-12-30 RX ORDER — LISDEXAMFETAMINE DIMESYLATE 30 MG/1
CAPSULE ORAL
Qty: 30 CAPSULE | Refills: 0 | Status: SHIPPED | OUTPATIENT
Start: 2020-12-30 | End: 2021-03-24 | Stop reason: ALTCHOICE

## 2020-12-30 NOTE — TELEPHONE ENCOUNTER
----- Message from Hallie Galdamez sent at 2020  1:18 PM CST -----  Contact: Self  Joe Henson  MRN: [id number]  : 1980  PCP: Estela Munguia  Home Phone      [phone]  Work Phone      Not on file.  Mobile          [phone]      MESSAGE:   Refill  lisdexamfetamine (VYVANSE) 30 MG capsule    [facility] [place] [address] [place] [address] [address]    [phone]

## 2020-12-30 NOTE — TELEPHONE ENCOUNTER
Spoke with pt--calling with c/o only having BM's 1 - 2 times every 2-3 wks.-- this is an ongoing problem. Asked if she is on any pain meds--no. She had a recent visit to the ER for her ankle. She states that she has been taking fiber, not helping. Suggested she try a fleet enema or mag citrate for relief and if that doesn't help, she may want to go to ER or make an appt here. She also asked if you could go up on her Klonopin--advised her that she would need an appt with you for that. Voiced understanding with all information given.

## 2021-01-04 ENCOUNTER — PATIENT MESSAGE (OUTPATIENT)
Dept: ADMINISTRATIVE | Facility: HOSPITAL | Age: 41
End: 2021-01-04

## 2021-01-14 DIAGNOSIS — R06.02 SOB (SHORTNESS OF BREATH) ON EXERTION: ICD-10-CM

## 2021-01-14 RX ORDER — TIOTROPIUM BROMIDE 18 UG/1
18 CAPSULE ORAL; RESPIRATORY (INHALATION) DAILY
Qty: 30 CAPSULE | Refills: 5 | Status: SHIPPED | OUTPATIENT
Start: 2021-01-14 | End: 2021-03-24

## 2021-01-14 RX ORDER — FLUTICASONE FUROATE AND VILANTEROL TRIFENATATE 100; 25 UG/1; UG/1
1 POWDER RESPIRATORY (INHALATION) DAILY
Qty: 1 EACH | Refills: 5 | Status: ON HOLD | OUTPATIENT
Start: 2021-01-14 | End: 2021-12-15 | Stop reason: HOSPADM

## 2021-01-15 DIAGNOSIS — Z12.31 OTHER SCREENING MAMMOGRAM: ICD-10-CM

## 2021-01-25 ENCOUNTER — OFFICE VISIT (OUTPATIENT)
Dept: FAMILY MEDICINE | Facility: CLINIC | Age: 41
End: 2021-01-25
Payer: COMMERCIAL

## 2021-01-25 VITALS
DIASTOLIC BLOOD PRESSURE: 80 MMHG | WEIGHT: 198.63 LBS | BODY MASS INDEX: 33.91 KG/M2 | HEIGHT: 64 IN | HEART RATE: 100 BPM | RESPIRATION RATE: 18 BRPM | TEMPERATURE: 98 F | SYSTOLIC BLOOD PRESSURE: 108 MMHG

## 2021-01-25 DIAGNOSIS — E78.2 MIXED HYPERLIPIDEMIA: ICD-10-CM

## 2021-01-25 DIAGNOSIS — K04.7 INFECTED TOOTH: ICD-10-CM

## 2021-01-25 DIAGNOSIS — E11.9 TYPE 2 DIABETES MELLITUS WITHOUT COMPLICATION, WITHOUT LONG-TERM CURRENT USE OF INSULIN: ICD-10-CM

## 2021-01-25 DIAGNOSIS — R53.83 FATIGUE, UNSPECIFIED TYPE: ICD-10-CM

## 2021-01-25 DIAGNOSIS — R41.0 DISORIENTATION: ICD-10-CM

## 2021-01-25 DIAGNOSIS — I95.9 HYPOTENSION, UNSPECIFIED HYPOTENSION TYPE: Primary | ICD-10-CM

## 2021-01-25 DIAGNOSIS — R41.0 CONFUSION: ICD-10-CM

## 2021-01-25 PROCEDURE — 3008F PR BODY MASS INDEX (BMI) DOCUMENTED: ICD-10-PCS | Mod: CPTII,S$GLB,, | Performed by: NURSE PRACTITIONER

## 2021-01-25 PROCEDURE — 99214 PR OFFICE/OUTPT VISIT, EST, LEVL IV, 30-39 MIN: ICD-10-PCS | Mod: S$GLB,,, | Performed by: NURSE PRACTITIONER

## 2021-01-25 PROCEDURE — 99499 UNLISTED E&M SERVICE: CPT | Mod: HCNC,S$GLB,, | Performed by: NURSE PRACTITIONER

## 2021-01-25 PROCEDURE — 99999 PR PBB SHADOW E&M-EST. PATIENT-LVL IV: CPT | Mod: PBBFAC,,, | Performed by: NURSE PRACTITIONER

## 2021-01-25 PROCEDURE — 3008F BODY MASS INDEX DOCD: CPT | Mod: CPTII,S$GLB,, | Performed by: NURSE PRACTITIONER

## 2021-01-25 PROCEDURE — 99999 PR PBB SHADOW E&M-EST. PATIENT-LVL IV: ICD-10-PCS | Mod: PBBFAC,,, | Performed by: NURSE PRACTITIONER

## 2021-01-25 PROCEDURE — 1126F AMNT PAIN NOTED NONE PRSNT: CPT | Mod: S$GLB,,, | Performed by: NURSE PRACTITIONER

## 2021-01-25 PROCEDURE — 1126F PR PAIN SEVERITY QUANTIFIED, NO PAIN PRESENT: ICD-10-PCS | Mod: S$GLB,,, | Performed by: NURSE PRACTITIONER

## 2021-01-25 PROCEDURE — 99499 RISK ADDL DX/OHS AUDIT: ICD-10-PCS | Mod: HCNC,S$GLB,, | Performed by: NURSE PRACTITIONER

## 2021-01-25 PROCEDURE — 99214 OFFICE O/P EST MOD 30 MIN: CPT | Mod: S$GLB,,, | Performed by: NURSE PRACTITIONER

## 2021-01-25 RX ORDER — CLINDAMYCIN HYDROCHLORIDE 300 MG/1
300 CAPSULE ORAL 2 TIMES DAILY
Qty: 20 CAPSULE | Refills: 0 | Status: SHIPPED | OUTPATIENT
Start: 2021-01-25 | End: 2021-02-04

## 2021-01-25 RX ORDER — TRAMADOL HYDROCHLORIDE 50 MG/1
50 TABLET ORAL EVERY 6 HOURS PRN
Qty: 28 TABLET | Refills: 0 | Status: SHIPPED | OUTPATIENT
Start: 2021-01-25 | End: 2021-02-01

## 2021-01-26 ENCOUNTER — LAB VISIT (OUTPATIENT)
Dept: LAB | Facility: HOSPITAL | Age: 41
End: 2021-01-26
Attending: NURSE PRACTITIONER
Payer: COMMERCIAL

## 2021-01-26 DIAGNOSIS — R41.0 DISORIENTATION: ICD-10-CM

## 2021-01-26 DIAGNOSIS — R41.0 CONFUSION: ICD-10-CM

## 2021-01-26 DIAGNOSIS — I95.9 HYPOTENSION, UNSPECIFIED HYPOTENSION TYPE: ICD-10-CM

## 2021-01-26 DIAGNOSIS — E78.2 MIXED HYPERLIPIDEMIA: ICD-10-CM

## 2021-01-26 DIAGNOSIS — E11.9 TYPE 2 DIABETES MELLITUS WITHOUT COMPLICATION, WITHOUT LONG-TERM CURRENT USE OF INSULIN: ICD-10-CM

## 2021-01-26 DIAGNOSIS — R53.83 FATIGUE, UNSPECIFIED TYPE: ICD-10-CM

## 2021-01-26 LAB
ALBUMIN SERPL BCP-MCNC: 3.4 G/DL (ref 3.5–5.2)
ALP SERPL-CCNC: 100 U/L (ref 55–135)
ALT SERPL W/O P-5'-P-CCNC: 18 U/L (ref 10–44)
ANION GAP SERPL CALC-SCNC: 8 MMOL/L (ref 8–16)
AST SERPL-CCNC: 13 U/L (ref 10–40)
BASOPHILS # BLD AUTO: 0.05 K/UL (ref 0–0.2)
BASOPHILS NFR BLD: 0.5 % (ref 0–1.9)
BILIRUB SERPL-MCNC: 0.2 MG/DL (ref 0.1–1)
BUN SERPL-MCNC: 9 MG/DL (ref 6–20)
CALCIUM SERPL-MCNC: 9 MG/DL (ref 8.7–10.5)
CHLORIDE SERPL-SCNC: 106 MMOL/L (ref 95–110)
CHOLEST SERPL-MCNC: 146 MG/DL (ref 120–199)
CHOLEST/HDLC SERPL: 4.9 {RATIO} (ref 2–5)
CO2 SERPL-SCNC: 26 MMOL/L (ref 23–29)
CREAT SERPL-MCNC: 0.9 MG/DL (ref 0.5–1.4)
DIFFERENTIAL METHOD: ABNORMAL
EOSINOPHIL # BLD AUTO: 0.2 K/UL (ref 0–0.5)
EOSINOPHIL NFR BLD: 2.1 % (ref 0–8)
ERYTHROCYTE [DISTWIDTH] IN BLOOD BY AUTOMATED COUNT: 15.1 % (ref 11.5–14.5)
EST. GFR  (AFRICAN AMERICAN): >60 ML/MIN/1.73 M^2
EST. GFR  (NON AFRICAN AMERICAN): >60 ML/MIN/1.73 M^2
ESTIMATED AVG GLUCOSE: 114 MG/DL (ref 68–131)
GLUCOSE SERPL-MCNC: 89 MG/DL (ref 70–110)
HBA1C MFR BLD HPLC: 5.6 % (ref 4–5.6)
HCT VFR BLD AUTO: 40.1 % (ref 37–48.5)
HDLC SERPL-MCNC: 30 MG/DL (ref 40–75)
HDLC SERPL: 20.5 % (ref 20–50)
HGB BLD-MCNC: 12.9 G/DL (ref 12–16)
IMM GRANULOCYTES # BLD AUTO: 0.03 K/UL (ref 0–0.04)
IMM GRANULOCYTES NFR BLD AUTO: 0.3 % (ref 0–0.5)
LDLC SERPL CALC-MCNC: 81.2 MG/DL (ref 63–159)
LYMPHOCYTES # BLD AUTO: 4.3 K/UL (ref 1–4.8)
LYMPHOCYTES NFR BLD: 46.7 % (ref 18–48)
MCH RBC QN AUTO: 28.3 PG (ref 27–31)
MCHC RBC AUTO-ENTMCNC: 32.2 G/DL (ref 32–36)
MCV RBC AUTO: 88 FL (ref 82–98)
MONOCYTES # BLD AUTO: 0.6 K/UL (ref 0.3–1)
MONOCYTES NFR BLD: 6.7 % (ref 4–15)
NEUTROPHILS # BLD AUTO: 4 K/UL (ref 1.8–7.7)
NEUTROPHILS NFR BLD: 43.7 % (ref 38–73)
NONHDLC SERPL-MCNC: 116 MG/DL
NRBC BLD-RTO: 0 /100 WBC
PLATELET # BLD AUTO: 448 K/UL (ref 150–350)
PMV BLD AUTO: 10.8 FL (ref 9.2–12.9)
POTASSIUM SERPL-SCNC: 4 MMOL/L (ref 3.5–5.1)
PROT SERPL-MCNC: 7 G/DL (ref 6–8.4)
RBC # BLD AUTO: 4.56 M/UL (ref 4–5.4)
SODIUM SERPL-SCNC: 140 MMOL/L (ref 136–145)
TRIGL SERPL-MCNC: 174 MG/DL (ref 30–150)
TSH SERPL DL<=0.005 MIU/L-ACNC: 0.94 UIU/ML (ref 0.4–4)
VIT B12 SERPL-MCNC: 577 PG/ML (ref 210–950)
WBC # BLD AUTO: 9.21 K/UL (ref 3.9–12.7)

## 2021-01-26 PROCEDURE — 80061 LIPID PANEL: CPT

## 2021-01-26 PROCEDURE — 83036 HEMOGLOBIN GLYCOSYLATED A1C: CPT

## 2021-01-26 PROCEDURE — 80053 COMPREHEN METABOLIC PANEL: CPT

## 2021-01-26 PROCEDURE — 84443 ASSAY THYROID STIM HORMONE: CPT

## 2021-01-26 PROCEDURE — 36415 COLL VENOUS BLD VENIPUNCTURE: CPT

## 2021-01-26 PROCEDURE — 85025 COMPLETE CBC W/AUTO DIFF WBC: CPT

## 2021-01-26 PROCEDURE — 82607 VITAMIN B-12: CPT

## 2021-01-26 PROCEDURE — 84207 ASSAY OF VITAMIN B-6: CPT

## 2021-01-29 ENCOUNTER — OFFICE VISIT (OUTPATIENT)
Dept: FAMILY MEDICINE | Facility: CLINIC | Age: 41
End: 2021-01-29
Payer: COMMERCIAL

## 2021-01-29 VITALS
HEIGHT: 64 IN | SYSTOLIC BLOOD PRESSURE: 112 MMHG | RESPIRATION RATE: 16 BRPM | HEART RATE: 92 BPM | TEMPERATURE: 98 F | DIASTOLIC BLOOD PRESSURE: 82 MMHG | WEIGHT: 201.25 LBS | BODY MASS INDEX: 34.36 KG/M2

## 2021-01-29 DIAGNOSIS — I95.9 HYPOTENSION, UNSPECIFIED HYPOTENSION TYPE: ICD-10-CM

## 2021-01-29 DIAGNOSIS — R41.0 DISORIENTATION: ICD-10-CM

## 2021-01-29 DIAGNOSIS — K59.04 CHRONIC IDIOPATHIC CONSTIPATION: Primary | ICD-10-CM

## 2021-01-29 DIAGNOSIS — R53.83 FATIGUE, UNSPECIFIED TYPE: ICD-10-CM

## 2021-01-29 DIAGNOSIS — R41.3 MEMORY LOSS: ICD-10-CM

## 2021-01-29 DIAGNOSIS — R41.0 CONFUSION: ICD-10-CM

## 2021-01-29 LAB — PYRIDOXAL SERPL-MCNC: 7 UG/L (ref 5–50)

## 2021-01-29 PROCEDURE — 1126F AMNT PAIN NOTED NONE PRSNT: CPT | Mod: S$GLB,,, | Performed by: NURSE PRACTITIONER

## 2021-01-29 PROCEDURE — 99213 PR OFFICE/OUTPT VISIT, EST, LEVL III, 20-29 MIN: ICD-10-PCS | Mod: S$GLB,,, | Performed by: NURSE PRACTITIONER

## 2021-01-29 PROCEDURE — 1126F PR PAIN SEVERITY QUANTIFIED, NO PAIN PRESENT: ICD-10-PCS | Mod: S$GLB,,, | Performed by: NURSE PRACTITIONER

## 2021-01-29 PROCEDURE — 3008F PR BODY MASS INDEX (BMI) DOCUMENTED: ICD-10-PCS | Mod: CPTII,S$GLB,, | Performed by: NURSE PRACTITIONER

## 2021-01-29 PROCEDURE — 99213 OFFICE O/P EST LOW 20 MIN: CPT | Mod: S$GLB,,, | Performed by: NURSE PRACTITIONER

## 2021-01-29 PROCEDURE — 99999 PR PBB SHADOW E&M-EST. PATIENT-LVL V: ICD-10-PCS | Mod: PBBFAC,,, | Performed by: NURSE PRACTITIONER

## 2021-01-29 PROCEDURE — 99999 PR PBB SHADOW E&M-EST. PATIENT-LVL V: CPT | Mod: PBBFAC,,, | Performed by: NURSE PRACTITIONER

## 2021-01-29 PROCEDURE — 3008F BODY MASS INDEX DOCD: CPT | Mod: CPTII,S$GLB,, | Performed by: NURSE PRACTITIONER

## 2021-02-23 ENCOUNTER — PATIENT OUTREACH (OUTPATIENT)
Dept: ADMINISTRATIVE | Facility: OTHER | Age: 41
End: 2021-02-23

## 2021-03-24 ENCOUNTER — CLINICAL SUPPORT (OUTPATIENT)
Dept: FAMILY MEDICINE | Facility: CLINIC | Age: 41
End: 2021-03-24
Payer: COMMERCIAL

## 2021-03-24 ENCOUNTER — OFFICE VISIT (OUTPATIENT)
Dept: FAMILY MEDICINE | Facility: CLINIC | Age: 41
End: 2021-03-24
Payer: COMMERCIAL

## 2021-03-24 VITALS
DIASTOLIC BLOOD PRESSURE: 78 MMHG | HEIGHT: 64 IN | HEART RATE: 100 BPM | TEMPERATURE: 97 F | RESPIRATION RATE: 16 BRPM | BODY MASS INDEX: 31.27 KG/M2 | WEIGHT: 183.19 LBS | SYSTOLIC BLOOD PRESSURE: 92 MMHG

## 2021-03-24 DIAGNOSIS — F41.9 ANXIETY: ICD-10-CM

## 2021-03-24 DIAGNOSIS — Z00.00 ROUTINE CHECK-UP: Primary | ICD-10-CM

## 2021-03-24 DIAGNOSIS — Z98.84 H/O BARIATRIC SURGERY: ICD-10-CM

## 2021-03-24 DIAGNOSIS — Z00.00 ROUTINE CHECK-UP: ICD-10-CM

## 2021-03-24 LAB
ALBUMIN SERPL BCP-MCNC: 3.9 G/DL (ref 3.5–5.2)
ALP SERPL-CCNC: 99 U/L (ref 55–135)
ALT SERPL W/O P-5'-P-CCNC: 20 U/L (ref 10–44)
ANION GAP SERPL CALC-SCNC: 12 MMOL/L (ref 8–16)
AST SERPL-CCNC: 19 U/L (ref 10–40)
BASOPHILS # BLD AUTO: 0.07 K/UL (ref 0–0.2)
BASOPHILS NFR BLD: 0.6 % (ref 0–1.9)
BILIRUB SERPL-MCNC: 0.3 MG/DL (ref 0.1–1)
BUN SERPL-MCNC: 11 MG/DL (ref 6–20)
CALCIUM SERPL-MCNC: 9.8 MG/DL (ref 8.7–10.5)
CHLORIDE SERPL-SCNC: 105 MMOL/L (ref 95–110)
CHOLEST SERPL-MCNC: 154 MG/DL (ref 120–199)
CHOLEST/HDLC SERPL: 4.5 {RATIO} (ref 2–5)
CO2 SERPL-SCNC: 23 MMOL/L (ref 23–29)
CREAT SERPL-MCNC: 0.9 MG/DL (ref 0.5–1.4)
DIFFERENTIAL METHOD: ABNORMAL
EOSINOPHIL # BLD AUTO: 0.1 K/UL (ref 0–0.5)
EOSINOPHIL NFR BLD: 0.9 % (ref 0–8)
ERYTHROCYTE [DISTWIDTH] IN BLOOD BY AUTOMATED COUNT: 14 % (ref 11.5–14.5)
EST. GFR  (AFRICAN AMERICAN): >60 ML/MIN/1.73 M^2
EST. GFR  (NON AFRICAN AMERICAN): >60 ML/MIN/1.73 M^2
GLUCOSE SERPL-MCNC: 98 MG/DL (ref 70–110)
HCT VFR BLD AUTO: 42.4 % (ref 37–48.5)
HDLC SERPL-MCNC: 34 MG/DL (ref 40–75)
HDLC SERPL: 22.1 % (ref 20–50)
HGB BLD-MCNC: 13.9 G/DL (ref 12–16)
IMM GRANULOCYTES # BLD AUTO: 0.04 K/UL (ref 0–0.04)
IMM GRANULOCYTES NFR BLD AUTO: 0.3 % (ref 0–0.5)
LDLC SERPL CALC-MCNC: 92.2 MG/DL (ref 63–159)
LYMPHOCYTES # BLD AUTO: 3.8 K/UL (ref 1–4.8)
LYMPHOCYTES NFR BLD: 32.6 % (ref 18–48)
MCH RBC QN AUTO: 29.6 PG (ref 27–31)
MCHC RBC AUTO-ENTMCNC: 32.8 G/DL (ref 32–36)
MCV RBC AUTO: 90 FL (ref 82–98)
MONOCYTES # BLD AUTO: 0.6 K/UL (ref 0.3–1)
MONOCYTES NFR BLD: 5.3 % (ref 4–15)
NEUTROPHILS # BLD AUTO: 7 K/UL (ref 1.8–7.7)
NEUTROPHILS NFR BLD: 60.3 % (ref 38–73)
NONHDLC SERPL-MCNC: 120 MG/DL
NRBC BLD-RTO: 0 /100 WBC
PLATELET # BLD AUTO: 490 K/UL (ref 150–350)
PMV BLD AUTO: 10.9 FL (ref 9.2–12.9)
POTASSIUM SERPL-SCNC: 4.2 MMOL/L (ref 3.5–5.1)
PROT SERPL-MCNC: 8.1 G/DL (ref 6–8.4)
RBC # BLD AUTO: 4.7 M/UL (ref 4–5.4)
SODIUM SERPL-SCNC: 140 MMOL/L (ref 136–145)
TRIGL SERPL-MCNC: 139 MG/DL (ref 30–150)
WBC # BLD AUTO: 11.61 K/UL (ref 3.9–12.7)

## 2021-03-24 PROCEDURE — 99213 PR OFFICE/OUTPT VISIT, EST, LEVL III, 20-29 MIN: ICD-10-PCS | Mod: S$GLB,,, | Performed by: NURSE PRACTITIONER

## 2021-03-24 PROCEDURE — 99999 PR PBB SHADOW E&M-EST. PATIENT-LVL III: CPT | Mod: PBBFAC,,, | Performed by: NURSE PRACTITIONER

## 2021-03-24 PROCEDURE — 85025 COMPLETE CBC W/AUTO DIFF WBC: CPT | Performed by: NURSE PRACTITIONER

## 2021-03-24 PROCEDURE — 36415 PR COLLECTION VENOUS BLOOD,VENIPUNCTURE: ICD-10-PCS | Mod: S$GLB,,, | Performed by: NURSE PRACTITIONER

## 2021-03-24 PROCEDURE — 36415 COLL VENOUS BLD VENIPUNCTURE: CPT | Mod: S$GLB,,, | Performed by: NURSE PRACTITIONER

## 2021-03-24 PROCEDURE — 1126F AMNT PAIN NOTED NONE PRSNT: CPT | Mod: S$GLB,,, | Performed by: NURSE PRACTITIONER

## 2021-03-24 PROCEDURE — 99999 PR PBB SHADOW E&M-EST. PATIENT-LVL III: ICD-10-PCS | Mod: PBBFAC,,, | Performed by: NURSE PRACTITIONER

## 2021-03-24 PROCEDURE — 3008F BODY MASS INDEX DOCD: CPT | Mod: CPTII,S$GLB,, | Performed by: NURSE PRACTITIONER

## 2021-03-24 PROCEDURE — 99213 OFFICE O/P EST LOW 20 MIN: CPT | Mod: S$GLB,,, | Performed by: NURSE PRACTITIONER

## 2021-03-24 PROCEDURE — 80053 COMPREHEN METABOLIC PANEL: CPT | Performed by: NURSE PRACTITIONER

## 2021-03-24 PROCEDURE — 3008F PR BODY MASS INDEX (BMI) DOCUMENTED: ICD-10-PCS | Mod: CPTII,S$GLB,, | Performed by: NURSE PRACTITIONER

## 2021-03-24 PROCEDURE — 1126F PR PAIN SEVERITY QUANTIFIED, NO PAIN PRESENT: ICD-10-PCS | Mod: S$GLB,,, | Performed by: NURSE PRACTITIONER

## 2021-03-24 PROCEDURE — 80061 LIPID PANEL: CPT | Performed by: NURSE PRACTITIONER

## 2021-03-24 PROCEDURE — 82607 VITAMIN B-12: CPT | Performed by: NURSE PRACTITIONER

## 2021-03-24 RX ORDER — CLONAZEPAM 1 MG/1
1 TABLET ORAL 3 TIMES DAILY PRN
Qty: 90 TABLET | Refills: 5 | Status: SHIPPED | OUTPATIENT
Start: 2021-03-24 | End: 2021-08-31 | Stop reason: SDUPTHER

## 2021-03-25 LAB — VIT B12 SERPL-MCNC: 480 PG/ML (ref 210–950)

## 2021-03-26 ENCOUNTER — PES CALL (OUTPATIENT)
Dept: ADMINISTRATIVE | Facility: CLINIC | Age: 41
End: 2021-03-26

## 2021-04-05 ENCOUNTER — PATIENT MESSAGE (OUTPATIENT)
Dept: ADMINISTRATIVE | Facility: HOSPITAL | Age: 41
End: 2021-04-05

## 2021-04-08 ENCOUNTER — IMMUNIZATION (OUTPATIENT)
Dept: FAMILY MEDICINE | Facility: CLINIC | Age: 41
End: 2021-04-08
Payer: COMMERCIAL

## 2021-04-08 DIAGNOSIS — Z23 NEED FOR VACCINATION: Primary | ICD-10-CM

## 2021-04-08 PROCEDURE — 0001A COVID-19, MRNA, LNP-S, PF, 30 MCG/0.3 ML DOSE VACCINE: CPT | Mod: CV19,S$GLB,, | Performed by: FAMILY MEDICINE

## 2021-04-08 PROCEDURE — 91300 COVID-19, MRNA, LNP-S, PF, 30 MCG/0.3 ML DOSE VACCINE: ICD-10-PCS | Mod: S$GLB,,, | Performed by: FAMILY MEDICINE

## 2021-04-08 PROCEDURE — 91300 COVID-19, MRNA, LNP-S, PF, 30 MCG/0.3 ML DOSE VACCINE: CPT | Mod: S$GLB,,, | Performed by: FAMILY MEDICINE

## 2021-04-08 PROCEDURE — 0001A COVID-19, MRNA, LNP-S, PF, 30 MCG/0.3 ML DOSE VACCINE: ICD-10-PCS | Mod: CV19,S$GLB,, | Performed by: FAMILY MEDICINE

## 2021-04-12 ENCOUNTER — HOSPITAL ENCOUNTER (EMERGENCY)
Facility: HOSPITAL | Age: 41
Discharge: HOME | End: 2021-04-12
Attending: EMERGENCY MEDICINE
Payer: COMMERCIAL

## 2021-04-12 VITALS
HEART RATE: 86 BPM | OXYGEN SATURATION: 97 % | SYSTOLIC BLOOD PRESSURE: 106 MMHG | WEIGHT: 182.31 LBS | TEMPERATURE: 97 F | DIASTOLIC BLOOD PRESSURE: 84 MMHG | BODY MASS INDEX: 31.3 KG/M2 | RESPIRATION RATE: 21 BRPM

## 2021-04-12 DIAGNOSIS — R55 VASOVAGAL SYNCOPE: ICD-10-CM

## 2021-04-12 DIAGNOSIS — E86.0 DEHYDRATION: ICD-10-CM

## 2021-04-12 DIAGNOSIS — R07.9 CHEST PAIN: ICD-10-CM

## 2021-04-12 DIAGNOSIS — R53.1 WEAKNESS: Primary | ICD-10-CM

## 2021-04-12 LAB
ALBUMIN SERPL BCP-MCNC: 3.3 G/DL (ref 3.5–5.2)
ALP SERPL-CCNC: 85 U/L (ref 55–135)
ALT SERPL W/O P-5'-P-CCNC: 33 U/L (ref 10–44)
ANION GAP SERPL CALC-SCNC: 12 MMOL/L (ref 8–16)
AST SERPL-CCNC: 47 U/L (ref 10–40)
BASOPHILS # BLD AUTO: 0.04 K/UL (ref 0–0.2)
BASOPHILS NFR BLD: 0.4 % (ref 0–1.9)
BILIRUB SERPL-MCNC: 0.3 MG/DL (ref 0.1–1)
BILIRUB UR QL STRIP: NEGATIVE
BUN SERPL-MCNC: 9 MG/DL (ref 6–20)
CALCIUM SERPL-MCNC: 9.3 MG/DL (ref 8.7–10.5)
CHLORIDE SERPL-SCNC: 109 MMOL/L (ref 95–110)
CLARITY UR: CLEAR
CO2 SERPL-SCNC: 22 MMOL/L (ref 23–29)
COLOR UR: YELLOW
CREAT SERPL-MCNC: 0.7 MG/DL (ref 0.5–1.4)
D DIMER PPP IA.FEU-MCNC: 1.03 MG/L FEU
DIFFERENTIAL METHOD: NORMAL
EOSINOPHIL # BLD AUTO: 0.1 K/UL (ref 0–0.5)
EOSINOPHIL NFR BLD: 1.2 % (ref 0–8)
ERYTHROCYTE [DISTWIDTH] IN BLOOD BY AUTOMATED COUNT: 13.9 % (ref 11.5–14.5)
EST. GFR  (AFRICAN AMERICAN): >60 ML/MIN/1.73 M^2
EST. GFR  (NON AFRICAN AMERICAN): >60 ML/MIN/1.73 M^2
GLUCOSE SERPL-MCNC: 98 MG/DL (ref 70–110)
GLUCOSE UR QL STRIP: NEGATIVE
HCT VFR BLD AUTO: 39.5 % (ref 37–48.5)
HGB BLD-MCNC: 13 G/DL (ref 12–16)
HGB UR QL STRIP: NEGATIVE
IMM GRANULOCYTES # BLD AUTO: 0.03 K/UL (ref 0–0.04)
IMM GRANULOCYTES NFR BLD AUTO: 0.3 % (ref 0–0.5)
KETONES UR QL STRIP: NEGATIVE
LEUKOCYTE ESTERASE UR QL STRIP: NEGATIVE
LYMPHOCYTES # BLD AUTO: 3.3 K/UL (ref 1–4.8)
LYMPHOCYTES NFR BLD: 29.2 % (ref 18–48)
MCH RBC QN AUTO: 29.3 PG (ref 27–31)
MCHC RBC AUTO-ENTMCNC: 32.9 G/DL (ref 32–36)
MCV RBC AUTO: 89 FL (ref 82–98)
MONOCYTES # BLD AUTO: 0.7 K/UL (ref 0.3–1)
MONOCYTES NFR BLD: 6.1 % (ref 4–15)
NEUTROPHILS # BLD AUTO: 7.1 K/UL (ref 1.8–7.7)
NEUTROPHILS NFR BLD: 62.8 % (ref 38–73)
NITRITE UR QL STRIP: NEGATIVE
NRBC BLD-RTO: 0 /100 WBC
PH UR STRIP: 7 [PH] (ref 5–8)
PLATELET # BLD AUTO: 439 K/UL (ref 150–450)
PMV BLD AUTO: 10.1 FL (ref 9.2–12.9)
POTASSIUM SERPL-SCNC: 3.6 MMOL/L (ref 3.5–5.1)
PROT SERPL-MCNC: 6.9 G/DL (ref 6–8.4)
PROT UR QL STRIP: NEGATIVE
RBC # BLD AUTO: 4.44 M/UL (ref 4–5.4)
SODIUM SERPL-SCNC: 143 MMOL/L (ref 136–145)
SP GR UR STRIP: 1.01 (ref 1–1.03)
TROPONIN I SERPL DL<=0.01 NG/ML-MCNC: <0.006 NG/ML (ref 0–0.03)
URN SPEC COLLECT METH UR: NORMAL
UROBILINOGEN UR STRIP-ACNC: NEGATIVE EU/DL
WBC # BLD AUTO: 11.26 K/UL (ref 3.9–12.7)

## 2021-04-12 PROCEDURE — 25000003 PHARM REV CODE 250: Performed by: EMERGENCY MEDICINE

## 2021-04-12 PROCEDURE — 93005 ELECTROCARDIOGRAM TRACING: CPT

## 2021-04-12 PROCEDURE — 99285 EMERGENCY DEPT VISIT HI MDM: CPT | Mod: 25

## 2021-04-12 PROCEDURE — 96361 HYDRATE IV INFUSION ADD-ON: CPT

## 2021-04-12 PROCEDURE — 80053 COMPREHEN METABOLIC PANEL: CPT | Performed by: EMERGENCY MEDICINE

## 2021-04-12 PROCEDURE — 85379 FIBRIN DEGRADATION QUANT: CPT | Performed by: EMERGENCY MEDICINE

## 2021-04-12 PROCEDURE — 85025 COMPLETE CBC W/AUTO DIFF WBC: CPT | Performed by: EMERGENCY MEDICINE

## 2021-04-12 PROCEDURE — 36415 COLL VENOUS BLD VENIPUNCTURE: CPT | Performed by: EMERGENCY MEDICINE

## 2021-04-12 PROCEDURE — 96360 HYDRATION IV INFUSION INIT: CPT | Mod: 59

## 2021-04-12 PROCEDURE — 81003 URINALYSIS AUTO W/O SCOPE: CPT | Performed by: EMERGENCY MEDICINE

## 2021-04-12 PROCEDURE — 93010 ELECTROCARDIOGRAM REPORT: CPT | Mod: ,,, | Performed by: INTERNAL MEDICINE

## 2021-04-12 PROCEDURE — 93010 EKG 12-LEAD: ICD-10-PCS | Mod: ,,, | Performed by: INTERNAL MEDICINE

## 2021-04-12 PROCEDURE — 94760 N-INVAS EAR/PLS OXIMETRY 1: CPT

## 2021-04-12 PROCEDURE — 25500020 PHARM REV CODE 255: Performed by: EMERGENCY MEDICINE

## 2021-04-12 PROCEDURE — 84484 ASSAY OF TROPONIN QUANT: CPT | Performed by: EMERGENCY MEDICINE

## 2021-04-12 RX ORDER — SODIUM CHLORIDE 9 MG/ML
INJECTION, SOLUTION INTRAVENOUS
Status: COMPLETED | OUTPATIENT
Start: 2021-04-12 | End: 2021-04-12

## 2021-04-12 RX ADMIN — SODIUM CHLORIDE: 0.9 INJECTION, SOLUTION INTRAVENOUS at 10:04

## 2021-04-12 RX ADMIN — IOHEXOL 75 ML: 350 INJECTION, SOLUTION INTRAVENOUS at 10:04

## 2021-04-12 RX ADMIN — SODIUM CHLORIDE 999 ML/HR: 0.9 INJECTION, SOLUTION INTRAVENOUS at 09:04

## 2021-04-29 ENCOUNTER — IMMUNIZATION (OUTPATIENT)
Dept: FAMILY MEDICINE | Facility: CLINIC | Age: 41
End: 2021-04-29
Payer: COMMERCIAL

## 2021-04-29 DIAGNOSIS — Z23 NEED FOR VACCINATION: Primary | ICD-10-CM

## 2021-04-29 PROCEDURE — 0002A COVID-19, MRNA, LNP-S, PF, 30 MCG/0.3 ML DOSE VACCINE: CPT | Mod: PBBFAC | Performed by: FAMILY MEDICINE

## 2021-04-29 PROCEDURE — 91300 COVID-19, MRNA, LNP-S, PF, 30 MCG/0.3 ML DOSE VACCINE: CPT | Mod: PBBFAC | Performed by: FAMILY MEDICINE

## 2021-07-07 ENCOUNTER — PATIENT MESSAGE (OUTPATIENT)
Dept: ADMINISTRATIVE | Facility: HOSPITAL | Age: 41
End: 2021-07-07

## 2021-07-24 ENCOUNTER — OFFICE VISIT (OUTPATIENT)
Dept: FAMILY MEDICINE | Facility: CLINIC | Age: 41
End: 2021-07-24
Payer: COMMERCIAL

## 2021-07-24 VITALS
HEIGHT: 64 IN | SYSTOLIC BLOOD PRESSURE: 82 MMHG | HEART RATE: 96 BPM | TEMPERATURE: 98 F | DIASTOLIC BLOOD PRESSURE: 64 MMHG | RESPIRATION RATE: 16 BRPM | OXYGEN SATURATION: 97 % | WEIGHT: 165.69 LBS | BODY MASS INDEX: 28.29 KG/M2

## 2021-07-24 DIAGNOSIS — R43.0 LOSS OF SMELL: ICD-10-CM

## 2021-07-24 DIAGNOSIS — R05.9 COUGH: ICD-10-CM

## 2021-07-24 DIAGNOSIS — I95.9 HYPOTENSION, UNSPECIFIED HYPOTENSION TYPE: Primary | ICD-10-CM

## 2021-07-24 DIAGNOSIS — F31.9 BIPOLAR DISORDER WITH DEPRESSION: ICD-10-CM

## 2021-07-24 PROCEDURE — 3008F PR BODY MASS INDEX (BMI) DOCUMENTED: ICD-10-PCS | Mod: CPTII,S$GLB,, | Performed by: FAMILY MEDICINE

## 2021-07-24 PROCEDURE — U0005 INFEC AGEN DETEC AMPLI PROBE: HCPCS | Performed by: FAMILY MEDICINE

## 2021-07-24 PROCEDURE — 3008F BODY MASS INDEX DOCD: CPT | Mod: CPTII,S$GLB,, | Performed by: FAMILY MEDICINE

## 2021-07-24 PROCEDURE — 99499 RISK ADDL DX/OHS AUDIT: ICD-10-PCS | Mod: S$GLB,,, | Performed by: FAMILY MEDICINE

## 2021-07-24 PROCEDURE — 99213 PR OFFICE/OUTPT VISIT, EST, LEVL III, 20-29 MIN: ICD-10-PCS | Mod: 25,S$GLB,, | Performed by: FAMILY MEDICINE

## 2021-07-24 PROCEDURE — 99499 UNLISTED E&M SERVICE: CPT | Mod: S$GLB,,, | Performed by: FAMILY MEDICINE

## 2021-07-24 PROCEDURE — 96372 THER/PROPH/DIAG INJ SC/IM: CPT | Mod: S$GLB,,, | Performed by: FAMILY MEDICINE

## 2021-07-24 PROCEDURE — 1125F PR PAIN SEVERITY QUANTIFIED, PAIN PRESENT: ICD-10-PCS | Mod: CPTII,S$GLB,, | Performed by: FAMILY MEDICINE

## 2021-07-24 PROCEDURE — 99999 PR PBB SHADOW E&M-EST. PATIENT-LVL III: ICD-10-PCS | Mod: PBBFAC,,, | Performed by: FAMILY MEDICINE

## 2021-07-24 PROCEDURE — U0003 INFECTIOUS AGENT DETECTION BY NUCLEIC ACID (DNA OR RNA); SEVERE ACUTE RESPIRATORY SYNDROME CORONAVIRUS 2 (SARS-COV-2) (CORONAVIRUS DISEASE [COVID-19]), AMPLIFIED PROBE TECHNIQUE, MAKING USE OF HIGH THROUGHPUT TECHNOLOGIES AS DESCRIBED BY CMS-2020-01-R: HCPCS | Performed by: FAMILY MEDICINE

## 2021-07-24 PROCEDURE — 96372 PR INJECTION,THERAP/PROPH/DIAG2ST, IM OR SUBCUT: ICD-10-PCS | Mod: S$GLB,,, | Performed by: FAMILY MEDICINE

## 2021-07-24 PROCEDURE — 1125F AMNT PAIN NOTED PAIN PRSNT: CPT | Mod: CPTII,S$GLB,, | Performed by: FAMILY MEDICINE

## 2021-07-24 PROCEDURE — 99213 OFFICE O/P EST LOW 20 MIN: CPT | Mod: 25,S$GLB,, | Performed by: FAMILY MEDICINE

## 2021-07-24 PROCEDURE — 99999 PR PBB SHADOW E&M-EST. PATIENT-LVL III: CPT | Mod: PBBFAC,,, | Performed by: FAMILY MEDICINE

## 2021-07-24 RX ORDER — PALIPERIDONE 3 MG/1
TABLET, EXTENDED RELEASE ORAL
Status: ON HOLD | COMMUNITY
Start: 2021-06-29 | End: 2021-09-13

## 2021-07-24 RX ORDER — TRAZODONE HYDROCHLORIDE 150 MG/1
300 TABLET ORAL NIGHTLY
Status: ON HOLD | COMMUNITY
Start: 2021-06-29 | End: 2021-09-27 | Stop reason: HOSPADM

## 2021-07-24 RX ORDER — CLINDAMYCIN PHOSPHATE 150 MG/ML
600 INJECTION, SOLUTION INTRAVENOUS
Status: COMPLETED | OUTPATIENT
Start: 2021-07-24 | End: 2021-07-24

## 2021-07-24 RX ORDER — AZITHROMYCIN 250 MG/1
TABLET, FILM COATED ORAL
Qty: 6 TABLET | Refills: 0 | Status: ON HOLD | OUTPATIENT
Start: 2021-07-24 | End: 2021-09-13

## 2021-07-24 RX ORDER — PROMETHAZINE HYDROCHLORIDE AND DEXTROMETHORPHAN HYDROBROMIDE 6.25; 15 MG/5ML; MG/5ML
5 SYRUP ORAL EVERY 6 HOURS PRN
Qty: 150 ML | Refills: 1 | Status: SHIPPED | OUTPATIENT
Start: 2021-07-24 | End: 2021-08-18 | Stop reason: SDUPTHER

## 2021-07-24 RX ORDER — METHYLPREDNISOLONE ACETATE 40 MG/ML
40 INJECTION, SUSPENSION INTRA-ARTICULAR; INTRALESIONAL; INTRAMUSCULAR; SOFT TISSUE
Status: COMPLETED | OUTPATIENT
Start: 2021-07-24 | End: 2021-07-24

## 2021-07-24 RX ADMIN — CLINDAMYCIN PHOSPHATE 600 MG: 150 INJECTION, SOLUTION INTRAVENOUS at 10:07

## 2021-07-24 RX ADMIN — METHYLPREDNISOLONE ACETATE 40 MG: 40 INJECTION, SUSPENSION INTRA-ARTICULAR; INTRALESIONAL; INTRAMUSCULAR; SOFT TISSUE at 10:07

## 2021-07-26 DIAGNOSIS — U07.1 COVID-19 VIRUS DETECTED: ICD-10-CM

## 2021-07-26 LAB
SARS-COV-2 RNA RESP QL NAA+PROBE: DETECTED
SARS-COV-2- CYCLE NUMBER: 14.02

## 2021-07-27 DIAGNOSIS — U07.1 COVID-19 VIRUS INFECTION: Primary | ICD-10-CM

## 2021-07-30 ENCOUNTER — INFUSION (OUTPATIENT)
Dept: INFECTIOUS DISEASES | Facility: HOSPITAL | Age: 41
End: 2021-07-30
Attending: FAMILY MEDICINE
Payer: COMMERCIAL

## 2021-07-30 VITALS
RESPIRATION RATE: 20 BRPM | HEART RATE: 90 BPM | SYSTOLIC BLOOD PRESSURE: 95 MMHG | OXYGEN SATURATION: 99 % | TEMPERATURE: 98 F | DIASTOLIC BLOOD PRESSURE: 69 MMHG

## 2021-07-30 DIAGNOSIS — U07.1 COVID-19 VIRUS INFECTION: ICD-10-CM

## 2021-07-30 DIAGNOSIS — U07.1 COVID-19: Primary | ICD-10-CM

## 2021-07-30 PROCEDURE — 63600175 PHARM REV CODE 636 W HCPCS: Performed by: FAMILY MEDICINE

## 2021-07-30 PROCEDURE — 25000003 PHARM REV CODE 250: Performed by: FAMILY MEDICINE

## 2021-07-30 PROCEDURE — M0243 CASIRIVI AND IMDEVI INFUSION: HCPCS | Performed by: FAMILY MEDICINE

## 2021-07-30 RX ORDER — DIPHENHYDRAMINE HYDROCHLORIDE 50 MG/ML
25 INJECTION INTRAMUSCULAR; INTRAVENOUS ONCE AS NEEDED
Status: DISCONTINUED | OUTPATIENT
Start: 2021-07-30 | End: 2021-09-13

## 2021-07-30 RX ORDER — EPINEPHRINE 0.3 MG/.3ML
0.3 INJECTION SUBCUTANEOUS
Status: DISCONTINUED | OUTPATIENT
Start: 2021-07-30 | End: 2021-09-13

## 2021-07-30 RX ORDER — ONDANSETRON 4 MG/1
4 TABLET, ORALLY DISINTEGRATING ORAL ONCE AS NEEDED
Status: DISCONTINUED | OUTPATIENT
Start: 2021-07-30 | End: 2021-09-13

## 2021-07-30 RX ORDER — SODIUM CHLORIDE 0.9 % (FLUSH) 0.9 %
10 SYRINGE (ML) INJECTION
Status: DISCONTINUED | OUTPATIENT
Start: 2021-07-30 | End: 2021-09-13

## 2021-07-30 RX ORDER — ACETAMINOPHEN 325 MG/1
650 TABLET ORAL ONCE AS NEEDED
Status: DISCONTINUED | OUTPATIENT
Start: 2021-07-30 | End: 2021-09-13

## 2021-07-30 RX ADMIN — CASIRIVIMAB AND IMDEVIMAB 600 MG: 600; 600 INJECTION, SOLUTION, CONCENTRATE INTRAVENOUS at 12:07

## 2021-08-01 ENCOUNTER — HOSPITAL ENCOUNTER (EMERGENCY)
Facility: HOSPITAL | Age: 41
Discharge: HOME | End: 2021-08-01
Attending: STUDENT IN AN ORGANIZED HEALTH CARE EDUCATION/TRAINING PROGRAM
Payer: COMMERCIAL

## 2021-08-01 VITALS
OXYGEN SATURATION: 100 % | HEART RATE: 67 BPM | WEIGHT: 157.44 LBS | DIASTOLIC BLOOD PRESSURE: 89 MMHG | SYSTOLIC BLOOD PRESSURE: 143 MMHG | TEMPERATURE: 98 F | BODY MASS INDEX: 27.02 KG/M2 | RESPIRATION RATE: 15 BRPM

## 2021-08-01 DIAGNOSIS — M79.672 ACUTE FOOT PAIN, LEFT: ICD-10-CM

## 2021-08-01 DIAGNOSIS — I95.9 HYPOTENSION, UNSPECIFIED HYPOTENSION TYPE: Primary | ICD-10-CM

## 2021-08-01 DIAGNOSIS — R55 SYNCOPE: ICD-10-CM

## 2021-08-01 LAB
ANION GAP SERPL CALC-SCNC: 15 MMOL/L (ref 8–16)
BASOPHILS # BLD AUTO: 0.04 K/UL (ref 0–0.2)
BASOPHILS NFR BLD: 0.4 % (ref 0–1.9)
BUN SERPL-MCNC: 12 MG/DL (ref 6–20)
CALCIUM SERPL-MCNC: 9.7 MG/DL (ref 8.7–10.5)
CHLORIDE SERPL-SCNC: 106 MMOL/L (ref 95–110)
CO2 SERPL-SCNC: 21 MMOL/L (ref 23–29)
CREAT SERPL-MCNC: 0.9 MG/DL (ref 0.5–1.4)
DIFFERENTIAL METHOD: ABNORMAL
EOSINOPHIL # BLD AUTO: 0.1 K/UL (ref 0–0.5)
EOSINOPHIL NFR BLD: 1.1 % (ref 0–8)
ERYTHROCYTE [DISTWIDTH] IN BLOOD BY AUTOMATED COUNT: 14.1 % (ref 11.5–14.5)
EST. GFR  (AFRICAN AMERICAN): >60 ML/MIN/1.73 M^2
EST. GFR  (NON AFRICAN AMERICAN): >60 ML/MIN/1.73 M^2
GLUCOSE SERPL-MCNC: 105 MG/DL (ref 70–110)
HCT VFR BLD AUTO: 41.3 % (ref 37–48.5)
HGB BLD-MCNC: 13.4 G/DL (ref 12–16)
IMM GRANULOCYTES # BLD AUTO: 0.03 K/UL (ref 0–0.04)
IMM GRANULOCYTES NFR BLD AUTO: 0.3 % (ref 0–0.5)
LYMPHOCYTES # BLD AUTO: 3.8 K/UL (ref 1–4.8)
LYMPHOCYTES NFR BLD: 36.3 % (ref 18–48)
MCH RBC QN AUTO: 29 PG (ref 27–31)
MCHC RBC AUTO-ENTMCNC: 32.4 G/DL (ref 32–36)
MCV RBC AUTO: 89 FL (ref 82–98)
MONOCYTES # BLD AUTO: 0.7 K/UL (ref 0.3–1)
MONOCYTES NFR BLD: 6.8 % (ref 4–15)
NEUTROPHILS # BLD AUTO: 5.7 K/UL (ref 1.8–7.7)
NEUTROPHILS NFR BLD: 55.1 % (ref 38–73)
NRBC BLD-RTO: 0 /100 WBC
PLATELET # BLD AUTO: 496 K/UL (ref 150–450)
PMV BLD AUTO: 10.4 FL (ref 9.2–12.9)
POTASSIUM SERPL-SCNC: 3.8 MMOL/L (ref 3.5–5.1)
RBC # BLD AUTO: 4.62 M/UL (ref 4–5.4)
SODIUM SERPL-SCNC: 142 MMOL/L (ref 136–145)
TROPONIN I SERPL DL<=0.01 NG/ML-MCNC: <0.006 NG/ML (ref 0–0.03)
WBC # BLD AUTO: 10.37 K/UL (ref 3.9–12.7)

## 2021-08-01 PROCEDURE — 84484 ASSAY OF TROPONIN QUANT: CPT | Performed by: STUDENT IN AN ORGANIZED HEALTH CARE EDUCATION/TRAINING PROGRAM

## 2021-08-01 PROCEDURE — 85025 COMPLETE CBC W/AUTO DIFF WBC: CPT | Performed by: STUDENT IN AN ORGANIZED HEALTH CARE EDUCATION/TRAINING PROGRAM

## 2021-08-01 PROCEDURE — 93010 EKG 12-LEAD: ICD-10-PCS | Mod: ,,, | Performed by: INTERNAL MEDICINE

## 2021-08-01 PROCEDURE — 63600175 PHARM REV CODE 636 W HCPCS: Performed by: STUDENT IN AN ORGANIZED HEALTH CARE EDUCATION/TRAINING PROGRAM

## 2021-08-01 PROCEDURE — 25000003 PHARM REV CODE 250: Performed by: STUDENT IN AN ORGANIZED HEALTH CARE EDUCATION/TRAINING PROGRAM

## 2021-08-01 PROCEDURE — 96372 THER/PROPH/DIAG INJ SC/IM: CPT

## 2021-08-01 PROCEDURE — 93010 ELECTROCARDIOGRAM REPORT: CPT | Mod: ,,, | Performed by: INTERNAL MEDICINE

## 2021-08-01 PROCEDURE — 36415 COLL VENOUS BLD VENIPUNCTURE: CPT | Performed by: STUDENT IN AN ORGANIZED HEALTH CARE EDUCATION/TRAINING PROGRAM

## 2021-08-01 PROCEDURE — 93005 ELECTROCARDIOGRAM TRACING: CPT

## 2021-08-01 PROCEDURE — 99285 EMERGENCY DEPT VISIT HI MDM: CPT | Mod: 25

## 2021-08-01 PROCEDURE — 80048 BASIC METABOLIC PNL TOTAL CA: CPT | Performed by: STUDENT IN AN ORGANIZED HEALTH CARE EDUCATION/TRAINING PROGRAM

## 2021-08-01 RX ORDER — KETOROLAC TROMETHAMINE 30 MG/ML
15 INJECTION, SOLUTION INTRAMUSCULAR; INTRAVENOUS
Status: COMPLETED | OUTPATIENT
Start: 2021-08-01 | End: 2021-08-01

## 2021-08-01 RX ORDER — SODIUM CHLORIDE 0.9 % (FLUSH) 0.9 %
10 SYRINGE (ML) INJECTION
Status: DISCONTINUED | OUTPATIENT
Start: 2021-08-01 | End: 2021-08-01 | Stop reason: HOSPADM

## 2021-08-01 RX ADMIN — KETOROLAC TROMETHAMINE 15 MG: 30 INJECTION, SOLUTION INTRAMUSCULAR; INTRAVENOUS at 05:08

## 2021-08-01 RX ADMIN — SODIUM CHLORIDE 1000 ML: 0.9 INJECTION, SOLUTION INTRAVENOUS at 05:08

## 2021-08-27 DIAGNOSIS — R05.9 COUGH: ICD-10-CM

## 2021-08-27 RX ORDER — PROMETHAZINE HYDROCHLORIDE AND DEXTROMETHORPHAN HYDROBROMIDE 6.25; 15 MG/5ML; MG/5ML
5 SYRUP ORAL EVERY 6 HOURS PRN
Qty: 150 ML | Refills: 1 | Status: SHIPPED | OUTPATIENT
Start: 2021-08-27 | End: 2021-09-06

## 2021-08-31 ENCOUNTER — NURSE TRIAGE (OUTPATIENT)
Dept: ADMINISTRATIVE | Facility: CLINIC | Age: 41
End: 2021-08-31

## 2021-08-31 DIAGNOSIS — F41.9 ANXIETY: ICD-10-CM

## 2021-08-31 RX ORDER — CLONAZEPAM 1 MG/1
1 TABLET ORAL 3 TIMES DAILY PRN
Qty: 90 TABLET | Refills: 0 | Status: ON HOLD | OUTPATIENT
Start: 2021-08-31 | End: 2021-09-13

## 2021-09-12 ENCOUNTER — HOSPITAL ENCOUNTER (EMERGENCY)
Facility: HOSPITAL | Age: 41
Discharge: PSYCHIATRIC OR SUBSTANCE USE FACILITY | End: 2021-09-13
Attending: STUDENT IN AN ORGANIZED HEALTH CARE EDUCATION/TRAINING PROGRAM
Payer: COMMERCIAL

## 2021-09-12 DIAGNOSIS — T50.904A DRUG OVERDOSE, UNDETERMINED INTENT, INITIAL ENCOUNTER: Primary | ICD-10-CM

## 2021-09-12 DIAGNOSIS — T50.901A DRUG OVERDOSE: ICD-10-CM

## 2021-09-12 LAB
ALBUMIN SERPL BCP-MCNC: 3.6 G/DL (ref 3.5–5.2)
ALP SERPL-CCNC: 98 U/L (ref 55–135)
ALT SERPL W/O P-5'-P-CCNC: 11 U/L (ref 10–44)
AMPHET+METHAMPHET UR QL: ABNORMAL
ANION GAP SERPL CALC-SCNC: 13 MMOL/L (ref 8–16)
APAP SERPL-MCNC: <3 UG/ML (ref 10–20)
AST SERPL-CCNC: 9 U/L (ref 10–40)
BARBITURATES UR QL SCN>200 NG/ML: NEGATIVE
BASOPHILS # BLD AUTO: 0.05 K/UL (ref 0–0.2)
BASOPHILS NFR BLD: 0.5 % (ref 0–1.9)
BENZODIAZ UR QL SCN>200 NG/ML: ABNORMAL
BILIRUB SERPL-MCNC: 0.2 MG/DL (ref 0.1–1)
BILIRUB UR QL STRIP: NEGATIVE
BUN SERPL-MCNC: 12 MG/DL (ref 6–20)
BZE UR QL SCN: NEGATIVE
CALCIUM SERPL-MCNC: 9.9 MG/DL (ref 8.7–10.5)
CANNABINOIDS UR QL SCN: NEGATIVE
CHLORIDE SERPL-SCNC: 106 MMOL/L (ref 95–110)
CLARITY UR: CLEAR
CO2 SERPL-SCNC: 25 MMOL/L (ref 23–29)
COLOR UR: YELLOW
CREAT SERPL-MCNC: 0.8 MG/DL (ref 0.5–1.4)
CREAT UR-MCNC: 261.5 MG/DL (ref 15–325)
DIFFERENTIAL METHOD: ABNORMAL
EOSINOPHIL # BLD AUTO: 0.2 K/UL (ref 0–0.5)
EOSINOPHIL NFR BLD: 2.3 % (ref 0–8)
ERYTHROCYTE [DISTWIDTH] IN BLOOD BY AUTOMATED COUNT: 14.6 % (ref 11.5–14.5)
EST. GFR  (AFRICAN AMERICAN): >60 ML/MIN/1.73 M^2
EST. GFR  (NON AFRICAN AMERICAN): >60 ML/MIN/1.73 M^2
ETHANOL SERPL-MCNC: <10 MG/DL
GLUCOSE SERPL-MCNC: 90 MG/DL (ref 70–110)
GLUCOSE UR QL STRIP: NEGATIVE
HCT VFR BLD AUTO: 40.9 % (ref 37–48.5)
HGB BLD-MCNC: 12.9 G/DL (ref 12–16)
HGB UR QL STRIP: NEGATIVE
IMM GRANULOCYTES # BLD AUTO: 0.03 K/UL (ref 0–0.04)
IMM GRANULOCYTES NFR BLD AUTO: 0.3 % (ref 0–0.5)
KETONES UR QL STRIP: NEGATIVE
LEUKOCYTE ESTERASE UR QL STRIP: NEGATIVE
LYMPHOCYTES # BLD AUTO: 4.3 K/UL (ref 1–4.8)
LYMPHOCYTES NFR BLD: 43.6 % (ref 18–48)
MCH RBC QN AUTO: 28.9 PG (ref 27–31)
MCHC RBC AUTO-ENTMCNC: 31.5 G/DL (ref 32–36)
MCV RBC AUTO: 92 FL (ref 82–98)
METHADONE UR QL SCN>300 NG/ML: NEGATIVE
MONOCYTES # BLD AUTO: 0.5 K/UL (ref 0.3–1)
MONOCYTES NFR BLD: 5.1 % (ref 4–15)
NEUTROPHILS # BLD AUTO: 4.8 K/UL (ref 1.8–7.7)
NEUTROPHILS NFR BLD: 48.2 % (ref 38–73)
NITRITE UR QL STRIP: NEGATIVE
NRBC BLD-RTO: 0 /100 WBC
OPIATES UR QL SCN: NEGATIVE
PCP UR QL SCN>25 NG/ML: NEGATIVE
PH UR STRIP: 6 [PH] (ref 5–8)
PLATELET # BLD AUTO: 457 K/UL (ref 150–450)
PMV BLD AUTO: 10.3 FL (ref 9.2–12.9)
POTASSIUM SERPL-SCNC: 3.7 MMOL/L (ref 3.5–5.1)
PROT SERPL-MCNC: 7.5 G/DL (ref 6–8.4)
PROT UR QL STRIP: NEGATIVE
RBC # BLD AUTO: 4.46 M/UL (ref 4–5.4)
SARS-COV-2 RDRP RESP QL NAA+PROBE: NEGATIVE
SODIUM SERPL-SCNC: 144 MMOL/L (ref 136–145)
SP GR UR STRIP: >=1.03 (ref 1–1.03)
TOXICOLOGY INFORMATION: ABNORMAL
TSH SERPL DL<=0.005 MIU/L-ACNC: 0.66 UIU/ML (ref 0.4–4)
URN SPEC COLLECT METH UR: ABNORMAL
UROBILINOGEN UR STRIP-ACNC: NEGATIVE EU/DL
WBC # BLD AUTO: 9.86 K/UL (ref 3.9–12.7)

## 2021-09-12 PROCEDURE — 85025 COMPLETE CBC W/AUTO DIFF WBC: CPT | Mod: HCNC | Performed by: STUDENT IN AN ORGANIZED HEALTH CARE EDUCATION/TRAINING PROGRAM

## 2021-09-12 PROCEDURE — 80307 DRUG TEST PRSMV CHEM ANLYZR: CPT | Mod: HCNC | Performed by: STUDENT IN AN ORGANIZED HEALTH CARE EDUCATION/TRAINING PROGRAM

## 2021-09-12 PROCEDURE — 93010 EKG 12-LEAD: ICD-10-PCS | Mod: HCNC,,, | Performed by: INTERNAL MEDICINE

## 2021-09-12 PROCEDURE — 93010 ELECTROCARDIOGRAM REPORT: CPT | Mod: HCNC,,, | Performed by: INTERNAL MEDICINE

## 2021-09-12 PROCEDURE — 99285 EMERGENCY DEPT VISIT HI MDM: CPT | Mod: HCNC

## 2021-09-12 PROCEDURE — 36415 COLL VENOUS BLD VENIPUNCTURE: CPT | Mod: HCNC | Performed by: STUDENT IN AN ORGANIZED HEALTH CARE EDUCATION/TRAINING PROGRAM

## 2021-09-12 PROCEDURE — 81003 URINALYSIS AUTO W/O SCOPE: CPT | Mod: HCNC,59 | Performed by: STUDENT IN AN ORGANIZED HEALTH CARE EDUCATION/TRAINING PROGRAM

## 2021-09-12 PROCEDURE — 93005 ELECTROCARDIOGRAM TRACING: CPT | Mod: HCNC

## 2021-09-12 PROCEDURE — 80061 LIPID PANEL: CPT | Mod: HCNC | Performed by: STUDENT IN AN ORGANIZED HEALTH CARE EDUCATION/TRAINING PROGRAM

## 2021-09-12 PROCEDURE — 82077 ASSAY SPEC XCP UR&BREATH IA: CPT | Mod: HCNC | Performed by: STUDENT IN AN ORGANIZED HEALTH CARE EDUCATION/TRAINING PROGRAM

## 2021-09-12 PROCEDURE — 84443 ASSAY THYROID STIM HORMONE: CPT | Mod: HCNC | Performed by: STUDENT IN AN ORGANIZED HEALTH CARE EDUCATION/TRAINING PROGRAM

## 2021-09-12 PROCEDURE — 83036 HEMOGLOBIN GLYCOSYLATED A1C: CPT | Mod: HCNC | Performed by: STUDENT IN AN ORGANIZED HEALTH CARE EDUCATION/TRAINING PROGRAM

## 2021-09-12 PROCEDURE — U0002 COVID-19 LAB TEST NON-CDC: HCPCS | Mod: HCNC | Performed by: STUDENT IN AN ORGANIZED HEALTH CARE EDUCATION/TRAINING PROGRAM

## 2021-09-12 PROCEDURE — 80143 DRUG ASSAY ACETAMINOPHEN: CPT | Mod: HCNC | Performed by: STUDENT IN AN ORGANIZED HEALTH CARE EDUCATION/TRAINING PROGRAM

## 2021-09-12 PROCEDURE — 80053 COMPREHEN METABOLIC PANEL: CPT | Mod: HCNC | Performed by: STUDENT IN AN ORGANIZED HEALTH CARE EDUCATION/TRAINING PROGRAM

## 2021-09-13 ENCOUNTER — HOSPITAL ENCOUNTER (INPATIENT)
Facility: HOSPITAL | Age: 41
LOS: 14 days | Discharge: HOME | DRG: 885 | End: 2021-09-27
Attending: STUDENT IN AN ORGANIZED HEALTH CARE EDUCATION/TRAINING PROGRAM | Admitting: STUDENT IN AN ORGANIZED HEALTH CARE EDUCATION/TRAINING PROGRAM
Payer: MEDICARE

## 2021-09-13 VITALS
WEIGHT: 157.44 LBS | RESPIRATION RATE: 20 BRPM | DIASTOLIC BLOOD PRESSURE: 76 MMHG | BODY MASS INDEX: 26.88 KG/M2 | SYSTOLIC BLOOD PRESSURE: 108 MMHG | OXYGEN SATURATION: 99 % | TEMPERATURE: 98 F | HEIGHT: 64 IN | HEART RATE: 89 BPM

## 2021-09-13 DIAGNOSIS — F31.4 BIPOLAR I DISORDER, MOST RECENT EPISODE DEPRESSED, SEVERE WITHOUT PSYCHOTIC FEATURES: Primary | ICD-10-CM

## 2021-09-13 DIAGNOSIS — T50.902A SUICIDAL OVERDOSE: ICD-10-CM

## 2021-09-13 DIAGNOSIS — R41.82 ALTERED MENTAL STATUS, UNSPECIFIED ALTERED MENTAL STATUS TYPE: ICD-10-CM

## 2021-09-13 LAB
CHOLEST SERPL-MCNC: 140 MG/DL (ref 120–199)
CHOLEST/HDLC SERPL: 3.7 {RATIO} (ref 2–5)
ESTIMATED AVG GLUCOSE: 105 MG/DL (ref 68–131)
HBA1C MFR BLD: 5.3 % (ref 4–5.6)
HDLC SERPL-MCNC: 38 MG/DL (ref 40–75)
HDLC SERPL: 27.1 % (ref 20–50)
LDLC SERPL CALC-MCNC: 66.4 MG/DL (ref 63–159)
NONHDLC SERPL-MCNC: 102 MG/DL
TRIGL SERPL-MCNC: 178 MG/DL (ref 30–150)

## 2021-09-13 PROCEDURE — 25000003 PHARM REV CODE 250: Mod: HCNC | Performed by: PSYCHIATRY & NEUROLOGY

## 2021-09-13 PROCEDURE — 99221 PR INITIAL HOSPITAL CARE,LEVL I: ICD-10-PCS | Mod: HCNC,,, | Performed by: FAMILY MEDICINE

## 2021-09-13 PROCEDURE — S4991 NICOTINE PATCH NONLEGEND: HCPCS | Mod: HCNC | Performed by: STUDENT IN AN ORGANIZED HEALTH CARE EDUCATION/TRAINING PROGRAM

## 2021-09-13 PROCEDURE — 99221 1ST HOSP IP/OBS SF/LOW 40: CPT | Mod: HCNC,,, | Performed by: FAMILY MEDICINE

## 2021-09-13 PROCEDURE — 90833 PR PSYCHOTHERAPY W/PATIENT W/E&M, 30 MIN (ADD ON): ICD-10-PCS | Mod: HCNC,,, | Performed by: PSYCHIATRY & NEUROLOGY

## 2021-09-13 PROCEDURE — 11400000 HC PSYCH PRIVATE ROOM: Mod: HCNC

## 2021-09-13 PROCEDURE — 25000003 PHARM REV CODE 250: Mod: HCNC | Performed by: STUDENT IN AN ORGANIZED HEALTH CARE EDUCATION/TRAINING PROGRAM

## 2021-09-13 PROCEDURE — 99223 PR INITIAL HOSPITAL CARE,LEVL III: ICD-10-PCS | Mod: HCNC,,, | Performed by: PSYCHIATRY & NEUROLOGY

## 2021-09-13 PROCEDURE — 90833 PSYTX W PT W E/M 30 MIN: CPT | Mod: HCNC,,, | Performed by: PSYCHIATRY & NEUROLOGY

## 2021-09-13 PROCEDURE — 99223 1ST HOSP IP/OBS HIGH 75: CPT | Mod: HCNC,,, | Performed by: PSYCHIATRY & NEUROLOGY

## 2021-09-13 PROCEDURE — 36415 COLL VENOUS BLD VENIPUNCTURE: CPT | Mod: HCNC | Performed by: STUDENT IN AN ORGANIZED HEALTH CARE EDUCATION/TRAINING PROGRAM

## 2021-09-13 RX ORDER — IBUPROFEN 200 MG
1 TABLET ORAL DAILY
Status: DISCONTINUED | OUTPATIENT
Start: 2021-09-13 | End: 2021-09-27 | Stop reason: HOSPADM

## 2021-09-13 RX ORDER — ZOLPIDEM TARTRATE 5 MG/1
30 TABLET ORAL NIGHTLY PRN
Status: ON HOLD | COMMUNITY
End: 2021-09-27 | Stop reason: HOSPADM

## 2021-09-13 RX ORDER — OLANZAPINE 10 MG/1
10 TABLET ORAL EVERY 6 HOURS PRN
Status: DISCONTINUED | OUTPATIENT
Start: 2021-09-13 | End: 2021-09-21

## 2021-09-13 RX ORDER — TRAZODONE HYDROCHLORIDE 150 MG/1
150 TABLET ORAL NIGHTLY
Status: DISCONTINUED | OUTPATIENT
Start: 2021-09-13 | End: 2021-09-26

## 2021-09-13 RX ORDER — QUETIAPINE FUMARATE 300 MG/1
300 TABLET, FILM COATED ORAL NIGHTLY
Status: DISCONTINUED | OUTPATIENT
Start: 2021-09-13 | End: 2021-09-27 | Stop reason: HOSPADM

## 2021-09-13 RX ORDER — HYDROXYZINE HYDROCHLORIDE 25 MG/1
50 TABLET, FILM COATED ORAL NIGHTLY PRN
Status: DISCONTINUED | OUTPATIENT
Start: 2021-09-13 | End: 2021-09-15

## 2021-09-13 RX ORDER — FOLIC ACID 1 MG/1
1 TABLET ORAL DAILY
Status: DISCONTINUED | OUTPATIENT
Start: 2021-09-13 | End: 2021-09-27 | Stop reason: HOSPADM

## 2021-09-13 RX ORDER — ALBUTEROL SULFATE 90 UG/1
2 AEROSOL, METERED RESPIRATORY (INHALATION) EVERY 4 HOURS PRN
Status: DISCONTINUED | OUTPATIENT
Start: 2021-09-13 | End: 2021-09-27 | Stop reason: HOSPADM

## 2021-09-13 RX ORDER — OLANZAPINE 10 MG/2ML
10 INJECTION, POWDER, FOR SOLUTION INTRAMUSCULAR EVERY 6 HOURS PRN
Status: DISCONTINUED | OUTPATIENT
Start: 2021-09-13 | End: 2021-09-21

## 2021-09-13 RX ADMIN — TRAZODONE HYDROCHLORIDE 150 MG: 150 TABLET ORAL at 08:09

## 2021-09-13 RX ADMIN — FOLIC ACID 1 MG: 1 TABLET ORAL at 08:09

## 2021-09-13 RX ADMIN — THERA TABS 1 TABLET: TAB at 08:09

## 2021-09-13 RX ADMIN — NICOTINE 1 PATCH: 14 PATCH, EXTENDED RELEASE TRANSDERMAL at 08:09

## 2021-09-13 RX ADMIN — QUETIAPINE FUMARATE 300 MG: 300 TABLET ORAL at 08:09

## 2021-09-14 PROCEDURE — 99233 SBSQ HOSP IP/OBS HIGH 50: CPT | Mod: HCNC,,, | Performed by: PSYCHIATRY & NEUROLOGY

## 2021-09-14 PROCEDURE — 11400000 HC PSYCH PRIVATE ROOM: Mod: HCNC

## 2021-09-14 PROCEDURE — 25000003 PHARM REV CODE 250: Mod: HCNC | Performed by: STUDENT IN AN ORGANIZED HEALTH CARE EDUCATION/TRAINING PROGRAM

## 2021-09-14 PROCEDURE — 99233 PR SUBSEQUENT HOSPITAL CARE,LEVL III: ICD-10-PCS | Mod: HCNC,,, | Performed by: PSYCHIATRY & NEUROLOGY

## 2021-09-14 PROCEDURE — S4991 NICOTINE PATCH NONLEGEND: HCPCS | Mod: HCNC | Performed by: PSYCHIATRY & NEUROLOGY

## 2021-09-14 PROCEDURE — 63600175 PHARM REV CODE 636 W HCPCS: Mod: HCNC | Performed by: PSYCHIATRY & NEUROLOGY

## 2021-09-14 PROCEDURE — 90833 PR PSYCHOTHERAPY W/PATIENT W/E&M, 30 MIN (ADD ON): ICD-10-PCS | Mod: HCNC,,, | Performed by: PSYCHIATRY & NEUROLOGY

## 2021-09-14 PROCEDURE — 90833 PSYTX W PT W E/M 30 MIN: CPT | Mod: HCNC,,, | Performed by: PSYCHIATRY & NEUROLOGY

## 2021-09-14 PROCEDURE — 25000003 PHARM REV CODE 250: Mod: HCNC | Performed by: PSYCHIATRY & NEUROLOGY

## 2021-09-14 RX ORDER — ADHESIVE BANDAGE
30 BANDAGE TOPICAL DAILY PRN
Status: DISCONTINUED | OUTPATIENT
Start: 2021-09-14 | End: 2021-09-27 | Stop reason: HOSPADM

## 2021-09-14 RX ADMIN — PALIPERIDONE PALMITATE 234 MG: 234 INJECTION INTRAMUSCULAR at 02:09

## 2021-09-14 RX ADMIN — THERA TABS 1 TABLET: TAB at 08:09

## 2021-09-14 RX ADMIN — MAGNESIUM HYDROXIDE 2400 MG: 400 SUSPENSION ORAL at 07:09

## 2021-09-14 RX ADMIN — FOLIC ACID 1 MG: 1 TABLET ORAL at 08:09

## 2021-09-14 RX ADMIN — QUETIAPINE FUMARATE 300 MG: 300 TABLET ORAL at 08:09

## 2021-09-14 RX ADMIN — NICOTINE 1 PATCH: 14 PATCH, EXTENDED RELEASE TRANSDERMAL at 08:09

## 2021-09-14 RX ADMIN — TRAZODONE HYDROCHLORIDE 150 MG: 150 TABLET ORAL at 08:09

## 2021-09-15 PROCEDURE — 90833 PR PSYCHOTHERAPY W/PATIENT W/E&M, 30 MIN (ADD ON): ICD-10-PCS | Mod: HCNC,,, | Performed by: STUDENT IN AN ORGANIZED HEALTH CARE EDUCATION/TRAINING PROGRAM

## 2021-09-15 PROCEDURE — S4991 NICOTINE PATCH NONLEGEND: HCPCS | Mod: HCNC | Performed by: PSYCHIATRY & NEUROLOGY

## 2021-09-15 PROCEDURE — 11400000 HC PSYCH PRIVATE ROOM: Mod: HCNC

## 2021-09-15 PROCEDURE — 25000003 PHARM REV CODE 250: Mod: HCNC | Performed by: PSYCHIATRY & NEUROLOGY

## 2021-09-15 PROCEDURE — 90833 PSYTX W PT W E/M 30 MIN: CPT | Mod: HCNC,,, | Performed by: STUDENT IN AN ORGANIZED HEALTH CARE EDUCATION/TRAINING PROGRAM

## 2021-09-15 PROCEDURE — 25000003 PHARM REV CODE 250: Mod: HCNC | Performed by: STUDENT IN AN ORGANIZED HEALTH CARE EDUCATION/TRAINING PROGRAM

## 2021-09-15 PROCEDURE — 99233 PR SUBSEQUENT HOSPITAL CARE,LEVL III: ICD-10-PCS | Mod: HCNC,,, | Performed by: STUDENT IN AN ORGANIZED HEALTH CARE EDUCATION/TRAINING PROGRAM

## 2021-09-15 PROCEDURE — 99233 SBSQ HOSP IP/OBS HIGH 50: CPT | Mod: HCNC,,, | Performed by: STUDENT IN AN ORGANIZED HEALTH CARE EDUCATION/TRAINING PROGRAM

## 2021-09-15 RX ORDER — HYDROXYZINE PAMOATE 50 MG/1
50 CAPSULE ORAL EVERY 6 HOURS PRN
Status: DISCONTINUED | OUTPATIENT
Start: 2021-09-15 | End: 2021-09-16

## 2021-09-15 RX ORDER — ACETAMINOPHEN 325 MG/1
650 TABLET ORAL EVERY 6 HOURS PRN
Status: DISCONTINUED | OUTPATIENT
Start: 2021-09-15 | End: 2021-09-27 | Stop reason: HOSPADM

## 2021-09-15 RX ADMIN — HYDROXYZINE PAMOATE 50 MG: 50 CAPSULE ORAL at 06:09

## 2021-09-15 RX ADMIN — TRAZODONE HYDROCHLORIDE 150 MG: 150 TABLET ORAL at 08:09

## 2021-09-15 RX ADMIN — MAGNESIUM HYDROXIDE 2400 MG: 400 SUSPENSION ORAL at 08:09

## 2021-09-15 RX ADMIN — ACETAMINOPHEN 650 MG: 325 TABLET ORAL at 07:09

## 2021-09-15 RX ADMIN — THERA TABS 1 TABLET: TAB at 08:09

## 2021-09-15 RX ADMIN — FOLIC ACID 1 MG: 1 TABLET ORAL at 08:09

## 2021-09-15 RX ADMIN — NICOTINE 1 PATCH: 14 PATCH, EXTENDED RELEASE TRANSDERMAL at 08:09

## 2021-09-15 RX ADMIN — QUETIAPINE FUMARATE 300 MG: 300 TABLET ORAL at 08:09

## 2021-09-15 RX ADMIN — HYDROXYZINE PAMOATE 50 MG: 50 CAPSULE ORAL at 12:09

## 2021-09-16 PROCEDURE — 90833 PR PSYCHOTHERAPY W/PATIENT W/E&M, 30 MIN (ADD ON): ICD-10-PCS | Mod: HCNC,,, | Performed by: STUDENT IN AN ORGANIZED HEALTH CARE EDUCATION/TRAINING PROGRAM

## 2021-09-16 PROCEDURE — 25000003 PHARM REV CODE 250: Mod: HCNC | Performed by: STUDENT IN AN ORGANIZED HEALTH CARE EDUCATION/TRAINING PROGRAM

## 2021-09-16 PROCEDURE — 99233 PR SUBSEQUENT HOSPITAL CARE,LEVL III: ICD-10-PCS | Mod: HCNC,,, | Performed by: STUDENT IN AN ORGANIZED HEALTH CARE EDUCATION/TRAINING PROGRAM

## 2021-09-16 PROCEDURE — 25000003 PHARM REV CODE 250: Mod: HCNC | Performed by: PSYCHIATRY & NEUROLOGY

## 2021-09-16 PROCEDURE — U0005 INFEC AGEN DETEC AMPLI PROBE: HCPCS | Performed by: PSYCHIATRY & NEUROLOGY

## 2021-09-16 PROCEDURE — S4991 NICOTINE PATCH NONLEGEND: HCPCS | Mod: HCNC | Performed by: PSYCHIATRY & NEUROLOGY

## 2021-09-16 PROCEDURE — 99233 SBSQ HOSP IP/OBS HIGH 50: CPT | Mod: HCNC,,, | Performed by: STUDENT IN AN ORGANIZED HEALTH CARE EDUCATION/TRAINING PROGRAM

## 2021-09-16 PROCEDURE — 11400000 HC PSYCH PRIVATE ROOM: Mod: HCNC

## 2021-09-16 PROCEDURE — U0003 INFECTIOUS AGENT DETECTION BY NUCLEIC ACID (DNA OR RNA); SEVERE ACUTE RESPIRATORY SYNDROME CORONAVIRUS 2 (SARS-COV-2) (CORONAVIRUS DISEASE [COVID-19]), AMPLIFIED PROBE TECHNIQUE, MAKING USE OF HIGH THROUGHPUT TECHNOLOGIES AS DESCRIBED BY CMS-2020-01-R: HCPCS | Mod: HCNC | Performed by: PSYCHIATRY & NEUROLOGY

## 2021-09-16 PROCEDURE — 90833 PSYTX W PT W E/M 30 MIN: CPT | Mod: HCNC,,, | Performed by: STUDENT IN AN ORGANIZED HEALTH CARE EDUCATION/TRAINING PROGRAM

## 2021-09-16 RX ORDER — HYDROXYZINE PAMOATE 50 MG/1
100 CAPSULE ORAL EVERY 6 HOURS PRN
Status: DISCONTINUED | OUTPATIENT
Start: 2021-09-16 | End: 2021-09-18

## 2021-09-16 RX ADMIN — TRAZODONE HYDROCHLORIDE 150 MG: 150 TABLET ORAL at 08:09

## 2021-09-16 RX ADMIN — HYDROXYZINE PAMOATE 100 MG: 50 CAPSULE ORAL at 01:09

## 2021-09-16 RX ADMIN — QUETIAPINE FUMARATE 300 MG: 300 TABLET ORAL at 08:09

## 2021-09-16 RX ADMIN — MAGNESIUM HYDROXIDE 2400 MG: 400 SUSPENSION ORAL at 08:09

## 2021-09-16 RX ADMIN — HYDROXYZINE PAMOATE 100 MG: 50 CAPSULE ORAL at 08:09

## 2021-09-16 RX ADMIN — NICOTINE 1 PATCH: 14 PATCH, EXTENDED RELEASE TRANSDERMAL at 08:09

## 2021-09-16 RX ADMIN — HYDROXYZINE PAMOATE 50 MG: 50 CAPSULE ORAL at 08:09

## 2021-09-16 RX ADMIN — FOLIC ACID 1 MG: 1 TABLET ORAL at 08:09

## 2021-09-16 RX ADMIN — THERA TABS 1 TABLET: TAB at 08:09

## 2021-09-17 LAB
SARS-COV-2 RNA RESP QL NAA+PROBE: NOT DETECTED
SARS-COV-2- CYCLE NUMBER: NORMAL

## 2021-09-17 PROCEDURE — 25000003 PHARM REV CODE 250: Mod: HCNC | Performed by: PSYCHIATRY & NEUROLOGY

## 2021-09-17 PROCEDURE — 99233 SBSQ HOSP IP/OBS HIGH 50: CPT | Mod: HCNC,,, | Performed by: STUDENT IN AN ORGANIZED HEALTH CARE EDUCATION/TRAINING PROGRAM

## 2021-09-17 PROCEDURE — 25000003 PHARM REV CODE 250: Mod: HCNC | Performed by: STUDENT IN AN ORGANIZED HEALTH CARE EDUCATION/TRAINING PROGRAM

## 2021-09-17 PROCEDURE — S4991 NICOTINE PATCH NONLEGEND: HCPCS | Mod: HCNC | Performed by: PSYCHIATRY & NEUROLOGY

## 2021-09-17 PROCEDURE — 11400000 HC PSYCH PRIVATE ROOM: Mod: HCNC

## 2021-09-17 PROCEDURE — 99233 PR SUBSEQUENT HOSPITAL CARE,LEVL III: ICD-10-PCS | Mod: HCNC,,, | Performed by: STUDENT IN AN ORGANIZED HEALTH CARE EDUCATION/TRAINING PROGRAM

## 2021-09-17 RX ADMIN — FOLIC ACID 1 MG: 1 TABLET ORAL at 08:09

## 2021-09-17 RX ADMIN — NICOTINE 1 PATCH: 14 PATCH, EXTENDED RELEASE TRANSDERMAL at 08:09

## 2021-09-17 RX ADMIN — THERA TABS 1 TABLET: TAB at 08:09

## 2021-09-17 RX ADMIN — TRAZODONE HYDROCHLORIDE 150 MG: 150 TABLET ORAL at 08:09

## 2021-09-17 RX ADMIN — HYDROXYZINE PAMOATE 100 MG: 50 CAPSULE ORAL at 02:09

## 2021-09-17 RX ADMIN — HYDROXYZINE PAMOATE 100 MG: 50 CAPSULE ORAL at 08:09

## 2021-09-17 RX ADMIN — QUETIAPINE FUMARATE 300 MG: 300 TABLET ORAL at 08:09

## 2021-09-18 PROCEDURE — 25000003 PHARM REV CODE 250: Mod: HCNC | Performed by: PSYCHIATRY & NEUROLOGY

## 2021-09-18 PROCEDURE — 99233 SBSQ HOSP IP/OBS HIGH 50: CPT | Mod: HCNC,,, | Performed by: PSYCHIATRY & NEUROLOGY

## 2021-09-18 PROCEDURE — 11400000 HC PSYCH PRIVATE ROOM: Mod: HCNC

## 2021-09-18 PROCEDURE — 99233 PR SUBSEQUENT HOSPITAL CARE,LEVL III: ICD-10-PCS | Mod: HCNC,,, | Performed by: PSYCHIATRY & NEUROLOGY

## 2021-09-18 PROCEDURE — 25000003 PHARM REV CODE 250: Mod: HCNC | Performed by: STUDENT IN AN ORGANIZED HEALTH CARE EDUCATION/TRAINING PROGRAM

## 2021-09-18 PROCEDURE — S4991 NICOTINE PATCH NONLEGEND: HCPCS | Mod: HCNC | Performed by: PSYCHIATRY & NEUROLOGY

## 2021-09-18 RX ADMIN — QUETIAPINE FUMARATE 300 MG: 300 TABLET ORAL at 08:09

## 2021-09-18 RX ADMIN — HYDROXYZINE PAMOATE 100 MG: 50 CAPSULE ORAL at 08:09

## 2021-09-18 RX ADMIN — FOLIC ACID 1 MG: 1 TABLET ORAL at 08:09

## 2021-09-18 RX ADMIN — HYDROXYZINE PAMOATE 75 MG: 25 CAPSULE ORAL at 03:09

## 2021-09-18 RX ADMIN — TRAZODONE HYDROCHLORIDE 150 MG: 150 TABLET ORAL at 08:09

## 2021-09-18 RX ADMIN — THERA TABS 1 TABLET: TAB at 08:09

## 2021-09-18 RX ADMIN — NICOTINE 1 PATCH: 14 PATCH, EXTENDED RELEASE TRANSDERMAL at 08:09

## 2021-09-19 PROCEDURE — 25000003 PHARM REV CODE 250: Mod: HCNC | Performed by: PSYCHIATRY & NEUROLOGY

## 2021-09-19 PROCEDURE — 90833 PR PSYCHOTHERAPY W/PATIENT W/E&M, 30 MIN (ADD ON): ICD-10-PCS | Mod: HCNC,,, | Performed by: PSYCHIATRY & NEUROLOGY

## 2021-09-19 PROCEDURE — S4991 NICOTINE PATCH NONLEGEND: HCPCS | Mod: HCNC | Performed by: PSYCHIATRY & NEUROLOGY

## 2021-09-19 PROCEDURE — 11400000 HC PSYCH PRIVATE ROOM: Mod: HCNC

## 2021-09-19 PROCEDURE — 90833 PSYTX W PT W E/M 30 MIN: CPT | Mod: HCNC,,, | Performed by: PSYCHIATRY & NEUROLOGY

## 2021-09-19 PROCEDURE — 99233 SBSQ HOSP IP/OBS HIGH 50: CPT | Mod: HCNC,,, | Performed by: PSYCHIATRY & NEUROLOGY

## 2021-09-19 PROCEDURE — 99233 PR SUBSEQUENT HOSPITAL CARE,LEVL III: ICD-10-PCS | Mod: HCNC,,, | Performed by: PSYCHIATRY & NEUROLOGY

## 2021-09-19 RX ADMIN — QUETIAPINE FUMARATE 300 MG: 300 TABLET ORAL at 08:09

## 2021-09-19 RX ADMIN — HYDROXYZINE PAMOATE 75 MG: 25 CAPSULE ORAL at 03:09

## 2021-09-19 RX ADMIN — TRAZODONE HYDROCHLORIDE 150 MG: 150 TABLET ORAL at 08:09

## 2021-09-19 RX ADMIN — THERA TABS 1 TABLET: TAB at 08:09

## 2021-09-19 RX ADMIN — HYDROXYZINE PAMOATE 75 MG: 25 CAPSULE ORAL at 08:09

## 2021-09-19 RX ADMIN — NICOTINE 1 PATCH: 14 PATCH, EXTENDED RELEASE TRANSDERMAL at 08:09

## 2021-09-19 RX ADMIN — FOLIC ACID 1 MG: 1 TABLET ORAL at 08:09

## 2021-09-20 LAB — SARS-COV-2 RDRP RESP QL NAA+PROBE: NEGATIVE

## 2021-09-20 PROCEDURE — U0002 COVID-19 LAB TEST NON-CDC: HCPCS | Mod: HCNC | Performed by: PSYCHIATRY & NEUROLOGY

## 2021-09-20 PROCEDURE — 11400000 HC PSYCH PRIVATE ROOM: Mod: HCNC

## 2021-09-20 PROCEDURE — S4991 NICOTINE PATCH NONLEGEND: HCPCS | Mod: HCNC | Performed by: PSYCHIATRY & NEUROLOGY

## 2021-09-20 PROCEDURE — 25000003 PHARM REV CODE 250: Mod: HCNC | Performed by: PSYCHIATRY & NEUROLOGY

## 2021-09-20 PROCEDURE — 99233 PR SUBSEQUENT HOSPITAL CARE,LEVL III: ICD-10-PCS | Mod: HCNC,,, | Performed by: PSYCHIATRY & NEUROLOGY

## 2021-09-20 PROCEDURE — 99233 SBSQ HOSP IP/OBS HIGH 50: CPT | Mod: HCNC,,, | Performed by: PSYCHIATRY & NEUROLOGY

## 2021-09-20 RX ADMIN — THERA TABS 1 TABLET: TAB at 08:09

## 2021-09-20 RX ADMIN — QUETIAPINE FUMARATE 300 MG: 300 TABLET ORAL at 08:09

## 2021-09-20 RX ADMIN — TRAZODONE HYDROCHLORIDE 150 MG: 150 TABLET ORAL at 08:09

## 2021-09-20 RX ADMIN — FOLIC ACID 1 MG: 1 TABLET ORAL at 08:09

## 2021-09-20 RX ADMIN — HYDROXYZINE PAMOATE 75 MG: 25 CAPSULE ORAL at 08:09

## 2021-09-20 RX ADMIN — NICOTINE 1 PATCH: 14 PATCH, EXTENDED RELEASE TRANSDERMAL at 08:09

## 2021-09-20 RX ADMIN — HYDROXYZINE PAMOATE 75 MG: 25 CAPSULE ORAL at 05:09

## 2021-09-21 PROCEDURE — 25000003 PHARM REV CODE 250: Mod: HCNC | Performed by: PSYCHIATRY & NEUROLOGY

## 2021-09-21 PROCEDURE — S4991 NICOTINE PATCH NONLEGEND: HCPCS | Mod: HCNC | Performed by: PSYCHIATRY & NEUROLOGY

## 2021-09-21 PROCEDURE — 90833 PSYTX W PT W E/M 30 MIN: CPT | Mod: HCNC,,, | Performed by: PSYCHIATRY & NEUROLOGY

## 2021-09-21 PROCEDURE — 63600175 PHARM REV CODE 636 W HCPCS: Mod: HCNC

## 2021-09-21 PROCEDURE — 99233 PR SUBSEQUENT HOSPITAL CARE,LEVL III: ICD-10-PCS | Mod: HCNC,,, | Performed by: PSYCHIATRY & NEUROLOGY

## 2021-09-21 PROCEDURE — 63600175 PHARM REV CODE 636 W HCPCS: Mod: HCNC | Performed by: PSYCHIATRY & NEUROLOGY

## 2021-09-21 PROCEDURE — 11400000 HC PSYCH PRIVATE ROOM: Mod: HCNC

## 2021-09-21 PROCEDURE — 25000003 PHARM REV CODE 250: Mod: HCNC | Performed by: STUDENT IN AN ORGANIZED HEALTH CARE EDUCATION/TRAINING PROGRAM

## 2021-09-21 PROCEDURE — 99233 SBSQ HOSP IP/OBS HIGH 50: CPT | Mod: HCNC,,, | Performed by: PSYCHIATRY & NEUROLOGY

## 2021-09-21 PROCEDURE — 90833 PR PSYCHOTHERAPY W/PATIENT W/E&M, 30 MIN (ADD ON): ICD-10-PCS | Mod: HCNC,,, | Performed by: PSYCHIATRY & NEUROLOGY

## 2021-09-21 RX ORDER — LORAZEPAM 1 MG/1
2 TABLET ORAL EVERY 4 HOURS PRN
Status: DISCONTINUED | OUTPATIENT
Start: 2021-09-21 | End: 2021-09-27 | Stop reason: HOSPADM

## 2021-09-21 RX ORDER — CHLORPROMAZINE HYDROCHLORIDE 100 MG/1
100 TABLET, FILM COATED ORAL 4 TIMES DAILY PRN
Status: DISCONTINUED | OUTPATIENT
Start: 2021-09-21 | End: 2021-09-27 | Stop reason: HOSPADM

## 2021-09-21 RX ORDER — DIAZEPAM 5 MG/1
5 TABLET ORAL 3 TIMES DAILY
Status: DISCONTINUED | OUTPATIENT
Start: 2021-09-21 | End: 2021-09-21

## 2021-09-21 RX ORDER — DIAZEPAM 10 MG/1
10 TABLET ORAL 3 TIMES DAILY
Status: DISCONTINUED | OUTPATIENT
Start: 2021-09-21 | End: 2021-09-22

## 2021-09-21 RX ORDER — CHLORPROMAZINE HYDROCHLORIDE 25 MG/ML
INJECTION INTRAMUSCULAR
Status: COMPLETED
Start: 2021-09-21 | End: 2021-09-21

## 2021-09-21 RX ORDER — QUETIAPINE FUMARATE 100 MG/1
100 TABLET, FILM COATED ORAL ONCE
Status: COMPLETED | OUTPATIENT
Start: 2021-09-21 | End: 2021-09-21

## 2021-09-21 RX ORDER — CHLORPROMAZINE HYDROCHLORIDE 25 MG/ML
100 INJECTION INTRAMUSCULAR 4 TIMES DAILY PRN
Status: DISCONTINUED | OUTPATIENT
Start: 2021-09-21 | End: 2021-09-27 | Stop reason: HOSPADM

## 2021-09-21 RX ORDER — QUETIAPINE FUMARATE 100 MG/1
100 TABLET, FILM COATED ORAL DAILY
Status: DISCONTINUED | OUTPATIENT
Start: 2021-09-22 | End: 2021-09-27 | Stop reason: HOSPADM

## 2021-09-21 RX ADMIN — OLANZAPINE 10 MG: 10 TABLET, FILM COATED ORAL at 07:09

## 2021-09-21 RX ADMIN — DIAZEPAM 5 MG: 5 TABLET ORAL at 02:09

## 2021-09-21 RX ADMIN — CHLORPROMAZINE HYDROCHLORIDE 100 MG: 25 INJECTION INTRAMUSCULAR at 09:09

## 2021-09-21 RX ADMIN — LORAZEPAM 2 MG: 1 TABLET ORAL at 11:09

## 2021-09-21 RX ADMIN — HYDROXYZINE PAMOATE 75 MG: 25 CAPSULE ORAL at 05:09

## 2021-09-21 RX ADMIN — CHLORPROMAZINE HYDROCHLORIDE 100 MG: 25 INJECTION INTRAMUSCULAR at 12:09

## 2021-09-21 RX ADMIN — HYDROXYZINE PAMOATE 75 MG: 25 CAPSULE ORAL at 09:09

## 2021-09-21 RX ADMIN — DIAZEPAM 10 MG: 10 TABLET ORAL at 08:09

## 2021-09-21 RX ADMIN — TRAZODONE HYDROCHLORIDE 150 MG: 150 TABLET ORAL at 08:09

## 2021-09-21 RX ADMIN — QUETIAPINE FUMARATE 300 MG: 300 TABLET ORAL at 08:09

## 2021-09-21 RX ADMIN — LORAZEPAM 2 MG: 1 TABLET ORAL at 04:09

## 2021-09-21 RX ADMIN — QUETIAPINE FUMARATE 100 MG: 100 TABLET ORAL at 08:09

## 2021-09-21 RX ADMIN — NICOTINE 1 PATCH: 14 PATCH, EXTENDED RELEASE TRANSDERMAL at 08:09

## 2021-09-22 LAB
ALBUMIN SERPL BCP-MCNC: 3.9 G/DL (ref 3.5–5.2)
ALP SERPL-CCNC: 96 U/L (ref 55–135)
ALT SERPL W/O P-5'-P-CCNC: 14 U/L (ref 10–44)
AMMONIA PLAS-SCNC: 32 UMOL/L (ref 10–50)
ANION GAP SERPL CALC-SCNC: 13 MMOL/L (ref 8–16)
AST SERPL-CCNC: 16 U/L (ref 10–40)
BASOPHILS # BLD AUTO: 0.08 K/UL (ref 0–0.2)
BASOPHILS NFR BLD: 0.6 % (ref 0–1.9)
BILIRUB SERPL-MCNC: 0.4 MG/DL (ref 0.1–1)
BUN SERPL-MCNC: 13 MG/DL (ref 6–20)
CALCIUM SERPL-MCNC: 9.7 MG/DL (ref 8.7–10.5)
CHLORIDE SERPL-SCNC: 105 MMOL/L (ref 95–110)
CO2 SERPL-SCNC: 21 MMOL/L (ref 23–29)
CREAT SERPL-MCNC: 0.8 MG/DL (ref 0.5–1.4)
DIFFERENTIAL METHOD: ABNORMAL
EOSINOPHIL # BLD AUTO: 0 K/UL (ref 0–0.5)
EOSINOPHIL NFR BLD: 0.2 % (ref 0–8)
ERYTHROCYTE [DISTWIDTH] IN BLOOD BY AUTOMATED COUNT: 13.6 % (ref 11.5–14.5)
EST. GFR  (AFRICAN AMERICAN): >60 ML/MIN/1.73 M^2
EST. GFR  (NON AFRICAN AMERICAN): >60 ML/MIN/1.73 M^2
GLUCOSE SERPL-MCNC: 100 MG/DL (ref 70–110)
HCT VFR BLD AUTO: 37 % (ref 37–48.5)
HGB BLD-MCNC: 12.5 G/DL (ref 12–16)
IMM GRANULOCYTES # BLD AUTO: 0.04 K/UL (ref 0–0.04)
IMM GRANULOCYTES NFR BLD AUTO: 0.3 % (ref 0–0.5)
LYMPHOCYTES # BLD AUTO: 2.7 K/UL (ref 1–4.8)
LYMPHOCYTES NFR BLD: 20.5 % (ref 18–48)
MCH RBC QN AUTO: 29.3 PG (ref 27–31)
MCHC RBC AUTO-ENTMCNC: 33.8 G/DL (ref 32–36)
MCV RBC AUTO: 87 FL (ref 82–98)
MONOCYTES # BLD AUTO: 1.1 K/UL (ref 0.3–1)
MONOCYTES NFR BLD: 8.1 % (ref 4–15)
NEUTROPHILS # BLD AUTO: 9.1 K/UL (ref 1.8–7.7)
NEUTROPHILS NFR BLD: 70.3 % (ref 38–73)
NRBC BLD-RTO: 0 /100 WBC
PLATELET # BLD AUTO: 416 K/UL (ref 150–450)
PMV BLD AUTO: 10.3 FL (ref 9.2–12.9)
POTASSIUM SERPL-SCNC: 3.8 MMOL/L (ref 3.5–5.1)
PROT SERPL-MCNC: 7.9 G/DL (ref 6–8.4)
RBC # BLD AUTO: 4.26 M/UL (ref 4–5.4)
SODIUM SERPL-SCNC: 139 MMOL/L (ref 136–145)
WBC # BLD AUTO: 12.99 K/UL (ref 3.9–12.7)

## 2021-09-22 PROCEDURE — 99233 SBSQ HOSP IP/OBS HIGH 50: CPT | Mod: HCNC,,, | Performed by: PSYCHIATRY & NEUROLOGY

## 2021-09-22 PROCEDURE — S4991 NICOTINE PATCH NONLEGEND: HCPCS | Mod: HCNC | Performed by: PSYCHIATRY & NEUROLOGY

## 2021-09-22 PROCEDURE — 25000003 PHARM REV CODE 250: Mod: HCNC | Performed by: PSYCHIATRY & NEUROLOGY

## 2021-09-22 PROCEDURE — 80053 COMPREHEN METABOLIC PANEL: CPT | Mod: HCNC | Performed by: PSYCHIATRY & NEUROLOGY

## 2021-09-22 PROCEDURE — 85025 COMPLETE CBC W/AUTO DIFF WBC: CPT | Mod: HCNC | Performed by: PSYCHIATRY & NEUROLOGY

## 2021-09-22 PROCEDURE — 11400000 HC PSYCH PRIVATE ROOM: Mod: HCNC

## 2021-09-22 PROCEDURE — 82140 ASSAY OF AMMONIA: CPT | Mod: HCNC | Performed by: PSYCHIATRY & NEUROLOGY

## 2021-09-22 PROCEDURE — 36415 COLL VENOUS BLD VENIPUNCTURE: CPT | Mod: HCNC | Performed by: PSYCHIATRY & NEUROLOGY

## 2021-09-22 PROCEDURE — 63600175 PHARM REV CODE 636 W HCPCS: Mod: HCNC | Performed by: PSYCHIATRY & NEUROLOGY

## 2021-09-22 PROCEDURE — 99233 PR SUBSEQUENT HOSPITAL CARE,LEVL III: ICD-10-PCS | Mod: HCNC,,, | Performed by: PSYCHIATRY & NEUROLOGY

## 2021-09-22 RX ADMIN — FOLIC ACID 1 MG: 1 TABLET ORAL at 08:09

## 2021-09-22 RX ADMIN — THERA TABS 1 TABLET: TAB at 08:09

## 2021-09-22 RX ADMIN — DIAZEPAM 15 MG: 10 TABLET ORAL at 04:09

## 2021-09-22 RX ADMIN — CHLORPROMAZINE HYDROCHLORIDE 100 MG: 100 TABLET, FILM COATED ORAL at 09:09

## 2021-09-22 RX ADMIN — DIAZEPAM 10 MG: 10 TABLET ORAL at 08:09

## 2021-09-22 RX ADMIN — QUETIAPINE FUMARATE 300 MG: 300 TABLET ORAL at 08:09

## 2021-09-22 RX ADMIN — HYDROXYZINE PAMOATE 75 MG: 25 CAPSULE ORAL at 03:09

## 2021-09-22 RX ADMIN — DIAZEPAM 15 MG: 10 TABLET ORAL at 12:09

## 2021-09-22 RX ADMIN — NICOTINE 1 PATCH: 14 PATCH, EXTENDED RELEASE TRANSDERMAL at 08:09

## 2021-09-22 RX ADMIN — LORAZEPAM 2 MG: 1 TABLET ORAL at 06:09

## 2021-09-22 RX ADMIN — QUETIAPINE FUMARATE 100 MG: 100 TABLET ORAL at 08:09

## 2021-09-22 RX ADMIN — DIAZEPAM 15 MG: 10 TABLET ORAL at 08:09

## 2021-09-22 RX ADMIN — CHLORPROMAZINE HYDROCHLORIDE 100 MG: 25 INJECTION INTRAMUSCULAR at 01:09

## 2021-09-22 RX ADMIN — TRAZODONE HYDROCHLORIDE 150 MG: 150 TABLET ORAL at 08:09

## 2021-09-23 PROCEDURE — 99233 PR SUBSEQUENT HOSPITAL CARE,LEVL III: ICD-10-PCS | Mod: HCNC,,, | Performed by: PSYCHIATRY & NEUROLOGY

## 2021-09-23 PROCEDURE — 81003 URINALYSIS AUTO W/O SCOPE: CPT | Mod: HCNC | Performed by: PSYCHIATRY & NEUROLOGY

## 2021-09-23 PROCEDURE — 11400000 HC PSYCH PRIVATE ROOM: Mod: HCNC

## 2021-09-23 PROCEDURE — 25000003 PHARM REV CODE 250: Mod: HCNC | Performed by: PSYCHIATRY & NEUROLOGY

## 2021-09-23 PROCEDURE — 99233 SBSQ HOSP IP/OBS HIGH 50: CPT | Mod: HCNC,,, | Performed by: PSYCHIATRY & NEUROLOGY

## 2021-09-23 PROCEDURE — 25000003 PHARM REV CODE 250: Mod: HCNC | Performed by: STUDENT IN AN ORGANIZED HEALTH CARE EDUCATION/TRAINING PROGRAM

## 2021-09-23 PROCEDURE — S4991 NICOTINE PATCH NONLEGEND: HCPCS | Mod: HCNC | Performed by: PSYCHIATRY & NEUROLOGY

## 2021-09-23 RX ADMIN — DIAZEPAM 15 MG: 10 TABLET ORAL at 08:09

## 2021-09-23 RX ADMIN — QUETIAPINE FUMARATE 300 MG: 300 TABLET ORAL at 08:09

## 2021-09-23 RX ADMIN — THERA TABS 1 TABLET: TAB at 09:09

## 2021-09-23 RX ADMIN — QUETIAPINE FUMARATE 100 MG: 100 TABLET ORAL at 09:09

## 2021-09-23 RX ADMIN — DIAZEPAM 15 MG: 10 TABLET ORAL at 04:09

## 2021-09-23 RX ADMIN — NICOTINE 1 PATCH: 14 PATCH, EXTENDED RELEASE TRANSDERMAL at 09:09

## 2021-09-23 RX ADMIN — DIAZEPAM 15 MG: 10 TABLET ORAL at 12:09

## 2021-09-23 RX ADMIN — MAGNESIUM HYDROXIDE 2400 MG: 400 SUSPENSION ORAL at 08:09

## 2021-09-23 RX ADMIN — TRAZODONE HYDROCHLORIDE 150 MG: 150 TABLET ORAL at 08:09

## 2021-09-23 RX ADMIN — DIAZEPAM 15 MG: 10 TABLET ORAL at 09:09

## 2021-09-23 RX ADMIN — FOLIC ACID 1 MG: 1 TABLET ORAL at 09:09

## 2021-09-24 LAB
BILIRUB UR QL STRIP: NEGATIVE
CLARITY UR: CLEAR
COLOR UR: YELLOW
GLUCOSE UR QL STRIP: NEGATIVE
HGB UR QL STRIP: NEGATIVE
KETONES UR QL STRIP: NEGATIVE
LEUKOCYTE ESTERASE UR QL STRIP: NEGATIVE
NITRITE UR QL STRIP: NEGATIVE
PH UR STRIP: 6 [PH] (ref 5–8)
PROT UR QL STRIP: NEGATIVE
SP GR UR STRIP: 1.01 (ref 1–1.03)
URN SPEC COLLECT METH UR: NORMAL
UROBILINOGEN UR STRIP-ACNC: NEGATIVE EU/DL

## 2021-09-24 PROCEDURE — 11400000 HC PSYCH PRIVATE ROOM: Mod: HCNC

## 2021-09-24 PROCEDURE — 99233 SBSQ HOSP IP/OBS HIGH 50: CPT | Mod: HCNC,,, | Performed by: STUDENT IN AN ORGANIZED HEALTH CARE EDUCATION/TRAINING PROGRAM

## 2021-09-24 PROCEDURE — 25000003 PHARM REV CODE 250: Mod: HCNC | Performed by: PSYCHIATRY & NEUROLOGY

## 2021-09-24 PROCEDURE — S4991 NICOTINE PATCH NONLEGEND: HCPCS | Mod: HCNC | Performed by: PSYCHIATRY & NEUROLOGY

## 2021-09-24 PROCEDURE — 99233 PR SUBSEQUENT HOSPITAL CARE,LEVL III: ICD-10-PCS | Mod: HCNC,,, | Performed by: STUDENT IN AN ORGANIZED HEALTH CARE EDUCATION/TRAINING PROGRAM

## 2021-09-24 RX ADMIN — HYDROXYZINE PAMOATE 75 MG: 25 CAPSULE ORAL at 08:09

## 2021-09-24 RX ADMIN — THERA TABS 1 TABLET: TAB at 08:09

## 2021-09-24 RX ADMIN — DIAZEPAM 15 MG: 10 TABLET ORAL at 12:09

## 2021-09-24 RX ADMIN — FOLIC ACID 1 MG: 1 TABLET ORAL at 08:09

## 2021-09-24 RX ADMIN — DIAZEPAM 15 MG: 10 TABLET ORAL at 08:09

## 2021-09-24 RX ADMIN — DIAZEPAM 15 MG: 10 TABLET ORAL at 04:09

## 2021-09-24 RX ADMIN — TRAZODONE HYDROCHLORIDE 150 MG: 150 TABLET ORAL at 08:09

## 2021-09-24 RX ADMIN — QUETIAPINE FUMARATE 300 MG: 300 TABLET ORAL at 08:09

## 2021-09-24 RX ADMIN — NICOTINE 1 PATCH: 14 PATCH, EXTENDED RELEASE TRANSDERMAL at 08:09

## 2021-09-24 RX ADMIN — QUETIAPINE FUMARATE 100 MG: 100 TABLET ORAL at 08:09

## 2021-09-25 PROCEDURE — 25000003 PHARM REV CODE 250: Mod: HCNC | Performed by: STUDENT IN AN ORGANIZED HEALTH CARE EDUCATION/TRAINING PROGRAM

## 2021-09-25 PROCEDURE — S4991 NICOTINE PATCH NONLEGEND: HCPCS | Mod: HCNC | Performed by: PSYCHIATRY & NEUROLOGY

## 2021-09-25 PROCEDURE — 90833 PSYTX W PT W E/M 30 MIN: CPT | Mod: HCNC,,, | Performed by: STUDENT IN AN ORGANIZED HEALTH CARE EDUCATION/TRAINING PROGRAM

## 2021-09-25 PROCEDURE — 25000003 PHARM REV CODE 250: Mod: HCNC | Performed by: PSYCHIATRY & NEUROLOGY

## 2021-09-25 PROCEDURE — 99233 SBSQ HOSP IP/OBS HIGH 50: CPT | Mod: HCNC,,, | Performed by: STUDENT IN AN ORGANIZED HEALTH CARE EDUCATION/TRAINING PROGRAM

## 2021-09-25 PROCEDURE — 11400000 HC PSYCH PRIVATE ROOM: Mod: HCNC

## 2021-09-25 PROCEDURE — 90833 PR PSYCHOTHERAPY W/PATIENT W/E&M, 30 MIN (ADD ON): ICD-10-PCS | Mod: HCNC,,, | Performed by: STUDENT IN AN ORGANIZED HEALTH CARE EDUCATION/TRAINING PROGRAM

## 2021-09-25 PROCEDURE — 99233 PR SUBSEQUENT HOSPITAL CARE,LEVL III: ICD-10-PCS | Mod: HCNC,,, | Performed by: STUDENT IN AN ORGANIZED HEALTH CARE EDUCATION/TRAINING PROGRAM

## 2021-09-25 RX ORDER — DIAZEPAM 10 MG/1
10 TABLET ORAL 4 TIMES DAILY
Status: DISCONTINUED | OUTPATIENT
Start: 2021-09-25 | End: 2021-09-27 | Stop reason: HOSPADM

## 2021-09-25 RX ORDER — DIAZEPAM 5 MG/1
5 TABLET ORAL NIGHTLY
Status: COMPLETED | OUTPATIENT
Start: 2021-09-25 | End: 2021-09-25

## 2021-09-25 RX ADMIN — THERA TABS 1 TABLET: TAB at 08:09

## 2021-09-25 RX ADMIN — DIAZEPAM 10 MG: 10 TABLET ORAL at 12:09

## 2021-09-25 RX ADMIN — DIAZEPAM 10 MG: 10 TABLET ORAL at 04:09

## 2021-09-25 RX ADMIN — MAGNESIUM HYDROXIDE 2400 MG: 400 SUSPENSION ORAL at 02:09

## 2021-09-25 RX ADMIN — DIAZEPAM 15 MG: 10 TABLET ORAL at 08:09

## 2021-09-25 RX ADMIN — FOLIC ACID 1 MG: 1 TABLET ORAL at 08:09

## 2021-09-25 RX ADMIN — QUETIAPINE FUMARATE 300 MG: 300 TABLET ORAL at 08:09

## 2021-09-25 RX ADMIN — TRAZODONE HYDROCHLORIDE 150 MG: 150 TABLET ORAL at 08:09

## 2021-09-25 RX ADMIN — NICOTINE 1 PATCH: 14 PATCH, EXTENDED RELEASE TRANSDERMAL at 08:09

## 2021-09-25 RX ADMIN — QUETIAPINE FUMARATE 100 MG: 100 TABLET ORAL at 08:09

## 2021-09-25 RX ADMIN — DIAZEPAM 5 MG: 5 TABLET ORAL at 08:09

## 2021-09-25 RX ADMIN — HYDROXYZINE PAMOATE 75 MG: 25 CAPSULE ORAL at 02:09

## 2021-09-25 RX ADMIN — DIAZEPAM 10 MG: 10 TABLET ORAL at 08:09

## 2021-09-26 PROCEDURE — 11400000 HC PSYCH PRIVATE ROOM: Mod: HCNC

## 2021-09-26 PROCEDURE — 99233 SBSQ HOSP IP/OBS HIGH 50: CPT | Mod: HCNC,,, | Performed by: STUDENT IN AN ORGANIZED HEALTH CARE EDUCATION/TRAINING PROGRAM

## 2021-09-26 PROCEDURE — 25000003 PHARM REV CODE 250: Mod: HCNC | Performed by: STUDENT IN AN ORGANIZED HEALTH CARE EDUCATION/TRAINING PROGRAM

## 2021-09-26 PROCEDURE — 99233 PR SUBSEQUENT HOSPITAL CARE,LEVL III: ICD-10-PCS | Mod: HCNC,,, | Performed by: STUDENT IN AN ORGANIZED HEALTH CARE EDUCATION/TRAINING PROGRAM

## 2021-09-26 PROCEDURE — 90833 PSYTX W PT W E/M 30 MIN: CPT | Mod: HCNC,,, | Performed by: STUDENT IN AN ORGANIZED HEALTH CARE EDUCATION/TRAINING PROGRAM

## 2021-09-26 PROCEDURE — S4991 NICOTINE PATCH NONLEGEND: HCPCS | Mod: HCNC | Performed by: PSYCHIATRY & NEUROLOGY

## 2021-09-26 PROCEDURE — 90833 PR PSYCHOTHERAPY W/PATIENT W/E&M, 30 MIN (ADD ON): ICD-10-PCS | Mod: HCNC,,, | Performed by: STUDENT IN AN ORGANIZED HEALTH CARE EDUCATION/TRAINING PROGRAM

## 2021-09-26 PROCEDURE — 25000003 PHARM REV CODE 250: Mod: HCNC | Performed by: PSYCHIATRY & NEUROLOGY

## 2021-09-26 RX ORDER — TRAZODONE HYDROCHLORIDE 100 MG/1
200 TABLET ORAL NIGHTLY
Status: DISCONTINUED | OUTPATIENT
Start: 2021-09-26 | End: 2021-09-27 | Stop reason: HOSPADM

## 2021-09-26 RX ORDER — DOCUSATE SODIUM 100 MG/1
100 CAPSULE, LIQUID FILLED ORAL 2 TIMES DAILY
Status: DISCONTINUED | OUTPATIENT
Start: 2021-09-26 | End: 2021-09-27 | Stop reason: HOSPADM

## 2021-09-26 RX ADMIN — DOCUSATE SODIUM 100 MG: 100 CAPSULE ORAL at 02:09

## 2021-09-26 RX ADMIN — TRAZODONE HYDROCHLORIDE 200 MG: 100 TABLET ORAL at 08:09

## 2021-09-26 RX ADMIN — DIAZEPAM 10 MG: 10 TABLET ORAL at 08:09

## 2021-09-26 RX ADMIN — HYDROXYZINE PAMOATE 75 MG: 25 CAPSULE ORAL at 02:09

## 2021-09-26 RX ADMIN — MAGNESIUM HYDROXIDE 2400 MG: 400 SUSPENSION ORAL at 02:09

## 2021-09-26 RX ADMIN — THERA TABS 1 TABLET: TAB at 08:09

## 2021-09-26 RX ADMIN — NICOTINE 1 PATCH: 14 PATCH, EXTENDED RELEASE TRANSDERMAL at 08:09

## 2021-09-26 RX ADMIN — DIAZEPAM 10 MG: 10 TABLET ORAL at 12:09

## 2021-09-26 RX ADMIN — QUETIAPINE FUMARATE 100 MG: 100 TABLET ORAL at 08:09

## 2021-09-26 RX ADMIN — DOCUSATE SODIUM 100 MG: 100 CAPSULE ORAL at 08:09

## 2021-09-26 RX ADMIN — ACETAMINOPHEN 650 MG: 325 TABLET ORAL at 11:09

## 2021-09-26 RX ADMIN — DIAZEPAM 10 MG: 10 TABLET ORAL at 04:09

## 2021-09-26 RX ADMIN — HYDROXYZINE PAMOATE 75 MG: 25 CAPSULE ORAL at 01:09

## 2021-09-26 RX ADMIN — QUETIAPINE FUMARATE 300 MG: 300 TABLET ORAL at 08:09

## 2021-09-26 RX ADMIN — FOLIC ACID 1 MG: 1 TABLET ORAL at 08:09

## 2021-09-27 VITALS
OXYGEN SATURATION: 98 % | DIASTOLIC BLOOD PRESSURE: 79 MMHG | HEIGHT: 64 IN | WEIGHT: 152.56 LBS | BODY MASS INDEX: 26.05 KG/M2 | TEMPERATURE: 98 F | HEART RATE: 88 BPM | RESPIRATION RATE: 16 BRPM | SYSTOLIC BLOOD PRESSURE: 117 MMHG

## 2021-09-27 PROBLEM — T50.902A SUICIDAL OVERDOSE: Status: RESOLVED | Noted: 2021-09-13 | Resolved: 2021-09-27

## 2021-09-27 PROCEDURE — 25000003 PHARM REV CODE 250: Mod: HCNC | Performed by: STUDENT IN AN ORGANIZED HEALTH CARE EDUCATION/TRAINING PROGRAM

## 2021-09-27 PROCEDURE — S4991 NICOTINE PATCH NONLEGEND: HCPCS | Mod: HCNC | Performed by: PSYCHIATRY & NEUROLOGY

## 2021-09-27 PROCEDURE — 25000003 PHARM REV CODE 250: Mod: HCNC | Performed by: PSYCHIATRY & NEUROLOGY

## 2021-09-27 PROCEDURE — 99239 PR HOSPITAL DISCHARGE DAY,>30 MIN: ICD-10-PCS | Mod: HCNC,,, | Performed by: PSYCHIATRY & NEUROLOGY

## 2021-09-27 PROCEDURE — 90833 PR PSYCHOTHERAPY W/PATIENT W/E&M, 30 MIN (ADD ON): ICD-10-PCS | Mod: HCNC,,, | Performed by: PSYCHIATRY & NEUROLOGY

## 2021-09-27 PROCEDURE — 99239 HOSP IP/OBS DSCHRG MGMT >30: CPT | Mod: HCNC,,, | Performed by: PSYCHIATRY & NEUROLOGY

## 2021-09-27 PROCEDURE — 90833 PSYTX W PT W E/M 30 MIN: CPT | Mod: HCNC,,, | Performed by: PSYCHIATRY & NEUROLOGY

## 2021-09-27 RX ORDER — QUETIAPINE FUMARATE 100 MG/1
100 TABLET, FILM COATED ORAL DAILY
Qty: 30 TABLET | Refills: 0 | Status: SHIPPED | OUTPATIENT
Start: 2021-09-28 | End: 2021-10-25

## 2021-09-27 RX ORDER — TRAZODONE HYDROCHLORIDE 100 MG/1
200 TABLET ORAL NIGHTLY
Qty: 60 TABLET | Refills: 0 | Status: SHIPPED | OUTPATIENT
Start: 2021-09-27 | End: 2021-10-25

## 2021-09-27 RX ORDER — IBUPROFEN 200 MG
1 TABLET ORAL DAILY
COMMUNITY
Start: 2021-09-27 | End: 2021-12-03

## 2021-09-27 RX ORDER — QUETIAPINE FUMARATE 300 MG/1
300 TABLET, FILM COATED ORAL NIGHTLY
Qty: 30 TABLET | Refills: 0 | Status: ON HOLD | OUTPATIENT
Start: 2021-09-27 | End: 2021-12-15 | Stop reason: SDUPTHER

## 2021-09-27 RX ORDER — DOCUSATE SODIUM 100 MG/1
100 CAPSULE, LIQUID FILLED ORAL 2 TIMES DAILY
Qty: 60 CAPSULE | Refills: 0 | Status: SHIPPED | OUTPATIENT
Start: 2021-09-27 | End: 2021-10-25

## 2021-09-27 RX ORDER — DIAZEPAM 10 MG/1
TABLET ORAL
Qty: 16 TABLET | Refills: 0 | Status: SHIPPED | OUTPATIENT
Start: 2021-09-27 | End: 2021-12-03

## 2021-09-27 RX ADMIN — FOLIC ACID 1 MG: 1 TABLET ORAL at 08:09

## 2021-09-27 RX ADMIN — DOCUSATE SODIUM 100 MG: 100 CAPSULE ORAL at 08:09

## 2021-09-27 RX ADMIN — HYDROXYZINE PAMOATE 75 MG: 25 CAPSULE ORAL at 02:09

## 2021-09-27 RX ADMIN — QUETIAPINE FUMARATE 100 MG: 100 TABLET ORAL at 08:09

## 2021-09-27 RX ADMIN — NICOTINE 1 PATCH: 14 PATCH, EXTENDED RELEASE TRANSDERMAL at 08:09

## 2021-09-27 RX ADMIN — MAGNESIUM HYDROXIDE 2400 MG: 400 SUSPENSION ORAL at 02:09

## 2021-09-27 RX ADMIN — DIAZEPAM 10 MG: 10 TABLET ORAL at 12:09

## 2021-09-27 RX ADMIN — DIAZEPAM 10 MG: 10 TABLET ORAL at 08:09

## 2021-09-27 RX ADMIN — THERA TABS 1 TABLET: TAB at 08:09

## 2021-11-06 ENCOUNTER — PATIENT OUTREACH (OUTPATIENT)
Dept: ADMINISTRATIVE | Facility: HOSPITAL | Age: 41
End: 2021-11-06
Payer: COMMERCIAL

## 2021-11-20 ENCOUNTER — PATIENT MESSAGE (OUTPATIENT)
Dept: ADMINISTRATIVE | Facility: HOSPITAL | Age: 41
End: 2021-11-20
Payer: COMMERCIAL

## 2021-12-03 ENCOUNTER — HOSPITAL ENCOUNTER (INPATIENT)
Facility: HOSPITAL | Age: 41
LOS: 12 days | Discharge: HOME | DRG: 885 | End: 2021-12-15
Attending: PSYCHIATRY & NEUROLOGY | Admitting: PSYCHIATRY & NEUROLOGY
Payer: COMMERCIAL

## 2021-12-03 ENCOUNTER — HOSPITAL ENCOUNTER (EMERGENCY)
Facility: HOSPITAL | Age: 41
Discharge: PSYCHIATRIC OR SUBSTANCE USE FACILITY | End: 2021-12-03
Attending: SURGERY
Payer: COMMERCIAL

## 2021-12-03 VITALS
HEART RATE: 102 BPM | SYSTOLIC BLOOD PRESSURE: 119 MMHG | DIASTOLIC BLOOD PRESSURE: 82 MMHG | BODY MASS INDEX: 25.15 KG/M2 | TEMPERATURE: 98 F | WEIGHT: 146.5 LBS | RESPIRATION RATE: 20 BRPM | OXYGEN SATURATION: 98 %

## 2021-12-03 DIAGNOSIS — F29 PSYCHOSIS, UNSPECIFIED PSYCHOSIS TYPE: Primary | ICD-10-CM

## 2021-12-03 DIAGNOSIS — F31.9 BIPOLAR DISORDER WITH DEPRESSION: ICD-10-CM

## 2021-12-03 DIAGNOSIS — F31.4 BIPOLAR I DISORDER, MOST RECENT EPISODE DEPRESSED, SEVERE WITHOUT PSYCHOTIC FEATURES: ICD-10-CM

## 2021-12-03 DIAGNOSIS — F31.2 BIPOLAR AFFECTIVE DISORDER, MANIC, SEVERE, WITH PSYCHOTIC BEHAVIOR: Primary | ICD-10-CM

## 2021-12-03 DIAGNOSIS — R06.02 SOB (SHORTNESS OF BREATH) ON EXERTION: ICD-10-CM

## 2021-12-03 LAB
ALBUMIN SERPL BCP-MCNC: 4.2 G/DL (ref 3.5–5.2)
ALP SERPL-CCNC: 99 U/L (ref 55–135)
ALT SERPL W/O P-5'-P-CCNC: 7 U/L (ref 10–44)
AMPHET+METHAMPHET UR QL: ABNORMAL
ANION GAP SERPL CALC-SCNC: 13 MMOL/L (ref 8–16)
APAP SERPL-MCNC: <3 UG/ML (ref 10–20)
AST SERPL-CCNC: 10 U/L (ref 10–40)
B-HCG UR QL: NEGATIVE
BACTERIA #/AREA URNS HPF: ABNORMAL /HPF
BARBITURATES UR QL SCN>200 NG/ML: NEGATIVE
BASOPHILS # BLD AUTO: 0.07 K/UL (ref 0–0.2)
BASOPHILS NFR BLD: 0.6 % (ref 0–1.9)
BENZODIAZ UR QL SCN>200 NG/ML: NEGATIVE
BILIRUB SERPL-MCNC: 0.4 MG/DL (ref 0.1–1)
BILIRUB UR QL STRIP: NEGATIVE
BUN SERPL-MCNC: 8 MG/DL (ref 6–20)
BZE UR QL SCN: NEGATIVE
CALCIUM SERPL-MCNC: 9.7 MG/DL (ref 8.7–10.5)
CANNABINOIDS UR QL SCN: NEGATIVE
CHLORIDE SERPL-SCNC: 106 MMOL/L (ref 95–110)
CLARITY UR: CLEAR
CO2 SERPL-SCNC: 24 MMOL/L (ref 23–29)
COLOR UR: YELLOW
CREAT SERPL-MCNC: 1 MG/DL (ref 0.5–1.4)
CREAT UR-MCNC: 48.2 MG/DL (ref 15–325)
DIFFERENTIAL METHOD: ABNORMAL
EOSINOPHIL # BLD AUTO: 0.1 K/UL (ref 0–0.5)
EOSINOPHIL NFR BLD: 0.5 % (ref 0–8)
ERYTHROCYTE [DISTWIDTH] IN BLOOD BY AUTOMATED COUNT: 13.4 % (ref 11.5–14.5)
EST. GFR  (AFRICAN AMERICAN): >60 ML/MIN/1.73 M^2
EST. GFR  (NON AFRICAN AMERICAN): >60 ML/MIN/1.73 M^2
ETHANOL SERPL-MCNC: <10 MG/DL
GLUCOSE SERPL-MCNC: 118 MG/DL (ref 70–110)
GLUCOSE UR QL STRIP: NEGATIVE
HCT VFR BLD AUTO: 43.9 % (ref 37–48.5)
HGB BLD-MCNC: 14.3 G/DL (ref 12–16)
HGB UR QL STRIP: ABNORMAL
IMM GRANULOCYTES # BLD AUTO: 0.02 K/UL (ref 0–0.04)
IMM GRANULOCYTES NFR BLD AUTO: 0.2 % (ref 0–0.5)
KETONES UR QL STRIP: NEGATIVE
LEUKOCYTE ESTERASE UR QL STRIP: NEGATIVE
LYMPHOCYTES # BLD AUTO: 3.7 K/UL (ref 1–4.8)
LYMPHOCYTES NFR BLD: 33.4 % (ref 18–48)
MCH RBC QN AUTO: 28.9 PG (ref 27–31)
MCHC RBC AUTO-ENTMCNC: 32.6 G/DL (ref 32–36)
MCV RBC AUTO: 89 FL (ref 82–98)
METHADONE UR QL SCN>300 NG/ML: NEGATIVE
MICROSCOPIC COMMENT: ABNORMAL
MONOCYTES # BLD AUTO: 0.5 K/UL (ref 0.3–1)
MONOCYTES NFR BLD: 4.8 % (ref 4–15)
NEUTROPHILS # BLD AUTO: 6.7 K/UL (ref 1.8–7.7)
NEUTROPHILS NFR BLD: 60.5 % (ref 38–73)
NITRITE UR QL STRIP: NEGATIVE
NRBC BLD-RTO: 0 /100 WBC
OPIATES UR QL SCN: NEGATIVE
PCP UR QL SCN>25 NG/ML: NEGATIVE
PH UR STRIP: 7 [PH] (ref 5–8)
PLATELET # BLD AUTO: 504 K/UL (ref 150–450)
PMV BLD AUTO: 10.2 FL (ref 9.2–12.9)
POTASSIUM SERPL-SCNC: 3.6 MMOL/L (ref 3.5–5.1)
PROT SERPL-MCNC: 8.2 G/DL (ref 6–8.4)
PROT UR QL STRIP: NEGATIVE
RBC # BLD AUTO: 4.95 M/UL (ref 4–5.4)
RBC #/AREA URNS HPF: 6 /HPF (ref 0–4)
SALICYLATES SERPL-MCNC: <5 MG/DL (ref 15–30)
SARS-COV-2 RDRP RESP QL NAA+PROBE: NEGATIVE
SODIUM SERPL-SCNC: 143 MMOL/L (ref 136–145)
SP GR UR STRIP: 1.01 (ref 1–1.03)
SQUAMOUS #/AREA URNS HPF: 2 /HPF
T4 FREE SERPL-MCNC: 1.03 NG/DL (ref 0.71–1.51)
TOXICOLOGY INFORMATION: ABNORMAL
TSH SERPL DL<=0.005 MIU/L-ACNC: 0.68 UIU/ML (ref 0.4–4)
URN SPEC COLLECT METH UR: ABNORMAL
UROBILINOGEN UR STRIP-ACNC: NEGATIVE EU/DL
WBC # BLD AUTO: 11.11 K/UL (ref 3.9–12.7)
WBC #/AREA URNS HPF: 1 /HPF (ref 0–5)

## 2021-12-03 PROCEDURE — 80143 DRUG ASSAY ACETAMINOPHEN: CPT | Performed by: SURGERY

## 2021-12-03 PROCEDURE — 36415 COLL VENOUS BLD VENIPUNCTURE: CPT | Performed by: SURGERY

## 2021-12-03 PROCEDURE — 85025 COMPLETE CBC W/AUTO DIFF WBC: CPT | Performed by: SURGERY

## 2021-12-03 PROCEDURE — 82607 VITAMIN B-12: CPT | Performed by: SURGERY

## 2021-12-03 PROCEDURE — 25000003 PHARM REV CODE 250: Performed by: PSYCHIATRY & NEUROLOGY

## 2021-12-03 PROCEDURE — 81000 URINALYSIS NONAUTO W/SCOPE: CPT | Mod: 59 | Performed by: SURGERY

## 2021-12-03 PROCEDURE — 84439 ASSAY OF FREE THYROXINE: CPT | Performed by: SURGERY

## 2021-12-03 PROCEDURE — 82746 ASSAY OF FOLIC ACID SERUM: CPT | Performed by: SURGERY

## 2021-12-03 PROCEDURE — 82306 VITAMIN D 25 HYDROXY: CPT | Performed by: SURGERY

## 2021-12-03 PROCEDURE — 83036 HEMOGLOBIN GLYCOSYLATED A1C: CPT | Performed by: PSYCHIATRY & NEUROLOGY

## 2021-12-03 PROCEDURE — 84481 FREE ASSAY (FT-3): CPT | Performed by: SURGERY

## 2021-12-03 PROCEDURE — 82077 ASSAY SPEC XCP UR&BREATH IA: CPT | Performed by: SURGERY

## 2021-12-03 PROCEDURE — 84443 ASSAY THYROID STIM HORMONE: CPT | Performed by: SURGERY

## 2021-12-03 PROCEDURE — 90833 PR PSYCHOTHERAPY W/PATIENT W/E&M, 30 MIN (ADD ON): ICD-10-PCS | Mod: ,,, | Performed by: PSYCHIATRY & NEUROLOGY

## 2021-12-03 PROCEDURE — 80053 COMPREHEN METABOLIC PANEL: CPT | Performed by: SURGERY

## 2021-12-03 PROCEDURE — 99223 PR INITIAL HOSPITAL CARE,LEVL III: ICD-10-PCS | Mod: ,,, | Performed by: PSYCHIATRY & NEUROLOGY

## 2021-12-03 PROCEDURE — 81025 URINE PREGNANCY TEST: CPT | Performed by: SURGERY

## 2021-12-03 PROCEDURE — 96360 HYDRATION IV INFUSION INIT: CPT

## 2021-12-03 PROCEDURE — 99285 EMERGENCY DEPT VISIT HI MDM: CPT | Mod: 25

## 2021-12-03 PROCEDURE — 99223 1ST HOSP IP/OBS HIGH 75: CPT | Mod: ,,, | Performed by: PSYCHIATRY & NEUROLOGY

## 2021-12-03 PROCEDURE — 11400000 HC PSYCH PRIVATE ROOM

## 2021-12-03 PROCEDURE — U0002 COVID-19 LAB TEST NON-CDC: HCPCS | Performed by: SURGERY

## 2021-12-03 PROCEDURE — 96361 HYDRATE IV INFUSION ADD-ON: CPT

## 2021-12-03 PROCEDURE — 80307 DRUG TEST PRSMV CHEM ANLYZR: CPT | Performed by: SURGERY

## 2021-12-03 PROCEDURE — 25000003 PHARM REV CODE 250: Performed by: SURGERY

## 2021-12-03 PROCEDURE — 90833 PSYTX W PT W E/M 30 MIN: CPT | Mod: ,,, | Performed by: PSYCHIATRY & NEUROLOGY

## 2021-12-03 PROCEDURE — 80179 DRUG ASSAY SALICYLATE: CPT | Performed by: SURGERY

## 2021-12-03 RX ORDER — ACETAMINOPHEN 325 MG/1
650 TABLET ORAL EVERY 6 HOURS PRN
Status: DISCONTINUED | OUTPATIENT
Start: 2021-12-03 | End: 2021-12-15 | Stop reason: HOSPADM

## 2021-12-03 RX ORDER — IBUPROFEN 200 MG
1 TABLET ORAL DAILY
Status: DISCONTINUED | OUTPATIENT
Start: 2021-12-04 | End: 2021-12-09

## 2021-12-03 RX ORDER — OLANZAPINE 10 MG/1
10 TABLET ORAL EVERY 4 HOURS PRN
Status: DISCONTINUED | OUTPATIENT
Start: 2021-12-03 | End: 2021-12-04

## 2021-12-03 RX ORDER — HALOPERIDOL 5 MG/ML
5 INJECTION INTRAMUSCULAR EVERY 4 HOURS PRN
Status: DISCONTINUED | OUTPATIENT
Start: 2021-12-03 | End: 2021-12-03 | Stop reason: HOSPADM

## 2021-12-03 RX ORDER — FOLIC ACID 1 MG/1
1 TABLET ORAL DAILY
Status: DISCONTINUED | OUTPATIENT
Start: 2021-12-04 | End: 2021-12-15 | Stop reason: HOSPADM

## 2021-12-03 RX ORDER — LORAZEPAM 2 MG/ML
2 INJECTION INTRAMUSCULAR EVERY 4 HOURS PRN
Status: DISCONTINUED | OUTPATIENT
Start: 2021-12-03 | End: 2021-12-03 | Stop reason: HOSPADM

## 2021-12-03 RX ORDER — OLANZAPINE 10 MG/2ML
10 INJECTION, POWDER, FOR SOLUTION INTRAMUSCULAR EVERY 4 HOURS PRN
Status: DISCONTINUED | OUTPATIENT
Start: 2021-12-03 | End: 2021-12-04

## 2021-12-03 RX ORDER — DOCUSATE SODIUM 100 MG/1
100 CAPSULE, LIQUID FILLED ORAL DAILY PRN
Status: DISCONTINUED | OUTPATIENT
Start: 2021-12-03 | End: 2021-12-15 | Stop reason: HOSPADM

## 2021-12-03 RX ORDER — LOPERAMIDE HYDROCHLORIDE 2 MG/1
2 CAPSULE ORAL
Status: DISCONTINUED | OUTPATIENT
Start: 2021-12-03 | End: 2021-12-15 | Stop reason: HOSPADM

## 2021-12-03 RX ORDER — HYDROXYZINE PAMOATE 50 MG/1
50 CAPSULE ORAL EVERY 6 HOURS PRN
Status: DISCONTINUED | OUTPATIENT
Start: 2021-12-03 | End: 2021-12-10

## 2021-12-03 RX ORDER — QUETIAPINE FUMARATE 300 MG/1
300 TABLET, FILM COATED ORAL NIGHTLY
Status: DISCONTINUED | OUTPATIENT
Start: 2021-12-03 | End: 2021-12-15 | Stop reason: HOSPADM

## 2021-12-03 RX ORDER — DIPHENHYDRAMINE HYDROCHLORIDE 50 MG/ML
50 INJECTION INTRAMUSCULAR; INTRAVENOUS EVERY 4 HOURS PRN
Status: DISCONTINUED | OUTPATIENT
Start: 2021-12-03 | End: 2021-12-03 | Stop reason: HOSPADM

## 2021-12-03 RX ORDER — MAG HYDROX/ALUMINUM HYD/SIMETH 200-200-20
30 SUSPENSION, ORAL (FINAL DOSE FORM) ORAL EVERY 6 HOURS PRN
Status: DISCONTINUED | OUTPATIENT
Start: 2021-12-03 | End: 2021-12-15 | Stop reason: HOSPADM

## 2021-12-03 RX ORDER — IBUPROFEN 200 MG
1 TABLET ORAL DAILY PRN
Status: DISCONTINUED | OUTPATIENT
Start: 2021-12-03 | End: 2021-12-03

## 2021-12-03 RX ADMIN — SODIUM CHLORIDE 1000 ML: 0.9 INJECTION, SOLUTION INTRAVENOUS at 02:12

## 2021-12-03 RX ADMIN — QUETIAPINE FUMARATE 300 MG: 300 TABLET ORAL at 08:12

## 2021-12-04 LAB
25(OH)D3+25(OH)D2 SERPL-MCNC: 46 NG/ML (ref 30–96)
ESTIMATED AVG GLUCOSE: 103 MG/DL (ref 68–131)
FOLATE SERPL-MCNC: 11.6 NG/ML (ref 4–24)
HBA1C MFR BLD: 5.2 % (ref 4–5.6)
T3FREE SERPL-MCNC: 2 PG/ML (ref 2.3–4.2)
VIT B12 SERPL-MCNC: 401 PG/ML (ref 210–950)

## 2021-12-04 PROCEDURE — 36415 COLL VENOUS BLD VENIPUNCTURE: CPT | Performed by: PSYCHIATRY & NEUROLOGY

## 2021-12-04 PROCEDURE — S4991 NICOTINE PATCH NONLEGEND: HCPCS | Performed by: PSYCHIATRY & NEUROLOGY

## 2021-12-04 PROCEDURE — 11400000 HC PSYCH PRIVATE ROOM

## 2021-12-04 PROCEDURE — 25000003 PHARM REV CODE 250: Performed by: STUDENT IN AN ORGANIZED HEALTH CARE EDUCATION/TRAINING PROGRAM

## 2021-12-04 PROCEDURE — 99233 PR SUBSEQUENT HOSPITAL CARE,LEVL III: ICD-10-PCS | Mod: ,,, | Performed by: STUDENT IN AN ORGANIZED HEALTH CARE EDUCATION/TRAINING PROGRAM

## 2021-12-04 PROCEDURE — 63600175 PHARM REV CODE 636 W HCPCS: Performed by: STUDENT IN AN ORGANIZED HEALTH CARE EDUCATION/TRAINING PROGRAM

## 2021-12-04 PROCEDURE — 25000003 PHARM REV CODE 250: Performed by: PSYCHIATRY & NEUROLOGY

## 2021-12-04 PROCEDURE — 99233 SBSQ HOSP IP/OBS HIGH 50: CPT | Mod: ,,, | Performed by: STUDENT IN AN ORGANIZED HEALTH CARE EDUCATION/TRAINING PROGRAM

## 2021-12-04 RX ORDER — LORAZEPAM 1 MG/1
2 TABLET ORAL ONCE
Status: COMPLETED | OUTPATIENT
Start: 2021-12-04 | End: 2021-12-04

## 2021-12-04 RX ORDER — HALOPERIDOL 5 MG/1
5 TABLET ORAL EVERY 6 HOURS PRN
Status: DISCONTINUED | OUTPATIENT
Start: 2021-12-04 | End: 2021-12-15 | Stop reason: HOSPADM

## 2021-12-04 RX ORDER — HALOPERIDOL 5 MG/1
5 TABLET ORAL ONCE
Status: DISCONTINUED | OUTPATIENT
Start: 2021-12-04 | End: 2021-12-04

## 2021-12-04 RX ORDER — LORAZEPAM 1 MG/1
2 TABLET ORAL ONCE
Status: DISCONTINUED | OUTPATIENT
Start: 2021-12-04 | End: 2021-12-04

## 2021-12-04 RX ORDER — HALOPERIDOL 5 MG/ML
5 INJECTION INTRAMUSCULAR EVERY 6 HOURS PRN
Status: DISCONTINUED | OUTPATIENT
Start: 2021-12-04 | End: 2021-12-15 | Stop reason: HOSPADM

## 2021-12-04 RX ORDER — DIPHENHYDRAMINE HCL 50 MG
50 CAPSULE ORAL EVERY 6 HOURS PRN
Status: DISCONTINUED | OUTPATIENT
Start: 2021-12-04 | End: 2021-12-15 | Stop reason: HOSPADM

## 2021-12-04 RX ORDER — LORAZEPAM 2 MG/ML
2 INJECTION INTRAMUSCULAR ONCE
Status: COMPLETED | OUTPATIENT
Start: 2021-12-04 | End: 2021-12-04

## 2021-12-04 RX ORDER — DIPHENHYDRAMINE HYDROCHLORIDE 50 MG/ML
50 INJECTION INTRAMUSCULAR; INTRAVENOUS EVERY 6 HOURS PRN
Status: DISCONTINUED | OUTPATIENT
Start: 2021-12-04 | End: 2021-12-15 | Stop reason: HOSPADM

## 2021-12-04 RX ORDER — DIPHENHYDRAMINE HCL 50 MG
50 CAPSULE ORAL ONCE
Status: DISCONTINUED | OUTPATIENT
Start: 2021-12-04 | End: 2021-12-04

## 2021-12-04 RX ORDER — LORAZEPAM 1 MG/1
2 TABLET ORAL ONCE
Status: DISCONTINUED | OUTPATIENT
Start: 2021-12-04 | End: 2021-12-04 | Stop reason: SDUPTHER

## 2021-12-04 RX ADMIN — LORAZEPAM 2 MG: 1 TABLET ORAL at 03:12

## 2021-12-04 RX ADMIN — LORAZEPAM 2 MG: 1 TABLET ORAL at 05:12

## 2021-12-04 RX ADMIN — FOLIC ACID 1 MG: 1 TABLET ORAL at 08:12

## 2021-12-04 RX ADMIN — NICOTINE 1 PATCH: 14 PATCH, EXTENDED RELEASE TRANSDERMAL at 10:12

## 2021-12-04 RX ADMIN — LORAZEPAM 2 MG: 1 TABLET ORAL at 07:12

## 2021-12-04 RX ADMIN — THERA TABS 1 TABLET: TAB at 08:12

## 2021-12-04 RX ADMIN — DIPHENHYDRAMINE HYDROCHLORIDE 50 MG: 50 CAPSULE ORAL at 10:12

## 2021-12-04 RX ADMIN — DIPHENHYDRAMINE HYDROCHLORIDE 50 MG: 50 INJECTION INTRAMUSCULAR; INTRAVENOUS at 03:12

## 2021-12-04 RX ADMIN — LORAZEPAM 2 MG: 2 INJECTION INTRAMUSCULAR; INTRAVENOUS at 02:12

## 2021-12-04 RX ADMIN — HYDROXYZINE PAMOATE 50 MG: 50 CAPSULE ORAL at 01:12

## 2021-12-04 RX ADMIN — LORAZEPAM 2 MG: 1 TABLET ORAL at 09:12

## 2021-12-04 RX ADMIN — OLANZAPINE 10 MG: 10 TABLET, FILM COATED ORAL at 07:12

## 2021-12-04 RX ADMIN — QUETIAPINE FUMARATE 300 MG: 300 TABLET ORAL at 08:12

## 2021-12-04 RX ADMIN — HALOPERIDOL LACTATE 5 MG: 5 INJECTION, SOLUTION INTRAMUSCULAR at 03:12

## 2021-12-04 RX ADMIN — LORAZEPAM 2 MG: 2 INJECTION INTRAMUSCULAR; INTRAVENOUS at 12:12

## 2021-12-04 RX ADMIN — HALOPERIDOL 5 MG: 5 TABLET ORAL at 10:12

## 2021-12-05 PROCEDURE — 99233 PR SUBSEQUENT HOSPITAL CARE,LEVL III: ICD-10-PCS | Mod: ,,, | Performed by: STUDENT IN AN ORGANIZED HEALTH CARE EDUCATION/TRAINING PROGRAM

## 2021-12-05 PROCEDURE — 11400000 HC PSYCH PRIVATE ROOM

## 2021-12-05 PROCEDURE — S4991 NICOTINE PATCH NONLEGEND: HCPCS | Performed by: PSYCHIATRY & NEUROLOGY

## 2021-12-05 PROCEDURE — 25000003 PHARM REV CODE 250: Performed by: STUDENT IN AN ORGANIZED HEALTH CARE EDUCATION/TRAINING PROGRAM

## 2021-12-05 PROCEDURE — 25000003 PHARM REV CODE 250: Performed by: PSYCHIATRY & NEUROLOGY

## 2021-12-05 PROCEDURE — 99233 SBSQ HOSP IP/OBS HIGH 50: CPT | Mod: ,,, | Performed by: STUDENT IN AN ORGANIZED HEALTH CARE EDUCATION/TRAINING PROGRAM

## 2021-12-05 RX ORDER — LORAZEPAM 1 MG/1
2 TABLET ORAL 4 TIMES DAILY
Status: DISCONTINUED | OUTPATIENT
Start: 2021-12-05 | End: 2021-12-05

## 2021-12-05 RX ORDER — LORAZEPAM 1 MG/1
2 TABLET ORAL ONCE
Status: COMPLETED | OUTPATIENT
Start: 2021-12-05 | End: 2021-12-05

## 2021-12-05 RX ORDER — LORAZEPAM 1 MG/1
2 TABLET ORAL 3 TIMES DAILY
Status: DISCONTINUED | OUTPATIENT
Start: 2021-12-05 | End: 2021-12-08

## 2021-12-05 RX ADMIN — FOLIC ACID 1 MG: 1 TABLET ORAL at 08:12

## 2021-12-05 RX ADMIN — LORAZEPAM 2 MG: 1 TABLET ORAL at 09:12

## 2021-12-05 RX ADMIN — LORAZEPAM 2 MG: 1 TABLET ORAL at 08:12

## 2021-12-05 RX ADMIN — QUETIAPINE FUMARATE 300 MG: 300 TABLET ORAL at 08:12

## 2021-12-05 RX ADMIN — LORAZEPAM 2 MG: 1 TABLET ORAL at 04:12

## 2021-12-05 RX ADMIN — DIPHENHYDRAMINE HYDROCHLORIDE 50 MG: 50 CAPSULE ORAL at 08:12

## 2021-12-05 RX ADMIN — THERA TABS 1 TABLET: TAB at 08:12

## 2021-12-05 RX ADMIN — HALOPERIDOL 5 MG: 5 TABLET ORAL at 08:12

## 2021-12-06 LAB
SARS-COV-2 RNA RESP QL NAA+PROBE: NOT DETECTED
SARS-COV-2- CYCLE NUMBER: NORMAL

## 2021-12-06 PROCEDURE — 90833 PR PSYCHOTHERAPY W/PATIENT W/E&M, 30 MIN (ADD ON): ICD-10-PCS | Mod: ,,, | Performed by: STUDENT IN AN ORGANIZED HEALTH CARE EDUCATION/TRAINING PROGRAM

## 2021-12-06 PROCEDURE — 99221 PR INITIAL HOSPITAL CARE,LEVL I: ICD-10-PCS | Mod: ,,, | Performed by: NURSE PRACTITIONER

## 2021-12-06 PROCEDURE — 99233 PR SUBSEQUENT HOSPITAL CARE,LEVL III: ICD-10-PCS | Mod: ,,, | Performed by: STUDENT IN AN ORGANIZED HEALTH CARE EDUCATION/TRAINING PROGRAM

## 2021-12-06 PROCEDURE — 25000003 PHARM REV CODE 250: Performed by: PSYCHIATRY & NEUROLOGY

## 2021-12-06 PROCEDURE — U0005 INFEC AGEN DETEC AMPLI PROBE: HCPCS | Performed by: PSYCHIATRY & NEUROLOGY

## 2021-12-06 PROCEDURE — 99221 1ST HOSP IP/OBS SF/LOW 40: CPT | Mod: ,,, | Performed by: NURSE PRACTITIONER

## 2021-12-06 PROCEDURE — 11400000 HC PSYCH PRIVATE ROOM

## 2021-12-06 PROCEDURE — 25000003 PHARM REV CODE 250: Performed by: STUDENT IN AN ORGANIZED HEALTH CARE EDUCATION/TRAINING PROGRAM

## 2021-12-06 PROCEDURE — 99233 SBSQ HOSP IP/OBS HIGH 50: CPT | Mod: ,,, | Performed by: STUDENT IN AN ORGANIZED HEALTH CARE EDUCATION/TRAINING PROGRAM

## 2021-12-06 PROCEDURE — 90833 PSYTX W PT W E/M 30 MIN: CPT | Mod: ,,, | Performed by: STUDENT IN AN ORGANIZED HEALTH CARE EDUCATION/TRAINING PROGRAM

## 2021-12-06 PROCEDURE — S4991 NICOTINE PATCH NONLEGEND: HCPCS | Performed by: PSYCHIATRY & NEUROLOGY

## 2021-12-06 PROCEDURE — U0003 INFECTIOUS AGENT DETECTION BY NUCLEIC ACID (DNA OR RNA); SEVERE ACUTE RESPIRATORY SYNDROME CORONAVIRUS 2 (SARS-COV-2) (CORONAVIRUS DISEASE [COVID-19]), AMPLIFIED PROBE TECHNIQUE, MAKING USE OF HIGH THROUGHPUT TECHNOLOGIES AS DESCRIBED BY CMS-2020-01-R: HCPCS | Performed by: PSYCHIATRY & NEUROLOGY

## 2021-12-06 RX ADMIN — LORAZEPAM 2 MG: 1 TABLET ORAL at 08:12

## 2021-12-06 RX ADMIN — LORAZEPAM 2 MG: 1 TABLET ORAL at 07:12

## 2021-12-06 RX ADMIN — QUETIAPINE FUMARATE 300 MG: 300 TABLET ORAL at 08:12

## 2021-12-06 RX ADMIN — FOLIC ACID 1 MG: 1 TABLET ORAL at 07:12

## 2021-12-06 RX ADMIN — THERA TABS 1 TABLET: TAB at 07:12

## 2021-12-06 RX ADMIN — HYDROXYZINE PAMOATE 50 MG: 50 CAPSULE ORAL at 04:12

## 2021-12-06 RX ADMIN — NICOTINE 1 PATCH: 14 PATCH, EXTENDED RELEASE TRANSDERMAL at 07:12

## 2021-12-06 RX ADMIN — LORAZEPAM 2 MG: 1 TABLET ORAL at 02:12

## 2021-12-06 RX ADMIN — DIPHENHYDRAMINE HYDROCHLORIDE 50 MG: 50 CAPSULE ORAL at 02:12

## 2021-12-06 RX ADMIN — HALOPERIDOL 5 MG: 5 TABLET ORAL at 02:12

## 2021-12-06 RX ADMIN — HYDROXYZINE PAMOATE 50 MG: 50 CAPSULE ORAL at 08:12

## 2021-12-07 PROCEDURE — 99233 SBSQ HOSP IP/OBS HIGH 50: CPT | Mod: ,,, | Performed by: STUDENT IN AN ORGANIZED HEALTH CARE EDUCATION/TRAINING PROGRAM

## 2021-12-07 PROCEDURE — 90833 PR PSYCHOTHERAPY W/PATIENT W/E&M, 30 MIN (ADD ON): ICD-10-PCS | Mod: ,,, | Performed by: STUDENT IN AN ORGANIZED HEALTH CARE EDUCATION/TRAINING PROGRAM

## 2021-12-07 PROCEDURE — 11400000 HC PSYCH PRIVATE ROOM

## 2021-12-07 PROCEDURE — S4991 NICOTINE PATCH NONLEGEND: HCPCS | Performed by: PSYCHIATRY & NEUROLOGY

## 2021-12-07 PROCEDURE — 90833 PSYTX W PT W E/M 30 MIN: CPT | Mod: ,,, | Performed by: STUDENT IN AN ORGANIZED HEALTH CARE EDUCATION/TRAINING PROGRAM

## 2021-12-07 PROCEDURE — 25000003 PHARM REV CODE 250: Performed by: STUDENT IN AN ORGANIZED HEALTH CARE EDUCATION/TRAINING PROGRAM

## 2021-12-07 PROCEDURE — 99233 PR SUBSEQUENT HOSPITAL CARE,LEVL III: ICD-10-PCS | Mod: ,,, | Performed by: STUDENT IN AN ORGANIZED HEALTH CARE EDUCATION/TRAINING PROGRAM

## 2021-12-07 PROCEDURE — 25000003 PHARM REV CODE 250: Performed by: PSYCHIATRY & NEUROLOGY

## 2021-12-07 RX ORDER — LORAZEPAM 1 MG/1
2 TABLET ORAL EVERY 4 HOURS PRN
Status: DISCONTINUED | OUTPATIENT
Start: 2021-12-07 | End: 2021-12-15 | Stop reason: HOSPADM

## 2021-12-07 RX ADMIN — HYDROXYZINE PAMOATE 50 MG: 50 CAPSULE ORAL at 08:12

## 2021-12-07 RX ADMIN — THERA TABS 1 TABLET: TAB at 08:12

## 2021-12-07 RX ADMIN — FOLIC ACID 1 MG: 1 TABLET ORAL at 08:12

## 2021-12-07 RX ADMIN — LORAZEPAM 2 MG: 1 TABLET ORAL at 08:12

## 2021-12-07 RX ADMIN — DIPHENHYDRAMINE HYDROCHLORIDE 50 MG: 50 CAPSULE ORAL at 01:12

## 2021-12-07 RX ADMIN — HYDROXYZINE PAMOATE 50 MG: 50 CAPSULE ORAL at 11:12

## 2021-12-07 RX ADMIN — NICOTINE 1 PATCH: 14 PATCH, EXTENDED RELEASE TRANSDERMAL at 08:12

## 2021-12-07 RX ADMIN — QUETIAPINE FUMARATE 300 MG: 300 TABLET ORAL at 08:12

## 2021-12-07 RX ADMIN — LORAZEPAM 2 MG: 1 TABLET ORAL at 03:12

## 2021-12-08 PROCEDURE — 25000003 PHARM REV CODE 250: Performed by: STUDENT IN AN ORGANIZED HEALTH CARE EDUCATION/TRAINING PROGRAM

## 2021-12-08 PROCEDURE — 90833 PSYTX W PT W E/M 30 MIN: CPT | Mod: ,,, | Performed by: STUDENT IN AN ORGANIZED HEALTH CARE EDUCATION/TRAINING PROGRAM

## 2021-12-08 PROCEDURE — S4991 NICOTINE PATCH NONLEGEND: HCPCS | Performed by: PSYCHIATRY & NEUROLOGY

## 2021-12-08 PROCEDURE — 90833 PR PSYCHOTHERAPY W/PATIENT W/E&M, 30 MIN (ADD ON): ICD-10-PCS | Mod: ,,, | Performed by: STUDENT IN AN ORGANIZED HEALTH CARE EDUCATION/TRAINING PROGRAM

## 2021-12-08 PROCEDURE — 99233 SBSQ HOSP IP/OBS HIGH 50: CPT | Mod: ,,, | Performed by: STUDENT IN AN ORGANIZED HEALTH CARE EDUCATION/TRAINING PROGRAM

## 2021-12-08 PROCEDURE — 11400000 HC PSYCH PRIVATE ROOM

## 2021-12-08 PROCEDURE — 25000003 PHARM REV CODE 250: Performed by: PSYCHIATRY & NEUROLOGY

## 2021-12-08 PROCEDURE — 99233 PR SUBSEQUENT HOSPITAL CARE,LEVL III: ICD-10-PCS | Mod: ,,, | Performed by: STUDENT IN AN ORGANIZED HEALTH CARE EDUCATION/TRAINING PROGRAM

## 2021-12-08 RX ORDER — LORAZEPAM 1 MG/1
1 TABLET ORAL NIGHTLY
Status: COMPLETED | OUTPATIENT
Start: 2021-12-08 | End: 2021-12-08

## 2021-12-08 RX ORDER — LORAZEPAM 1 MG/1
2 TABLET ORAL
Status: DISCONTINUED | OUTPATIENT
Start: 2021-12-08 | End: 2021-12-09

## 2021-12-08 RX ORDER — LORAZEPAM 1 MG/1
2 TABLET ORAL DAILY
Status: DISCONTINUED | OUTPATIENT
Start: 2021-12-09 | End: 2021-12-09

## 2021-12-08 RX ADMIN — HYDROXYZINE PAMOATE 50 MG: 50 CAPSULE ORAL at 04:12

## 2021-12-08 RX ADMIN — HYDROXYZINE PAMOATE 50 MG: 50 CAPSULE ORAL at 10:12

## 2021-12-08 RX ADMIN — LORAZEPAM 1 MG: 1 TABLET ORAL at 08:12

## 2021-12-08 RX ADMIN — HYDROXYZINE PAMOATE 50 MG: 50 CAPSULE ORAL at 09:12

## 2021-12-08 RX ADMIN — FOLIC ACID 1 MG: 1 TABLET ORAL at 08:12

## 2021-12-08 RX ADMIN — ACETAMINOPHEN 650 MG: 325 TABLET ORAL at 09:12

## 2021-12-08 RX ADMIN — QUETIAPINE FUMARATE 300 MG: 300 TABLET ORAL at 08:12

## 2021-12-08 RX ADMIN — NICOTINE 1 PATCH: 14 PATCH, EXTENDED RELEASE TRANSDERMAL at 08:12

## 2021-12-08 RX ADMIN — LORAZEPAM 2 MG: 1 TABLET ORAL at 08:12

## 2021-12-08 RX ADMIN — THERA TABS 1 TABLET: TAB at 08:12

## 2021-12-08 RX ADMIN — LORAZEPAM 2 MG: 1 TABLET ORAL at 12:12

## 2021-12-09 PROCEDURE — 99233 PR SUBSEQUENT HOSPITAL CARE,LEVL III: ICD-10-PCS | Mod: ,,, | Performed by: STUDENT IN AN ORGANIZED HEALTH CARE EDUCATION/TRAINING PROGRAM

## 2021-12-09 PROCEDURE — 25000003 PHARM REV CODE 250: Performed by: STUDENT IN AN ORGANIZED HEALTH CARE EDUCATION/TRAINING PROGRAM

## 2021-12-09 PROCEDURE — 11400000 HC PSYCH PRIVATE ROOM

## 2021-12-09 PROCEDURE — 90833 PR PSYCHOTHERAPY W/PATIENT W/E&M, 30 MIN (ADD ON): ICD-10-PCS | Mod: ,,, | Performed by: STUDENT IN AN ORGANIZED HEALTH CARE EDUCATION/TRAINING PROGRAM

## 2021-12-09 PROCEDURE — S4991 NICOTINE PATCH NONLEGEND: HCPCS | Performed by: STUDENT IN AN ORGANIZED HEALTH CARE EDUCATION/TRAINING PROGRAM

## 2021-12-09 PROCEDURE — 99233 SBSQ HOSP IP/OBS HIGH 50: CPT | Mod: ,,, | Performed by: STUDENT IN AN ORGANIZED HEALTH CARE EDUCATION/TRAINING PROGRAM

## 2021-12-09 PROCEDURE — 25000003 PHARM REV CODE 250: Performed by: PSYCHIATRY & NEUROLOGY

## 2021-12-09 PROCEDURE — S4991 NICOTINE PATCH NONLEGEND: HCPCS | Performed by: PSYCHIATRY & NEUROLOGY

## 2021-12-09 PROCEDURE — 90833 PSYTX W PT W E/M 30 MIN: CPT | Mod: ,,, | Performed by: STUDENT IN AN ORGANIZED HEALTH CARE EDUCATION/TRAINING PROGRAM

## 2021-12-09 RX ORDER — DIAZEPAM 5 MG/1
5 TABLET ORAL
Status: COMPLETED | OUTPATIENT
Start: 2021-12-09 | End: 2021-12-09

## 2021-12-09 RX ORDER — QUETIAPINE FUMARATE 100 MG/1
100 TABLET, FILM COATED ORAL DAILY
Status: DISCONTINUED | OUTPATIENT
Start: 2021-12-10 | End: 2021-12-15 | Stop reason: HOSPADM

## 2021-12-09 RX ORDER — DIAZEPAM 5 MG/1
5 TABLET ORAL 4 TIMES DAILY
Status: DISCONTINUED | OUTPATIENT
Start: 2021-12-10 | End: 2021-12-12

## 2021-12-09 RX ORDER — DIAZEPAM 10 MG/1
10 TABLET ORAL NIGHTLY
Status: COMPLETED | OUTPATIENT
Start: 2021-12-09 | End: 2021-12-09

## 2021-12-09 RX ORDER — IBUPROFEN 200 MG
1 TABLET ORAL DAILY
Status: DISCONTINUED | OUTPATIENT
Start: 2021-12-09 | End: 2021-12-15 | Stop reason: HOSPADM

## 2021-12-09 RX ADMIN — HYDROXYZINE PAMOATE 50 MG: 50 CAPSULE ORAL at 05:12

## 2021-12-09 RX ADMIN — NICOTINE 1 PATCH: 14 PATCH, EXTENDED RELEASE TRANSDERMAL at 08:12

## 2021-12-09 RX ADMIN — QUETIAPINE FUMARATE 300 MG: 300 TABLET ORAL at 08:12

## 2021-12-09 RX ADMIN — LORAZEPAM 2 MG: 1 TABLET ORAL at 08:12

## 2021-12-09 RX ADMIN — DIAZEPAM 5 MG: 5 TABLET ORAL at 12:12

## 2021-12-09 RX ADMIN — HYDROXYZINE PAMOATE 50 MG: 50 CAPSULE ORAL at 11:12

## 2021-12-09 RX ADMIN — FOLIC ACID 1 MG: 1 TABLET ORAL at 08:12

## 2021-12-09 RX ADMIN — Medication 1 PATCH: at 10:12

## 2021-12-09 RX ADMIN — THERA TABS 1 TABLET: TAB at 08:12

## 2021-12-09 RX ADMIN — DIAZEPAM 10 MG: 10 TABLET ORAL at 08:12

## 2021-12-10 PROCEDURE — S4991 NICOTINE PATCH NONLEGEND: HCPCS | Performed by: STUDENT IN AN ORGANIZED HEALTH CARE EDUCATION/TRAINING PROGRAM

## 2021-12-10 PROCEDURE — 99233 SBSQ HOSP IP/OBS HIGH 50: CPT | Mod: ,,, | Performed by: STUDENT IN AN ORGANIZED HEALTH CARE EDUCATION/TRAINING PROGRAM

## 2021-12-10 PROCEDURE — 90833 PSYTX W PT W E/M 30 MIN: CPT | Mod: ,,, | Performed by: STUDENT IN AN ORGANIZED HEALTH CARE EDUCATION/TRAINING PROGRAM

## 2021-12-10 PROCEDURE — 11400000 HC PSYCH PRIVATE ROOM

## 2021-12-10 PROCEDURE — 99233 PR SUBSEQUENT HOSPITAL CARE,LEVL III: ICD-10-PCS | Mod: ,,, | Performed by: STUDENT IN AN ORGANIZED HEALTH CARE EDUCATION/TRAINING PROGRAM

## 2021-12-10 PROCEDURE — 25000003 PHARM REV CODE 250: Performed by: STUDENT IN AN ORGANIZED HEALTH CARE EDUCATION/TRAINING PROGRAM

## 2021-12-10 PROCEDURE — 25000003 PHARM REV CODE 250: Performed by: PSYCHIATRY & NEUROLOGY

## 2021-12-10 PROCEDURE — 90833 PR PSYCHOTHERAPY W/PATIENT W/E&M, 30 MIN (ADD ON): ICD-10-PCS | Mod: ,,, | Performed by: STUDENT IN AN ORGANIZED HEALTH CARE EDUCATION/TRAINING PROGRAM

## 2021-12-10 RX ORDER — HYDROXYZINE PAMOATE 50 MG/1
50 CAPSULE ORAL EVERY 4 HOURS PRN
Status: DISCONTINUED | OUTPATIENT
Start: 2021-12-10 | End: 2021-12-15 | Stop reason: HOSPADM

## 2021-12-10 RX ORDER — TRAZODONE HYDROCHLORIDE 50 MG/1
50 TABLET ORAL NIGHTLY
Status: DISCONTINUED | OUTPATIENT
Start: 2021-12-10 | End: 2021-12-15 | Stop reason: HOSPADM

## 2021-12-10 RX ORDER — FLUTICASONE FUROATE AND VILANTEROL 100; 25 UG/1; UG/1
1 POWDER RESPIRATORY (INHALATION) DAILY
Status: DISCONTINUED | OUTPATIENT
Start: 2021-12-10 | End: 2021-12-15 | Stop reason: HOSPADM

## 2021-12-10 RX ORDER — SERTRALINE HYDROCHLORIDE 25 MG/1
25 TABLET, FILM COATED ORAL DAILY
Status: DISCONTINUED | OUTPATIENT
Start: 2021-12-10 | End: 2021-12-11

## 2021-12-10 RX ADMIN — HYDROXYZINE PAMOATE 50 MG: 50 CAPSULE ORAL at 11:12

## 2021-12-10 RX ADMIN — DIAZEPAM 5 MG: 5 TABLET ORAL at 04:12

## 2021-12-10 RX ADMIN — SERTRALINE HYDROCHLORIDE 25 MG: 25 TABLET ORAL at 11:12

## 2021-12-10 RX ADMIN — DIAZEPAM 5 MG: 5 TABLET ORAL at 08:12

## 2021-12-10 RX ADMIN — THERA TABS 1 TABLET: TAB at 08:12

## 2021-12-10 RX ADMIN — QUETIAPINE FUMARATE 300 MG: 300 TABLET ORAL at 08:12

## 2021-12-10 RX ADMIN — HYDROXYZINE PAMOATE 50 MG: 50 CAPSULE ORAL at 06:12

## 2021-12-10 RX ADMIN — Medication 1 PATCH: at 08:12

## 2021-12-10 RX ADMIN — DIAZEPAM 5 MG: 5 TABLET ORAL at 12:12

## 2021-12-10 RX ADMIN — QUETIAPINE FUMARATE 100 MG: 100 TABLET ORAL at 08:12

## 2021-12-10 RX ADMIN — FOLIC ACID 1 MG: 1 TABLET ORAL at 08:12

## 2021-12-10 RX ADMIN — TRAZODONE HYDROCHLORIDE 50 MG: 50 TABLET ORAL at 08:12

## 2021-12-10 RX ADMIN — HYDROXYZINE PAMOATE 50 MG: 50 CAPSULE ORAL at 03:12

## 2021-12-11 PROCEDURE — 11400000 HC PSYCH PRIVATE ROOM

## 2021-12-11 PROCEDURE — 25000003 PHARM REV CODE 250: Performed by: STUDENT IN AN ORGANIZED HEALTH CARE EDUCATION/TRAINING PROGRAM

## 2021-12-11 PROCEDURE — 25000242 PHARM REV CODE 250 ALT 637 W/ HCPCS: Performed by: STUDENT IN AN ORGANIZED HEALTH CARE EDUCATION/TRAINING PROGRAM

## 2021-12-11 PROCEDURE — 94640 AIRWAY INHALATION TREATMENT: CPT

## 2021-12-11 PROCEDURE — 25000003 PHARM REV CODE 250: Performed by: PSYCHIATRY & NEUROLOGY

## 2021-12-11 PROCEDURE — S4991 NICOTINE PATCH NONLEGEND: HCPCS | Performed by: STUDENT IN AN ORGANIZED HEALTH CARE EDUCATION/TRAINING PROGRAM

## 2021-12-11 PROCEDURE — 99233 PR SUBSEQUENT HOSPITAL CARE,LEVL III: ICD-10-PCS | Mod: ,,, | Performed by: PSYCHIATRY & NEUROLOGY

## 2021-12-11 PROCEDURE — 99233 SBSQ HOSP IP/OBS HIGH 50: CPT | Mod: ,,, | Performed by: PSYCHIATRY & NEUROLOGY

## 2021-12-11 RX ORDER — SERTRALINE HYDROCHLORIDE 50 MG/1
50 TABLET, FILM COATED ORAL DAILY
Status: DISCONTINUED | OUTPATIENT
Start: 2021-12-12 | End: 2021-12-15 | Stop reason: HOSPADM

## 2021-12-11 RX ADMIN — TRAZODONE HYDROCHLORIDE 50 MG: 50 TABLET ORAL at 08:12

## 2021-12-11 RX ADMIN — Medication 1 PATCH: at 08:12

## 2021-12-11 RX ADMIN — FOLIC ACID 1 MG: 1 TABLET ORAL at 08:12

## 2021-12-11 RX ADMIN — HYDROXYZINE PAMOATE 50 MG: 50 CAPSULE ORAL at 06:12

## 2021-12-11 RX ADMIN — HYDROXYZINE PAMOATE 50 MG: 50 CAPSULE ORAL at 02:12

## 2021-12-11 RX ADMIN — QUETIAPINE FUMARATE 300 MG: 300 TABLET ORAL at 08:12

## 2021-12-11 RX ADMIN — THERA TABS 1 TABLET: TAB at 08:12

## 2021-12-11 RX ADMIN — DIAZEPAM 5 MG: 5 TABLET ORAL at 04:12

## 2021-12-11 RX ADMIN — DIAZEPAM 5 MG: 5 TABLET ORAL at 01:12

## 2021-12-11 RX ADMIN — DIAZEPAM 5 MG: 5 TABLET ORAL at 08:12

## 2021-12-11 RX ADMIN — FLUTICASONE FUROATE AND VILANTEROL TRIFENATATE 1 PUFF: 100; 25 POWDER RESPIRATORY (INHALATION) at 07:12

## 2021-12-11 RX ADMIN — HYDROXYZINE PAMOATE 50 MG: 50 CAPSULE ORAL at 10:12

## 2021-12-11 RX ADMIN — SERTRALINE HYDROCHLORIDE 25 MG: 25 TABLET ORAL at 08:12

## 2021-12-11 RX ADMIN — QUETIAPINE FUMARATE 100 MG: 100 TABLET ORAL at 08:12

## 2021-12-11 RX ADMIN — HYDROXYZINE PAMOATE 50 MG: 50 CAPSULE ORAL at 08:12

## 2021-12-12 PROCEDURE — 11400000 HC PSYCH PRIVATE ROOM

## 2021-12-12 PROCEDURE — 94640 AIRWAY INHALATION TREATMENT: CPT

## 2021-12-12 PROCEDURE — 90833 PSYTX W PT W E/M 30 MIN: CPT | Mod: ,,, | Performed by: PSYCHIATRY & NEUROLOGY

## 2021-12-12 PROCEDURE — 90833 PR PSYCHOTHERAPY W/PATIENT W/E&M, 30 MIN (ADD ON): ICD-10-PCS | Mod: ,,, | Performed by: PSYCHIATRY & NEUROLOGY

## 2021-12-12 PROCEDURE — S4991 NICOTINE PATCH NONLEGEND: HCPCS | Performed by: STUDENT IN AN ORGANIZED HEALTH CARE EDUCATION/TRAINING PROGRAM

## 2021-12-12 PROCEDURE — 25000003 PHARM REV CODE 250: Performed by: PSYCHIATRY & NEUROLOGY

## 2021-12-12 PROCEDURE — 25000003 PHARM REV CODE 250: Performed by: STUDENT IN AN ORGANIZED HEALTH CARE EDUCATION/TRAINING PROGRAM

## 2021-12-12 PROCEDURE — 99233 SBSQ HOSP IP/OBS HIGH 50: CPT | Mod: ,,, | Performed by: PSYCHIATRY & NEUROLOGY

## 2021-12-12 PROCEDURE — 99233 PR SUBSEQUENT HOSPITAL CARE,LEVL III: ICD-10-PCS | Mod: ,,, | Performed by: PSYCHIATRY & NEUROLOGY

## 2021-12-12 RX ORDER — DIAZEPAM 5 MG/1
5 TABLET ORAL 3 TIMES DAILY
Status: DISCONTINUED | OUTPATIENT
Start: 2021-12-12 | End: 2021-12-13

## 2021-12-12 RX ADMIN — QUETIAPINE FUMARATE 100 MG: 100 TABLET ORAL at 08:12

## 2021-12-12 RX ADMIN — Medication 1 PATCH: at 08:12

## 2021-12-12 RX ADMIN — QUETIAPINE FUMARATE 300 MG: 300 TABLET ORAL at 08:12

## 2021-12-12 RX ADMIN — HYDROXYZINE PAMOATE 50 MG: 50 CAPSULE ORAL at 09:12

## 2021-12-12 RX ADMIN — HYDROXYZINE PAMOATE 50 MG: 50 CAPSULE ORAL at 05:12

## 2021-12-12 RX ADMIN — SERTRALINE 50 MG: 50 TABLET, FILM COATED ORAL at 08:12

## 2021-12-12 RX ADMIN — FLUTICASONE FUROATE AND VILANTEROL TRIFENATATE 1 PUFF: 100; 25 POWDER RESPIRATORY (INHALATION) at 07:12

## 2021-12-12 RX ADMIN — FOLIC ACID 1 MG: 1 TABLET ORAL at 08:12

## 2021-12-12 RX ADMIN — THERA TABS 1 TABLET: TAB at 08:12

## 2021-12-12 RX ADMIN — TRAZODONE HYDROCHLORIDE 50 MG: 50 TABLET ORAL at 08:12

## 2021-12-12 RX ADMIN — DIAZEPAM 5 MG: 5 TABLET ORAL at 02:12

## 2021-12-12 RX ADMIN — DIAZEPAM 5 MG: 5 TABLET ORAL at 08:12

## 2021-12-12 RX ADMIN — DOCUSATE SODIUM 100 MG: 100 CAPSULE ORAL at 03:12

## 2021-12-13 PROCEDURE — 94640 AIRWAY INHALATION TREATMENT: CPT

## 2021-12-13 PROCEDURE — 25000003 PHARM REV CODE 250: Performed by: STUDENT IN AN ORGANIZED HEALTH CARE EDUCATION/TRAINING PROGRAM

## 2021-12-13 PROCEDURE — 11400000 HC PSYCH PRIVATE ROOM

## 2021-12-13 PROCEDURE — 99233 PR SUBSEQUENT HOSPITAL CARE,LEVL III: ICD-10-PCS | Mod: ,,, | Performed by: STUDENT IN AN ORGANIZED HEALTH CARE EDUCATION/TRAINING PROGRAM

## 2021-12-13 PROCEDURE — 25000003 PHARM REV CODE 250: Performed by: PSYCHIATRY & NEUROLOGY

## 2021-12-13 PROCEDURE — 99233 SBSQ HOSP IP/OBS HIGH 50: CPT | Mod: ,,, | Performed by: STUDENT IN AN ORGANIZED HEALTH CARE EDUCATION/TRAINING PROGRAM

## 2021-12-13 PROCEDURE — 90833 PR PSYCHOTHERAPY W/PATIENT W/E&M, 30 MIN (ADD ON): ICD-10-PCS | Mod: ,,, | Performed by: STUDENT IN AN ORGANIZED HEALTH CARE EDUCATION/TRAINING PROGRAM

## 2021-12-13 PROCEDURE — S4991 NICOTINE PATCH NONLEGEND: HCPCS | Performed by: STUDENT IN AN ORGANIZED HEALTH CARE EDUCATION/TRAINING PROGRAM

## 2021-12-13 PROCEDURE — 90833 PSYTX W PT W E/M 30 MIN: CPT | Mod: ,,, | Performed by: STUDENT IN AN ORGANIZED HEALTH CARE EDUCATION/TRAINING PROGRAM

## 2021-12-13 RX ORDER — DIAZEPAM 5 MG/1
5 TABLET ORAL 2 TIMES DAILY
Status: DISCONTINUED | OUTPATIENT
Start: 2021-12-14 | End: 2021-12-14

## 2021-12-13 RX ORDER — QUETIAPINE FUMARATE 25 MG/1
50 TABLET, FILM COATED ORAL
Status: DISCONTINUED | OUTPATIENT
Start: 2021-12-13 | End: 2021-12-15 | Stop reason: HOSPADM

## 2021-12-13 RX ADMIN — Medication 1 PATCH: at 08:12

## 2021-12-13 RX ADMIN — SERTRALINE 50 MG: 50 TABLET, FILM COATED ORAL at 08:12

## 2021-12-13 RX ADMIN — HYDROXYZINE PAMOATE 50 MG: 50 CAPSULE ORAL at 06:12

## 2021-12-13 RX ADMIN — HYDROXYZINE PAMOATE 50 MG: 50 CAPSULE ORAL at 12:12

## 2021-12-13 RX ADMIN — QUETIAPINE FUMARATE 100 MG: 100 TABLET ORAL at 08:12

## 2021-12-13 RX ADMIN — QUETIAPINE FUMARATE 50 MG: 25 TABLET ORAL at 05:12

## 2021-12-13 RX ADMIN — TRAZODONE HYDROCHLORIDE 50 MG: 50 TABLET ORAL at 08:12

## 2021-12-13 RX ADMIN — DIAZEPAM 5 MG: 5 TABLET ORAL at 02:12

## 2021-12-13 RX ADMIN — HYDROXYZINE PAMOATE 50 MG: 50 CAPSULE ORAL at 01:12

## 2021-12-13 RX ADMIN — FLUTICASONE FUROATE AND VILANTEROL TRIFENATATE 1 PUFF: 100; 25 POWDER RESPIRATORY (INHALATION) at 09:12

## 2021-12-13 RX ADMIN — QUETIAPINE FUMARATE 300 MG: 300 TABLET ORAL at 08:12

## 2021-12-13 RX ADMIN — THERA TABS 1 TABLET: TAB at 08:12

## 2021-12-13 RX ADMIN — DIAZEPAM 5 MG: 5 TABLET ORAL at 08:12

## 2021-12-13 RX ADMIN — FOLIC ACID 1 MG: 1 TABLET ORAL at 08:12

## 2021-12-14 PROCEDURE — 94640 AIRWAY INHALATION TREATMENT: CPT

## 2021-12-14 PROCEDURE — S4991 NICOTINE PATCH NONLEGEND: HCPCS | Performed by: STUDENT IN AN ORGANIZED HEALTH CARE EDUCATION/TRAINING PROGRAM

## 2021-12-14 PROCEDURE — 99233 SBSQ HOSP IP/OBS HIGH 50: CPT | Mod: ,,, | Performed by: STUDENT IN AN ORGANIZED HEALTH CARE EDUCATION/TRAINING PROGRAM

## 2021-12-14 PROCEDURE — 25000003 PHARM REV CODE 250: Performed by: STUDENT IN AN ORGANIZED HEALTH CARE EDUCATION/TRAINING PROGRAM

## 2021-12-14 PROCEDURE — 11400000 HC PSYCH PRIVATE ROOM

## 2021-12-14 PROCEDURE — 25000003 PHARM REV CODE 250: Performed by: PSYCHIATRY & NEUROLOGY

## 2021-12-14 PROCEDURE — 99233 PR SUBSEQUENT HOSPITAL CARE,LEVL III: ICD-10-PCS | Mod: ,,, | Performed by: STUDENT IN AN ORGANIZED HEALTH CARE EDUCATION/TRAINING PROGRAM

## 2021-12-14 RX ORDER — DIAZEPAM 5 MG/1
5 TABLET ORAL DAILY
Status: DISCONTINUED | OUTPATIENT
Start: 2021-12-15 | End: 2021-12-15 | Stop reason: HOSPADM

## 2021-12-14 RX ADMIN — FLUTICASONE FUROATE AND VILANTEROL TRIFENATATE 1 PUFF: 100; 25 POWDER RESPIRATORY (INHALATION) at 08:12

## 2021-12-14 RX ADMIN — QUETIAPINE FUMARATE 50 MG: 25 TABLET ORAL at 12:12

## 2021-12-14 RX ADMIN — HYDROXYZINE PAMOATE 50 MG: 50 CAPSULE ORAL at 03:12

## 2021-12-14 RX ADMIN — FOLIC ACID 1 MG: 1 TABLET ORAL at 08:12

## 2021-12-14 RX ADMIN — TRAZODONE HYDROCHLORIDE 50 MG: 50 TABLET ORAL at 08:12

## 2021-12-14 RX ADMIN — DIAZEPAM 5 MG: 5 TABLET ORAL at 08:12

## 2021-12-14 RX ADMIN — HYDROXYZINE PAMOATE 50 MG: 50 CAPSULE ORAL at 10:12

## 2021-12-14 RX ADMIN — SERTRALINE 50 MG: 50 TABLET, FILM COATED ORAL at 08:12

## 2021-12-14 RX ADMIN — QUETIAPINE FUMARATE 300 MG: 300 TABLET ORAL at 08:12

## 2021-12-14 RX ADMIN — QUETIAPINE FUMARATE 100 MG: 100 TABLET ORAL at 08:12

## 2021-12-14 RX ADMIN — Medication 1 PATCH: at 08:12

## 2021-12-14 RX ADMIN — THERA TABS 1 TABLET: TAB at 08:12

## 2021-12-15 VITALS
TEMPERATURE: 98 F | HEIGHT: 64 IN | WEIGHT: 151.56 LBS | OXYGEN SATURATION: 98 % | RESPIRATION RATE: 18 BRPM | SYSTOLIC BLOOD PRESSURE: 121 MMHG | HEART RATE: 80 BPM | DIASTOLIC BLOOD PRESSURE: 73 MMHG | BODY MASS INDEX: 25.88 KG/M2

## 2021-12-15 PROCEDURE — 90833 PSYTX W PT W E/M 30 MIN: CPT | Mod: ,,, | Performed by: STUDENT IN AN ORGANIZED HEALTH CARE EDUCATION/TRAINING PROGRAM

## 2021-12-15 PROCEDURE — 25000003 PHARM REV CODE 250: Performed by: PSYCHIATRY & NEUROLOGY

## 2021-12-15 PROCEDURE — S4991 NICOTINE PATCH NONLEGEND: HCPCS | Performed by: STUDENT IN AN ORGANIZED HEALTH CARE EDUCATION/TRAINING PROGRAM

## 2021-12-15 PROCEDURE — 94640 AIRWAY INHALATION TREATMENT: CPT

## 2021-12-15 PROCEDURE — 25000003 PHARM REV CODE 250: Performed by: STUDENT IN AN ORGANIZED HEALTH CARE EDUCATION/TRAINING PROGRAM

## 2021-12-15 PROCEDURE — 99239 HOSP IP/OBS DSCHRG MGMT >30: CPT | Mod: ,,, | Performed by: STUDENT IN AN ORGANIZED HEALTH CARE EDUCATION/TRAINING PROGRAM

## 2021-12-15 PROCEDURE — 90833 PR PSYCHOTHERAPY W/PATIENT W/E&M, 30 MIN (ADD ON): ICD-10-PCS | Mod: ,,, | Performed by: STUDENT IN AN ORGANIZED HEALTH CARE EDUCATION/TRAINING PROGRAM

## 2021-12-15 PROCEDURE — 99239 PR HOSPITAL DISCHARGE DAY,>30 MIN: ICD-10-PCS | Mod: ,,, | Performed by: STUDENT IN AN ORGANIZED HEALTH CARE EDUCATION/TRAINING PROGRAM

## 2021-12-15 RX ORDER — DIAZEPAM 5 MG/1
TABLET ORAL
Qty: 7 TABLET | Refills: 0 | Status: ON HOLD | OUTPATIENT
Start: 2021-12-15 | End: 2022-05-06

## 2021-12-15 RX ORDER — SERTRALINE HYDROCHLORIDE 50 MG/1
50 TABLET, FILM COATED ORAL DAILY
Qty: 30 TABLET | Refills: 0 | Status: SHIPPED | OUTPATIENT
Start: 2021-12-16 | End: 2022-03-24

## 2021-12-15 RX ORDER — DIAZEPAM 5 MG/1
TABLET ORAL
Qty: 7 TABLET | Refills: 0 | Status: SHIPPED | OUTPATIENT
Start: 2021-12-15 | End: 2021-12-15

## 2021-12-15 RX ORDER — QUETIAPINE FUMARATE 100 MG/1
100 TABLET, FILM COATED ORAL DAILY
Qty: 30 TABLET | Refills: 0 | Status: ON HOLD | OUTPATIENT
Start: 2021-12-16 | End: 2022-05-16 | Stop reason: HOSPADM

## 2021-12-15 RX ORDER — FLUTICASONE FUROATE AND VILANTEROL 100; 25 UG/1; UG/1
1 POWDER RESPIRATORY (INHALATION) DAILY
Qty: 14 EACH | Refills: 0 | OUTPATIENT
Start: 2021-12-15 | End: 2021-12-15

## 2021-12-15 RX ORDER — HYDROXYZINE PAMOATE 50 MG/1
50 CAPSULE ORAL EVERY 4 HOURS PRN
Qty: 120 CAPSULE | Refills: 0 | Status: ON HOLD | OUTPATIENT
Start: 2021-12-15 | End: 2022-05-06

## 2021-12-15 RX ORDER — TRAZODONE HYDROCHLORIDE 50 MG/1
50 TABLET ORAL NIGHTLY
Qty: 30 TABLET | Refills: 0 | OUTPATIENT
Start: 2021-12-15 | End: 2021-12-15

## 2021-12-15 RX ORDER — TRAZODONE HYDROCHLORIDE 50 MG/1
50 TABLET ORAL NIGHTLY
Qty: 30 TABLET | Refills: 0 | Status: SHIPPED | OUTPATIENT
Start: 2021-12-15 | End: 2022-03-24

## 2021-12-15 RX ORDER — SERTRALINE HYDROCHLORIDE 50 MG/1
50 TABLET, FILM COATED ORAL DAILY
Qty: 30 TABLET | Refills: 0 | OUTPATIENT
Start: 2021-12-16 | End: 2021-12-15

## 2021-12-15 RX ORDER — FLUTICASONE FUROATE AND VILANTEROL 100; 25 UG/1; UG/1
1 POWDER RESPIRATORY (INHALATION) DAILY
Qty: 14 EACH | Refills: 0 | Status: SHIPPED | OUTPATIENT
Start: 2021-12-15 | End: 2022-05-16

## 2021-12-15 RX ORDER — HYDROXYZINE PAMOATE 50 MG/1
50 CAPSULE ORAL EVERY 4 HOURS PRN
Qty: 120 CAPSULE | Refills: 0 | OUTPATIENT
Start: 2021-12-15 | End: 2021-12-15

## 2021-12-15 RX ORDER — QUETIAPINE FUMARATE 100 MG/1
100 TABLET, FILM COATED ORAL DAILY
Qty: 30 TABLET | Refills: 0 | OUTPATIENT
Start: 2021-12-16 | End: 2021-12-15

## 2021-12-15 RX ORDER — QUETIAPINE FUMARATE 50 MG/1
50 TABLET, FILM COATED ORAL
Qty: 30 TABLET | Refills: 0 | Status: ON HOLD | OUTPATIENT
Start: 2021-12-15 | End: 2022-05-06

## 2021-12-15 RX ORDER — QUETIAPINE FUMARATE 300 MG/1
300 TABLET, FILM COATED ORAL NIGHTLY
Qty: 30 TABLET | Refills: 0 | OUTPATIENT
Start: 2021-12-15 | End: 2021-12-15 | Stop reason: SDUPTHER

## 2021-12-15 RX ORDER — QUETIAPINE FUMARATE 50 MG/1
50 TABLET, FILM COATED ORAL
Qty: 30 TABLET | Refills: 0 | OUTPATIENT
Start: 2021-12-15 | End: 2021-12-15

## 2021-12-15 RX ORDER — QUETIAPINE FUMARATE 300 MG/1
300 TABLET, FILM COATED ORAL NIGHTLY
Qty: 30 TABLET | Refills: 0 | Status: ON HOLD | OUTPATIENT
Start: 2021-12-15 | End: 2022-05-06

## 2021-12-15 RX ADMIN — DIAZEPAM 5 MG: 5 TABLET ORAL at 08:12

## 2021-12-15 RX ADMIN — HYDROXYZINE PAMOATE 50 MG: 50 CAPSULE ORAL at 04:12

## 2021-12-15 RX ADMIN — QUETIAPINE FUMARATE 100 MG: 100 TABLET ORAL at 08:12

## 2021-12-15 RX ADMIN — THERA TABS 1 TABLET: TAB at 08:12

## 2021-12-15 RX ADMIN — FOLIC ACID 1 MG: 1 TABLET ORAL at 08:12

## 2021-12-15 RX ADMIN — QUETIAPINE FUMARATE 50 MG: 25 TABLET ORAL at 12:12

## 2021-12-15 RX ADMIN — FLUTICASONE FUROATE AND VILANTEROL TRIFENATATE 1 PUFF: 100; 25 POWDER RESPIRATORY (INHALATION) at 07:12

## 2021-12-15 RX ADMIN — SERTRALINE 50 MG: 50 TABLET, FILM COATED ORAL at 08:12

## 2021-12-15 RX ADMIN — Medication 1 PATCH: at 08:12

## 2022-01-31 ENCOUNTER — PATIENT MESSAGE (OUTPATIENT)
Dept: ADMINISTRATIVE | Facility: HOSPITAL | Age: 42
End: 2022-01-31
Payer: MEDICARE

## 2022-03-24 ENCOUNTER — OFFICE VISIT (OUTPATIENT)
Dept: FAMILY MEDICINE | Facility: CLINIC | Age: 42
End: 2022-03-24
Payer: MEDICARE

## 2022-03-24 VITALS
HEIGHT: 64 IN | RESPIRATION RATE: 12 BRPM | SYSTOLIC BLOOD PRESSURE: 100 MMHG | WEIGHT: 158.63 LBS | DIASTOLIC BLOOD PRESSURE: 74 MMHG | BODY MASS INDEX: 27.08 KG/M2 | HEART RATE: 104 BPM

## 2022-03-24 DIAGNOSIS — R05.9 COUGH: ICD-10-CM

## 2022-03-24 DIAGNOSIS — J32.9 SINUSITIS, UNSPECIFIED CHRONICITY, UNSPECIFIED LOCATION: ICD-10-CM

## 2022-03-24 DIAGNOSIS — F41.9 ANXIETY: Primary | ICD-10-CM

## 2022-03-24 PROCEDURE — 3078F DIAST BP <80 MM HG: CPT | Mod: CPTII,S$GLB,, | Performed by: NURSE PRACTITIONER

## 2022-03-24 PROCEDURE — 3078F PR MOST RECENT DIASTOLIC BLOOD PRESSURE < 80 MM HG: ICD-10-PCS | Mod: CPTII,S$GLB,, | Performed by: NURSE PRACTITIONER

## 2022-03-24 PROCEDURE — 1159F MED LIST DOCD IN RCRD: CPT | Mod: CPTII,S$GLB,, | Performed by: NURSE PRACTITIONER

## 2022-03-24 PROCEDURE — 3074F SYST BP LT 130 MM HG: CPT | Mod: CPTII,S$GLB,, | Performed by: NURSE PRACTITIONER

## 2022-03-24 PROCEDURE — 96372 PR INJECTION,THERAP/PROPH/DIAG2ST, IM OR SUBCUT: ICD-10-PCS | Mod: S$GLB,,, | Performed by: NURSE PRACTITIONER

## 2022-03-24 PROCEDURE — 3008F PR BODY MASS INDEX (BMI) DOCUMENTED: ICD-10-PCS | Mod: CPTII,S$GLB,, | Performed by: NURSE PRACTITIONER

## 2022-03-24 PROCEDURE — 1160F RVW MEDS BY RX/DR IN RCRD: CPT | Mod: CPTII,S$GLB,, | Performed by: NURSE PRACTITIONER

## 2022-03-24 PROCEDURE — 96372 THER/PROPH/DIAG INJ SC/IM: CPT | Mod: S$GLB,,, | Performed by: NURSE PRACTITIONER

## 2022-03-24 PROCEDURE — 1159F PR MEDICATION LIST DOCUMENTED IN MEDICAL RECORD: ICD-10-PCS | Mod: CPTII,S$GLB,, | Performed by: NURSE PRACTITIONER

## 2022-03-24 PROCEDURE — 99213 OFFICE O/P EST LOW 20 MIN: CPT | Mod: 25,S$GLB,, | Performed by: NURSE PRACTITIONER

## 2022-03-24 PROCEDURE — 3008F BODY MASS INDEX DOCD: CPT | Mod: CPTII,S$GLB,, | Performed by: NURSE PRACTITIONER

## 2022-03-24 PROCEDURE — 99213 PR OFFICE/OUTPT VISIT, EST, LEVL III, 20-29 MIN: ICD-10-PCS | Mod: 25,S$GLB,, | Performed by: NURSE PRACTITIONER

## 2022-03-24 PROCEDURE — 3074F PR MOST RECENT SYSTOLIC BLOOD PRESSURE < 130 MM HG: ICD-10-PCS | Mod: CPTII,S$GLB,, | Performed by: NURSE PRACTITIONER

## 2022-03-24 PROCEDURE — 99999 PR PBB SHADOW E&M-EST. PATIENT-LVL III: ICD-10-PCS | Mod: PBBFAC,,, | Performed by: NURSE PRACTITIONER

## 2022-03-24 PROCEDURE — 1160F PR REVIEW ALL MEDS BY PRESCRIBER/CLIN PHARMACIST DOCUMENTED: ICD-10-PCS | Mod: CPTII,S$GLB,, | Performed by: NURSE PRACTITIONER

## 2022-03-24 PROCEDURE — 99999 PR PBB SHADOW E&M-EST. PATIENT-LVL III: CPT | Mod: PBBFAC,,, | Performed by: NURSE PRACTITIONER

## 2022-03-24 RX ORDER — TRIAMCINOLONE ACETONIDE 40 MG/ML
60 INJECTION, SUSPENSION INTRA-ARTICULAR; INTRAMUSCULAR
Status: COMPLETED | OUTPATIENT
Start: 2022-03-24 | End: 2022-03-24

## 2022-03-24 RX ORDER — AZITHROMYCIN 500 MG/1
500 TABLET, FILM COATED ORAL DAILY
Qty: 3 TABLET | Refills: 0 | Status: ON HOLD | OUTPATIENT
Start: 2022-03-24 | End: 2022-05-06

## 2022-03-24 RX ORDER — PROMETHAZINE HYDROCHLORIDE AND DEXTROMETHORPHAN HYDROBROMIDE 6.25; 15 MG/5ML; MG/5ML
5 SYRUP ORAL EVERY 8 HOURS PRN
Qty: 120 ML | Refills: 0 | Status: ON HOLD | OUTPATIENT
Start: 2022-03-24 | End: 2022-05-06

## 2022-03-24 RX ORDER — CLONAZEPAM 0.5 MG/1
0.5 TABLET ORAL 2 TIMES DAILY PRN
Qty: 60 TABLET | Refills: 5 | Status: ON HOLD | OUTPATIENT
Start: 2022-03-24 | End: 2022-05-06

## 2022-03-24 RX ORDER — SERTRALINE HYDROCHLORIDE 25 MG/1
TABLET, FILM COATED ORAL
COMMUNITY
Start: 2021-11-02 | End: 2023-06-21

## 2022-03-24 RX ORDER — FLUTICASONE PROPIONATE 50 MCG
2 SPRAY, SUSPENSION (ML) NASAL DAILY
Qty: 16 G | Refills: 5 | Status: ON HOLD | OUTPATIENT
Start: 2022-03-24 | End: 2022-05-16 | Stop reason: HOSPADM

## 2022-03-24 RX ADMIN — TRIAMCINOLONE ACETONIDE 60 MG: 40 INJECTION, SUSPENSION INTRA-ARTICULAR; INTRAMUSCULAR at 10:03

## 2022-03-24 NOTE — PROGRESS NOTES
Subjective:       Patient ID: Joe Henson is a 41 y.o. female.    Chief Complaint: Anxiety (Patient states she has a lot of family issues and would like to get back on her medications), Cough (Started last weekend), and Sore Throat (Started last weekend)    Here with anxiety. Wants to get back on her clonazepam. Having issues with her son - he has been hospitalized for psych issues. Cough and sore throat that started 5-6 days ago.    Anxiety  Symptoms include nervous/anxious behavior (increased). Patient reports no dizziness, nausea or shortness of breath.       Cough  The cough is productive of sputum. Associated symptoms include nasal congestion, rhinorrhea and a sore throat. Pertinent negatives include no ear pain, fever, headaches, myalgias, shortness of breath or wheezing. She has tried OTC cough suppressant for the symptoms.   Sore Throat   There has been no fever. Associated symptoms include congestion and coughing. Pertinent negatives include no abdominal pain, diarrhea, ear pain, headaches, shortness of breath, trouble swallowing or vomiting.     Review of Systems   Constitutional: Negative.  Negative for appetite change, fatigue and fever.   HENT: Positive for congestion, rhinorrhea and sore throat. Negative for ear pain and trouble swallowing.    Eyes: Negative.  Negative for visual disturbance.   Respiratory: Positive for cough. Negative for shortness of breath and wheezing.    Cardiovascular: Negative.    Gastrointestinal: Negative.  Negative for abdominal pain, diarrhea, nausea and vomiting.   Endocrine: Negative.    Genitourinary: Negative.  Negative for difficulty urinating and urgency.   Musculoskeletal: Negative.  Negative for arthralgias and myalgias.   Skin: Negative.  Negative for color change.   Allergic/Immunologic: Negative.    Neurological: Negative.  Negative for dizziness and headaches.   Hematological: Negative.    Psychiatric/Behavioral: Negative for sleep disturbance. The patient  is nervous/anxious (increased).    All other systems reviewed and are negative.      Objective:      Physical Exam  Vitals and nursing note reviewed.   Constitutional:       Appearance: Normal appearance. She is well-developed.   HENT:      Head: Normocephalic and atraumatic.      Right Ear: Tympanic membrane and external ear normal.      Left Ear: Tympanic membrane and external ear normal.      Nose: Mucosal edema and rhinorrhea present.      Mouth/Throat:      Pharynx: Uvula midline.   Eyes:      Conjunctiva/sclera: Conjunctivae normal.   Neck:      Thyroid: No thyromegaly.      Trachea: Trachea normal.   Cardiovascular:      Rate and Rhythm: Normal rate and regular rhythm.      Heart sounds: Normal heart sounds, S1 normal and S2 normal. No murmur heard.  Pulmonary:      Effort: Pulmonary effort is normal. No respiratory distress.   Abdominal:      Palpations: Abdomen is soft.      Tenderness: There is no abdominal tenderness.   Musculoskeletal:         General: Normal range of motion.      Cervical back: Normal range of motion and neck supple.   Lymphadenopathy:      Cervical: No cervical adenopathy.   Skin:     General: Skin is warm and dry.   Neurological:      Mental Status: She is alert.   Psychiatric:         Speech: Speech normal.         Assessment:       1. Anxiety    2. Sinusitis, unspecified chronicity, unspecified location    3. Cough        Plan:   Joe was seen today for anxiety, cough and sore throat.    Diagnoses and all orders for this visit:    Anxiety  -     clonazePAM (KLONOPIN) 0.5 MG tablet; Take 1 tablet (0.5 mg total) by mouth 2 (two) times daily as needed for Anxiety.    Sinusitis, unspecified chronicity, unspecified location  -     triamcinolone acetonide injection 60 mg  -     azithromycin (ZITHROMAX) 500 MG tablet; Take 1 tablet (500 mg total) by mouth once daily.  -     fluticasone propionate (FLONASE) 50 mcg/actuation nasal spray; 2 sprays (100 mcg total) by Each Nostril route once  daily.    Cough  -     promethazine-dextromethorphan (PROMETHAZINE-DM) 6.25-15 mg/5 mL Syrp; Take 5 mLs by mouth every 8 (eight) hours as needed (cough).    RTC 6 months for recheck

## 2022-05-05 ENCOUNTER — HOSPITAL ENCOUNTER (EMERGENCY)
Facility: HOSPITAL | Age: 42
Discharge: PSYCHIATRIC OR SUBSTANCE USE FACILITY | End: 2022-05-06
Attending: STUDENT IN AN ORGANIZED HEALTH CARE EDUCATION/TRAINING PROGRAM
Payer: MEDICARE

## 2022-05-05 VITALS
SYSTOLIC BLOOD PRESSURE: 110 MMHG | BODY MASS INDEX: 27.62 KG/M2 | OXYGEN SATURATION: 100 % | HEART RATE: 98 BPM | RESPIRATION RATE: 16 BRPM | WEIGHT: 160.94 LBS | DIASTOLIC BLOOD PRESSURE: 76 MMHG | TEMPERATURE: 99 F

## 2022-05-05 DIAGNOSIS — F30.9 MANIA: ICD-10-CM

## 2022-05-05 DIAGNOSIS — R41.82 AMS (ALTERED MENTAL STATUS): ICD-10-CM

## 2022-05-05 DIAGNOSIS — F15.10 AMPHETAMINE ABUSE: ICD-10-CM

## 2022-05-05 DIAGNOSIS — R73.9 HYPERGLYCEMIA: Primary | ICD-10-CM

## 2022-05-05 DIAGNOSIS — F12.10 MILD TETRAHYDROCANNABINOL (THC) ABUSE: ICD-10-CM

## 2022-05-05 LAB
ALBUMIN SERPL BCP-MCNC: 4 G/DL (ref 3.5–5.2)
ALP SERPL-CCNC: 94 U/L (ref 55–135)
ALT SERPL W/O P-5'-P-CCNC: 19 U/L (ref 10–44)
AMPHET+METHAMPHET UR QL: ABNORMAL
ANION GAP SERPL CALC-SCNC: 13 MMOL/L (ref 8–16)
APAP SERPL-MCNC: <3 UG/ML (ref 10–20)
AST SERPL-CCNC: 16 U/L (ref 10–40)
BARBITURATES UR QL SCN>200 NG/ML: NEGATIVE
BASOPHILS # BLD AUTO: 0.09 K/UL (ref 0–0.2)
BASOPHILS NFR BLD: 0.7 % (ref 0–1.9)
BENZODIAZ UR QL SCN>200 NG/ML: ABNORMAL
BILIRUB SERPL-MCNC: 0.3 MG/DL (ref 0.1–1)
BILIRUB UR QL STRIP: NEGATIVE
BUN SERPL-MCNC: 18 MG/DL (ref 6–20)
BZE UR QL SCN: NEGATIVE
CALCIUM SERPL-MCNC: 9.5 MG/DL (ref 8.7–10.5)
CANNABINOIDS UR QL SCN: ABNORMAL
CHLORIDE SERPL-SCNC: 105 MMOL/L (ref 95–110)
CLARITY UR: CLEAR
CO2 SERPL-SCNC: 26 MMOL/L (ref 23–29)
COLOR UR: YELLOW
CREAT SERPL-MCNC: 1 MG/DL (ref 0.5–1.4)
CREAT UR-MCNC: 80.3 MG/DL (ref 15–325)
DIFFERENTIAL METHOD: ABNORMAL
EOSINOPHIL # BLD AUTO: 0.1 K/UL (ref 0–0.5)
EOSINOPHIL NFR BLD: 0.9 % (ref 0–8)
ERYTHROCYTE [DISTWIDTH] IN BLOOD BY AUTOMATED COUNT: 13.9 % (ref 11.5–14.5)
EST. GFR  (AFRICAN AMERICAN): >60 ML/MIN/1.73 M^2
EST. GFR  (NON AFRICAN AMERICAN): >60 ML/MIN/1.73 M^2
ETHANOL SERPL-MCNC: <10 MG/DL
GLUCOSE SERPL-MCNC: 117 MG/DL (ref 70–110)
GLUCOSE UR QL STRIP: NEGATIVE
HCT VFR BLD AUTO: 41.7 % (ref 37–48.5)
HGB BLD-MCNC: 13.2 G/DL (ref 12–16)
HGB UR QL STRIP: NEGATIVE
IMM GRANULOCYTES # BLD AUTO: 0.05 K/UL (ref 0–0.04)
IMM GRANULOCYTES NFR BLD AUTO: 0.4 % (ref 0–0.5)
KETONES UR QL STRIP: NEGATIVE
LEUKOCYTE ESTERASE UR QL STRIP: NEGATIVE
LYMPHOCYTES # BLD AUTO: 2.3 K/UL (ref 1–4.8)
LYMPHOCYTES NFR BLD: 19.3 % (ref 18–48)
MCH RBC QN AUTO: 28.1 PG (ref 27–31)
MCHC RBC AUTO-ENTMCNC: 31.7 G/DL (ref 32–36)
MCV RBC AUTO: 89 FL (ref 82–98)
METHADONE UR QL SCN>300 NG/ML: NEGATIVE
MONOCYTES # BLD AUTO: 0.6 K/UL (ref 0.3–1)
MONOCYTES NFR BLD: 5 % (ref 4–15)
NEUTROPHILS # BLD AUTO: 8.9 K/UL (ref 1.8–7.7)
NEUTROPHILS NFR BLD: 73.7 % (ref 38–73)
NITRITE UR QL STRIP: NEGATIVE
NRBC BLD-RTO: 0 /100 WBC
OPIATES UR QL SCN: NEGATIVE
PCP UR QL SCN>25 NG/ML: NEGATIVE
PH UR STRIP: 6 [PH] (ref 5–8)
PLATELET # BLD AUTO: 424 K/UL (ref 150–450)
PMV BLD AUTO: 9.8 FL (ref 9.2–12.9)
POCT GLUCOSE: 131 MG/DL (ref 70–110)
POTASSIUM SERPL-SCNC: 3.9 MMOL/L (ref 3.5–5.1)
PROT SERPL-MCNC: 7.7 G/DL (ref 6–8.4)
PROT UR QL STRIP: NEGATIVE
RBC # BLD AUTO: 4.7 M/UL (ref 4–5.4)
SARS-COV-2 RDRP RESP QL NAA+PROBE: NEGATIVE
SODIUM SERPL-SCNC: 144 MMOL/L (ref 136–145)
SP GR UR STRIP: 1.02 (ref 1–1.03)
TOXICOLOGY INFORMATION: ABNORMAL
TSH SERPL DL<=0.005 MIU/L-ACNC: 0.57 UIU/ML (ref 0.4–4)
URN SPEC COLLECT METH UR: NORMAL
UROBILINOGEN UR STRIP-ACNC: NEGATIVE EU/DL
WBC # BLD AUTO: 12.12 K/UL (ref 3.9–12.7)

## 2022-05-05 PROCEDURE — 93010 EKG 12-LEAD: ICD-10-PCS | Mod: ,,, | Performed by: INTERNAL MEDICINE

## 2022-05-05 PROCEDURE — 82962 GLUCOSE BLOOD TEST: CPT

## 2022-05-05 PROCEDURE — 83036 HEMOGLOBIN GLYCOSYLATED A1C: CPT | Performed by: STUDENT IN AN ORGANIZED HEALTH CARE EDUCATION/TRAINING PROGRAM

## 2022-05-05 PROCEDURE — 84443 ASSAY THYROID STIM HORMONE: CPT | Performed by: STUDENT IN AN ORGANIZED HEALTH CARE EDUCATION/TRAINING PROGRAM

## 2022-05-05 PROCEDURE — 80307 DRUG TEST PRSMV CHEM ANLYZR: CPT | Performed by: STUDENT IN AN ORGANIZED HEALTH CARE EDUCATION/TRAINING PROGRAM

## 2022-05-05 PROCEDURE — 85025 COMPLETE CBC W/AUTO DIFF WBC: CPT | Performed by: STUDENT IN AN ORGANIZED HEALTH CARE EDUCATION/TRAINING PROGRAM

## 2022-05-05 PROCEDURE — 96372 THER/PROPH/DIAG INJ SC/IM: CPT | Performed by: STUDENT IN AN ORGANIZED HEALTH CARE EDUCATION/TRAINING PROGRAM

## 2022-05-05 PROCEDURE — 36415 COLL VENOUS BLD VENIPUNCTURE: CPT | Performed by: STUDENT IN AN ORGANIZED HEALTH CARE EDUCATION/TRAINING PROGRAM

## 2022-05-05 PROCEDURE — 80143 DRUG ASSAY ACETAMINOPHEN: CPT | Performed by: STUDENT IN AN ORGANIZED HEALTH CARE EDUCATION/TRAINING PROGRAM

## 2022-05-05 PROCEDURE — 81003 URINALYSIS AUTO W/O SCOPE: CPT | Mod: 59 | Performed by: STUDENT IN AN ORGANIZED HEALTH CARE EDUCATION/TRAINING PROGRAM

## 2022-05-05 PROCEDURE — 93005 ELECTROCARDIOGRAM TRACING: CPT

## 2022-05-05 PROCEDURE — 99291 CRITICAL CARE FIRST HOUR: CPT | Mod: 25

## 2022-05-05 PROCEDURE — U0002 COVID-19 LAB TEST NON-CDC: HCPCS | Performed by: STUDENT IN AN ORGANIZED HEALTH CARE EDUCATION/TRAINING PROGRAM

## 2022-05-05 PROCEDURE — 80053 COMPREHEN METABOLIC PANEL: CPT | Performed by: STUDENT IN AN ORGANIZED HEALTH CARE EDUCATION/TRAINING PROGRAM

## 2022-05-05 PROCEDURE — 80061 LIPID PANEL: CPT | Performed by: STUDENT IN AN ORGANIZED HEALTH CARE EDUCATION/TRAINING PROGRAM

## 2022-05-05 PROCEDURE — 63600175 PHARM REV CODE 636 W HCPCS: Performed by: STUDENT IN AN ORGANIZED HEALTH CARE EDUCATION/TRAINING PROGRAM

## 2022-05-05 PROCEDURE — 82077 ASSAY SPEC XCP UR&BREATH IA: CPT | Performed by: STUDENT IN AN ORGANIZED HEALTH CARE EDUCATION/TRAINING PROGRAM

## 2022-05-05 PROCEDURE — 93010 ELECTROCARDIOGRAM REPORT: CPT | Mod: ,,, | Performed by: INTERNAL MEDICINE

## 2022-05-05 PROCEDURE — 81025 URINE PREGNANCY TEST: CPT | Performed by: STUDENT IN AN ORGANIZED HEALTH CARE EDUCATION/TRAINING PROGRAM

## 2022-05-05 RX ORDER — LORAZEPAM 2 MG/ML
2 INJECTION INTRAMUSCULAR
Status: COMPLETED | OUTPATIENT
Start: 2022-05-05 | End: 2022-05-05

## 2022-05-05 RX ORDER — HALOPERIDOL 5 MG/ML
5 INJECTION INTRAMUSCULAR
Status: COMPLETED | OUTPATIENT
Start: 2022-05-05 | End: 2022-05-05

## 2022-05-05 RX ADMIN — HALOPERIDOL LACTATE 5 MG: 5 INJECTION, SOLUTION INTRAMUSCULAR at 11:05

## 2022-05-05 RX ADMIN — LORAZEPAM 2 MG: 2 INJECTION INTRAMUSCULAR; INTRAVENOUS at 11:05

## 2022-05-06 ENCOUNTER — HOSPITAL ENCOUNTER (INPATIENT)
Facility: HOSPITAL | Age: 42
LOS: 10 days | Discharge: HOME | DRG: 897 | End: 2022-05-16
Attending: STUDENT IN AN ORGANIZED HEALTH CARE EDUCATION/TRAINING PROGRAM | Admitting: STUDENT IN AN ORGANIZED HEALTH CARE EDUCATION/TRAINING PROGRAM
Payer: MEDICARE

## 2022-05-06 DIAGNOSIS — F31.12 BIPOLAR AFFECTIVE DISORDER, CURRENTLY MANIC, MODERATE: Primary | ICD-10-CM

## 2022-05-06 DIAGNOSIS — F29 PSYCHOSIS: ICD-10-CM

## 2022-05-06 PROBLEM — F17.210 CIGARETTE NICOTINE DEPENDENCE WITHOUT COMPLICATION: Status: ACTIVE | Noted: 2022-05-06

## 2022-05-06 PROBLEM — F15.10 AMPHETAMINE ABUSE: Status: ACTIVE | Noted: 2022-05-06

## 2022-05-06 PROBLEM — Z86.39 HISTORY OF DIABETES MELLITUS, TYPE II: Status: ACTIVE | Noted: 2022-05-06

## 2022-05-06 LAB
B-HCG UR QL: NEGATIVE
CHOLEST SERPL-MCNC: 186 MG/DL (ref 120–199)
CHOLEST/HDLC SERPL: 3.4 {RATIO} (ref 2–5)
ESTIMATED AVG GLUCOSE: 100 MG/DL (ref 68–131)
HBA1C MFR BLD: 5.1 % (ref 4–5.6)
HDLC SERPL-MCNC: 54 MG/DL (ref 40–75)
HDLC SERPL: 29 % (ref 20–50)
LDLC SERPL CALC-MCNC: 106.8 MG/DL (ref 63–159)
NONHDLC SERPL-MCNC: 132 MG/DL
TRIGL SERPL-MCNC: 126 MG/DL (ref 30–150)

## 2022-05-06 PROCEDURE — 99223 PR INITIAL HOSPITAL CARE,LEVL III: ICD-10-PCS | Mod: ,,, | Performed by: PSYCHIATRY & NEUROLOGY

## 2022-05-06 PROCEDURE — 36415 COLL VENOUS BLD VENIPUNCTURE: CPT | Performed by: STUDENT IN AN ORGANIZED HEALTH CARE EDUCATION/TRAINING PROGRAM

## 2022-05-06 PROCEDURE — 63600175 PHARM REV CODE 636 W HCPCS

## 2022-05-06 PROCEDURE — 99222 1ST HOSP IP/OBS MODERATE 55: CPT | Mod: ,,, | Performed by: NURSE PRACTITIONER

## 2022-05-06 PROCEDURE — 25000003 PHARM REV CODE 250: Performed by: STUDENT IN AN ORGANIZED HEALTH CARE EDUCATION/TRAINING PROGRAM

## 2022-05-06 PROCEDURE — 63600175 PHARM REV CODE 636 W HCPCS: Performed by: PSYCHIATRY & NEUROLOGY

## 2022-05-06 PROCEDURE — 99222 PR INITIAL HOSPITAL CARE,LEVL II: ICD-10-PCS | Mod: ,,, | Performed by: NURSE PRACTITIONER

## 2022-05-06 PROCEDURE — 96372 THER/PROPH/DIAG INJ SC/IM: CPT | Performed by: STUDENT IN AN ORGANIZED HEALTH CARE EDUCATION/TRAINING PROGRAM

## 2022-05-06 PROCEDURE — 96372 THER/PROPH/DIAG INJ SC/IM: CPT

## 2022-05-06 PROCEDURE — 25000003 PHARM REV CODE 250: Performed by: PSYCHIATRY & NEUROLOGY

## 2022-05-06 PROCEDURE — 11400000 HC PSYCH PRIVATE ROOM

## 2022-05-06 PROCEDURE — S0166 INJ OLANZAPINE 2.5MG: HCPCS | Performed by: STUDENT IN AN ORGANIZED HEALTH CARE EDUCATION/TRAINING PROGRAM

## 2022-05-06 PROCEDURE — 99223 1ST HOSP IP/OBS HIGH 75: CPT | Mod: ,,, | Performed by: PSYCHIATRY & NEUROLOGY

## 2022-05-06 RX ORDER — HYDROXYZINE PAMOATE 50 MG/1
50 CAPSULE ORAL EVERY 6 HOURS PRN
Status: DISCONTINUED | OUTPATIENT
Start: 2022-05-06 | End: 2022-05-11

## 2022-05-06 RX ORDER — QUETIAPINE FUMARATE 25 MG/1
50 TABLET, FILM COATED ORAL 2 TIMES DAILY
Status: DISCONTINUED | OUTPATIENT
Start: 2022-05-06 | End: 2022-05-09

## 2022-05-06 RX ORDER — LORAZEPAM 2 MG/ML
2 INJECTION INTRAMUSCULAR EVERY 4 HOURS PRN
Status: DISCONTINUED | OUTPATIENT
Start: 2022-05-06 | End: 2022-05-16 | Stop reason: HOSPADM

## 2022-05-06 RX ORDER — DIPHENHYDRAMINE HYDROCHLORIDE 50 MG/ML
50 INJECTION INTRAMUSCULAR; INTRAVENOUS ONCE
Status: COMPLETED | OUTPATIENT
Start: 2022-05-06 | End: 2022-05-06

## 2022-05-06 RX ORDER — OLANZAPINE 10 MG/2ML
10 INJECTION, POWDER, FOR SOLUTION INTRAMUSCULAR ONCE AS NEEDED
Status: COMPLETED | OUTPATIENT
Start: 2022-05-06 | End: 2022-05-06

## 2022-05-06 RX ORDER — OLANZAPINE 10 MG/2ML
5 INJECTION, POWDER, FOR SOLUTION INTRAMUSCULAR ONCE AS NEEDED
Status: DISCONTINUED | OUTPATIENT
Start: 2022-05-06 | End: 2022-05-06

## 2022-05-06 RX ORDER — OLANZAPINE 10 MG/2ML
10 INJECTION, POWDER, FOR SOLUTION INTRAMUSCULAR EVERY 6 HOURS PRN
Status: DISCONTINUED | OUTPATIENT
Start: 2022-05-06 | End: 2022-05-16 | Stop reason: HOSPADM

## 2022-05-06 RX ORDER — DIAZEPAM 10 MG/1
10 TABLET ORAL 4 TIMES DAILY
Status: DISCONTINUED | OUTPATIENT
Start: 2022-05-06 | End: 2022-05-07

## 2022-05-06 RX ORDER — IBUPROFEN 200 MG
1 TABLET ORAL DAILY
Status: DISCONTINUED | OUTPATIENT
Start: 2022-05-06 | End: 2022-05-16 | Stop reason: HOSPADM

## 2022-05-06 RX ORDER — OLANZAPINE 10 MG/1
10 TABLET ORAL EVERY 6 HOURS PRN
Status: DISCONTINUED | OUTPATIENT
Start: 2022-05-06 | End: 2022-05-16 | Stop reason: HOSPADM

## 2022-05-06 RX ORDER — DIPHENHYDRAMINE HYDROCHLORIDE 50 MG/ML
INJECTION INTRAMUSCULAR; INTRAVENOUS
Status: COMPLETED
Start: 2022-05-06 | End: 2022-05-06

## 2022-05-06 RX ORDER — FOLIC ACID 1 MG/1
1 TABLET ORAL DAILY
Status: DISCONTINUED | OUTPATIENT
Start: 2022-05-06 | End: 2022-05-16 | Stop reason: HOSPADM

## 2022-05-06 RX ADMIN — DIAZEPAM 10 MG: 10 TABLET ORAL at 09:05

## 2022-05-06 RX ADMIN — FOLIC ACID 1 MG: 1 TABLET ORAL at 08:05

## 2022-05-06 RX ADMIN — OLANZAPINE 10 MG: 10 INJECTION, POWDER, FOR SOLUTION INTRAMUSCULAR at 01:05

## 2022-05-06 RX ADMIN — DIPHENHYDRAMINE HYDROCHLORIDE 50 MG: 50 INJECTION INTRAMUSCULAR; INTRAVENOUS at 02:05

## 2022-05-06 RX ADMIN — DIAZEPAM 10 MG: 10 TABLET ORAL at 04:05

## 2022-05-06 RX ADMIN — HYDROXYZINE PAMOATE 50 MG: 50 CAPSULE ORAL at 08:05

## 2022-05-06 RX ADMIN — LORAZEPAM 2 MG: 2 INJECTION INTRAMUSCULAR; INTRAVENOUS at 08:05

## 2022-05-06 RX ADMIN — DIAZEPAM 10 MG: 10 TABLET ORAL at 12:05

## 2022-05-06 RX ADMIN — DIAZEPAM 10 MG: 10 TABLET ORAL at 08:05

## 2022-05-06 RX ADMIN — HYDROXYZINE PAMOATE 50 MG: 50 CAPSULE ORAL at 04:05

## 2022-05-06 RX ADMIN — LORAZEPAM 2 MG: 2 INJECTION INTRAMUSCULAR; INTRAVENOUS at 12:05

## 2022-05-06 RX ADMIN — QUETIAPINE FUMARATE 50 MG: 25 TABLET ORAL at 08:05

## 2022-05-06 RX ADMIN — THERA TABS 1 TABLET: TAB at 08:05

## 2022-05-06 RX ADMIN — LORAZEPAM 2 MG: 2 INJECTION INTRAMUSCULAR; INTRAVENOUS at 04:05

## 2022-05-06 RX ADMIN — DIPHENHYDRAMINE HYDROCHLORIDE 50 MG: 50 INJECTION, SOLUTION INTRAMUSCULAR; INTRAVENOUS at 02:05

## 2022-05-06 NOTE — HPI
Joe Henson is a 41 y.o. female with past medical history ADHD, bipolar disorder, diabetes mellitus, PTSD, suicide attempt and history of inpatient psychiatric hospitalization who is currently admitted to [facility] with bizarre behavior, john     Consulted for medical H&P  Records reviewed  PER ER MD   41 y.o. female with past medical history ADHD, bipolar disorder, diabetes mellitus, PTSD, suicide attempt and history of inpatient psychiatric hospitalization who presents with bizarre behavior.  Patient was brought in by son who stated that she has been acting manic.  She has been out of medications for the past 2 days and has been unable to take them.  During my interview, patient states that she was brought in by airplane.  Besides this, she mumbled nonsensically and did not answer any questions directly.     + UDS amphetamines, benzodiazepine, and THC

## 2022-05-06 NOTE — PLAN OF CARE
Problem: Adult Behavioral Health Plan of Care  Goal: Optimized Coping Skills in Response to Life Stressors  Outcome: Ongoing, Progressing     Group Note:    Pt did not attend group psychotherapy today. [facility] was advised not to by RN to allow pt to rest in bed. RN reports pt was very non compliant and had bizarre behavior. Pt observed for safety by staff. Pt is a 1:1.

## 2022-05-06 NOTE — CONSULTS
[facility] Medicine  Consult Note    Patient Name: Joe Henson  MRN: [id number]  Admission Date: 5/6/2022  Hospital Length of Stay: 0 days  Attending Physician: Jason Romeo III, MD   Primary Care Provider: Estela Munguia NP           Patient information was obtained from patient and ER records.     Inpatient consult to [facility] for History and Physical  Consult performed by: Wendy Babcock NP  Consult ordered by: Jason Romeo III, MD        Subjective:     Principal Problem: <principal problem not specified>    Chief Complaint: No chief complaint on file.       HPI: Joe Henson is a 41 y.o. female with past medical history ADHD, bipolar disorder, diabetes mellitus, PTSD, suicide attempt and history of inpatient psychiatric hospitalization who is currently admitted to [facility] with bizarre behavior, john     Consulted for medical H&P  Records reviewed  PER ER MD   41 y.o. female with past medical history ADHD, bipolar disorder, diabetes mellitus, PTSD, suicide attempt and history of inpatient psychiatric hospitalization who presents with bizarre behavior.  Patient was brought in by son who stated that she has been acting manic.  She has been out of medications for the past 2 days and has been unable to take them.  During my interview, patient states that she was brought in by airplane.  Besides this, she mumbled nonsensically and did not answer any questions directly.     + UDS amphetamines, benzodiazepine, and THC       Past Medical History:   Diagnosis Date    Abnormal Pap smear age 30    Pos. HPV,     Abnormal Pap smear of cervix     Addiction to drug     ADHD (attention deficit hyperactivity disorder)     Arthritis     Bipolar disorder     COPD (chronic obstructive pulmonary disease)     Depression     Diabetes mellitus     Fatigue     Hallucination     History of psychiatric hospitalization     Hx of psychiatric care     OCD (obsessive  compulsive disorder)     Psychiatric problem     PTSD (post-traumatic stress disorder)     Sleep difficulties     Substance abuse     Suicide attempt     Therapy        Past Surgical History:   Procedure Laterality Date    bariatric sleeve N/A 2020     SECTION  ;     DILATION AND CURETTAGE OF UTERUS  2016    ENDOMETRIAL ABLATION  2016    KNEE SURGERY Right 2011    hardware-bone from hip to repair    TUBAL LIGATION         Review of patient's allergies indicates:   Allergen Reactions    Latex, natural rubber        Current Facility-Administered Medications on File Prior to Encounter   Medication    [COMPLETED] diphenhydrAMINE injection 50 mg    [COMPLETED] haloperidol lactate injection 5 mg    [COMPLETED] lorazepam injection 2 mg    [COMPLETED] OLANZapine injection 10 mg    [DISCONTINUED] OLANZapine injection 5 mg     Current Outpatient Medications on File Prior to Encounter   Medication Sig    azithromycin (ZITHROMAX) 500 MG tablet Take 1 tablet (500 mg total) by mouth once daily.    clonazePAM (KLONOPIN) 0.5 MG tablet Take 1 tablet (0.5 mg total) by mouth 2 (two) times daily as needed for Anxiety.    diazePAM (VALIUM) 5 MG tablet Take daily for 5 days then half tablet daily for 4 days then stop (Patient not taking: No sig reported)    fluticasone furoate-vilanteroL (BREO) 100-25 mcg/dose diskus inhaler Inhale 1 puff into the lungs once daily. Controller    fluticasone propionate (FLONASE) 50 mcg/actuation nasal spray 2 sprays (100 mcg total) by Each Nostril route once daily.    hydrOXYzine pamoate (VISTARIL) 50 MG Cap Take 1 capsule (50 mg total) by mouth every 4 (four) hours as needed (Insomnia). (Patient not taking: No sig reported)    promethazine-dextromethorphan (PROMETHAZINE-DM) 6.25-15 mg/5 mL Syrp Take 5 mLs by mouth every 8 (eight) hours as needed (cough).    QUEtiapine (SEROQUEL) 100 MG Tab Take 1 tablet (100 mg total) by mouth once daily.  (Patient not taking: No sig reported)    QUEtiapine (SEROQUEL) 300 MG Tab Take 1 tablet (300 mg total) by mouth nightly.    QUEtiapine (SEROQUEL) 50 MG tablet Take 1 tablet (50 mg total) by mouth after lunch. (Patient not taking: No sig reported)    sertraline (ZOLOFT) 25 MG tablet TAKE ONE TABLET BY MOUTH ONCE A DAY AT BEDTIME    VENTOLIN HFA 90 mcg/actuation inhaler INHALE 2 PUFFS EVERY 6   HOURS AS NEEDED FOR      WHEEZING     Family History       Problem Relation (Age of Onset)    Anxiety disorder Sister    Colon cancer Father (60)    Coronary artery disease Maternal Grandfather    Depression Sister    Diabetes Maternal Grandmother, Maternal Grandfather    Drug abuse Sister    Hypertension Father    No Known Problems Paternal Aunt, Maternal Aunt, Maternal Uncle, Paternal Uncle, Paternal Grandfather, Paternal Grandmother, Cousin          Tobacco Use    Smoking status: Current Every Day Smoker     Packs/day: 1.00     Years: 25.00     Pack years: 25.00     Types: Cigarettes     Start date: 11/28/1997    Smokeless tobacco: Never Used    Tobacco comment: told about brochure and smoking clinic program   Substance and Sexual Activity    Alcohol use: Not Currently     Comment: Socially-2 times a week-4-5 beers    Drug use: Yes     Types: Amphetamines, Benzodiazepines    Sexual activity: Yes     Partners: Male     Birth control/protection: Surgical, See Surgical Hx     Comment: - has a boyfriend     Review of Systems   Unable to perform ROS: Psychiatric disorder   Constitutional:  Negative for chills and fever.   HENT:  Negative for congestion and sore throat.    Respiratory:  Negative for cough, chest tightness and shortness of breath.    Cardiovascular:  Negative for chest pain, palpitations and leg swelling.   Gastrointestinal:  Negative for abdominal pain, diarrhea, nausea and vomiting.   Genitourinary:  Negative for flank pain, frequency and hematuria.   Musculoskeletal:  Negative for back pain and  neck pain.   Skin:  Negative for rash and wound.   Neurological:  Negative for dizziness, seizures, syncope and headaches.   Psychiatric/Behavioral:  Negative for agitation, confusion and self-injury.    Objective:     Vital Signs (Most Recent):  Temp: 97.2 °F (36.2 °C) (05/06/22 0729)  Pulse: 89 (05/06/22 0729)  Resp: 18 (05/06/22 0729)  BP: (!) 159/89 (05/06/22 0729)   Vital Signs (24h Range):  Temp:  [97.2 °F (36.2 °C)-98.5 °F (36.9 °C)] 97.2 °F (36.2 °C)  Pulse:  [] 89  Resp:  [16-24] 18  SpO2:  [96 %-100 %] 100 %  BP: (110-159)/(76-89) 159/89     Weight: 72.3 kg (159 lb 4.5 oz)  Body mass index is 29.13 kg/m².    Physical Exam  Vitals and nursing note reviewed.   Constitutional:       General: She is not in acute distress.     Appearance: She is well-developed.   HENT:      Head: Normocephalic and atraumatic.   Eyes:      Pupils: Pupils are equal, round, and reactive to light.   Neck:      Thyroid: No thyromegaly.   Cardiovascular:      Rate and Rhythm: Normal rate and regular rhythm.      Heart sounds: Normal heart sounds. No murmur heard.  Pulmonary:      Effort: Pulmonary effort is normal. No respiratory distress.      Breath sounds: Normal breath sounds. No wheezing or rales.   Abdominal:      General: Bowel sounds are normal. There is no distension.      Palpations: Abdomen is soft. There is no mass.      Tenderness: There is no abdominal tenderness.   Musculoskeletal:         General: No deformity. Normal range of motion.   Lymphadenopathy:      Cervical: No cervical adenopathy.   Skin:     General: Skin is warm and dry.      Findings: No erythema or rash.   Neurological:      Mental Status: She is alert and oriented to person, place, and time.      Comments: CN II visual fields full to confrontation  CN III, Iv, VI- pupils ERRL   CN III- no palsy  Nystagmus; none   Diplopia- none  ophthalmoparesis- none  CN V- facial sensation intact  CN VII- facial expression full and symmetric  CNVIII- normal  CN  IV-Normal  CN X- normal  CN XI- Normal   CN XII normal      Psychiatric:         Behavior: Behavior is agitated.         Thought Content: Thought content is delusional.      Comments: On exam pt is delusional , speaking   Room disheveled ; crackers and blankets on the floor   Speech mumbled nonsensically        Significant Labs: All pertinent labs within the past 24 hours have been reviewed.  Urine Culture: No results for input(s): LABURIN in the last 48 hours.  Urine Studies:   Recent Labs   Lab 05/05/22  2203   COLORU Yellow   APPEARANCEUA Clear   PHUR 6.0   SPECGRAV 1.020   PROTEINUA Negative   GLUCUA Negative   KETONESU Negative   BILIRUBINUA Negative   OCCULTUA Negative   NITRITE Negative   UROBILINOGEN Negative   LEUKOCYTESUR Negative     UPT  Results for orders placed or performed during the hospital encounter of 08/03/17   POCT urine pregnancy   Result Value Ref Range    POC Preg Test, Ur Negative Negative     Acceptable Yes      U/A  Results for orders placed or performed during the hospital encounter of 11/26/17   Urinalysis   Result Value Ref Range    Specimen UA Urine, Clean Catch     Color, UA Yellow Yellow, Straw, Alea    Appearance, UA Clear Clear    pH, UA 6.0 5.0 - 8.0    Specific Gravity, UA 1.020 1.005 - 1.030    Protein, UA Negative Negative    Glucose, UA Negative Negative    Ketones, UA 2+ (A) Negative    Bilirubin (UA) Negative Negative    Occult Blood UA 2+ (A) Negative    Nitrite, UA Negative Negative    Urobilinogen, UA Negative <2.0 EU/dL    Leukocytes, UA Negative Negative     UDS  Results for orders placed or performed during the hospital encounter of 05/05/22   Drug screen panel, in-house   Result Value Ref Range    Benzodiazepines Presumptive Positive (A) Negative    Methadone metabolites Negative Negative    Cocaine (Metab.) Negative Negative    Opiate Scrn, Ur Negative Negative    Barbiturate Screen, Ur Negative Negative    Amphetamine Screen, Ur Presumptive Positive  (A) Negative    THC Presumptive Positive (A) Negative    Phencyclidine Negative Negative    Creatinine, Urine 80.3 15.0 - 325.0 mg/dL    Toxicology Information SEE COMMENT      CBC  Results for orders placed or performed during the hospital encounter of 05/05/22   CBC Auto Differential   Result Value Ref Range    WBC 12.12 3.90 - 12.70 K/uL    RBC 4.70 4.00 - 5.40 M/uL    Hemoglobin 13.2 12.0 - 16.0 g/dL    Hematocrit 41.7 37.0 - 48.5 %    MCV 89 82 - 98 fL    MCH 28.1 27.0 - 31.0 pg    MCHC 31.7 (L) 32.0 - 36.0 g/dL    RDW 13.9 11.5 - 14.5 %    Platelets 424 150 - 450 K/uL    MPV 9.8 9.2 - 12.9 fL    Immature Granulocytes 0.4 0.0 - 0.5 %    Gran # (ANC) 8.9 (H) 1.8 - 7.7 K/uL    Immature Grans (Abs) 0.05 (H) 0.00 - 0.04 K/uL    Lymph # 2.3 1.0 - 4.8 K/uL    Mono # 0.6 0.3 - 1.0 K/uL    Eos # 0.1 0.0 - 0.5 K/uL    Baso # 0.09 0.00 - 0.20 K/uL    nRBC 0 0 /100 WBC    Gran % 73.7 (H) 38.0 - 73.0 %    Lymph % 19.3 18.0 - 48.0 %    Mono % 5.0 4.0 - 15.0 %    Eosinophil % 0.9 0.0 - 8.0 %    Basophil % 0.7 0.0 - 1.9 %    Differential Method Automated      CMP  Results for orders placed or performed during the hospital encounter of 05/05/22   Comprehensive Metabolic Panel   Result Value Ref Range    Sodium 144 136 - 145 mmol/L    Potassium 3.9 3.5 - 5.1 mmol/L    Chloride 105 95 - 110 mmol/L    CO2 26 23 - 29 mmol/L    Glucose 117 (H) 70 - 110 mg/dL    BUN 18 6 - 20 mg/dL    Creatinine 1.0 0.5 - 1.4 mg/dL    Calcium 9.5 8.7 - 10.5 mg/dL    Total Protein 7.7 6.0 - 8.4 g/dL    Albumin 4.0 3.5 - 5.2 g/dL    Total Bilirubin 0.3 0.1 - 1.0 mg/dL    Alkaline Phosphatase 94 55 - 135 U/L    AST 16 10 - 40 U/L    ALT 19 10 - 44 U/L    Anion Gap 13 8 - 16 mmol/L    eGFR if African American >60 >60 mL/min/1.73 m^2    eGFR if non African American >60 >60 mL/min/1.73 m^2     TSH  Results for orders placed or performed during the hospital encounter of 05/05/22   TSH   Result Value Ref Range    TSH 0.573 0.400 - 4.000 uIU/mL      ETOH  Results for orders placed or performed during the hospital encounter of 05/05/22   Ethanol   Result Value Ref Range    Alcohol, Serum <10 <10 mg/dL     Salicylate  Results for orders placed or performed during the hospital encounter of 12/03/21   Salicylate Level   Result Value Ref Range    Salicylate Lvl <5.0 (L) 15.0 - 30.0 mg/dL     Acetaminophen  Results for orders placed or performed during the hospital encounter of 05/05/22   Acetaminophen Level   Result Value Ref Range    Acetaminophen (Tylenol), Serum <3.0 (L) 10.0 - 20.0 ug/mL     Lab Results   Component Value Date    HGBA1C 5.2 12/03/2021       Lab Results   Component Value Date    CHOL 186 05/05/2022    CHOL 140 09/12/2021    CHOL 154 03/24/2021     Lab Results   Component Value Date    HDL 54 05/05/2022    HDL 38 (L) 09/12/2021    HDL 34 (L) 03/24/2021     Lab Results   Component Value Date    LDLCALC 106.8 05/05/2022    LDLCALC 66.4 09/12/2021    LDLCALC 92.2 03/24/2021     Lab Results   Component Value Date    TRIG 126 05/05/2022    TRIG 178 (H) 09/12/2021    TRIG 139 03/24/2021     Lab Results   Component Value Date    CHOLHDL 29.0 05/05/2022    CHOLHDL 27.1 09/12/2021    CHOLHDL 22.1 03/24/2021         Significant Imaging: I have reviewed and interpreted all pertinent imaging results/findings within the past 24 hours.    Assessment/Plan:     Bipolar affective disorder, currently manic, moderate  Per psychiatry       Amphetamine abuse  Per psychiatry     Cigarette nicotine dependence without complication  Nicotine patch       History of diabetes mellitus, type II  Lab Results   Component Value Date    HGBA1C 5.1 05/05/2022     Pt  Has hx of Type II diabetes ~ 3 years ago, when she had BMI 39.68 with weight of 234; she is currently 159# with BMI 29  No longer diabetic , A1C above normal       BMI 29.0-29.9,adult        ITZ (generalized anxiety disorder)    Per psychiatry       VTE Risk Mitigation (From admission, onward)    None               Thank you for your consult. I will sign off. Please contact us if you have any additional questions.    Wendy Babcock NP  Department of [facility] Medicine   [facility]

## 2022-05-06 NOTE — PSYCH
Pt received IM ativan at 1210 for withdrawal psychosis, pt tolerated well.  Will continue to monitor.

## 2022-05-06 NOTE — PLAN OF CARE
Pt is actively hallucinating in her room.  Remains 1:1, needs constant redirection.  Pt given IM ativan at 0810 for psychosis/withdrawal.  Not effective at this time.  Pt still up in room hallucinating.  Tried to eat her blanket instead of her breakfast this morning.  Thinks she is cooking on the grill, which is actually the air conditioning in her room.  Pt restless, up and down out of the bed.  Sitter at doorway, will continue to monitor.

## 2022-05-06 NOTE — ED NOTES
Pt walking around room and sitting on ground at times. Pt is not aggressive but very confused at this time and is talking nonsensical.

## 2022-05-06 NOTE — ED PROVIDER NOTES
[facility]                                                  Chief Complaint     Chief Complaint   Patient presents with    Altered Mental Status     Pt brought in by son for c/o of AMS.  Pt has Hx of bipolar disorder.  Pt is A&Ox2.  States she hasnt taken her medication in a few days.  Pt in a manic state in triage.,  trying to find objects that are not there.  Unable to get complete vitals, due to pt maniac state.      Psychiatric Evaluation       History of Present Illness  41 y.o. female with past medical history ADHD, bipolar disorder, diabetes mellitus, PTSD, suicide attempt and history of inpatient psychiatric hospitalization who presents with bizarre behavior.  Patient was brought in by son who stated that she has been acting manic.  She has been out of medications for the past 2 days and has been unable to take them.  During my interview, patient states that she was brought in by airplane.  Besides this, she mumbled nonsensically and did not answer any questions directly.      Collateral (Son: Prashanth)  Patient has been acting bizarrely with the past 2 days.  She has been responding to internal stimuli at home, speaking to people who are not there.  She has been taking extra doses of her Seroquel.  She has had multiple similar episodes in the past.    Review of patient's allergies indicates:   Allergen Reactions    Latex, natural rubber      Past Medical History:   Diagnosis Date    Abnormal Pap smear age 30    Pos. HPV,     Abnormal Pap smear of cervix     Addiction to drug     ADHD (attention deficit hyperactivity disorder)     Arthritis     Bipolar disorder     COPD (chronic obstructive pulmonary disease)     Depression     Diabetes mellitus     Fatigue     Hallucination     History of psychiatric hospitalization     Hx of psychiatric care     OCD (obsessive compulsive disorder)     Psychiatric problem     PTSD (post-traumatic stress disorder)     Sleep difficulties      Substance abuse     Suicide attempt     Therapy      Past Surgical History:   Procedure Laterality Date    bariatric sleeve N/A 2020     SECTION  ;     DILATION AND CURETTAGE OF UTERUS  2016    ENDOMETRIAL ABLATION  2016    KNEE SURGERY Right 2011    hardware-bone from hip to repair    TUBAL LIGATION  2004      Family History   Problem Relation Age of Onset    Diabetes Maternal Grandmother     Hypertension Father     Colon cancer Father 60    No Known Problems Paternal Aunt     Coronary artery disease Maternal Grandfather     Diabetes Maternal Grandfather     Anxiety disorder Sister     Depression Sister     Drug abuse Sister     No Known Problems Maternal Aunt     No Known Problems Maternal Uncle     No Known Problems Paternal Uncle     No Known Problems Paternal Grandfather     No Known Problems Paternal Grandmother     No Known Problems Cousin      labor Neg Hx      Social History     Tobacco Use    Smoking status: Current Every Day Smoker     Packs/day: 1.00     Years: 25.00     Pack years: 25.00     Types: Cigarettes     Start date: 1997    Smokeless tobacco: Never Used    Tobacco comment: told about brochure and smoking clinic program   Substance Use Topics    Alcohol use: Not Currently     Comment: Socially-2 times a week-4-5 beers    Drug use: Yes     Types: Amphetamines, Benzodiazepines          Review of Systems and Physical Exam     Review of Systems    Unable to obtain due to psychiatric problem    Vital Signs   weight is 73 kg (160 lb 15 oz). Her temperature is 98.5 °F (36.9 °C). Her respiration is 16 and oxygen saturation is 96%.      Physical Exam   Nursing note and vitals reviewed  --Constitutional:  Well developed, well nourished.  Mumbling nonsensically.  --HENT: Normocephalic, atraumatic. No rhinorrhea. Moist oral mucosa. No oropharyngeal edema, erythema or exudates.   --Eyes: PERRL. Extraocular movements intact. No  periorbital swelling. Normal conjunctiva.  --Neck: Normal range of motion. Neck supple. No adenopathy  --Cardiac: Regular rhythm, normal S1, normal S2, no murmur, normal rate, intact distal pulses  --Pulmonary: Normal respiratory effort, breath sounds normal and equal bilaterally, no accessory muscle use, no respiratory distress  --Abdominal: Soft, normal bowel sounds, no tenderness, no guarding, no rebound  --Musculoskeletal: Normal range of motion. No deformity, tenderness or edema.  --Neurological:  Alert and oriented x 4. Follows commands appropriately.    --Skin: Warm and dry. No rash, pallor, cyanosis or jaundice.  --Psych:  Flat affect.  Responding to internal stimuli.    ED Course     Critical Care    Date/Time: 5/5/2022 11:42 PM  Performed by: Fernandez Kapadia MD  Authorized by: Fernandez Kapadia MD   Direct patient critical care time: 8 minutes  Additional history critical care time: 7 minutes  Ordering / reviewing critical care time: 8 minutes  Documentation critical care time: 7 minutes  Consulting other physicians critical care time: 8 minutes  Total critical care time (exclusive of procedural time) : 38 minutes  Critical care time was exclusive of separately billable procedures and treating other patients.  Critical care was necessary to treat or prevent imminent or life-threatening deterioration of the following conditions: Sedating medications given for acute psychiatric disturbance and agitation.            EKG (interpreted by me)  Rate: 83 bpm  Rhythm:  Normal Sinus rhythm  Axis:  Normal  ST-segments:  No elevation or depression  Overall Interpretation:  Normal sinus rhythm with no signs of ischemia or infarction      Lab Results (reviewed by me)  Labs Reviewed   CBC W/ AUTO DIFFERENTIAL - Abnormal; Notable for the following components:       Result Value    MCHC 31.7 (*)     Gran # (ANC) 8.9 (*)     Immature Grans (Abs) 0.05 (*)     Gran % 73.7 (*)     All other components within normal limits    COMPREHENSIVE METABOLIC PANEL - Abnormal; Notable for the following components:    Glucose 117 (*)     All other components within normal limits   DRUG SCREEN PANEL, URINE EMERGENCY - Abnormal; Notable for the following components:    Benzodiazepines Presumptive Positive (*)     Amphetamine Screen, Ur Presumptive Positive (*)     THC Presumptive Positive (*)     All other components within normal limits    Narrative:     Specimen Source->Urine   ACETAMINOPHEN LEVEL - Abnormal; Notable for the following components:    Acetaminophen (Tylenol), Serum <3.0 (*)     All other components within normal limits   POCT GLUCOSE - Abnormal; Notable for the following components:    POCT Glucose 131 (*)     All other components within normal limits   TSH   URINALYSIS, REFLEX TO URINE CULTURE    Narrative:     Specimen Source->Urine   ALCOHOL,MEDICAL (ETHANOL)   SARS-COV-2 RNA AMPLIFICATION, QUAL   POCT GLUCOSE MONITORING CONTINUOUS       Radiology (images visualized & reports reviewed by me)  No orders to display       Medications Given  Medications   haloperidol lactate injection 5 mg (has no administration in time range)   lorazepam injection 2 mg (has no administration in time range)       Differential Diagnosis:  Bipolar disorder, schizophrenia, anxiety, depression, acute psychosis, drug intoxication    Clinical Tests:  Lab Tests: Ordered and reviewed    ED Management     weight is 73 kg (160 lb 15 oz). Her temperature is 98.5 °F (36.9 °C). Her respiration is 16 and oxygen saturation is 96%.     Physical exam with patient mumbling nonsensically and responding to internal stimuli.  Patient presentation consistent with john.  Lab workup revealed urine drug screen positive for benzodiazepines, amphetamines and THC along with mild hyperglycemia.  She is clinically stable and medically cleared for admission inpatient psychiatric unit.    Diagnosis  The primary encounter diagnosis was Hyperglycemia. Diagnoses of AMS (altered  mental status), Kary, Amphetamine abuse, and Mild tetrahydrocannabinol (THC) abuse were also pertinent to this visit.    Disposition and Plan  Condition: Stable  Disposition:  Inpatient psychiatric unit     Fernandez Kapadia MD  05/05/22 [id number]       Fernandez Kapadia MD  05/05/22 [id number]

## 2022-05-06 NOTE — NURSING
Pt in room restless with tremors and uncooperative with staff. PRN Ativan administered to aid withdrawal relief.

## 2022-05-06 NOTE — SUBJECTIVE & OBJECTIVE
Past Medical History:   Diagnosis Date    Abnormal Pap smear age 30    Pos. HPV,     Abnormal Pap smear of cervix     Addiction to drug     ADHD (attention deficit hyperactivity disorder)     Arthritis     Bipolar disorder     COPD (chronic obstructive pulmonary disease)     Depression     Diabetes mellitus     Fatigue     Hallucination     History of psychiatric hospitalization     Hx of psychiatric care     OCD (obsessive compulsive disorder)     Psychiatric problem     PTSD (post-traumatic stress disorder)     Sleep difficulties     Substance abuse     Suicide attempt     Therapy        Past Surgical History:   Procedure Laterality Date    bariatric sleeve N/A 2020     SECTION  ;     DILATION AND CURETTAGE OF UTERUS  2016    ENDOMETRIAL ABLATION  2016    KNEE SURGERY Right     hardware-bone from hip to repair    TUBAL LIGATION         Review of patient's allergies indicates:   Allergen Reactions    Latex, natural rubber        Current Facility-Administered Medications on File Prior to Encounter   Medication    [COMPLETED] diphenhydrAMINE injection 50 mg    [COMPLETED] haloperidol lactate injection 5 mg    [COMPLETED] lorazepam injection 2 mg    [COMPLETED] OLANZapine injection 10 mg    [DISCONTINUED] OLANZapine injection 5 mg     Current Outpatient Medications on File Prior to Encounter   Medication Sig    azithromycin (ZITHROMAX) 500 MG tablet Take 1 tablet (500 mg total) by mouth once daily.    clonazePAM (KLONOPIN) 0.5 MG tablet Take 1 tablet (0.5 mg total) by mouth 2 (two) times daily as needed for Anxiety.    diazePAM (VALIUM) 5 MG tablet Take daily for 5 days then half tablet daily for 4 days then stop (Patient not taking: No sig reported)    fluticasone furoate-vilanteroL (BREO) 100-25 mcg/dose diskus inhaler Inhale 1 puff into the lungs once daily. Controller    fluticasone propionate (FLONASE) 50 mcg/actuation nasal spray 2 sprays (100 mcg total) by Each Nostril  route once daily.    hydrOXYzine pamoate (VISTARIL) 50 MG Cap Take 1 capsule (50 mg total) by mouth every 4 (four) hours as needed (Insomnia). (Patient not taking: No sig reported)    promethazine-dextromethorphan (PROMETHAZINE-DM) 6.25-15 mg/5 mL Syrp Take 5 mLs by mouth every 8 (eight) hours as needed (cough).    QUEtiapine (SEROQUEL) 100 MG Tab Take 1 tablet (100 mg total) by mouth once daily. (Patient not taking: No sig reported)    QUEtiapine (SEROQUEL) 300 MG Tab Take 1 tablet (300 mg total) by mouth nightly.    QUEtiapine (SEROQUEL) 50 MG tablet Take 1 tablet (50 mg total) by mouth after lunch. (Patient not taking: No sig reported)    sertraline (ZOLOFT) 25 MG tablet TAKE ONE TABLET BY MOUTH ONCE A DAY AT BEDTIME    VENTOLIN HFA 90 mcg/actuation inhaler INHALE 2 PUFFS EVERY 6   HOURS AS NEEDED FOR      WHEEZING     Family History       Problem Relation (Age of Onset)    Anxiety disorder Sister    Colon cancer Father (60)    Coronary artery disease Maternal Grandfather    Depression Sister    Diabetes Maternal Grandmother, Maternal Grandfather    Drug abuse Sister    Hypertension Father    No Known Problems Paternal Aunt, Maternal Aunt, Maternal Uncle, Paternal Uncle, Paternal Grandfather, Paternal Grandmother, Cousin          Tobacco Use    Smoking status: Current Every Day Smoker     Packs/day: 1.00     Years: 25.00     Pack years: 25.00     Types: Cigarettes     Start date: 11/28/1997    Smokeless tobacco: Never Used    Tobacco comment: told about brochure and smoking clinic program   Substance and Sexual Activity    Alcohol use: Not Currently     Comment: Socially-2 times a week-4-5 beers    Drug use: Yes     Types: Amphetamines, Benzodiazepines    Sexual activity: Yes     Partners: Male     Birth control/protection: Surgical, See Surgical Hx     Comment: - has a boyfriend     Review of Systems   Unable to perform ROS: Psychiatric disorder   Constitutional:  Negative for chills and fever.   HENT:   Negative for congestion and sore throat.    Respiratory:  Negative for cough, chest tightness and shortness of breath.    Cardiovascular:  Negative for chest pain, palpitations and leg swelling.   Gastrointestinal:  Negative for abdominal pain, diarrhea, nausea and vomiting.   Genitourinary:  Negative for flank pain, frequency and hematuria.   Musculoskeletal:  Negative for back pain and neck pain.   Skin:  Negative for rash and wound.   Neurological:  Negative for dizziness, seizures, syncope and headaches.   Psychiatric/Behavioral:  Negative for agitation, confusion and self-injury.    Objective:     Vital Signs (Most Recent):  Temp: 97.2 °F (36.2 °C) (05/06/22 0729)  Pulse: 89 (05/06/22 0729)  Resp: 18 (05/06/22 0729)  BP: (!) 159/89 (05/06/22 0729)   Vital Signs (24h Range):  Temp:  [97.2 °F (36.2 °C)-98.5 °F (36.9 °C)] 97.2 °F (36.2 °C)  Pulse:  [] 89  Resp:  [16-24] 18  SpO2:  [96 %-100 %] 100 %  BP: (110-159)/(76-89) 159/89     Weight: 72.3 kg (159 lb 4.5 oz)  Body mass index is 29.13 kg/m².    Physical Exam  Vitals and nursing note reviewed.   Constitutional:       General: She is not in acute distress.     Appearance: She is well-developed.   HENT:      Head: Normocephalic and atraumatic.   Eyes:      Pupils: Pupils are equal, round, and reactive to light.   Neck:      Thyroid: No thyromegaly.   Cardiovascular:      Rate and Rhythm: Normal rate and regular rhythm.      Heart sounds: Normal heart sounds. No murmur heard.  Pulmonary:      Effort: Pulmonary effort is normal. No respiratory distress.      Breath sounds: Normal breath sounds. No wheezing or rales.   Abdominal:      General: Bowel sounds are normal. There is no distension.      Palpations: Abdomen is soft. There is no mass.      Tenderness: There is no abdominal tenderness.   Musculoskeletal:         General: No deformity. Normal range of motion.   Lymphadenopathy:      Cervical: No cervical adenopathy.   Skin:     General: Skin is warm and  dry.      Findings: No erythema or rash.   Neurological:      Mental Status: She is alert and oriented to person, place, and time.      Comments: CN II visual fields full to confrontation  CN III, Iv, VI- pupils ERRL   CN III- no palsy  Nystagmus; none   Diplopia- none  ophthalmoparesis- none  CN V- facial sensation intact  CN VII- facial expression full and symmetric  CNVIII- normal  CN IV-Normal  CN X- normal  CN XI- Normal   CN XII normal      Psychiatric:         Behavior: Behavior is agitated.         Thought Content: Thought content is delusional.      Comments: On exam pt is delusional , speaking   Room disheveled ; crackers and blankets on the floor   Speech mumbled nonsensically        Significant Labs: All pertinent labs within the past 24 hours have been reviewed.  Urine Culture: No results for input(s): LABURIN in the last 48 hours.  Urine Studies:   Recent Labs   Lab 05/05/22  2203   COLORU Yellow   APPEARANCEUA Clear   PHUR 6.0   SPECGRAV 1.020   PROTEINUA Negative   GLUCUA Negative   KETONESU Negative   BILIRUBINUA Negative   OCCULTUA Negative   NITRITE Negative   UROBILINOGEN Negative   LEUKOCYTESUR Negative     UPT  Results for orders placed or performed during the hospital encounter of 08/03/17   POCT urine pregnancy   Result Value Ref Range    POC Preg Test, Ur Negative Negative     Acceptable Yes      U/A  Results for orders placed or performed during the hospital encounter of 11/26/17   Urinalysis   Result Value Ref Range    Specimen UA Urine, Clean Catch     Color, UA Yellow Yellow, Straw, Alea    Appearance, UA Clear Clear    pH, UA 6.0 5.0 - 8.0    Specific Gravity, UA 1.020 1.005 - 1.030    Protein, UA Negative Negative    Glucose, UA Negative Negative    Ketones, UA 2+ (A) Negative    Bilirubin (UA) Negative Negative    Occult Blood UA 2+ (A) Negative    Nitrite, UA Negative Negative    Urobilinogen, UA Negative <2.0 EU/dL    Leukocytes, UA Negative Negative     UDS  Results  for orders placed or performed during the hospital encounter of 05/05/22   Drug screen panel, in-house   Result Value Ref Range    Benzodiazepines Presumptive Positive (A) Negative    Methadone metabolites Negative Negative    Cocaine (Metab.) Negative Negative    Opiate Scrn, Ur Negative Negative    Barbiturate Screen, Ur Negative Negative    Amphetamine Screen, Ur Presumptive Positive (A) Negative    THC Presumptive Positive (A) Negative    Phencyclidine Negative Negative    Creatinine, Urine 80.3 15.0 - 325.0 mg/dL    Toxicology Information SEE COMMENT      CBC  Results for orders placed or performed during the hospital encounter of 05/05/22   CBC Auto Differential   Result Value Ref Range    WBC 12.12 3.90 - 12.70 K/uL    RBC 4.70 4.00 - 5.40 M/uL    Hemoglobin 13.2 12.0 - 16.0 g/dL    Hematocrit 41.7 37.0 - 48.5 %    MCV 89 82 - 98 fL    MCH 28.1 27.0 - 31.0 pg    MCHC 31.7 (L) 32.0 - 36.0 g/dL    RDW 13.9 11.5 - 14.5 %    Platelets 424 150 - 450 K/uL    MPV 9.8 9.2 - 12.9 fL    Immature Granulocytes 0.4 0.0 - 0.5 %    Gran # (ANC) 8.9 (H) 1.8 - 7.7 K/uL    Immature Grans (Abs) 0.05 (H) 0.00 - 0.04 K/uL    Lymph # 2.3 1.0 - 4.8 K/uL    Mono # 0.6 0.3 - 1.0 K/uL    Eos # 0.1 0.0 - 0.5 K/uL    Baso # 0.09 0.00 - 0.20 K/uL    nRBC 0 0 /100 WBC    Gran % 73.7 (H) 38.0 - 73.0 %    Lymph % 19.3 18.0 - 48.0 %    Mono % 5.0 4.0 - 15.0 %    Eosinophil % 0.9 0.0 - 8.0 %    Basophil % 0.7 0.0 - 1.9 %    Differential Method Automated      CMP  Results for orders placed or performed during the hospital encounter of 05/05/22   Comprehensive Metabolic Panel   Result Value Ref Range    Sodium 144 136 - 145 mmol/L    Potassium 3.9 3.5 - 5.1 mmol/L    Chloride 105 95 - 110 mmol/L    CO2 26 23 - 29 mmol/L    Glucose 117 (H) 70 - 110 mg/dL    BUN 18 6 - 20 mg/dL    Creatinine 1.0 0.5 - 1.4 mg/dL    Calcium 9.5 8.7 - 10.5 mg/dL    Total Protein 7.7 6.0 - 8.4 g/dL    Albumin 4.0 3.5 - 5.2 g/dL    Total Bilirubin 0.3 0.1 - 1.0 mg/dL     Alkaline Phosphatase 94 55 - 135 U/L    AST 16 10 - 40 U/L    ALT 19 10 - 44 U/L    Anion Gap 13 8 - 16 mmol/L    eGFR if African American >60 >60 mL/min/1.73 m^2    eGFR if non African American >60 >60 mL/min/1.73 m^2     TSH  Results for orders placed or performed during the hospital encounter of 05/05/22   TSH   Result Value Ref Range    TSH 0.573 0.400 - 4.000 uIU/mL     ETOH  Results for orders placed or performed during the hospital encounter of 05/05/22   Ethanol   Result Value Ref Range    Alcohol, Serum <10 <10 mg/dL     Salicylate  Results for orders placed or performed during the hospital encounter of 12/03/21   Salicylate Level   Result Value Ref Range    Salicylate Lvl <5.0 (L) 15.0 - 30.0 mg/dL     Acetaminophen  Results for orders placed or performed during the hospital encounter of 05/05/22   Acetaminophen Level   Result Value Ref Range    Acetaminophen (Tylenol), Serum <3.0 (L) 10.0 - 20.0 ug/mL     Lab Results   Component Value Date    HGBA1C 5.2 12/03/2021       Lab Results   Component Value Date    CHOL 186 05/05/2022    CHOL 140 09/12/2021    CHOL 154 03/24/2021     Lab Results   Component Value Date    HDL 54 05/05/2022    HDL 38 (L) 09/12/2021    HDL 34 (L) 03/24/2021     Lab Results   Component Value Date    LDLCALC 106.8 05/05/2022    LDLCALC 66.4 09/12/2021    LDLCALC 92.2 03/24/2021     Lab Results   Component Value Date    TRIG 126 05/05/2022    TRIG 178 (H) 09/12/2021    TRIG 139 03/24/2021     Lab Results   Component Value Date    CHOLHDL 29.0 05/05/2022    CHOLHDL 27.1 09/12/2021    CHOLHDL 22.1 03/24/2021         Significant Imaging: I have reviewed and interpreted all pertinent imaging results/findings within the past 24 hours.

## 2022-05-06 NOTE — NURSING
New admit. Patient unable to settle down despite being administered PRNs @ approximately 2am. easily redirected, stays in bed about 15 minutes, then gets up again. Will continue to monitor. NAD. All safety precautions remain in place.

## 2022-05-06 NOTE — NURSING
Report called by Freda in ER who states pt medically cleared by Dr Kapadia.  VS stable.  Given Haldol and Ativan at 23:51 then later zyprexa 10mg at 01:51 and benadryl 50mg IM at 02:11.  Mumbling nonsensibly.  Agitated at times.  Redirectable at times.  Brought in for bizarre behavior, VH, and john.  BIB son.  PEC'd.

## 2022-05-06 NOTE — CONSULTS
"  [place] [facility]  Adult Nutrition  Consult Note    SUMMARY     Recommendations    Recommendation:   1. Please change diet to diabetic diet per hx HTN   2. Encourage adequate intake of meals daily   3. RD to follow    Goals: pt to consume >50% of meals by f/u  Nutrition Goal Status: new  Communication of RD Recs:  (POC)    Assessment and Plan  Nutrition Problem:  Inadequate energy intake    Related to (etiology):   AMS    Signs and Symptoms (as evidenced by):   50% intake of meal    Interventions:  General Healthful Diet    Nutrition Diagnosis Status:   New     Reason for Assessment    Reason For Assessment: consult  Diagnosis: psychological disorder  Relevant Medical History: addiction to drug, COPD, depression, DM    General Information Comments: Pt admitted today. She consumed 50% of breakfast this AM. +weight gain. NFPE not completed per psych status.    Nutrition Discharge Planning: diabetic diet    Nutrition Risk Screen    Nutrition Risk Screen: no indicators present    Nutrition/Diet History    Food Allergies: NKFA    Anthropometrics    Temp: 97.2 °F (36.2 °C)  Height: 5' 2" (157.5 cm)  Height (inches): 62 in  Weight Method: Standard Scale  Weight: 72.3 kg (159 lb 4.5 oz)  Weight (lb): 159.28 lb  Ideal Body Weight (IBW), Female: 110 lb  % Ideal Body Weight, Female (lb): 144.8 %  BMI (Calculated): 29.1  BMI Grade: 25 - 29.9 - overweight       Lab/Procedures/Meds    Pertinent Labs Reviewed: reviewed  Pertinent Labs Comments: glucose 117  Pertinent Medications Reviewed: reviewed  Pertinent Medications Comments: diazepam, folic acid, MVI    Estimated/Assessed Needs    Weight Used For Calorie Calculations: 72.3 kg (159 lb 6.3 oz)  Energy Calorie Requirements (kcal): 1800  Energy Need Method: Kcal/kg (25)  Protein Requirements: 57-72 g (.8-1 g/kg)  Weight Used For Protein Calculations: 72.3 kg (159 lb 6.3 oz)     Estimated Fluid Requirement Method: RDA Method  RDA Method (mL): 1800  CHO Requirement: " 225 g    Nutrition Prescription Ordered    Current Diet Order: Regular diet    Evaluation of Received Nutrient/Fluid Intake    Fluid Required: meeting needs  % Intake of Estimated Energy Needs: 50 - 75 %  % Meal Intake: 50 - 75 %    Nutrition Risk    Level of Risk/Frequency of Follow-up: low - moderate       Monitor and Evaluation    Food and Nutrient Intake: energy intake, food and beverage intake  Food and Nutrient Adminstration: diet order  Physical Activity and Function: nutrition-related ADLs and IADLs  Anthropometric Measurements: weight, weight change, body mass index  Biochemical Data, Medical Tests and Procedures: electrolyte and renal panel, glucose/endocrine profile, lipid profile  Nutrition-Focused Physical Findings: overall appearance       Nutrition Follow-Up    RD Follow-up?: Yes
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
## 2022-05-06 NOTE — PLAN OF CARE
Recommendation:   1. Please change diet to diabetic diet per hx HTN   2. Encourage adequate intake of meals daily   3. RD to follow    Goals: pt to consume >50% of meals by f/u  Nutrition Goal Status: new

## 2022-05-06 NOTE — ASSESSMENT & PLAN NOTE
Lab Results   Component Value Date    HGBA1C 5.1 05/05/2022     Pt  Has hx of Type II diabetes ~ 3 years ago, when she had BMI 39.68 with weight of 234; she is currently 159# with BMI 29  No longer diabetic , A1C above normal

## 2022-05-06 NOTE — NURSING
Patient arrived to [facility] from [facility] via wheelchair, escorted by hospital security and [facility]. WANDED. NO contraband found.  Ambulated to assessment room without difficulty.
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
## 2022-05-06 NOTE — PLAN OF CARE
Refused VS and questions.  Unable to complete nursing assessment due to uncooperativeness.  Grabbing at things on desk in assessment room.  Moved pt to her room.  Skin assessment done.  No contraband found.  Pulling at blanket on foot of bed cautiously.  Appears to be responding to hallucinations.  Mumbling illogically.  Briefly combative.

## 2022-05-07 PROCEDURE — 25000003 PHARM REV CODE 250: Performed by: PSYCHIATRY & NEUROLOGY

## 2022-05-07 PROCEDURE — 63600175 PHARM REV CODE 636 W HCPCS: Performed by: PSYCHIATRY & NEUROLOGY

## 2022-05-07 PROCEDURE — 99233 PR SUBSEQUENT HOSPITAL CARE,LEVL III: ICD-10-PCS | Mod: ,,, | Performed by: PSYCHIATRY & NEUROLOGY

## 2022-05-07 PROCEDURE — 99233 SBSQ HOSP IP/OBS HIGH 50: CPT | Mod: ,,, | Performed by: PSYCHIATRY & NEUROLOGY

## 2022-05-07 PROCEDURE — 25000003 PHARM REV CODE 250: Performed by: STUDENT IN AN ORGANIZED HEALTH CARE EDUCATION/TRAINING PROGRAM

## 2022-05-07 PROCEDURE — 11400000 HC PSYCH PRIVATE ROOM

## 2022-05-07 RX ADMIN — LORAZEPAM 2 MG: 2 INJECTION INTRAMUSCULAR; INTRAVENOUS at 04:05

## 2022-05-07 RX ADMIN — LORAZEPAM 2 MG: 2 INJECTION INTRAMUSCULAR; INTRAVENOUS at 08:05

## 2022-05-07 RX ADMIN — DIAZEPAM 15 MG: 10 TABLET ORAL at 08:05

## 2022-05-07 RX ADMIN — QUETIAPINE FUMARATE 50 MG: 25 TABLET ORAL at 09:05

## 2022-05-07 RX ADMIN — HYDROXYZINE PAMOATE 50 MG: 50 CAPSULE ORAL at 02:05

## 2022-05-07 RX ADMIN — DIAZEPAM 15 MG: 10 TABLET ORAL at 09:05

## 2022-05-07 RX ADMIN — FOLIC ACID 1 MG: 1 TABLET ORAL at 08:05

## 2022-05-07 RX ADMIN — THERA TABS 1 TABLET: TAB at 08:05

## 2022-05-07 RX ADMIN — OLANZAPINE 10 MG: 10 TABLET, FILM COATED ORAL at 02:05

## 2022-05-07 RX ADMIN — LORAZEPAM 2 MG: 2 INJECTION INTRAMUSCULAR; INTRAVENOUS at 12:05

## 2022-05-07 RX ADMIN — DIAZEPAM 15 MG: 10 TABLET ORAL at 04:05

## 2022-05-07 RX ADMIN — QUETIAPINE FUMARATE 50 MG: 25 TABLET ORAL at 08:05

## 2022-05-07 NOTE — PLAN OF CARE
Pt still actively hallucinating this morning, did not sleep last night.  1:1 and need constant redirection.  Pt received IM ativan at 0815, tolerated well.  Pt able to be instructed to lie in her bed, she fell asleep at approximately 10am, has been in bed with eyes closed since.  Sitter remains at pt doorway for 1:1 observation.  Will continue to monitor.

## 2022-05-07 NOTE — PLAN OF CARE
Remains agitated, restless, grabbing at the air, trying to climb on bed, trying to grab into staff's pockets, trying to go into other pt's rooms.  Needs constant redirection to stay safe.  Drinking water and juice frequently only with staff encouragement.  Ativan 2mg IM given to right gluteal muscle.  Pt tolerated well.

## 2022-05-07 NOTE — PLAN OF CARE
Pt remains on strict 1:1 visual contact. Pt remains in room restless, +responding to internal stimuli, +AVH. Pt uncooperative and needing constant verbal redirection. Denies SI/HI. PRN Ativan 2mg IM administered to aid with withdrawal. PRN Visteril administered to aid with sleep. No relief obtained from either PRN medication at this time. NADN. Safety precautions remain in place.

## 2022-05-07 NOTE — NURSING
Zyprexa 10mg po given for severe agitation, punching on wall, trying to stand on bed and closet shelf, pulling at walls, blanket.  Unable to keep still.  Talking to self.  Vistaril 50mg po given for insomnia and anxiety.  Strict VC maintained.

## 2022-05-07 NOTE — NURSING
Prn med not effective yet.  Combative at times.  Walked into wall.  Very difficult to redirect, sometimes requiring 2 staff.  Slept none.  Strict VC maintained.

## 2022-05-07 NOTE — PROGRESS NOTES
PSYCHIATRY DAILY INPATIENT PROGRESS NOTE  SUBSEQUENT HOSPITAL VISIT    ENCOUNTER DATE: 5/7/2022  SITE: [facility]    DATE OF ADMISSION: 5/6/2022  2:50 AM  LENGTH OF STAY: 1 days      CHIEF COMPLAINT   Joe Henson is a 41 y.o. female, seen during daily salguero rounds on the inpatient unit.  Joe Henson presented with the chief complaint of mood disorder, anxiety and substance problems      The patient was seen and examined. The chart was reviewed.     Reviewed notes from Rns, MD and SW and labs from the last 24 hours.    The patient's case was discussed with the treatment team/care providers today including Rns and SW    Staff reports severe (requiring PRN medications for non redirectable, agitated behavior in order to prevent harm to self or others) behavioral or management issues.     The patient has been compliant with treatment.      Subjective 05/07/2022       Today the pt alternates between sitting up in bed, talking out loud to herself, and lying down. Appears to be RIS severely. She has been noted by staff to be pretending to grill food on the air conditioner grate, hallucinating overnight and has required multiple PRN injections for this.     Pt is not able to meaningfully participate in interview.     Requiring 1:1 sitter.       The patient denies any side effects to medications.          Psychiatric ROS (observed, reported, or endorsed/denied):  Depressed mood - Yes  Interest/pleasure/anhedonia: Yes  Guilt/hopelessness/worthlessness - Yes  Changes in Sleep - Yes  Changes in Appetite - Yes  Changes in Concentration - Yes  Changes in Energy - Yes  PMA/R- Yes  Suicidal- active/passive ideations - No  Homicidal ideations: active/passive ideations - denies    Hallucinations - Yes  Delusions - Yes  Disorganized behavior - denies  Disorganized speech - denies  Negative symptoms - denies    Elevated mood - Yes  Decreased need for sleep - Yes  Grandiosity - denies  Racing thoughts -  denies  Impulsivity - denies  Irritability- denies  Increased energy - denies  Distractibility - Yes  Increase in goal-directed activity or PMA- denies    Symptoms of ITZ - Yes  Symptoms of Panic Disorder- Yes  Symptoms of PTSD - Yes        Overall progress: Patient is showing no improvement on the Unit to date        Psychotherapy:  · Target symptoms: anxiety , substance abuse, mood disorder  · Why chosen therapy is appropriate versus another modality: relevant to diagnosis  · Outcome monitoring methods: self-report, lab data, observation  · Therapeutic intervention type: interactive psychotherapy  · Topics discussed/themes: building skills sets for symptom management, symptom recognition, substance abuse  · The patient's response to the intervention is not able to participate in therapy at this time.. The patient's progress toward treatment goals is poor.   · Duration of intervention: 0 minutes.        Medical ROS  ROS      PAST MEDICAL HISTORY   Past Medical History:   Diagnosis Date    Abnormal Pap smear age 30    Pos. HPV,     Abnormal Pap smear of cervix     Addiction to drug     ADHD (attention deficit hyperactivity disorder)     Arthritis     Bipolar disorder     COPD (chronic obstructive pulmonary disease)     Depression     Diabetes mellitus     Fatigue     Hallucination     History of psychiatric hospitalization     Hx of psychiatric care     OCD (obsessive compulsive disorder)     Psychiatric problem     PTSD (post-traumatic stress disorder)     Sleep difficulties     Substance abuse     Suicide attempt     Therapy            PSYCHOTROPIC MEDICATIONS   Scheduled Meds:   diazePAM  15 mg Oral QID    folic acid  1 mg Oral Daily    multivitamin  1 tablet Oral Daily    nicotine  1 patch Transdermal Daily    QUEtiapine  50 mg Oral BID     Continuous Infusions:  PRN Meds:.hydrOXYzine pamoate, lorazepam, OLANZapine **AND** OLANZapine        EXAMINATION    VITALS   Vitals:    05/06/22  0401 05/06/22 0729 05/06/22 1937 05/07/22 0036   BP: 139/85 (!) 159/89 111/73 119/65   BP Location:  Right arm Left arm    Patient Position:  Sitting Lying    Pulse: 104 89 93 88   Resp: 20 18 20    Temp: 97.7 °F (36.5 °C) 97.2 °F (36.2 °C) 98.1 °F (36.7 °C) 97.5 °F (36.4 °C)   TempSrc:  Temporal     Weight:       Height:           Body mass index is 29.13 kg/m².        CONSTITUTIONAL  General Appearance: unremarkable, age appropriate    MUSCULOSKELETAL  Muscle Strength and Tone:no tremor, no tic  Abnormal Involuntary Movements: No  Gait and Station: KOFFI, in bed     PSYCHIATRIC   Level of Consciousness: awake  Orientation: person  Grooming: Disheveled  Psychomotor Behavior: psychomotor agitation  Speech: soft  Language: grossly intact, not tested  Mood: unable to ilicit  Affect: Even  Thought Process: KOFFI due to AMS  Associations: KOFFI due to AMS   Thought Content: KOFFI due to AMS  Perceptions: responding to internal stimuli  Memory: Unable to assess  Attention:Attends to interview without distraction  Fund of Knowledge: Impaired  Estimate if Intelligence:  Average based on work/education history, vocabulary and mental status exam  Insight: limited awareness of illness  Judgment: limited        DIAGNOSTIC TESTING   Laboratory Results  No results found for this or any previous visit (from the past 24 hour(s)).          MEDICAL DECISION MAKING      ASSESSMENT:   Delirium secondary to benzo withdrawal  Bipolar I Disorder mre mixed, severe with psychotic features  ITZ  PTSD     Nicotine Dependence  Substance abuse (h/o alcohol, opiate, amphetamine, sedative hypnotic and cocaine abuse; unable to specify or determine further at this time)     Psychosocial stressors     Chronic pain (sciatica)  Elevated Blood glucose        PROBLEM LIST AND MANAGEMENT PLANS    Delirium  - suspect 2/2 bzd w/d; h/o bzd wd last admit,  - started Ativan 2 mg PO q 4 prn for BZD WD  -Start valium 10 mg PO 4 times daily; will taper down/off as able  --> Titrate Valium to 15mg PO QID (pt with severe WD, requiring 5 doses of PRN ativan in the past 24 hours)        Mood: pt counseled   -Resume home medication Serqouel at 50 mg po BID- will change/optimize as indicated  -hold home med trazodone- pt has not recently taken; will resume if needed     Anxiety: pt counseled  -serqouel off-label as above  -hold zoloft for now; will-reinitiate if needed     PTSD: pt counseled  -seroquel off-label as above     Insomnia:pt counseled  -seroquel/ off-label as above  -vistaril prn     Nicotine use: pt counseled;   Start nicotine 14 mg patch dermal daily     Substance use: pt counseled     Psychosocial stressors: pt counseled; SW consulted to to assist with resources     Elevated Blood glucose: pt counseled  -check HgA1c     Chronic pain: pt counseled            Discussed diagnosis, risks and benefits of proposed treatment vs alternative treatments vs no treatment, potential side effects of these treatments and the inherent unpredictability of treatment. The patient expresses understanding of the above and displays the capacity to agree with this treatment given said understanding. Patient also agrees that, currently, the benefits outweigh the risks and would like to pursue/continue treatment at this time.      DISCHARGE PLANNING  Expected Disposition Plan: Home or Self Care      NEED FOR CONTINUED HOSPITALIZATION  Psychiatric illness continues to pose a potential threat to life or bodily function, of self or others, thereby requiring the need for continued inpatient psychiatric hospitalization: Yes, due to: danger to self and gravely disabled, as evidenced by:  Ongoing concerns with grave disability with patient unable to perform basic feeding, hygiene and dressing activities without significant constant support.    Protective inpatient pyschiatric hospitalization required while a safe disposition plan is enacted: Yes    Patient stabilized and ready for discharge from  inpatient psychiatric unit: No        STAFF:   Rl Aguilar MD  Psychiatry

## 2022-05-07 NOTE — PSYCH
Pt up to eat at 4pm, assisted to dayroom by tech.  Pt somewhat unsteady on feet but able to ambulate independently.  Pt only ate very little, spilled most of the food in her plate.  Speech is still nonsensical.  Thoughts disorganized.  Pt given scheduled po valium and went back to her room.  Pt lying down, remains 1:1.  Will continue to monitor.

## 2022-05-07 NOTE — NURSING
Pt received IM ativan at 1215 for withdrawal psychosis, pt tolerated well.  Will continue to monitor

## 2022-05-08 PROCEDURE — 25000003 PHARM REV CODE 250: Performed by: PSYCHIATRY & NEUROLOGY

## 2022-05-08 PROCEDURE — 99232 PR SUBSEQUENT HOSPITAL CARE,LEVL II: ICD-10-PCS | Mod: ,,, | Performed by: PSYCHIATRY & NEUROLOGY

## 2022-05-08 PROCEDURE — S4991 NICOTINE PATCH NONLEGEND: HCPCS | Performed by: STUDENT IN AN ORGANIZED HEALTH CARE EDUCATION/TRAINING PROGRAM

## 2022-05-08 PROCEDURE — 99232 SBSQ HOSP IP/OBS MODERATE 35: CPT | Mod: ,,, | Performed by: PSYCHIATRY & NEUROLOGY

## 2022-05-08 PROCEDURE — 11400000 HC PSYCH PRIVATE ROOM

## 2022-05-08 PROCEDURE — 25000003 PHARM REV CODE 250: Performed by: STUDENT IN AN ORGANIZED HEALTH CARE EDUCATION/TRAINING PROGRAM

## 2022-05-08 RX ORDER — IBUPROFEN 600 MG/1
600 TABLET ORAL EVERY 6 HOURS PRN
Status: DISCONTINUED | OUTPATIENT
Start: 2022-05-08 | End: 2022-05-16 | Stop reason: HOSPADM

## 2022-05-08 RX ADMIN — HYDROXYZINE PAMOATE 50 MG: 50 CAPSULE ORAL at 02:05

## 2022-05-08 RX ADMIN — DIAZEPAM 15 MG: 10 TABLET ORAL at 04:05

## 2022-05-08 RX ADMIN — IBUPROFEN 600 MG: 600 TABLET, FILM COATED ORAL at 09:05

## 2022-05-08 RX ADMIN — QUETIAPINE FUMARATE 50 MG: 25 TABLET ORAL at 08:05

## 2022-05-08 RX ADMIN — IBUPROFEN 600 MG: 600 TABLET, FILM COATED ORAL at 03:05

## 2022-05-08 RX ADMIN — NICOTINE 1 PATCH: 14 PATCH, EXTENDED RELEASE TRANSDERMAL at 08:05

## 2022-05-08 RX ADMIN — IBUPROFEN 600 MG: 600 TABLET, FILM COATED ORAL at 06:05

## 2022-05-08 RX ADMIN — DIAZEPAM 15 MG: 10 TABLET ORAL at 08:05

## 2022-05-08 RX ADMIN — FOLIC ACID 1 MG: 1 TABLET ORAL at 08:05

## 2022-05-08 RX ADMIN — THERA TABS 1 TABLET: TAB at 08:05

## 2022-05-08 RX ADMIN — DIAZEPAM 15 MG: 10 TABLET ORAL at 12:05

## 2022-05-08 NOTE — PROGRESS NOTES
PSYCHIATRY DAILY INPATIENT PROGRESS NOTE  SUBSEQUENT HOSPITAL VISIT    ENCOUNTER DATE: 5/8/2022  SITE: [facility]    DATE OF ADMISSION: 5/6/2022  2:50 AM  LENGTH OF STAY: 2 days      CHIEF COMPLAINT   Joe Henson is a 41 y.o. female, seen during daily salguero rounds on the inpatient unit.  Joe Henson presented with the chief complaint of mood disorder, anxiety and substance problems      The patient was seen and examined. The chart was reviewed.     Reviewed notes from Rns, MD and SW and labs from the last 24 hours.    The patient's case was discussed with the treatment team/care providers today including Rns and SW    Staff reports severe (requiring PRN medications for non redirectable, agitated behavior in order to prevent harm to self or others) behavioral or management issues.     The patient has been compliant with treatment.      Subjective 05/08/2022       Pt is able to participate appropriately in interview this morning. States she is feeling better. She is oriented to person, place, and time. Reports that she is  Feeling anxious, though she does not appear preoccupied with this.     Pt states her mood is okay. She slept well last night. Denies side effects from medications.     Significant improvement in BZD overnight. Will [id number] continues inpatient stay for delicate tapering of BZD.     No longer equiring 1:1 sitter.       The patient denies any side effects to medications.          Psychiatric ROS (observed, reported, or endorsed/denied):  Depressed mood - Yes  Interest/pleasure/anhedonia: Yes  Guilt/hopelessness/worthlessness - Yes  Changes in Sleep - Yes  Changes in Appetite - Yes  Changes in Concentration - Yes  Changes in Energy - Yes  PMA/R- Yes  Suicidal- active/passive ideations - No  Homicidal ideations: active/passive ideations - denies    Hallucinations - Yes  Delusions - Yes  Disorganized behavior - denies  Disorganized speech - denies  Negative symptoms -  denies    Elevated mood - Yes  Decreased need for sleep - Yes  Grandiosity - denies  Racing thoughts - denies  Impulsivity - denies  Irritability- denies  Increased energy - denies  Distractibility - Yes  Increase in goal-directed activity or PMA- denies    Symptoms of ITZ - Yes  Symptoms of Panic Disorder- Yes  Symptoms of PTSD - Yes        Overall progress: Patient is showing no improvement on the Unit to date        Psychotherapy:  · Target symptoms: anxiety , substance abuse, mood disorder  · Why chosen therapy is appropriate versus another modality: relevant to diagnosis  · Outcome monitoring methods: self-report, lab data, observation  · Therapeutic intervention type: interactive psychotherapy  · Topics discussed/themes: building skills sets for symptom management, symptom recognition, substance abuse  · The patient's response to the intervention is not able to participate in therapy at this time.. The patient's progress toward treatment goals is poor.   · Duration of intervention: 0 minutes.        Medical ROS  Review of Systems   Constitutional: Negative for chills and fever.   HENT: Negative for congestion, hearing loss, sore throat and tinnitus.    Eyes: Negative for blurred vision, double vision, photophobia and pain.   Respiratory: Negative for cough, hemoptysis, sputum production, shortness of breath and wheezing.    Cardiovascular: Negative for chest pain, palpitations and leg swelling.   Gastrointestinal: Negative for abdominal pain, blood in stool, constipation, diarrhea, nausea and vomiting.   Genitourinary: Negative for dysuria, frequency, hematuria and urgency.   Musculoskeletal: Negative for back pain, joint pain and myalgias.   Skin: Negative for rash.   Neurological: Negative for dizziness, tremors, seizures, weakness and headaches.         PAST MEDICAL HISTORY   Past Medical History:   Diagnosis Date    Abnormal Pap smear age 30    Pos. HPV,     Abnormal Pap smear of cervix     Addiction to  drug     ADHD (attention deficit hyperactivity disorder)     Arthritis     Bipolar disorder     COPD (chronic obstructive pulmonary disease)     Depression     Diabetes mellitus     Fatigue     Hallucination     History of psychiatric hospitalization     Hx of psychiatric care     OCD (obsessive compulsive disorder)     Psychiatric problem     PTSD (post-traumatic stress disorder)     Sleep difficulties     Substance abuse     Suicide attempt     Therapy            PSYCHOTROPIC MEDICATIONS   Scheduled Meds:   diazePAM  15 mg Oral QID    folic acid  1 mg Oral Daily    multivitamin  1 tablet Oral Daily    nicotine  1 patch Transdermal Daily    QUEtiapine  50 mg Oral BID     Continuous Infusions:  PRN Meds:.hydrOXYzine pamoate, ibuprofen, lorazepam, OLANZapine **AND** OLANZapine        EXAMINATION    VITALS   Vitals:    05/06/22 0729 05/06/22 1937 05/07/22 0036 05/07/22 2120   BP: (!) 159/89 111/73 119/65 99/70   BP Location: Right arm Left arm  Left arm   Patient Position: Sitting Lying  Sitting   Pulse: 89 93 88 102   Resp: 18 20  16   Temp: 97.2 °F (36.2 °C) 98.1 °F (36.7 °C) 97.5 °F (36.4 °C) 96.3 °F (35.7 °C)   TempSrc: Temporal   Temporal   Weight:       Height:           Body mass index is 29.13 kg/m².        CONSTITUTIONAL  General Appearance: unremarkable, age appropriate    MUSCULOSKELETAL  Muscle Strength and Tone:no tremor, no tic  Abnormal Involuntary Movements: No  Gait and Station: [facility], in bed     PSYCHIATRIC   Level of Consciousness: awake  Orientation: person  Grooming: Disheveled  Psychomotor Behavior: psychomotor agitation  Speech: soft  Language: grossly intact, not tested  Mood: unable to ilicit  Affect: Even  Thought Process: Linear, logical  Associations: intact   Thought Content: DENIES suicidal ideation and DENIES homicidal ideation  Perceptions: denies hallucinations  Memory: WNL, not formally tested  Attention:Attends to interview without distraction  Fund of Knowledge:  Impaired  Estimate if Intelligence:  Average based on work/education history, vocabulary and mental status exam  Insight: limited awareness of illness  Judgment: limited        DIAGNOSTIC TESTING   Laboratory Results  No results found for this or any previous visit (from the past 24 hour(s)).          MEDICAL DECISION MAKING      ASSESSMENT:   Delirium secondary to benzo withdrawal  Bipolar I Disorder mre mixed, severe with psychotic features  ITZ  PTSD     Nicotine Dependence  Substance abuse (h/o alcohol, opiate, amphetamine, sedative hypnotic and cocaine abuse; unable to specify or determine further at this time)     Psychosocial stressors     Chronic pain (sciatica)  Elevated Blood glucose        PROBLEM LIST AND MANAGEMENT PLANS    Delirium  - suspect 2/2 bzd w/d; h/o bzd wd last admit,  - started Ativan 2 mg PO q 4 prn for BZD WD  -Start valium 10 mg PO 4 times daily--> Titrated Valium to 15mg PO QID  On 5/7 (No longer requring PRN ativan since titration of valium dose.); will taper down/off as able         Mood: pt counseled   -Resume home medication Serqouel at 50 mg po BID- will change/optimize as indicated  -hold home med trazodone- pt has not recently taken; will resume if needed     Anxiety: pt counseled  -serqouel off-label as above  -hold zoloft for now; will-reinitiate if needed     PTSD: pt counseled  -seroquel off-label as above     Insomnia:pt counseled  -seroquel/ off-label as above  -vistaril prn     Nicotine use: pt counseled;   Start nicotine 14 mg patch dermal daily     Substance use: pt counseled     Psychosocial stressors: pt counseled; SW consulted to to assist with resources     Elevated Blood glucose: pt counseled  -check HgA1c     Chronic pain: pt counseled            Discussed diagnosis, risks and benefits of proposed treatment vs alternative treatments vs no treatment, potential side effects of these treatments and the inherent unpredictability of treatment. The patient expresses  understanding of the above and displays the capacity to agree with this treatment given said understanding. Patient also agrees that, currently, the benefits outweigh the risks and would like to pursue/continue treatment at this time.      DISCHARGE PLANNING  Expected Disposition Plan: Home or Self Care      NEED FOR CONTINUED HOSPITALIZATION  Psychiatric illness continues to pose a potential threat to life or bodily function, of self or others, thereby requiring the need for continued inpatient psychiatric hospitalization: Yes, due to: danger to self and gravely disabled, as evidenced by:  Ongoing concerns with grave disability with patient unable to perform basic feeding, hygiene and dressing activities without significant constant support.    Protective inpatient pyschiatric hospitalization required while a safe disposition plan is enacted: Yes    Patient stabilized and ready for discharge from inpatient psychiatric unit: No        STAFF:   Rl Aguilar MD  Psychiatry

## 2022-05-08 NOTE — PLAN OF CARE
Lying quietly in bed, eyes closed, respirations even, unlabored. Apparently asleep. Slept 9 hours thus far with 3 interruptions. Vistaril 50 mg given po at 0240 for anxiety. Safety precautions maintained. Rounds done every 15 minutes. Bed is fixed in low position and room is uncluttered and pathways are clear.

## 2022-05-08 NOTE — PLAN OF CARE
Pt mental status improving.  No longer requires 1:1, she is more alert and oriented.  Gait is steady.  Does not appear to be hallucinating at this time.  Pt denies any S/I or H/I.  Denies psychosis.  Reports she is sore all over her body.  Pt given prn Motrin po at this time.  Up for meals and snacks.  Resting quietly at this time, will continue to monitor.

## 2022-05-08 NOTE — PLAN OF CARE
Patient awakened to use bathroom. She is lethargic but is oriented x4. Cooperative with medication administration. States she is hungry. Accompanied to activity room where she consumed 25% of dinner. Patient then was accompanied back to room to bed. Remains quiet and cooperative, and is on constant observation.

## 2022-05-09 PROCEDURE — 25000003 PHARM REV CODE 250: Performed by: PSYCHIATRY & NEUROLOGY

## 2022-05-09 PROCEDURE — 25000003 PHARM REV CODE 250: Performed by: STUDENT IN AN ORGANIZED HEALTH CARE EDUCATION/TRAINING PROGRAM

## 2022-05-09 PROCEDURE — 99233 PR SUBSEQUENT HOSPITAL CARE,LEVL III: ICD-10-PCS | Mod: ,,, | Performed by: PSYCHIATRY & NEUROLOGY

## 2022-05-09 PROCEDURE — 90833 PSYTX W PT W E/M 30 MIN: CPT | Mod: ,,, | Performed by: PSYCHIATRY & NEUROLOGY

## 2022-05-09 PROCEDURE — 90833 PR PSYCHOTHERAPY W/PATIENT W/E&M, 30 MIN (ADD ON): ICD-10-PCS | Mod: ,,, | Performed by: PSYCHIATRY & NEUROLOGY

## 2022-05-09 PROCEDURE — 11400000 HC PSYCH PRIVATE ROOM

## 2022-05-09 PROCEDURE — S4991 NICOTINE PATCH NONLEGEND: HCPCS | Performed by: STUDENT IN AN ORGANIZED HEALTH CARE EDUCATION/TRAINING PROGRAM

## 2022-05-09 PROCEDURE — 99233 SBSQ HOSP IP/OBS HIGH 50: CPT | Mod: ,,, | Performed by: PSYCHIATRY & NEUROLOGY

## 2022-05-09 RX ORDER — QUETIAPINE FUMARATE 100 MG/1
100 TABLET, FILM COATED ORAL 2 TIMES DAILY
Status: DISCONTINUED | OUTPATIENT
Start: 2022-05-09 | End: 2022-05-10

## 2022-05-09 RX ADMIN — DIAZEPAM 12 MG: 10 TABLET ORAL at 08:05

## 2022-05-09 RX ADMIN — DIAZEPAM 15 MG: 10 TABLET ORAL at 08:05

## 2022-05-09 RX ADMIN — DIAZEPAM 12 MG: 10 TABLET ORAL at 12:05

## 2022-05-09 RX ADMIN — DIAZEPAM 12 MG: 10 TABLET ORAL at 04:05

## 2022-05-09 RX ADMIN — THERA TABS 1 TABLET: TAB at 08:05

## 2022-05-09 RX ADMIN — QUETIAPINE FUMARATE 50 MG: 25 TABLET ORAL at 08:05

## 2022-05-09 RX ADMIN — QUETIAPINE FUMARATE 100 MG: 100 TABLET ORAL at 08:05

## 2022-05-09 RX ADMIN — NICOTINE 1 PATCH: 14 PATCH, EXTENDED RELEASE TRANSDERMAL at 08:05

## 2022-05-09 RX ADMIN — FOLIC ACID 1 MG: 1 TABLET ORAL at 08:05

## 2022-05-09 RX ADMIN — HYDROXYZINE PAMOATE 50 MG: 50 CAPSULE ORAL at 03:05

## 2022-05-09 RX ADMIN — HYDROXYZINE PAMOATE 50 MG: 50 CAPSULE ORAL at 08:05

## 2022-05-09 NOTE — PROGRESS NOTES
"   05/09/22 1203   Assessment   Patient's Identification of the Problem Patient reports "blah, tired" mood, states her admit is due to "because I took drugs and I overacted." Patient reports she was feeling paranoid, seeing and hearing things. Patient denies hearing voices at this time. Patient admits to negative leisure lifestyle of cigarettes, alcohol(seldon) and meth recently."Patient reports she is single, has 2 children, high school educatuion, receive SSI sisability, wears glasses, lives in [place] with her mother.Patient verbalized her main goal "to fix me so I can get better."   Leisure Interest Watching TV;Reading;Listen to Music;Fishing;Hunting;Enjoy Family/Friends  (fishing and hunting were done in the past)   Leisure Barriers Attitude;Loss of Interest;Lack of Finances;Drug Use;Fears/Phobias;Other (See Comments)  ("I don't like being around people)   Treatment Focus To Improve Mood;To Improve Leisure Awareness/Lifestyle/Interest;To Improve Coping Skills;To Educate and Increase Awareness of Sober Free Activities     Treatment Recommendation:   1:1 Intervention (as needed)    Cognitive Stimulation Skilled Activity  Self Expression Skilled Activity  Mild Exercises Skilled Activity  Coping Skilled Activity  Leisure Education and Awareness Skilled Activity    Treatment Goal(s):  Long Term Goals Refer To Master Treatment Plan    Short Term Treatment Goal(s)  Patient Will:  Comply with Treatment  Exhibit Improvement in Mood  Demonstrate Improvement in Thought Processes / Awareness  Integrate with Therapeutic Milieu  Identify (+) Leisure / Recreation Activities that Promote Wellness and Promote a Sober Lifestyle    Discharge Recommendations:  Encourage Patient to Actively Utilize Available Community Resources to Increase Leisure Involvement to Decrease Signs and Symptoms of Illness  Encourage Patient to Utilize Coping Skills on a Regular Basis to Reduce the Risk of Decompensating and Re-Hospitalizations  AA/NA " Meetings  Follow Up with After Care Appointments

## 2022-05-09 NOTE — PROGRESS NOTES
05/09/22 1142   Assessment   Patient's Identification of the Problem CTRS was not able to initiate an assessment due to the patient is in bed, under the covers with eyes closed and appears to be sleeping. Staff will attempt to meet with the patient at a later time when she is alert.

## 2022-05-09 NOTE — PROGRESS NOTES
05/09/22 1040   [facility] Group Therapy   Group Name Leisure Education   Participation Level None   Participation Quality Sleeping   Patient is resting in bed and did not attend group.

## 2022-05-09 NOTE — PLAN OF CARE
"Pt states that she feels "better" today, spending majority of time in room isolated with minimal interaction with staff/peers. Denies SI/HI. Denies hallucinations. Appetite adequate. Denies sleep disturbances. Medication complaint. NADN. Remains calm and cooperative. Safety precautions remain in place.  "

## 2022-05-09 NOTE — PROGRESS NOTES
"PSYCHIATRY DAILY INPATIENT PROGRESS NOTE  SUBSEQUENT HOSPITAL VISIT    ENCOUNTER DATE: 5/9/2022  SITE: [facility]    DATE OF ADMISSION: 5/6/2022  2:50 AM  LENGTH OF STAY: 3 days      CHIEF COMPLAINT   Joe Henson is a 41 y.o. female, seen during daily salguero rounds on the inpatient unit.  Joe Henson presented with the chief complaint of mood disorder, anxiety and substance problems      The patient was seen and examined. The chart was reviewed.     Reviewed notes from Rns and MD and labs from the last 24 hours.    The patient's case was discussed with the treatment team/care providers today including Rns and LPC    Staff reports less behavioral or management issues.     The patient has been compliant with treatment.      Subjective 05/09/2022    Per yesterday's notes:  -Pt is able to participate appropriately in interview this morning. States she is feeling better. She is oriented to person, place, and time. Reports that she is  Feeling anxious, though she does not appear preoccupied with this.   Pt states her mood is okay. She slept well last night. Denies side effects from medications.   Significant improvement in BZD overnight. Will require continues inpatient stay for delicate tapering of BZD.     Today, the patient reports that she is doing "ok." She does not believe her issues to be secondary to benzo withdrawals despite contrary evidence.  -she is minimizing her substance use  -she reports improvement in her mood  -psychosis is slowly dissipating.         The patient denies any side effects to medications.          Psychiatric ROS (observed, reported, or endorsed/denied):  Depressed mood - variable  Interest/pleasure/anhedonia: variable  Guilt/hopelessness/worthlessness - variable  Changes in Sleep - variable  Changes in Appetite - variable  Changes in Concentration - variable  Changes in Energy - variable  PMA/R- denies  Suicidal- active/passive ideations - No  Homicidal ideations: " "eICU Physician Brief Note    Chart reviewed. On camera, the patient is asleep in bed, in NAD. Mr Ochoa is a 54 yr old man presenting with retrosternal CP as well as worsening orthopnea. He was noted to have 2+ pitting LE edema.  PMH: CAD/CABG, CHF - EF 30%, HTN, NSVT - s/p AICD (checked 9/2019), CVA.  Labs and investigations reviewed.  Current medications reviewed.  A/P:  1. NSTEMI  Patient was started on Heparin gtt and Nitro gtt.  Cards consult in AM.  Continue home meds - ASA, beta blocker, ACEinh and statin.  Patient c/o nonspecific "body aches - will add on CPK as patient is on statin.  2. Pulmonary edema/CHF  Diurese - is on lasix 40 IV bid, follow I&Os.   Continue ACEinh and beta blocker (home meds).  3. CAP  Started on Ceftriaxone/Azithro.  Follow cultures and de-escalate when able.  4. HTN  Follow BP on above meds.  5. GI/DVT prophylaxis.    " active/passive ideations - denies    Hallucinations - decreasing slowly  Delusions - decreasing slowly  Disorganized behavior - denies  Disorganized speech - denies  Negative symptoms - denies    Elevated mood - variable  Decreased need for sleep - variable  Grandiosity - denies  Racing thoughts - denies  Impulsivity - denies  Irritability- denies  Increased energy - denies  Distractibility - variable  Increase in goal-directed activity or PMA- denies    Symptoms of ITZ - Yes  Symptoms of Panic Disorder- Yes  Symptoms of PTSD - Yes        Overall progress: Patient is showing mild improvement         Psychotherapy:  · Target symptoms: anxiety , substance abuse, mood disorder  · Why chosen therapy is appropriate versus another modality: relevant to diagnosis  · Outcome monitoring methods: self-report, lab data, observation  · Therapeutic intervention type: interactive psychotherapy  · Topics discussed/themes: building skills sets for symptom management, symptom recognition, substance abuse  · The patient's response to the intervention is not able to participate in therapy at this time.. The patient's progress toward treatment goals is poor.   · Duration of intervention: 16 minutes.        Medical ROS  Review of Systems   Constitutional: Negative for chills and fever.   HENT: Negative for congestion, hearing loss, sore throat and tinnitus.    Eyes: Negative for blurred vision, double vision, photophobia and pain.   Respiratory: Negative for cough, hemoptysis, sputum production, shortness of breath and wheezing.    Cardiovascular: Negative for chest pain, palpitations and leg swelling.   Gastrointestinal: Negative for abdominal pain, blood in stool, constipation, diarrhea, nausea and vomiting.   Genitourinary: Negative for dysuria, frequency, hematuria and urgency.   Musculoskeletal: Negative for back pain, joint pain and myalgias.   Skin: Negative for rash.   Neurological: Negative for dizziness, tremors, seizures,  "weakness and headaches.         PAST MEDICAL HISTORY   Past Medical History:   Diagnosis Date    Abnormal Pap smear age 30    Pos. HPV,     Abnormal Pap smear of cervix     Addiction to drug     ADHD (attention deficit hyperactivity disorder)     Arthritis     Bipolar disorder     COPD (chronic obstructive pulmonary disease)     Depression     Diabetes mellitus     Fatigue     Hallucination     History of psychiatric hospitalization     Hx of psychiatric care     OCD (obsessive compulsive disorder)     Psychiatric problem     PTSD (post-traumatic stress disorder)     Sleep difficulties     Substance abuse     Suicide attempt     Therapy            PSYCHOTROPIC MEDICATIONS   Scheduled Meds:   diazePAM  15 mg Oral QID    folic acid  1 mg Oral Daily    multivitamin  1 tablet Oral Daily    nicotine  1 patch Transdermal Daily    QUEtiapine  50 mg Oral BID     Continuous Infusions:  PRN Meds:.hydrOXYzine pamoate, ibuprofen, lorazepam, OLANZapine **AND** OLANZapine        EXAMINATION    VITALS   Vitals:    05/08/22 1930 05/08/22 2047 05/09/22 0700 05/09/22 0800   BP: (!) 90/52 112/72 116/81 116/81   BP Location:    Left arm   Patient Position:    Sitting   Pulse: 81 92 79 79   Resp: 16  18 18   Temp: 97.2 °F (36.2 °C)   97.2 °F (36.2 °C)   TempSrc:    Temporal   Weight:       Height:           Body mass index is 28.63 kg/m².        CONSTITUTIONAL  General Appearance: unremarkable, age appropriate    MUSCULOSKELETAL  Muscle Strength and Tone:no tremor, no tic  Abnormal Involuntary Movements: No  Gait and Station: [facility], in bed     PSYCHIATRIC   Level of Consciousness: awake  Orientation: person, place, situation; and year/dayof week  Grooming: Disheveled  Psychomotor Behavior: psychomotor agitation; no PMR  Speech: normal tone, normal rate, normal pitch, normal volume, spontaneous  Language: grossly intact, not tested  Mood: u"ok"  Affect: Even, less anxious  Thought Process: Linear, logical, goal " directed  Associations: intact; no attila   Thought Content: DENIES suicidal ideation and DENIES homicidal ideation  Perceptions: denies hallucinations  Memory: intact to recent of remote   Attention:Attends to interview without distraction  Fund of Knowledge: Impaired  Estimate if Intelligence:  Average based on work/education history, vocabulary and mental status exam  Insight: limited awareness of illness  Judgment: limited secondary to delirium        DIAGNOSTIC TESTING   Laboratory Results  No results found for this or any previous visit (from the past 24 hour(s)).          MEDICAL DECISION MAKING      ASSESSMENT:   Delirium secondary to benzo withdrawal  Bipolar I Disorder mre mixed, severe with psychotic features  ITZ  PTSD     Nicotine Dependence  Substance abuse (h/o alcohol, opiate, amphetamine, sedative hypnotic and cocaine abuse; unable to specify or determine further at this time)     Psychosocial stressors     Chronic pain (sciatica)  Elevated Blood glucose        PROBLEM LIST AND MANAGEMENT PLANS    Delirium  - suspect 2/2 bzd w/d; h/o bzd wd last admit,  - started Ativan 2 mg PO q 4 prn for BZD WD  -Start valium 10 mg PO 4 times daily--> Titrated Valium to 15mg PO QID  On 5/7 (No longer requring PRN ativan since titration of valium dose.)- will decrease to 12 mg po 4 times daily; will taper down/off as able         Mood: pt counseled   -Resume home medication Serqouel at 50 mg po BID- increase to 100 mg po BID; will change/optimize as indicated  -hold home med trazodone- pt has not recently taken; will resume if needed     Anxiety: pt counseled  -serqouel off-label as above  -hold zoloft for now; will-reinitiate if needed     PTSD: pt counseled  -seroquel off-label as above     Insomnia:pt counseled  -seroquel/ off-label as above  -vistaril prn     Nicotine use: pt counseled;   Start nicotine 14 mg patch dermal daily     Substance use: pt counseled     Psychosocial stressors: pt counseled; SW consulted  to to assist with resources     Elevated Blood glucose: pt counseled  -check HgA1c     Chronic pain: pt counseled            Discussed diagnosis, risks and benefits of proposed treatment vs alternative treatments vs no treatment, potential side effects of these treatments and the inherent unpredictability of treatment. The patient expresses understanding of the above and displays the capacity to agree with this treatment given said understanding. Patient also agrees that, currently, the benefits outweigh the risks and would like to pursue/continue treatment at this time.      DISCHARGE PLANNING  Expected Disposition Plan: Home or Self Care      NEED FOR CONTINUED HOSPITALIZATION  Psychiatric illness continues to pose a potential threat to life or bodily function, of self or others, thereby requiring the need for continued inpatient psychiatric hospitalization: Yes, due to: danger to self and gravely disabled, as evidenced by:  Ongoing concerns with grave disability with patient unable to perform basic feeding, hygiene and dressing activities without significant constant support.    Protective inpatient pyschiatric hospitalization required while a safe disposition plan is enacted: Yes    Patient stabilized and ready for discharge from inpatient psychiatric unit: No        STAFF:   Srinath Fowler MD  Psychiatry

## 2022-05-09 NOTE — PLAN OF CARE
Lying quietly in bed, eyes closed, respirations even, unlabored. Apparently asleep. Slept 7 hours thus far with 3 interruptions. Safety precautions maintained. Rounds done every 15 minutes. Bed is fixed in low position and room is uncluttered and pathways are clear.

## 2022-05-09 NOTE — PLAN OF CARE
"  Problem: Adult Behavioral Health Plan of Care  Goal: Optimized Coping Skills in Response to Life Stressors  Outcome: Ongoing, Progressing       GROUP NOTE:    Pt did not attend group. PLPC met with pt individually. Pt requested to be excused from group today. Pt was resting in bed quietly and stated " I am tired and I am not feeling well". PLPC educated pt on the importance of attending group.      "

## 2022-05-09 NOTE — PLAN OF CARE
Plan of care reviewed. Denies intent to harm self or others at this time. Accepts snacks and medications. Gait steady, no falls. Limited interaction noted with staff and peers. Patient is quiet and cooperative at present. Promoted individualized safety plan, reality-based interactions, effective coping strategies, and impulse control. Will continue precautions and monitor for safety.

## 2022-05-10 PROCEDURE — 99233 SBSQ HOSP IP/OBS HIGH 50: CPT | Mod: ,,, | Performed by: PSYCHIATRY & NEUROLOGY

## 2022-05-10 PROCEDURE — 25000003 PHARM REV CODE 250: Performed by: STUDENT IN AN ORGANIZED HEALTH CARE EDUCATION/TRAINING PROGRAM

## 2022-05-10 PROCEDURE — S4991 NICOTINE PATCH NONLEGEND: HCPCS | Performed by: STUDENT IN AN ORGANIZED HEALTH CARE EDUCATION/TRAINING PROGRAM

## 2022-05-10 PROCEDURE — 25000003 PHARM REV CODE 250: Performed by: PSYCHIATRY & NEUROLOGY

## 2022-05-10 PROCEDURE — 90833 PSYTX W PT W E/M 30 MIN: CPT | Mod: ,,, | Performed by: PSYCHIATRY & NEUROLOGY

## 2022-05-10 PROCEDURE — 11400000 HC PSYCH PRIVATE ROOM

## 2022-05-10 PROCEDURE — S0166 INJ OLANZAPINE 2.5MG: HCPCS | Performed by: STUDENT IN AN ORGANIZED HEALTH CARE EDUCATION/TRAINING PROGRAM

## 2022-05-10 PROCEDURE — 90833 PR PSYCHOTHERAPY W/PATIENT W/E&M, 30 MIN (ADD ON): ICD-10-PCS | Mod: ,,, | Performed by: PSYCHIATRY & NEUROLOGY

## 2022-05-10 PROCEDURE — 99233 PR SUBSEQUENT HOSPITAL CARE,LEVL III: ICD-10-PCS | Mod: ,,, | Performed by: PSYCHIATRY & NEUROLOGY

## 2022-05-10 RX ORDER — DIAZEPAM 10 MG/1
10 TABLET ORAL 4 TIMES DAILY
Status: DISCONTINUED | OUTPATIENT
Start: 2022-05-10 | End: 2022-05-11

## 2022-05-10 RX ORDER — ALUMINUM HYDROXIDE, MAGNESIUM HYDROXIDE, AND SIMETHICONE 2400; 240; 2400 MG/30ML; MG/30ML; MG/30ML
30 SUSPENSION ORAL EVERY 6 HOURS PRN
Status: DISCONTINUED | OUTPATIENT
Start: 2022-05-10 | End: 2022-05-16 | Stop reason: HOSPADM

## 2022-05-10 RX ORDER — ADHESIVE BANDAGE
30 BANDAGE TOPICAL DAILY PRN
Status: DISCONTINUED | OUTPATIENT
Start: 2022-05-10 | End: 2022-05-16 | Stop reason: HOSPADM

## 2022-05-10 RX ADMIN — HYDROXYZINE PAMOATE 50 MG: 50 CAPSULE ORAL at 08:05

## 2022-05-10 RX ADMIN — NICOTINE 1 PATCH: 14 PATCH, EXTENDED RELEASE TRANSDERMAL at 08:05

## 2022-05-10 RX ADMIN — ALUMINUM HYDROXIDE, MAGNESIUM HYDROXIDE, AND DIMETHICONE 30 ML: 400; 400; 40 SUSPENSION ORAL at 10:05

## 2022-05-10 RX ADMIN — DIAZEPAM 10 MG: 10 TABLET ORAL at 04:05

## 2022-05-10 RX ADMIN — FOLIC ACID 1 MG: 1 TABLET ORAL at 08:05

## 2022-05-10 RX ADMIN — DIAZEPAM 10 MG: 10 TABLET ORAL at 01:05

## 2022-05-10 RX ADMIN — MAGNESIUM HYDROXIDE 2400 MG: 400 SUSPENSION ORAL at 04:05

## 2022-05-10 RX ADMIN — DIAZEPAM 12 MG: 10 TABLET ORAL at 08:05

## 2022-05-10 RX ADMIN — QUETIAPINE FUMARATE 150 MG: 25 TABLET ORAL at 08:05

## 2022-05-10 RX ADMIN — DIAZEPAM 10 MG: 10 TABLET ORAL at 08:05

## 2022-05-10 RX ADMIN — HYDROXYZINE PAMOATE 50 MG: 50 CAPSULE ORAL at 04:05

## 2022-05-10 RX ADMIN — THERA TABS 1 TABLET: TAB at 08:05

## 2022-05-10 RX ADMIN — QUETIAPINE FUMARATE 100 MG: 100 TABLET ORAL at 08:05

## 2022-05-10 NOTE — PROGRESS NOTES
"   05/10/22 1040   [facility] Group Therapy   Group Name Therapeutic Recreation   Specific Interventions Cognitive Stimulation Training   Participation Level Appropriate;Sharing   Participation Quality Cooperative   Insight/Motivation Good   Affect/Mood Display Appropriate   Cognition Alert   Psychomotor WNL   Patient reports an "alright" mood, read, wrote and shared answers, states group was a challenge, she feels better and enjoyed herself. Patient reports she is not going to rehab because her son is graduating, states her family and friends as her support system, shared her coping skills are watching a movie, call a friend, shopping.  "

## 2022-05-10 NOTE — PLAN OF CARE
Pt is sleeping at this time and has slept 5.5 hours intermittently.  Received prn vistaril for anxiety due to peer yelling most of night.  NAD.  Resp even & unlabored.  Pathways clear.  Q 15 minute safety checks ongoing.  All precautions maintained

## 2022-05-10 NOTE — PLAN OF CARE
Problem: Adult Behavioral Health Plan of Care  Goal: Optimized Coping Skills in Response to Life Stressors  Outcome: Ongoing, Progressing    Group Note:  Pt did not attend group today. PLPC met with pt individually. PLPC attempted to discuss with pt on group discussion. Pt was resting in bed quietly. Pt states she was very tired and not feeling well.  PLPC discussed with pt that PLPC will come back at a later time and date to discuss group.

## 2022-05-10 NOTE — PROGRESS NOTES
"PSYCHIATRY DAILY INPATIENT PROGRESS NOTE  SUBSEQUENT HOSPITAL VISIT    ENCOUNTER DATE: 5/10/2022  SITE: [facility]    DATE OF ADMISSION: 5/6/2022  2:50 AM  LENGTH OF STAY: 4 days      CHIEF COMPLAINT   Joe Henson is a 41 y.o. female, seen during daily salguero rounds on the inpatient unit.  Joe Henson presented with the chief complaint of mood disorder, anxiety and substance problems      The patient was seen and examined. The chart was reviewed.     Reviewed notes from Rns, CTRS and LPC and labs from the last 24 hours.    The patient's case was discussed with the treatment team/care providers today including Rns, CTRS and LPC    Staff reports less behavioral or management issues.     The patient has been compliant with treatment.      Subjective 05/10/2022    Today, the patient reports that she is doing "ok." She does not believe her issues to be secondary to benzo withdrawals despite contrary evidence.  -she is minimizing her substance use  -she reports improvement in her mood  -psychosis is slowly dissipating.     She continues to lack insight into her addictions and the role of sedatives hypnotics in her illness/presentations.   -she was focused on obtaining sedatives to treat her anxiety        The patient denies any side effects to medications.          Psychiatric ROS (observed, reported, or endorsed/denied):  Depressed mood - variable  Interest/pleasure/anhedonia: variable  Guilt/hopelessness/worthlessness - variable  Changes in Sleep - variable  Changes in Appetite - variable  Changes in Concentration - variable  Changes in Energy - variable  PMA/R- denies  Suicidal- active/passive ideations - No  Homicidal ideations: active/passive ideations - denies    Hallucinations - decreasing slowly  Delusions - decreasing slowly  Disorganized behavior - denies  Disorganized speech - denies  Negative symptoms - denies    Elevated mood - denies  Decreased need for sleep - denies  Grandiosity - " denies  Racing thoughts - denies  Impulsivity - denies  Irritability- denies  Increased energy - denies  Distractibility - denies  Increase in goal-directed activity or PMA- denies    Symptoms of ITZ - Yes  Symptoms of Panic Disorder- Yes  Symptoms of PTSD - Yes        Overall progress: Patient is showing mild improvement         Psychotherapy:  · Target symptoms: anxiety , substance abuse, mood disorder  · Why chosen therapy is appropriate versus another modality: relevant to diagnosis  · Outcome monitoring methods: self-report, lab data, observation  · Therapeutic intervention type: interactive psychotherapy  · Topics discussed/themes: building skills sets for symptom management, symptom recognition, substance abuse  · The patient's response to the intervention is not able to participate in therapy at this time.. The patient's progress toward treatment goals is poor.   · Duration of intervention: 16 minutes.        Medical ROS  Review of Systems   Constitutional: Negative for chills and fever.   HENT: Negative for congestion, hearing loss, sore throat and tinnitus.    Eyes: Negative for blurred vision, double vision, photophobia and pain.   Respiratory: Negative for cough, hemoptysis, sputum production, shortness of breath and wheezing.    Cardiovascular: Negative for chest pain, palpitations and leg swelling.   Gastrointestinal: Negative for abdominal pain, blood in stool, constipation, diarrhea, nausea and vomiting.   Genitourinary: Negative for dysuria, frequency, hematuria and urgency.   Musculoskeletal: Negative for back pain, joint pain and myalgias.   Skin: Negative for rash.   Neurological: Negative for dizziness, tremors, seizures, weakness and headaches.   Endo/Heme/Allergies: Negative.    Psychiatric/Behavioral:        See HPI         PAST MEDICAL HISTORY   Past Medical History:   Diagnosis Date    Abnormal Pap smear age 30    Pos. HPV,     Abnormal Pap smear of cervix     Addiction to drug     ADHD  "(attention deficit hyperactivity disorder)     Arthritis     Bipolar disorder     COPD (chronic obstructive pulmonary disease)     Depression     Diabetes mellitus     Fatigue     Hallucination     History of psychiatric hospitalization     Hx of psychiatric care     OCD (obsessive compulsive disorder)     Psychiatric problem     PTSD (post-traumatic stress disorder)     Sleep difficulties     Substance abuse     Suicide attempt     Therapy            PSYCHOTROPIC MEDICATIONS   Scheduled Meds:   diazePAM  12 mg Oral QID    folic acid  1 mg Oral Daily    multivitamin  1 tablet Oral Daily    nicotine  1 patch Transdermal Daily    QUEtiapine  100 mg Oral BID     Continuous Infusions:  PRN Meds:.aluminum & magnesium hydroxide-simethicone, hydrOXYzine pamoate, ibuprofen, lorazepam, OLANZapine **AND** OLANZapine        EXAMINATION    VITALS   Vitals:    05/09/22 0700 05/09/22 0800 05/09/22 2018 05/10/22 0700   BP: 116/81 116/81 105/63 100/75   BP Location:  Left arm Right arm Right arm   Patient Position:  Sitting Sitting Sitting   Pulse: 79 79 85 69   Resp: 18 18 17 18   Temp:  97.2 °F (36.2 °C) 96.9 °F (36.1 °C) 96.9 °F (36.1 °C)   TempSrc:  Temporal Temporal Temporal   Weight:       Height:           Body mass index is 28.63 kg/m².        CONSTITUTIONAL  General Appearance: unremarkable, age appropriate    MUSCULOSKELETAL  Muscle Strength and Tone:no tremor, no tic  Abnormal Involuntary Movements: No  Gait and Station: [facility], in bed     PSYCHIATRIC   Level of Consciousness: awake  Orientation: person, place, situation; and year/dayof week  Grooming: Disheveled  Psychomotor Behavior: psychomotor agitation; no PMR  Speech: normal tone, normal rate, normal pitch, normal volume, spontaneous  Language: grossly intact, not tested  Mood: "alright""  Affect: Even, less anxious, irritable  Thought Process: Linear, logical, goal directed  Associations: intact; no attila   Thought Content: DENIES suicidal ideation " and DENIES homicidal ideation  Perceptions: denies hallucinations  Memory: intact to recent of remote   Attention:Attends to interview without distraction  Fund of Knowledge: Impaired  Estimate if Intelligence:  Average based on work/education history, vocabulary and mental status exam  Insight: limited awareness of illness  Judgment: limited secondary to delirium        DIAGNOSTIC TESTING   Laboratory Results  No results found for this or any previous visit (from the past 24 hour(s)).          MEDICAL DECISION MAKING      ASSESSMENT:   Delirium secondary to benzo withdrawal  Bipolar I Disorder mre mixed, severe with psychotic features  ITZ  PTSD     Nicotine Dependence  Substance abuse (h/o alcohol, opiate, amphetamine, sedative hypnotic and cocaine abuse; unable to specify or determine further at this time)     Psychosocial stressors     Chronic pain (sciatica)  Elevated Blood glucose        PROBLEM LIST AND MANAGEMENT PLANS    Delirium  - suspect 2/2 bzd w/d; h/o bzd wd last admit,  - started Ativan 2 mg PO q 4 prn for BZD WD  -Start valium 10 mg PO 4 times daily--> Titrated Valium to 15mg PO QID  On 5/7 (No longer requring PRN ativan since titration of valium dose.)- will decrease to 12 mg po 4 times daily- decrease to 10 mg po 4 times daily; will taper down/off as able       Mood: pt counseled   -Resume home medication Serqouel at 50 mg po BID- increase to 100 mg po BID- increase to 150 mg po BID; will change/optimize as indicated  -hold home med trazodone- pt has not recently taken; will resume if needed     Anxiety: pt counseled  -serqouel off-label as above  -hold zoloft for now; will-reinitiate if needed     PTSD: pt counseled  -seroquel off-label as above     Insomnia:pt counseled  -seroquel/ off-label as above  -vistaril prn     Nicotine use: pt counseled;   Start nicotine 14 mg patch dermal daily     Substance use: pt counseled     Psychosocial stressors: pt counseled; SW consulted to to assist with  resources     Elevated Blood glucose: pt counseled  -check HgA1c- 5.1; elevation was from substance use     Chronic pain: pt counseled            Discussed diagnosis, risks and benefits of proposed treatment vs alternative treatments vs no treatment, potential side effects of these treatments and the inherent unpredictability of treatment. The patient expresses understanding of the above and displays the capacity to agree with this treatment given said understanding. Patient also agrees that, currently, the benefits outweigh the risks and would like to pursue/continue treatment at this time.      DISCHARGE PLANNING  Expected Disposition Plan: Home or Self Care      NEED FOR CONTINUED HOSPITALIZATION  Psychiatric illness continues to pose a potential threat to life or bodily function, of self or others, thereby requiring the need for continued inpatient psychiatric hospitalization: Yes, due to: danger to self and gravely disabled, as evidenced by:  Ongoing concerns with grave disability with patient unable to perform basic feeding, hygiene and dressing activities without significant constant support.    Protective inpatient pyschiatric hospitalization required while a safe disposition plan is enacted: Yes    Patient stabilized and ready for discharge from inpatient psychiatric unit: No        STAFF:   Srinath Fowlre MD  Psychiatry

## 2022-05-10 NOTE — PLAN OF CARE
"Pt states that she feels "a little better" today, spending majority of time isolated in bed resting with RR E/U. Denies SI/HI. Denies hallucinations. Appetite adequate. PRN Visteril administered to aid with sleep. Medication compliant. NADN. Remains calm and cooperative. Safety precautions remain in place.  "

## 2022-05-11 PROCEDURE — 25000003 PHARM REV CODE 250: Performed by: PSYCHIATRY & NEUROLOGY

## 2022-05-11 PROCEDURE — 99233 PR SUBSEQUENT HOSPITAL CARE,LEVL III: ICD-10-PCS | Mod: ,,, | Performed by: PSYCHIATRY & NEUROLOGY

## 2022-05-11 PROCEDURE — 11400000 HC PSYCH PRIVATE ROOM

## 2022-05-11 PROCEDURE — S4991 NICOTINE PATCH NONLEGEND: HCPCS | Performed by: STUDENT IN AN ORGANIZED HEALTH CARE EDUCATION/TRAINING PROGRAM

## 2022-05-11 PROCEDURE — 99233 SBSQ HOSP IP/OBS HIGH 50: CPT | Mod: ,,, | Performed by: PSYCHIATRY & NEUROLOGY

## 2022-05-11 PROCEDURE — 25000003 PHARM REV CODE 250: Performed by: STUDENT IN AN ORGANIZED HEALTH CARE EDUCATION/TRAINING PROGRAM

## 2022-05-11 PROCEDURE — 90833 PR PSYCHOTHERAPY W/PATIENT W/E&M, 30 MIN (ADD ON): ICD-10-PCS | Mod: ,,, | Performed by: PSYCHIATRY & NEUROLOGY

## 2022-05-11 PROCEDURE — 90833 PSYTX W PT W E/M 30 MIN: CPT | Mod: ,,, | Performed by: PSYCHIATRY & NEUROLOGY

## 2022-05-11 RX ORDER — HYDROXYZINE PAMOATE 50 MG/1
50 CAPSULE ORAL EVERY 6 HOURS PRN
Status: DISCONTINUED | OUTPATIENT
Start: 2022-05-11 | End: 2022-05-16 | Stop reason: HOSPADM

## 2022-05-11 RX ORDER — QUETIAPINE FUMARATE 200 MG/1
200 TABLET, FILM COATED ORAL 2 TIMES DAILY
Status: DISCONTINUED | OUTPATIENT
Start: 2022-05-11 | End: 2022-05-12

## 2022-05-11 RX ORDER — HYDROXYZINE PAMOATE 50 MG/1
100 CAPSULE ORAL NIGHTLY PRN
Status: DISCONTINUED | OUTPATIENT
Start: 2022-05-11 | End: 2022-05-16 | Stop reason: HOSPADM

## 2022-05-11 RX ADMIN — DIAZEPAM 7 MG: 5 TABLET ORAL at 04:05

## 2022-05-11 RX ADMIN — NICOTINE 1 PATCH: 14 PATCH, EXTENDED RELEASE TRANSDERMAL at 08:05

## 2022-05-11 RX ADMIN — HYDROXYZINE PAMOATE 50 MG: 50 CAPSULE ORAL at 03:05

## 2022-05-11 RX ADMIN — HYDROXYZINE PAMOATE 100 MG: 50 CAPSULE ORAL at 08:05

## 2022-05-11 RX ADMIN — FOLIC ACID 1 MG: 1 TABLET ORAL at 08:05

## 2022-05-11 RX ADMIN — DIAZEPAM 7 MG: 5 TABLET ORAL at 01:05

## 2022-05-11 RX ADMIN — DIAZEPAM 10 MG: 10 TABLET ORAL at 08:05

## 2022-05-11 RX ADMIN — QUETIAPINE FUMARATE 150 MG: 25 TABLET ORAL at 08:05

## 2022-05-11 RX ADMIN — QUETIAPINE FUMARATE 200 MG: 200 TABLET ORAL at 08:05

## 2022-05-11 RX ADMIN — THERA TABS 1 TABLET: TAB at 08:05

## 2022-05-11 RX ADMIN — DIAZEPAM 7 MG: 5 TABLET ORAL at 08:05

## 2022-05-11 NOTE — PROGRESS NOTES
05/11/22 1045   [facility] Group Therapy   Group Name Education   Specific Interventions Coping Skills Training  (assessing your own level of self-esteem)   Participation Level Sharing;Appropriate   Participation Quality Cooperative   Insight/Motivation Good   Affect/Mood Display Appropriate   Cognition Alert   Psychomotor WNL   Patient read, wrote and shared with the group. Patient shared she is easily hurt by criticism, is overly aggressive, try to blame others for her mistakes and is not honest and open about her feelings. Patient was given a list of things to do to improve her self-esteem.

## 2022-05-11 NOTE — PLAN OF CARE
Pt is sleeping at this time and has slept 7 hours with 2 awakenings when pt came to nurses station for prn meds.  NAD.  Resp even & unlabored.  Pathways clear.  Q 15 minute safety checks ongoing.  All precautions maintained

## 2022-05-11 NOTE — PROGRESS NOTES
"PSYCHIATRY DAILY INPATIENT PROGRESS NOTE  SUBSEQUENT HOSPITAL VISIT    ENCOUNTER DATE: 5/11/2022  SITE: [facility]    DATE OF ADMISSION: 5/6/2022  2:50 AM  LENGTH OF STAY: 5 days      CHIEF COMPLAINT   Joe Henson is a 41 y.o. female, seen during daily salguero rounds on the inpatient unit.  Joe Henson presented with the chief complaint of mood disorder, anxiety and substance problems      The patient was seen and examined. The chart was reviewed.     Reviewed notes from Rns, CTRS and LPC and labs from the last 24 hours.    The patient's case was discussed with the treatment team/care providers today including Rns, CTRS and LPC    Staff reports less behavioral or management issues.     The patient has been compliant with treatment.      Subjective 05/11/2022    Today, the patient reports that she is doing "alright" She does not believe her issues to be secondary to benzo withdrawals despite contrary evidence.  -she conitnues minimizing her substance use  -she reports improvement in her mood  -she notes continued anxiety  -psychosis is slowly dissipating/resolving     She continues to lack insight into her addictions and the role of sedatives hypnotics in her illness/presentations.   -she was focused on obtaining sedatives to treat her anxiety/insomnia        The patient denies any side effects to medications.          Psychiatric ROS (observed, reported, or endorsed/denied):  Depressed mood - variable  Interest/pleasure/anhedonia: variable  Guilt/hopelessness/worthlessness - variable  Changes in Sleep - variable  Changes in Appetite - variable  Changes in Concentration - variable  Changes in Energy - variable  PMA/R- denies  Suicidal- active/passive ideations - No  Homicidal ideations: active/passive ideations - denies    Hallucinations - decreasing slowly  Delusions - decreasing slowly  Disorganized behavior - denies  Disorganized speech - denies  Negative symptoms - denies    Elevated mood - " denies  Decreased need for sleep - denies  Grandiosity - denies  Racing thoughts - denies  Impulsivity - denies  Irritability- denies  Increased energy - denies  Distractibility - denies  Increase in goal-directed activity or PMA- denies    Symptoms of ITZ - Yes  Symptoms of Panic Disorder- Yes  Symptoms of PTSD - Yes        Overall progress: Patient is showing mild improvement         Psychotherapy:  · Target symptoms: anxiety , substance abuse, mood disorder  · Why chosen therapy is appropriate versus another modality: relevant to diagnosis  · Outcome monitoring methods: self-report, lab data, observation  · Therapeutic intervention type: interactive psychotherapy  · Topics discussed/themes: building skills sets for symptom management, symptom recognition, substance abuse  · The patient's response to the intervention is not able to participate in therapy at this time.. The patient's progress toward treatment goals is poor.   · Duration of intervention: 16 minutes.        Medical ROS  Review of Systems   Constitutional: Negative for chills and fever.   HENT: Negative for congestion, hearing loss, sore throat and tinnitus.    Eyes: Negative for blurred vision, double vision, photophobia and pain.   Respiratory: Negative for cough, hemoptysis, sputum production, shortness of breath and wheezing.    Cardiovascular: Negative for chest pain, palpitations and leg swelling.   Gastrointestinal: Negative for abdominal pain, blood in stool, constipation, diarrhea, nausea and vomiting.   Genitourinary: Negative for dysuria, frequency, hematuria and urgency.   Musculoskeletal: Negative for back pain, joint pain and myalgias.   Skin: Negative for rash.   Neurological: Negative for dizziness, tremors, seizures, weakness and headaches.   Endo/Heme/Allergies: Negative.    Psychiatric/Behavioral:        See HPI         PAST MEDICAL HISTORY   Past Medical History:   Diagnosis Date    Abnormal Pap smear age 30    Pos. HPV,      Abnormal Pap smear of cervix     Addiction to drug     ADHD (attention deficit hyperactivity disorder)     Arthritis     Bipolar disorder     COPD (chronic obstructive pulmonary disease)     Depression     Diabetes mellitus     Fatigue     Hallucination     History of psychiatric hospitalization     Hx of psychiatric care     OCD (obsessive compulsive disorder)     Psychiatric problem     PTSD (post-traumatic stress disorder)     Sleep difficulties     Substance abuse     Suicide attempt     Therapy            PSYCHOTROPIC MEDICATIONS   Scheduled Meds:   diazePAM  10 mg Oral QID    folic acid  1 mg Oral Daily    multivitamin  1 tablet Oral Daily    nicotine  1 patch Transdermal Daily    QUEtiapine  150 mg Oral BID     Continuous Infusions:  PRN Meds:.aluminum & magnesium hydroxide-simethicone, hydrOXYzine pamoate, ibuprofen, lorazepam, magnesium hydroxide 400 mg/5 ml, OLANZapine **AND** OLANZapine        EXAMINATION    VITALS   Vitals:    05/10/22 0800 05/10/22 1952 05/11/22 0700 05/11/22 0746   BP: 100/75 111/63  99/66   BP Location: Right arm   Left arm   Patient Position: Sitting   Lying   Pulse: 69 78  71   Resp: 18 18  18   Temp: 96.9 °F (36.1 °C) 97.8 °F (36.6 °C)  97.1 °F (36.2 °C)   TempSrc: Temporal   Temporal   Weight:   72.2 kg (159 lb 2.8 oz)    Height:           Body mass index is 29.11 kg/m².        CONSTITUTIONAL  General Appearance: unremarkable, age appropriate, casually dressed, neatly groomed, overweight    MUSCULOSKELETAL  Muscle Strength and Tone:no tremor, no tic  Abnormal Involuntary Movements: None  Gait and Station: [facility], in bed     PSYCHIATRIC   Level of Consciousness: awake  Orientation: person, place, situation; and time  Grooming: less Disheveled; improving  Psychomotor Behavior: normal, cooperative, eye contact normal; no PMR/A  Speech: normal tone, normal rate, normal pitch, normal volume, spontaneous  Language: grossly intact, not tested  Mood:  ""alright""  Affect: Even, less anxious, variably irritable  Thought Process: Linear, logical, goal directed  Associations: intact; no attila   Thought Content: DENIES suicidal ideation and DENIES homicidal ideation  Perceptions: denies hallucinations  Memory: intact to recent of remote   Attention:Attends to interview without distraction  Fund of Knowledge: Improved; age and education appropriate   Estimate if Intelligence:  Average based on work/education history, vocabulary and mental status exam  Insight: limited awareness of illness  Judgment: limited secondary to delirium        DIAGNOSTIC TESTING   Laboratory Results  No results found for this or any previous visit (from the past 24 hour(s)).          MEDICAL DECISION MAKING      ASSESSMENT:   Delirium secondary to benzo withdrawal  Bipolar I Disorder mre mixed, severe with psychotic features  ITZ  PTSD     Nicotine Dependence  Substance abuse (h/o alcohol, opiate, amphetamine, sedative hypnotic and cocaine abuse; unable to specify or determine further at this time)     Psychosocial stressors     Chronic pain (sciatica)  Elevated Blood glucose        PROBLEM LIST AND MANAGEMENT PLANS    Delirium  - suspect 2/2 bzd w/d; h/o bzd wd last admit,  - started Ativan 2 mg PO q 4 prn for BZD WD  -Start valium 10 mg PO 4 times daily--> Titrated Valium to 15mg PO QID  On 5/7 (No longer requring PRN ativan since titration of valium dose.)- will decrease to 12 mg po 4 times daily- decrease to 10 mg po 4 times daily- decrease to 7 mg po 4 times daily; will continue to taper down/off as able       Mood: pt counseled   -Resume home medication Serqouel at 50 mg po BID- increase to 100 mg po BID- increase to 150 mg po BID- increase to 200 mg po BID; will change/optimize as indicated  -continue Invega DUVAL 234 mg im q month (last given on 4/27/22)  -hold home med trazodone- pt has not recently taken; will resume if needed     Anxiety: pt counseled  -serqouel off-label as " above  -hold zoloft for now; will-reinitiate if needed     PTSD: pt counseled  -seroquel off-label as above     Insomnia:pt counseled  -seroquel/ off-label as above  -vistaril prn     Nicotine use: pt counseled;   Started/continue nicotine 14 mg patch dermal daily     Substance use: pt counseled     Psychosocial stressors: pt counseled; SW consulted to to assist with resources     Elevated Blood glucose: pt counseled  -check HgA1c- 5.1; elevation was from substance use     Chronic pain: pt counseled            Discussed diagnosis, risks and benefits of proposed treatment vs alternative treatments vs no treatment, potential side effects of these treatments and the inherent unpredictability of treatment. The patient expresses understanding of the above and displays the capacity to agree with this treatment given said understanding. Patient also agrees that, currently, the benefits outweigh the risks and would like to pursue/continue treatment at this time.      DISCHARGE PLANNING  Expected Disposition Plan: Home or Self Care      NEED FOR CONTINUED HOSPITALIZATION  Psychiatric illness continues to pose a potential threat to life or bodily function, of self or others, thereby requiring the need for continued inpatient psychiatric hospitalization: Yes, due to: danger to self and gravely disabled, as evidenced by:  Ongoing concerns with grave disability with patient unable to perform basic feeding, hygiene and dressing activities without significant constant support.    Protective inpatient pyschiatric hospitalization required while a safe disposition plan is enacted: Yes    Patient stabilized and ready for discharge from inpatient psychiatric unit: No        STAFF:   Srinath Fowler MD  Psychiatry

## 2022-05-11 NOTE — PLAN OF CARE
No falls, accepting meals and meds, out on unit, denies suicidal or homicidal ideation, denies hallucinations. Interacting with select peers, Safety maintained.

## 2022-05-11 NOTE — PLAN OF CARE
"Pt states that she feels "better" today, spending majority of time in room and activity room. Denies SI/HI. Denies hallucinations. Appetite adequate. PRN Visteril administered to aid with sleep. Medication compliant. Remains calm and cooperative. NADN. Safety precautions remain in place.  "

## 2022-05-12 PROCEDURE — 25000003 PHARM REV CODE 250: Performed by: STUDENT IN AN ORGANIZED HEALTH CARE EDUCATION/TRAINING PROGRAM

## 2022-05-12 PROCEDURE — S4991 NICOTINE PATCH NONLEGEND: HCPCS | Performed by: STUDENT IN AN ORGANIZED HEALTH CARE EDUCATION/TRAINING PROGRAM

## 2022-05-12 PROCEDURE — 25000003 PHARM REV CODE 250: Performed by: PSYCHIATRY & NEUROLOGY

## 2022-05-12 PROCEDURE — 99233 PR SUBSEQUENT HOSPITAL CARE,LEVL III: ICD-10-PCS | Mod: ,,, | Performed by: PSYCHIATRY & NEUROLOGY

## 2022-05-12 PROCEDURE — 90833 PR PSYCHOTHERAPY W/PATIENT W/E&M, 30 MIN (ADD ON): ICD-10-PCS | Mod: ,,, | Performed by: PSYCHIATRY & NEUROLOGY

## 2022-05-12 PROCEDURE — 11400000 HC PSYCH PRIVATE ROOM

## 2022-05-12 PROCEDURE — 99233 SBSQ HOSP IP/OBS HIGH 50: CPT | Mod: ,,, | Performed by: PSYCHIATRY & NEUROLOGY

## 2022-05-12 PROCEDURE — 90833 PSYTX W PT W E/M 30 MIN: CPT | Mod: ,,, | Performed by: PSYCHIATRY & NEUROLOGY

## 2022-05-12 RX ORDER — QUETIAPINE FUMARATE 200 MG/1
200 TABLET, FILM COATED ORAL DAILY
Status: DISCONTINUED | OUTPATIENT
Start: 2022-05-13 | End: 2022-05-16 | Stop reason: HOSPADM

## 2022-05-12 RX ORDER — QUETIAPINE FUMARATE 300 MG/1
300 TABLET, FILM COATED ORAL NIGHTLY
Status: DISCONTINUED | OUTPATIENT
Start: 2022-05-12 | End: 2022-05-16 | Stop reason: HOSPADM

## 2022-05-12 RX ORDER — DIAZEPAM 5 MG/1
5 TABLET ORAL 4 TIMES DAILY
Status: DISCONTINUED | OUTPATIENT
Start: 2022-05-12 | End: 2022-05-14

## 2022-05-12 RX ADMIN — ALUMINUM HYDROXIDE, MAGNESIUM HYDROXIDE, AND DIMETHICONE 30 ML: 400; 400; 40 SUSPENSION ORAL at 03:05

## 2022-05-12 RX ADMIN — QUETIAPINE FUMARATE 300 MG: 300 TABLET ORAL at 08:05

## 2022-05-12 RX ADMIN — DIAZEPAM 5 MG: 5 TABLET ORAL at 04:05

## 2022-05-12 RX ADMIN — DIAZEPAM 5 MG: 5 TABLET ORAL at 12:05

## 2022-05-12 RX ADMIN — HYDROXYZINE PAMOATE 50 MG: 50 CAPSULE ORAL at 02:05

## 2022-05-12 RX ADMIN — QUETIAPINE FUMARATE 200 MG: 200 TABLET ORAL at 08:05

## 2022-05-12 RX ADMIN — DIAZEPAM 5 MG: 5 TABLET ORAL at 08:05

## 2022-05-12 RX ADMIN — NICOTINE 1 PATCH: 14 PATCH, EXTENDED RELEASE TRANSDERMAL at 07:05

## 2022-05-12 RX ADMIN — FOLIC ACID 1 MG: 1 TABLET ORAL at 08:05

## 2022-05-12 RX ADMIN — DIAZEPAM 7 MG: 5 TABLET ORAL at 08:05

## 2022-05-12 RX ADMIN — HYDROXYZINE PAMOATE 100 MG: 50 CAPSULE ORAL at 08:05

## 2022-05-12 RX ADMIN — THERA TABS 1 TABLET: TAB at 08:05

## 2022-05-12 NOTE — PLAN OF CARE
"Speech clear and logical.  No outward evidence of responding to hallucinations.  Mood "so-so".  Blunted affect.  Said she's hoping to be discharged by the 13th because she has plans.  Said she's tired of being here.  Accepted meds and snack.  Stressed compliance with meds upon discharge.    "

## 2022-05-12 NOTE — PLAN OF CARE
Patient agitated, frustrated, and labile.  Denies intentions to harm self and/or others at this time. Denies auditory and/or visual hallucinations.  Affect and emotion blunted, labile, and irritable.  Inappropriately focused on discharge; expresses little insight into substance abuse--denies desire/need for rehab. Interacts appropriately with peers and staff. Accepts meals and medications. Discussed medication and plan of care as well as healthy coping skills and techniques; patient needs continued education and reinforcement.

## 2022-05-12 NOTE — PROGRESS NOTES
"PSYCHIATRY DAILY INPATIENT PROGRESS NOTE  SUBSEQUENT HOSPITAL VISIT    ENCOUNTER DATE: 5/12/2022  SITE: [facility]    DATE OF ADMISSION: 5/6/2022  2:50 AM  LENGTH OF STAY: 6 days      CHIEF COMPLAINT   Joe Henson is a 41 y.o. female, seen during daily salguero rounds on the inpatient unit.  Joe Henson presented with the chief complaint of mood disorder, anxiety and substance problems      The patient was seen and examined. The chart was reviewed.     Reviewed notes from Rns and CTRS and labs from the last 24 hours.    The patient's case was discussed with the treatment team/care providers today including Rns and LPC    Staff reports less behavioral or management issues.     The patient has been compliant with treatment.      Subjective 05/12/2022    Today, the patient again reports that she is doing "ok." She does not believe her issues to be secondary to benzo withdrawals despite contrary evidence.  -she conitnues minimizing her substance use; she intends to get klonopin in the future as she feels it to be "the only thing  That helps my anxiety."  -she reports stable/improvement in her mood  -she notes continued anxiety  -psychosis appears resolved; she is oriented     She continues to lack insight into her addictions and the role of sedatives hypnotics in her illness/presentations.   -she was focused on obtaining sedatives to treat her anxiety/insomnia  -she was focused on discharge today        The patient denies any side effects to medications.          Psychiatric ROS (observed, reported, or endorsed/denied):  Depressed mood - variable  Interest/pleasure/anhedonia: variable  Guilt/hopelessness/worthlessness - variable  Changes in Sleep - variable  Changes in Appetite - variable  Changes in Concentration - variable  Changes in Energy - variable  PMA/R- denies  Suicidal- active/passive ideations - No  Homicidal ideations: active/passive ideations - denies    Hallucinations - improving " steadily  Delusions - improving steadily  Disorganized behavior - denies  Disorganized speech - denies  Negative symptoms - denies    Elevated mood - denies  Decreased need for sleep - denies  Grandiosity - denies  Racing thoughts - denies  Impulsivity - denies  Irritability- denies  Increased energy - denies  Distractibility - denies  Increase in goal-directed activity or PMA- denies    Symptoms of ITZ - Yes  Symptoms of Panic Disorder- decreasing steadily  Symptoms of PTSD - Yes        Overall progress: Patient is showing mild improvement         Psychotherapy:  · Target symptoms: anxiety , substance abuse, mood disorder  · Why chosen therapy is appropriate versus another modality: relevant to diagnosis  · Outcome monitoring methods: self-report, lab data, observation  · Therapeutic intervention type: interactive psychotherapy  · Topics discussed/themes: building skills sets for symptom management, symptom recognition, substance abuse  · The patient's response to the intervention is not able to participate in therapy at this time.. The patient's progress toward treatment goals is poor.   · Duration of intervention: 16 minutes.        Medical ROS  Review of Systems   Constitutional: Negative for chills and fever.   HENT: Negative for congestion, hearing loss, sore throat and tinnitus.    Eyes: Negative for blurred vision, double vision, photophobia and pain.   Respiratory: Negative for cough, hemoptysis, sputum production, shortness of breath and wheezing.    Cardiovascular: Negative for chest pain, palpitations and leg swelling.   Gastrointestinal: Negative for abdominal pain, blood in stool, constipation, diarrhea, nausea and vomiting.   Genitourinary: Negative for dysuria, frequency, hematuria and urgency.   Musculoskeletal: Negative for back pain, joint pain and myalgias.   Skin: Negative for rash.   Neurological: Negative for dizziness, tremors, seizures, weakness and headaches.   Endo/Heme/Allergies:  Negative.    Psychiatric/Behavioral:        See HPI         PAST MEDICAL HISTORY   Past Medical History:   Diagnosis Date    Abnormal Pap smear age 30    Pos. HPV,     Abnormal Pap smear of cervix     Addiction to drug     ADHD (attention deficit hyperactivity disorder)     Arthritis     Bipolar disorder     COPD (chronic obstructive pulmonary disease)     Depression     Diabetes mellitus     Fatigue     Hallucination     History of psychiatric hospitalization     Hx of psychiatric care     OCD (obsessive compulsive disorder)     Psychiatric problem     PTSD (post-traumatic stress disorder)     Sleep difficulties     Substance abuse     Suicide attempt     Therapy            PSYCHOTROPIC MEDICATIONS   Scheduled Meds:   diazePAM  7 mg Oral QID    folic acid  1 mg Oral Daily    multivitamin  1 tablet Oral Daily    nicotine  1 patch Transdermal Daily    QUEtiapine  200 mg Oral BID     Continuous Infusions:  PRN Meds:.aluminum & magnesium hydroxide-simethicone, hydrOXYzine pamoate, hydrOXYzine pamoate, ibuprofen, lorazepam, magnesium hydroxide 400 mg/5 ml, OLANZapine **AND** OLANZapine        EXAMINATION    VITALS   Vitals:    05/11/22 0700 05/11/22 0746 05/11/22 2001 05/12/22 0801   BP:  99/66 103/72 93/65   BP Location:  Left arm Right arm Right arm   Patient Position:  Lying Lying Sitting   Pulse:  71 95 78   Resp:  18 16 18   Temp:  97.1 °F (36.2 °C) 97.4 °F (36.3 °C) 98.2 °F (36.8 °C)   TempSrc:  Temporal  Temporal   Weight: 72.2 kg (159 lb 2.8 oz)      Height:           Body mass index is 29.11 kg/m².        CONSTITUTIONAL  General Appearance: unremarkable, age appropriate, casually dressed, neatly groomed, overweight    MUSCULOSKELETAL  Muscle Strength and Tone:no tremor, no tic  Abnormal Involuntary Movements: None  Gait and Station: [facility], in bed     PSYCHIATRIC   Level of Consciousness: awake  Orientation: person, place, situation; and time  Grooming: less Disheveled;  "improving  Psychomotor Behavior: normal, cooperative, eye contact normal; no PMR/A  Speech: normal tone, normal rate, normal pitch, normal volume, spontaneous  Language: grossly intact, not tested  Mood: "alright""  Affect: Even, less anxious, variably irritable  Thought Process: Linear, logical, goal directed  Associations: intact; no attila   Thought Content: DENIES suicidal ideation and DENIES homicidal ideation  Perceptions: denies hallucinations  Memory: intact to recent of remote   Attention:Attends to interview without distraction  Fund of Knowledge: Improved; age and education appropriate   Estimate if Intelligence:  Average based on work/education history, vocabulary and mental status exam  Insight: limited awareness of illness  Judgment: limited secondary to delirium        DIAGNOSTIC TESTING   Laboratory Results  No results found for this or any previous visit (from the past 24 hour(s)).          MEDICAL DECISION MAKING      ASSESSMENT:   Delirium secondary to benzo withdrawal  Bipolar I Disorder mre mixed, severe with psychotic features  ITZ  PTSD     Nicotine Dependence  Substance abuse (h/o alcohol, opiate, amphetamine, sedative hypnotic and cocaine abuse; unable to specify or determine further at this time)     Psychosocial stressors     Chronic pain (sciatica)  Elevated Blood glucose        PROBLEM LIST AND MANAGEMENT PLANS    Delirium  - suspect 2/2 bzd w/d; h/o bzd wd last admit,  - started Ativan 2 mg PO q 4 prn for BZD WD  -Start valium 10 mg PO 4 times daily--> Titrated Valium to 15mg PO QID  On 5/7 (No longer requring PRN ativan since titration of valium dose.)- will decrease to 12 mg po 4 times daily- decrease to 10 mg po 4 times daily- decrease to 7 mg po 4 times daily- decrease to 5 mg po 4 times daily; will continue to taper down/off as able       Mood: pt counseled   -Resume home medication Serqouel at 50 mg po BID- increase to 100 mg po BID- increase to 150 mg po BID- increase to 200 mg po " BID- increase to 200 mg po q Am and 300 mg po q HS; will change/optimize as indicated  -continue Invega DUVAL 234 mg im q month (last given on 4/27/22)  -hold home med trazodone- pt has not recently taken; will resume if needed     Anxiety: pt counseled  -serqouel off-label as above  -hold zoloft for now; will-reinitiate if needed     PTSD: pt counseled  -seroquel off-label as above     Insomnia:pt counseled  -seroquel/ off-label as above  -vistaril prn     Nicotine use: pt counseled;   Started/continue nicotine 14 mg patch dermal daily     Substance use: pt counseled     Psychosocial stressors: pt counseled; SW consulted to to assist with resources     Elevated Blood glucose: pt counseled  -check HgA1c- 5.1; elevation was from substance use     Chronic pain: pt counseled            Discussed diagnosis, risks and benefits of proposed treatment vs alternative treatments vs no treatment, potential side effects of these treatments and the inherent unpredictability of treatment. The patient expresses understanding of the above and displays the capacity to agree with this treatment given said understanding. Patient also agrees that, currently, the benefits outweigh the risks and would like to pursue/continue treatment at this time.      DISCHARGE PLANNING  Expected Disposition Plan: Home or Self Care      NEED FOR CONTINUED HOSPITALIZATION  Psychiatric illness continues to pose a potential threat to life or bodily function, of self or others, thereby requiring the need for continued inpatient psychiatric hospitalization: Yes, due to: danger to self and gravely disabled, as evidenced by:  Ongoing concerns with grave disability with patient unable to perform basic feeding, hygiene and dressing activities without significant constant support.    Protective inpatient pyschiatric hospitalization required while a safe disposition plan is enacted: Yes    Patient stabilized and ready for discharge from inpatient psychiatric unit:  No        STAFF:   Srinath Fowler MD  Psychiatry

## 2022-05-13 PROCEDURE — 99233 PR SUBSEQUENT HOSPITAL CARE,LEVL III: ICD-10-PCS | Mod: ,,, | Performed by: PSYCHIATRY & NEUROLOGY

## 2022-05-13 PROCEDURE — 90833 PR PSYCHOTHERAPY W/PATIENT W/E&M, 30 MIN (ADD ON): ICD-10-PCS | Mod: ,,, | Performed by: PSYCHIATRY & NEUROLOGY

## 2022-05-13 PROCEDURE — 25000003 PHARM REV CODE 250: Performed by: PSYCHIATRY & NEUROLOGY

## 2022-05-13 PROCEDURE — 99233 SBSQ HOSP IP/OBS HIGH 50: CPT | Mod: ,,, | Performed by: PSYCHIATRY & NEUROLOGY

## 2022-05-13 PROCEDURE — 11400000 HC PSYCH PRIVATE ROOM

## 2022-05-13 PROCEDURE — 25000003 PHARM REV CODE 250: Performed by: STUDENT IN AN ORGANIZED HEALTH CARE EDUCATION/TRAINING PROGRAM

## 2022-05-13 PROCEDURE — 90833 PSYTX W PT W E/M 30 MIN: CPT | Mod: ,,, | Performed by: PSYCHIATRY & NEUROLOGY

## 2022-05-13 PROCEDURE — S4991 NICOTINE PATCH NONLEGEND: HCPCS | Performed by: STUDENT IN AN ORGANIZED HEALTH CARE EDUCATION/TRAINING PROGRAM

## 2022-05-13 RX ADMIN — DIAZEPAM 5 MG: 5 TABLET ORAL at 08:05

## 2022-05-13 RX ADMIN — THERA TABS 1 TABLET: TAB at 08:05

## 2022-05-13 RX ADMIN — QUETIAPINE FUMARATE 200 MG: 200 TABLET ORAL at 08:05

## 2022-05-13 RX ADMIN — DIAZEPAM 5 MG: 5 TABLET ORAL at 04:05

## 2022-05-13 RX ADMIN — DIAZEPAM 5 MG: 5 TABLET ORAL at 12:05

## 2022-05-13 RX ADMIN — QUETIAPINE FUMARATE 300 MG: 300 TABLET ORAL at 08:05

## 2022-05-13 RX ADMIN — HYDROXYZINE PAMOATE 100 MG: 50 CAPSULE ORAL at 07:05

## 2022-05-13 RX ADMIN — HYDROXYZINE PAMOATE 50 MG: 50 CAPSULE ORAL at 02:05

## 2022-05-13 RX ADMIN — FOLIC ACID 1 MG: 1 TABLET ORAL at 08:05

## 2022-05-13 RX ADMIN — NICOTINE 1 PATCH: 14 PATCH, EXTENDED RELEASE TRANSDERMAL at 08:05

## 2022-05-13 NOTE — NURSING
Rested intermittently with closed eyes and even resp for 6.75 hours.  Modified VC and all precautions maintained.  Pathways clear and bed in low position.

## 2022-05-13 NOTE — PLAN OF CARE
Pt calm and cooperative, out on unit interacting good with staff and peers, denies SI at this time, appetite good, med compliant, safety precautions maintained, will continue to monitor

## 2022-05-13 NOTE — PLAN OF CARE
"Patient interacting with select peers. Blunted affect. Patient reports " I really want to go home today but the doctor said I am not ready yet". Irritable at times and drug seeking.  Encouraged patient to live a drug free life style.  Discussed healthy coping skills and techniques. Discussed triggers to substance abuse and motivations  to stop use.  Patient refusing substance abuse rehab. Patient denies SI/HI no self harming behavior displayed, no aggressive behavior displayed towards others. Patient contracted safety with staff and unit.  Educated, reviewed  and discussed plan of care and medication regiment with this patient. Patient voiced understanding of all teachings.    "

## 2022-05-13 NOTE — PROGRESS NOTES
"PSYCHIATRY DAILY INPATIENT PROGRESS NOTE  SUBSEQUENT HOSPITAL VISIT    ENCOUNTER DATE: 5/13/2022  SITE: [facility]    DATE OF ADMISSION: 5/6/2022  2:50 AM  LENGTH OF STAY: 7 days      CHIEF COMPLAINT   Joe Henson is a 41 y.o. female, seen during daily salguero rounds on the inpatient unit.  Joe Henson presented with the chief complaint of mood disorder, anxiety and substance problems      The patient was seen and examined. The chart was reviewed.     Reviewed notes from [facility] and labs from the last 24 hours.    The patient's case was discussed with the treatment team/care providers today including [facility] and [facility]    Staff reports less behavioral or management issues.     The patient has been compliant with treatment.      Subjective 05/13/2022    Today, the patient again reports that she is doing "ok.. I need you to call my mom so I can go home." She remains hesitant to believe her issues to be secondary to benzo withdrawals despite contrary evidence.  -she conitnues minimizing her substance use; she intends to get klonopin in the future as she feels it to be "the only thing  That helps my anxiety."  -she reports stable/improvement in her mood  -she notes continued anxiety  -psychosis appears resolved; she is fully oriented     She continues to lack insight into her addictions and the role of sedatives hypnotics in her illness/presentations.   -she was focused on obtaining sedatives to treat her anxiety/insomnia- her sleep was rpeortly "terrible.. I didn't sleep at all last night." Staff notes 6-7 hours.  -she remains focused on discharge today, and stating that she does not need the valium        The patient denies any side effects to medications.          Psychiatric ROS (observed, reported, or endorsed/denied):  Depressed mood - variable  Interest/pleasure/anhedonia: variable  Guilt/hopelessness/worthlessness - variable  Changes in Sleep - variable  Changes in Appetite - variable  Changes in " Concentration - variable  Changes in Energy - variable  PMA/R- denies  Suicidal- active/passive ideations - No  Homicidal ideations: active/passive ideations - denies    Hallucinations - improving steadily  Delusions - improving steadily  Disorganized behavior - denies  Disorganized speech - denies  Negative symptoms - denies    Elevated mood - denies  Decreased need for sleep - denies  Grandiosity - denies  Racing thoughts - denies  Impulsivity - denies  Irritability- denies  Increased energy - denies  Distractibility - denies  Increase in goal-directed activity or PMA- denies    Symptoms of ITZ - Yes  Symptoms of Panic Disorder- decreasing steadily  Symptoms of PTSD - Yes        Overall progress: Patient is showing mild improvement         Psychotherapy:  · Target symptoms: anxiety , substance abuse, mood disorder  · Why chosen therapy is appropriate versus another modality: relevant to diagnosis  · Outcome monitoring methods: self-report, lab data, observation  · Therapeutic intervention type: interactive psychotherapy  · Topics discussed/themes: building skills sets for symptom management, symptom recognition, substance abuse  · The patient's response to the intervention is not able to participate in therapy at this time.. The patient's progress toward treatment goals is poor.   · Duration of intervention: 16 minutes.        Medical ROS  Review of Systems   Constitutional: Negative for chills and fever.   HENT: Negative for congestion, hearing loss, sore throat and tinnitus.    Eyes: Negative for blurred vision, double vision, photophobia and pain.   Respiratory: Negative for cough, hemoptysis, sputum production, shortness of breath and wheezing.    Cardiovascular: Negative for chest pain, palpitations and leg swelling.   Gastrointestinal: Negative for abdominal pain, blood in stool, constipation, diarrhea, nausea and vomiting.   Genitourinary: Negative for dysuria, frequency, hematuria and urgency.    Musculoskeletal: Negative for back pain, joint pain and myalgias.   Skin: Negative for rash.   Neurological: Negative for dizziness, tremors, seizures, weakness and headaches.   Endo/Heme/Allergies: Negative.    Psychiatric/Behavioral:        See HPI         PAST MEDICAL HISTORY   Past Medical History:   Diagnosis Date    Abnormal Pap smear age 30    Pos. HPV,     Abnormal Pap smear of cervix     Addiction to drug     ADHD (attention deficit hyperactivity disorder)     Arthritis     Bipolar disorder     COPD (chronic obstructive pulmonary disease)     Depression     Diabetes mellitus     Fatigue     Hallucination     History of psychiatric hospitalization     Hx of psychiatric care     OCD (obsessive compulsive disorder)     Psychiatric problem     PTSD (post-traumatic stress disorder)     Sleep difficulties     Substance abuse     Suicide attempt     Therapy            PSYCHOTROPIC MEDICATIONS   Scheduled Meds:   diazePAM  5 mg Oral QID    folic acid  1 mg Oral Daily    multivitamin  1 tablet Oral Daily    nicotine  1 patch Transdermal Daily    QUEtiapine  200 mg Oral Daily    QUEtiapine  300 mg Oral QHS     Continuous Infusions:  PRN Meds:.aluminum & magnesium hydroxide-simethicone, hydrOXYzine pamoate, hydrOXYzine pamoate, ibuprofen, lorazepam, magnesium hydroxide 400 mg/5 ml, OLANZapine **AND** OLANZapine        EXAMINATION    VITALS   Vitals:    05/11/22 2001 05/12/22 0801 05/12/22 2000 05/13/22 0800   BP: 103/72 93/65 90/61 102/73   BP Location: Right arm Right arm Right arm Right arm   Patient Position: Lying Sitting Sitting Sitting   Pulse: 95 78 91 81   Resp: 16 18 18 17   Temp: 97.4 °F (36.3 °C) 98.2 °F (36.8 °C) 97.5 °F (36.4 °C) 98.1 °F (36.7 °C)   TempSrc:  Temporal Temporal Temporal   Weight:       Height:           Body mass index is 29.11 kg/m².        CONSTITUTIONAL  General Appearance: unremarkable, age appropriate, casually dressed, neatly groomed,  "overweight    MUSCULOSKELETAL  Muscle Strength and Tone:no tremor, no tic  Abnormal Involuntary Movements: None  Gait and Station: [facility], in bed     PSYCHIATRIC   Level of Consciousness: awake  Orientation: person, place, situation; and time  Grooming: less Disheveled; improving  Psychomotor Behavior: normal, cooperative, eye contact normal; no PMR/A  Speech: normal tone, normal rate, normal pitch, normal volume, spontaneous  Language: grossly intact, not tested  Mood: "alright""  Affect: Even, less anxious, variably irritable  Thought Process: Linear, logical, goal directed  Associations: intact; no attila   Thought Content: DENIES suicidal ideation and DENIES homicidal ideation  Perceptions: denies hallucinations  Memory: intact to recent of remote   Attention:Attends to interview without distraction  Fund of Knowledge: Improved; age and education appropriate   Estimate if Intelligence:  Average based on work/education history, vocabulary and mental status exam  Insight: limited awareness of illness  Judgment: limited secondary to delirium        DIAGNOSTIC TESTING   Laboratory Results  No results found for this or any previous visit (from the past 24 hour(s)).          MEDICAL DECISION MAKING      ASSESSMENT:   Delirium secondary to benzo withdrawal  Bipolar I Disorder mre mixed, severe with psychotic features  ITZ  PTSD     Nicotine Dependence  Substance abuse (h/o alcohol, opiate, amphetamine, sedative hypnotic and cocaine abuse; unable to specify or determine further at this time)     Psychosocial stressors     Chronic pain (sciatica)  Elevated Blood glucose        PROBLEM LIST AND MANAGEMENT PLANS    Delirium  - suspect 2/2 bzd w/d; h/o bzd wd last admit,  - started Ativan 2 mg PO q 4 prn for BZD WD  -Start valium 10 mg PO 4 times daily--> Titrated Valium to 15mg PO QID  On 5/7 (No longer requring PRN ativan since titration of valium dose.)- will decrease to 12 mg po 4 times daily- decrease to 10 mg po 4 times " daily- decrease to 7 mg po 4 times daily- decreased to 5 mg po 4 times daily; will continue to taper down/off as able       Mood: pt counseled   -Resume home medication Serqouel at 50 mg po BID- increase to 100 mg po BID- increase to 150 mg po BID- increase to 200 mg po BID- increase to 200 mg po q Am and 300 mg po q HS; will change/optimize as indicated  -continue Invega DUVAL 234 mg im q month (last given on 4/27/22)  -hold home med trazodone- pt has not recently taken; will resume if needed     Anxiety: pt counseled  -serqouel off-label as above  -hold zoloft for now; will-reinitiate if needed     PTSD: pt counseled  -seroquel off-label as above     Insomnia:pt counseled  -seroquel/ off-label as above  -vistaril prn     Nicotine use: pt counseled;   Started/continue nicotine 14 mg patch dermal daily     Substance use: pt counseled     Psychosocial stressors: pt counseled; SW consulted to to assist with resources     Elevated Blood glucose: pt counseled  -check HgA1c- 5.1; elevation was from substance use     Chronic pain: pt counseled            Discussed diagnosis, risks and benefits of proposed treatment vs alternative treatments vs no treatment, potential side effects of these treatments and the inherent unpredictability of treatment. The patient expresses understanding of the above and displays the capacity to agree with this treatment given said understanding. Patient also agrees that, currently, the benefits outweigh the risks and would like to pursue/continue treatment at this time.      DISCHARGE PLANNING  Expected Disposition Plan: Home or Self Care      NEED FOR CONTINUED HOSPITALIZATION  Psychiatric illness continues to pose a potential threat to life or bodily function, of self or others, thereby requiring the need for continued inpatient psychiatric hospitalization: Yes, due to: danger to self and gravely disabled, as evidenced by:  Ongoing concerns with grave disability with patient unable to perform  basic feeding, hygiene and dressing activities without significant constant support.    Protective inpatient pyschiatric hospitalization required while a safe disposition plan is enacted: Yes    Patient stabilized and ready for discharge from inpatient psychiatric unit: No        STAFF:   Srinath Fowler MD  Psychiatry

## 2022-05-14 PROCEDURE — 99231 SBSQ HOSP IP/OBS SF/LOW 25: CPT | Mod: ,,, | Performed by: PSYCHIATRY & NEUROLOGY

## 2022-05-14 PROCEDURE — 25000003 PHARM REV CODE 250: Performed by: STUDENT IN AN ORGANIZED HEALTH CARE EDUCATION/TRAINING PROGRAM

## 2022-05-14 PROCEDURE — 99231 PR SUBSEQUENT HOSPITAL CARE,LEVL I: ICD-10-PCS | Mod: ,,, | Performed by: PSYCHIATRY & NEUROLOGY

## 2022-05-14 PROCEDURE — 25000003 PHARM REV CODE 250: Performed by: PSYCHIATRY & NEUROLOGY

## 2022-05-14 PROCEDURE — S4991 NICOTINE PATCH NONLEGEND: HCPCS | Performed by: STUDENT IN AN ORGANIZED HEALTH CARE EDUCATION/TRAINING PROGRAM

## 2022-05-14 PROCEDURE — 11400000 HC PSYCH PRIVATE ROOM

## 2022-05-14 RX ORDER — DIAZEPAM 5 MG/1
5 TABLET ORAL 3 TIMES DAILY
Status: DISCONTINUED | OUTPATIENT
Start: 2022-05-14 | End: 2022-05-15

## 2022-05-14 RX ORDER — DOCUSATE SODIUM 100 MG/1
100 CAPSULE, LIQUID FILLED ORAL 2 TIMES DAILY
Status: DISCONTINUED | OUTPATIENT
Start: 2022-05-14 | End: 2022-05-16 | Stop reason: HOSPADM

## 2022-05-14 RX ADMIN — MAGNESIUM HYDROXIDE 2400 MG: 400 SUSPENSION ORAL at 05:05

## 2022-05-14 RX ADMIN — HYDROXYZINE PAMOATE 100 MG: 50 CAPSULE ORAL at 08:05

## 2022-05-14 RX ADMIN — HYDROXYZINE PAMOATE 50 MG: 50 CAPSULE ORAL at 12:05

## 2022-05-14 RX ADMIN — NICOTINE 1 PATCH: 14 PATCH, EXTENDED RELEASE TRANSDERMAL at 08:05

## 2022-05-14 RX ADMIN — DIAZEPAM 5 MG: 5 TABLET ORAL at 02:05

## 2022-05-14 RX ADMIN — FOLIC ACID 1 MG: 1 TABLET ORAL at 08:05

## 2022-05-14 RX ADMIN — DIAZEPAM 5 MG: 5 TABLET ORAL at 08:05

## 2022-05-14 RX ADMIN — THERA TABS 1 TABLET: TAB at 08:05

## 2022-05-14 RX ADMIN — DOCUSATE SODIUM 100 MG: 100 CAPSULE, LIQUID FILLED ORAL at 06:05

## 2022-05-14 RX ADMIN — QUETIAPINE FUMARATE 300 MG: 300 TABLET ORAL at 08:05

## 2022-05-14 RX ADMIN — QUETIAPINE FUMARATE 200 MG: 200 TABLET ORAL at 08:05

## 2022-05-14 NOTE — PROGRESS NOTES
"PSYCHIATRY DAILY INPATIENT PROGRESS NOTE  SUBSEQUENT HOSPITAL VISIT    ENCOUNTER DATE: 5/14/2022  SITE: [facility]    DATE OF ADMISSION: 5/6/2022  2:50 AM  LENGTH OF STAY: 8 days      CHIEF COMPLAINT   Joe Henson is a 41 y.o. female, seen during daily salguero rounds on the inpatient unit.  Joe Henson presented with the chief complaint of mood disorder, anxiety and substance problems      The patient was seen and examined. The chart was reviewed.     Reviewed notes from [facility] and labs from the last 24 hours.    The patient's case was discussed with the treatment team/care providers today including [facility]    Staff reports no behavioral or management issues.     The patient has been compliant with treatment.      Subjective 05/14/2022    Today, the patient again reports that she is doing "ok..can leave tomorrow."" She remains hesitant to believe her issues to be secondary to benzo withdrawals despite contrary evidence.  -she conitnues minimizing her substance use; she intends to get klonopin in the future as she feels it to be "the only thing  That helps my anxiety."  -she reports stable/improvement in her mood  -she notes continued anxiety  -psychosis appears resolved; she is fully oriented     She continues to lack insight into her addictions and the role of sedatives hypnotics in her illness/presentations.   -she was focused on obtaining sedatives to treat her anxiety/insomnia- her sleep was rpeortly "terrible.. I didn't sleep at all last night." Staff notes 6-7 hours.  -she remains focused on discharge today, and stating that she does not need the valium    Overall, she is improving and will be stable for discharge Monday after completing her detox.        The patient denies any side effects to medications.          Psychiatric ROS (observed, reported, or endorsed/denied):  Depressed mood - decreasing steadily  Interest/pleasure/anhedonia: decreasing steadily  Guilt/hopelessness/worthlessness - " decreasing steadily  Changes in Sleep - decreasing steadily  Changes in Appetite - decreasing steadily  Changes in Concentration - decreasing steadily  Changes in Energy - decreasing steadily  PMA/R- denies  Suicidal- active/passive ideations - No  Homicidal ideations: active/passive ideations - denies    Hallucinations - improving steadily  Delusions - improving steadily  Disorganized behavior - denies  Disorganized speech - denies  Negative symptoms - denies    Elevated mood - denies  Decreased need for sleep - denies  Grandiosity - denies  Racing thoughts - denies  Impulsivity - denies  Irritability- denies  Increased energy - denies  Distractibility - denies  Increase in goal-directed activity or PMA- denies    Symptoms of ITZ - Yes  Symptoms of Panic Disorder- decreasing steadily  Symptoms of PTSD - Yes        Overall progress: Patient is showing moderate improvement        Psychotherapy:  · Target symptoms: anxiety , substance abuse, mood disorder  · Why chosen therapy is appropriate versus another modality: relevant to diagnosis  · Outcome monitoring methods: self-report, lab data, observation  · Therapeutic intervention type: interactive psychotherapy  · Topics discussed/themes: building skills sets for symptom management, symptom recognition, substance abuse  · The patient's response to the intervention is not able to participate in therapy at this time.. The patient's progress toward treatment goals is poor.   · Duration of intervention: 2 minutes.        Medical ROS  Review of Systems   Constitutional: Negative for chills and fever.   HENT: Negative for congestion, hearing loss, sore throat and tinnitus.    Eyes: Negative for blurred vision, double vision, photophobia and pain.   Respiratory: Negative for cough, hemoptysis, sputum production, shortness of breath and wheezing.    Cardiovascular: Negative for chest pain, palpitations and leg swelling.   Gastrointestinal: Negative for abdominal pain, blood  in stool, constipation, diarrhea, nausea and vomiting.   Genitourinary: Negative for dysuria, frequency, hematuria and urgency.   Musculoskeletal: Negative for back pain, joint pain and myalgias.   Skin: Negative for rash.   Neurological: Negative for dizziness, tremors, seizures, weakness and headaches.   Endo/Heme/Allergies: Negative.    Psychiatric/Behavioral:        See HPI         PAST MEDICAL HISTORY   Past Medical History:   Diagnosis Date    Abnormal Pap smear age 30    Pos. HPV,     Abnormal Pap smear of cervix     Addiction to drug     ADHD (attention deficit hyperactivity disorder)     Arthritis     Bipolar disorder     COPD (chronic obstructive pulmonary disease)     Depression     Diabetes mellitus     Fatigue     Hallucination     History of psychiatric hospitalization     Hx of psychiatric care     OCD (obsessive compulsive disorder)     Psychiatric problem     PTSD (post-traumatic stress disorder)     Sleep difficulties     Substance abuse     Suicide attempt     Therapy            PSYCHOTROPIC MEDICATIONS   Scheduled Meds:   diazePAM  5 mg Oral QID    folic acid  1 mg Oral Daily    multivitamin  1 tablet Oral Daily    nicotine  1 patch Transdermal Daily    QUEtiapine  200 mg Oral Daily    QUEtiapine  300 mg Oral QHS     Continuous Infusions:  PRN Meds:.aluminum & magnesium hydroxide-simethicone, hydrOXYzine pamoate, hydrOXYzine pamoate, ibuprofen, lorazepam, magnesium hydroxide 400 mg/5 ml, OLANZapine **AND** OLANZapine        EXAMINATION    VITALS   Vitals:    05/12/22 2000 05/13/22 0800 05/13/22 1924 05/14/22 0800   BP: 90/61 102/73 99/65 100/75   BP Location: Right arm Right arm Right arm Right arm   Patient Position: Sitting Sitting Sitting Sitting   Pulse: 91 81 90 84   Resp: 18 17 17 18   Temp: 97.5 °F (36.4 °C) 98.1 °F (36.7 °C) 96.1 °F (35.6 °C) 97.2 °F (36.2 °C)   TempSrc: Temporal Temporal Temporal Temporal   Weight:       Height:           Body mass index is  "29.11 kg/m².        CONSTITUTIONAL  General Appearance: unremarkable, age appropriate, casually dressed, neatly groomed, overweight    MUSCULOSKELETAL  Muscle Strength and Tone:no tremor, no tic  Abnormal Involuntary Movements: None  Gait and Station: [facility], in bed     PSYCHIATRIC   Level of Consciousness: awake  Orientation: person, place, situation; and time  Grooming: less Disheveled; improving  Psychomotor Behavior: normal, cooperative, eye contact normal; no PMR/A  Speech: normal tone, normal rate, normal pitch, normal volume, spontaneous  Language: grossly intact, not tested  Mood: "alright""  Affect: Even, less anxious, variably irritable  Thought Process: Linear, logical, goal directed  Associations: intact; no attila   Thought Content: DENIES suicidal ideation and DENIES homicidal ideation  Perceptions: denies hallucinations  Memory: intact to recent of remote   Attention:Attends to interview without distraction  Fund of Knowledge: Improved; age and education appropriate   Estimate if Intelligence:  Average based on work/education history, vocabulary and mental status exam  Insight: limited awareness of illness  Judgment: limited secondary to delirium        DIAGNOSTIC TESTING   Laboratory Results  No results found for this or any previous visit (from the past 24 hour(s)).          MEDICAL DECISION MAKING      ASSESSMENT:   Delirium secondary to benzo withdrawal  Bipolar I Disorder mre mixed, severe with psychotic features  ITZ  PTSD     Nicotine Dependence  Substance abuse (h/o alcohol, opiate, amphetamine, sedative hypnotic and cocaine abuse; unable to specify or determine further at this time)     Psychosocial stressors     Chronic pain (sciatica)  Elevated Blood glucose        PROBLEM LIST AND MANAGEMENT PLANS    Delirium  - suspect 2/2 bzd w/d; h/o bzd wd last admit,  - started Ativan 2 mg PO q 4 prn for BZD WD  -Start valium 10 mg PO 4 times daily--> Titrated Valium to 15mg PO QID  On 5/7 (No longer " requring PRN ativan since titration of valium dose.)- will decrease to 12 mg po 4 times daily- decrease to 10 mg po 4 times daily- decrease to 7 mg po 4 times daily- decreased to 5 mg po 4 times daily- decrease to 5 mg po TID; will continue to taper down/off as able       Mood: pt counseled   -Resume home medication Serqouel at 50 mg po BID- increase to 100 mg po BID- increase to 150 mg po BID- increase to 200 mg po BID- increase to/continue at 200 mg po q Am and 300 mg po q HS; will change/optimize as indicated  -continue Invega DUVAL 234 mg im q month (last given on 4/27/22)  -hold home med trazodone- pt has not recently taken; will resume if needed     Anxiety: pt counseled  -serqouel off-label as above  -hold zoloft for now; will-reinitiate if needed     PTSD: pt counseled  -seroquel off-label as above     Insomnia:pt counseled  -seroquel/ off-label as above  -vistaril prn     Nicotine use: pt counseled;   Started/continue nicotine 14 mg patch dermal daily     Substance use: pt counseled     Psychosocial stressors: pt counseled; SW consulted to to assist with resources     Elevated Blood glucose: pt counseled  -check HgA1c- 5.1; elevation was from substance use     Chronic pain: pt counseled            Discussed diagnosis, risks and benefits of proposed treatment vs alternative treatments vs no treatment, potential side effects of these treatments and the inherent unpredictability of treatment. The patient expresses understanding of the above and displays the capacity to agree with this treatment given said understanding. Patient also agrees that, currently, the benefits outweigh the risks and would like to pursue/continue treatment at this time.      DISCHARGE PLANNING  Expected Disposition Plan: Home or Self Care      NEED FOR CONTINUED HOSPITALIZATION  Psychiatric illness continues to pose a potential threat to life or bodily function, of self or others, thereby requiring the need for continued inpatient  psychiatric hospitalization: Yes, due to: danger to self and gravely disabled, as evidenced by:  Ongoing concerns with grave disability with patient unable to perform basic feeding, hygiene and dressing activities without significant constant support.    Protective inpatient pyschiatric hospitalization required while a safe disposition plan is enacted: Yes    Patient stabilized and ready for discharge from inpatient psychiatric unit: No        STAFF:   Srinath Fowler MD  Psychiatry

## 2022-05-14 NOTE — PLAN OF CARE
Pt calm and cooperative, denies SI at this time, out on unit interacting good with staff and peers, med compliant, appetite good, safety precautions maintained, will continue to monitor

## 2022-05-14 NOTE — PLAN OF CARE
"Patient interacting with select group of peers. Patient reported "I am still angry he won't let me go sooner". Patient irritable at time and drug seeking. We discussed plan of care.  Encouraged patient to live a drug free life style.  Discussed healthy coping skills and techniques. Discussed triggers to substance abuse and motivations  to stop use.  Encouraged patient to consider seeking substance abuse rehab to gain coping skills. Patient refusing at this time. Poor insight and judgement into substance abuse. Discussed healthy coping skills and techniques.  Patient denies SI/HI no self harming behavior displayed, no aggressive behavior displayed towards others. Patient contracted safety with staff and unit.  Educated, reviewed  and discussed plan of care and medication regiment with this patient. Patient voiced understanding of all teachings.    "

## 2022-05-14 NOTE — NURSING
Pt sleeping at this time, slept 8.5 hrs with 3 awakenings. NAD. Resp even and unlabored.Pathways clear,bed in low position. Q 15 min safety check ongoing.All precautions maintained.

## 2022-05-15 PROCEDURE — 99231 SBSQ HOSP IP/OBS SF/LOW 25: CPT | Mod: ,,, | Performed by: PSYCHIATRY & NEUROLOGY

## 2022-05-15 PROCEDURE — 25000003 PHARM REV CODE 250: Performed by: PSYCHIATRY & NEUROLOGY

## 2022-05-15 PROCEDURE — 11400000 HC PSYCH PRIVATE ROOM

## 2022-05-15 PROCEDURE — 25000003 PHARM REV CODE 250: Performed by: STUDENT IN AN ORGANIZED HEALTH CARE EDUCATION/TRAINING PROGRAM

## 2022-05-15 PROCEDURE — 99231 PR SUBSEQUENT HOSPITAL CARE,LEVL I: ICD-10-PCS | Mod: ,,, | Performed by: PSYCHIATRY & NEUROLOGY

## 2022-05-15 PROCEDURE — S4991 NICOTINE PATCH NONLEGEND: HCPCS | Performed by: STUDENT IN AN ORGANIZED HEALTH CARE EDUCATION/TRAINING PROGRAM

## 2022-05-15 RX ORDER — DIAZEPAM 5 MG/1
5 TABLET ORAL 2 TIMES DAILY
Status: COMPLETED | OUTPATIENT
Start: 2022-05-15 | End: 2022-05-16

## 2022-05-15 RX ADMIN — HYDROXYZINE PAMOATE 100 MG: 50 CAPSULE ORAL at 08:05

## 2022-05-15 RX ADMIN — IBUPROFEN 600 MG: 600 TABLET, FILM COATED ORAL at 04:05

## 2022-05-15 RX ADMIN — QUETIAPINE FUMARATE 200 MG: 200 TABLET ORAL at 08:05

## 2022-05-15 RX ADMIN — DOCUSATE SODIUM 100 MG: 100 CAPSULE, LIQUID FILLED ORAL at 08:05

## 2022-05-15 RX ADMIN — DIAZEPAM 5 MG: 5 TABLET ORAL at 08:05

## 2022-05-15 RX ADMIN — HYDROXYZINE PAMOATE 50 MG: 50 CAPSULE ORAL at 03:05

## 2022-05-15 RX ADMIN — QUETIAPINE FUMARATE 300 MG: 300 TABLET ORAL at 08:05

## 2022-05-15 RX ADMIN — HYDROXYZINE PAMOATE 50 MG: 50 CAPSULE ORAL at 12:05

## 2022-05-15 RX ADMIN — FOLIC ACID 1 MG: 1 TABLET ORAL at 08:05

## 2022-05-15 RX ADMIN — NICOTINE 1 PATCH: 14 PATCH, EXTENDED RELEASE TRANSDERMAL at 08:05

## 2022-05-15 RX ADMIN — THERA TABS 1 TABLET: TAB at 08:05

## 2022-05-15 NOTE — NURSING
Pt sleeping at this time, slept 4.5 hrs with 3 awakenings. NAD. Resp even and unlabored.Pathways clear,bed in low position. Q 15 min safety check ongoing.All precautions maintained.

## 2022-05-15 NOTE — PLAN OF CARE
Pt in bed at beginning of shift.  Out for phone.  Out watching tv and interacting with roommate.  Remains focused on meds.  Took meds as ordered along with prn vistaril.  Says she wants to be discharged so she can go to [facility].  Says she is concerned with meds causing weight gain.   Encouraged pt to walk and exercise for mental health.  Pt says she plans on starting walking when discharged.  No distress noted.  Will continue to monitor for safety.

## 2022-05-15 NOTE — PLAN OF CARE
"Patient irritable at times and demanding. Patient pressured speech reporting " I still don't know why I cannot go home today. One day makes no difference. We discussed her care plan and medication regiment in which the MD was tapering her medication of Valium in order to be ready for discharge tomorrow. Discussed healthy coping skills and techniques.  Patient denies SI/HI no self harming behavior displayed, no aggressive behavior displayed towards others. Patient contracted safety with staff and unit.   Encouraged patient to live a drug free life style.  Discussed healthy coping skills and techniques. Discussed triggers to substance abuse and motivations  to stop use.  Patient refusing substance abuse rehab. Educated, reviewed  and discussed plan of care and medication regiment with this patient. Patient voiced understanding of all teachings.    "

## 2022-05-15 NOTE — PROGRESS NOTES
"PSYCHIATRY DAILY INPATIENT PROGRESS NOTE  SUBSEQUENT HOSPITAL VISIT    ENCOUNTER DATE: 5/15/2022  SITE: [facility]    DATE OF ADMISSION: 5/6/2022  2:50 AM  LENGTH OF STAY: 9 days      CHIEF COMPLAINT   Joe Henson is a 41 y.o. female, seen during daily salguero rounds on the inpatient unit.  Joe Henson presented with the chief complaint of mood disorder, anxiety and substance problems      The patient was seen and examined. The chart was reviewed.     Reviewed notes from Rns and labs from the last 24 hours.    The patient's case was discussed with the treatment team/care providers today including Rns    Staff reports no behavioral or management issues.     The patient has been compliant with treatment.      Subjective 05/15/2022    Today, the patient again reports that she is doing "ok..can leave tomorrow."" She remains hesitant to believe her issues to be secondary to benzo withdrawals despite contrary evidence.  -she conitnues minimizing her substance use; she intends to get klonopin in the future as she feels it to be "the only thing  That helps my anxiety."  -she reports stable/improvement in her mood  -she notes continued anxiety  -psychosis appears resolved; she is fully oriented     She continues to lack insight into her addictions and the role of sedatives hypnotics in her illness/presentations.   -she was periodically focused on obtaining sedatives to treat her anxiety/insomnia- her sleep was rpeortly "terrible.. I didn't sleep at all last night." Staff notes 6-7 hours.  -she remains focused on discharge today, and stating that she does not need the valium    Overall, she contnues improving and will be stable for discharge Monday/tomorrow after completing her detox.        The patient denies any side effects to medications.          Psychiatric ROS (observed, reported, or endorsed/denied):  Depressed mood - decreasing steadily  Interest/pleasure/anhedonia: decreasing " steadily  Guilt/hopelessness/worthlessness - decreasing steadily  Changes in Sleep - decreasing steadily  Changes in Appetite - decreasing steadily  Changes in Concentration - decreasing steadily  Changes in Energy - decreasing steadily  PMA/R- denies  Suicidal- active/passive ideations - No  Homicidal ideations: active/passive ideations - denies    Hallucinations - improving steadily  Delusions - improving steadily  Disorganized behavior - denies  Disorganized speech - denies  Negative symptoms - denies    Elevated mood - denies  Decreased need for sleep - denies  Grandiosity - denies  Racing thoughts - denies  Impulsivity - denies  Irritability- denies  Increased energy - denies  Distractibility - denies  Increase in goal-directed activity or PMA- denies    Symptoms of ITZ - Yes  Symptoms of Panic Disorder- decreasing steadily  Symptoms of PTSD - Yes        Overall progress: Patient is showing moderate improvement        Psychotherapy:  · Target symptoms: anxiety , substance abuse, mood disorder  · Why chosen therapy is appropriate versus another modality: relevant to diagnosis  · Outcome monitoring methods: self-report, lab data, observation  · Therapeutic intervention type: interactive psychotherapy  · Topics discussed/themes: building skills sets for symptom management, symptom recognition, substance abuse  · The patient's response to the intervention is not able to participate in therapy at this time.. The patient's progress toward treatment goals is poor.   · Duration of intervention: 2 minutes.        Medical ROS  Review of Systems   Constitutional: Negative for chills and fever.   HENT: Negative for congestion, hearing loss, sore throat and tinnitus.    Eyes: Negative for blurred vision, double vision, photophobia and pain.   Respiratory: Negative for cough, hemoptysis, sputum production, shortness of breath and wheezing.    Cardiovascular: Negative for chest pain, palpitations and leg swelling.    Gastrointestinal: Negative for abdominal pain, blood in stool, constipation, diarrhea, nausea and vomiting.   Genitourinary: Negative for dysuria, frequency, hematuria and urgency.   Musculoskeletal: Negative for back pain, joint pain and myalgias.   Skin: Negative for rash.   Neurological: Negative for dizziness, tremors, seizures, weakness and headaches.   Endo/Heme/Allergies: Negative.    Psychiatric/Behavioral:        See HPI         PAST MEDICAL HISTORY   Past Medical History:   Diagnosis Date    Abnormal Pap smear age 30    Pos. HPV,     Abnormal Pap smear of cervix     Addiction to drug     ADHD (attention deficit hyperactivity disorder)     Arthritis     Bipolar disorder     COPD (chronic obstructive pulmonary disease)     Depression     Diabetes mellitus     Fatigue     Hallucination     History of psychiatric hospitalization     Hx of psychiatric care     OCD (obsessive compulsive disorder)     Psychiatric problem     PTSD (post-traumatic stress disorder)     Sleep difficulties     Substance abuse     Suicide attempt     Therapy            PSYCHOTROPIC MEDICATIONS   Scheduled Meds:   diazePAM  5 mg Oral BID    docusate sodium  100 mg Oral BID    folic acid  1 mg Oral Daily    multivitamin  1 tablet Oral Daily    nicotine  1 patch Transdermal Daily    QUEtiapine  200 mg Oral Daily    QUEtiapine  300 mg Oral QHS     Continuous Infusions:  PRN Meds:.aluminum & magnesium hydroxide-simethicone, hydrOXYzine pamoate, hydrOXYzine pamoate, ibuprofen, lorazepam, magnesium hydroxide 400 mg/5 ml, OLANZapine **AND** OLANZapine        EXAMINATION    VITALS   Vitals:    05/13/22 1924 05/14/22 0800 05/14/22 1939 05/15/22 0800   BP: 99/65 100/75 110/61 104/74   BP Location: Right arm Right arm Right arm Right arm   Patient Position: Sitting Sitting Sitting Sitting   Pulse: 90 84 91 84   Resp: 17 18 18 18   Temp: 96.1 °F (35.6 °C) 97.2 °F (36.2 °C) 97.2 °F (36.2 °C) 97.5 °F (36.4 °C)   TempSrc:  "Temporal Temporal Temporal Temporal   Weight:       Height:           Body mass index is 29.11 kg/m².        CONSTITUTIONAL  General Appearance: unremarkable, age appropriate, casually dressed, neatly groomed, overweight    MUSCULOSKELETAL  Muscle Strength and Tone:no tremor, no tic  Abnormal Involuntary Movements: None  Gait and Station: [facility], in bed     PSYCHIATRIC   Level of Consciousness: awake  Orientation: person, place, situation; and time  Grooming: less Disheveled; improving  Psychomotor Behavior: normal, cooperative, eye contact normal; no PMR/A  Speech: normal tone, normal rate, normal pitch, normal volume, spontaneous  Language: grossly intact, not tested  Mood: "alright""  Affect: Even, less anxious, variably irritable  Thought Process: Linear, logical, goal directed  Associations: intact; no attila   Thought Content: DENIES suicidal ideation and DENIES homicidal ideation  Perceptions: denies hallucinations  Memory: intact to recent of remote   Attention:Attends to interview without distraction  Fund of Knowledge: Improved; age and education appropriate   Estimate if Intelligence:  Average based on work/education history, vocabulary and mental status exam  Insight: limited awareness of illness  Judgment: limited secondary to delirium        DIAGNOSTIC TESTING   Laboratory Results  No results found for this or any previous visit (from the past 24 hour(s)).          MEDICAL DECISION MAKING      ASSESSMENT:   Delirium secondary to benzo withdrawal  Bipolar I Disorder mre mixed, severe with psychotic features  ITZ  PTSD     Nicotine Dependence  Substance abuse (h/o alcohol, opiate, amphetamine, sedative hypnotic and cocaine abuse; unable to specify or determine further at this time)     Psychosocial stressors     Chronic pain (sciatica)  Elevated Blood glucose        PROBLEM LIST AND MANAGEMENT PLANS    Delirium  - suspect 2/2 bzd w/d; h/o bzd wd last admit,  - started Ativan 2 mg PO q 4 prn for BZD WD  -Start " valium 10 mg PO 4 times daily--> Titrated Valium to 15mg PO QID  On 5/7 (No longer requring PRN ativan since titration of valium dose.)- will decrease to 12 mg po 4 times daily- decrease to 10 mg po 4 times daily- decrease to 7 mg po 4 times daily- decreased to 5 mg po 4 times daily- decrease to 5 mg po TID- decrease to BID today, then q day tomorrow for the last dose; will continue to taper down/off as able       Mood: pt counseled   -Resume home medication Serqouel at 50 mg po BID- increase to 100 mg po BID- increase to 150 mg po BID- increase to 200 mg po BID- increase to/continue at 200 mg po q Am and 300 mg po q HS; will change/optimize as indicated  -continue Invega DUVAL 234 mg im q month (last given on 4/27/22)  -hold home med trazodone- pt has not recently taken; will resume if needed     Anxiety: pt counseled  -serqouel off-label as above  -hold zoloft for now; will-reinitiate if needed     PTSD: pt counseled  -seroquel off-label as above     Insomnia:pt counseled  -seroquel/ off-label as above  -vistaril prn     Nicotine use: pt counseled;   Started/continue nicotine 14 mg patch dermal daily     Substance use: pt counseled     Psychosocial stressors: pt counseled; SW consulted to to assist with resources     Elevated Blood glucose: pt counseled  -check HgA1c- 5.1; elevation was from substance use     Chronic pain: pt counseled            Discussed diagnosis, risks and benefits of proposed treatment vs alternative treatments vs no treatment, potential side effects of these treatments and the inherent unpredictability of treatment. The patient expresses understanding of the above and displays the capacity to agree with this treatment given said understanding. Patient also agrees that, currently, the benefits outweigh the risks and would like to pursue/continue treatment at this time.      DISCHARGE PLANNING  Expected Disposition Plan: Home or Self Care- she is improving and will be stable for discharge home  tomorrow       NEED FOR CONTINUED HOSPITALIZATION  Psychiatric illness continues to pose a potential threat to life or bodily function, of self or others, thereby requiring the need for continued inpatient psychiatric hospitalization: Yes, due to: danger to self and gravely disabled, as evidenced by:  Ongoing concerns with grave disability with patient unable to perform basic feeding, hygiene and dressing activities without significant constant support.    Protective inpatient pyschiatric hospitalization required while a safe disposition plan is enacted: Yes    Patient stabilized and ready for discharge from inpatient psychiatric unit: No        STAFF:   Srinath Fowler MD  Psychiatry

## 2022-05-16 VITALS
HEART RATE: 88 BPM | OXYGEN SATURATION: 100 % | SYSTOLIC BLOOD PRESSURE: 115 MMHG | TEMPERATURE: 97 F | RESPIRATION RATE: 20 BRPM | WEIGHT: 161.38 LBS | DIASTOLIC BLOOD PRESSURE: 76 MMHG | BODY MASS INDEX: 29.7 KG/M2 | HEIGHT: 62 IN

## 2022-05-16 PROCEDURE — 25000003 PHARM REV CODE 250: Performed by: PSYCHIATRY & NEUROLOGY

## 2022-05-16 PROCEDURE — 99239 HOSP IP/OBS DSCHRG MGMT >30: CPT | Mod: ,,, | Performed by: PSYCHIATRY & NEUROLOGY

## 2022-05-16 PROCEDURE — 90833 PSYTX W PT W E/M 30 MIN: CPT | Mod: ,,, | Performed by: PSYCHIATRY & NEUROLOGY

## 2022-05-16 PROCEDURE — 99239 PR HOSPITAL DISCHARGE DAY,>30 MIN: ICD-10-PCS | Mod: ,,, | Performed by: PSYCHIATRY & NEUROLOGY

## 2022-05-16 PROCEDURE — S4991 NICOTINE PATCH NONLEGEND: HCPCS | Performed by: STUDENT IN AN ORGANIZED HEALTH CARE EDUCATION/TRAINING PROGRAM

## 2022-05-16 PROCEDURE — 90833 PR PSYCHOTHERAPY W/PATIENT W/E&M, 30 MIN (ADD ON): ICD-10-PCS | Mod: ,,, | Performed by: PSYCHIATRY & NEUROLOGY

## 2022-05-16 PROCEDURE — 25000003 PHARM REV CODE 250: Performed by: STUDENT IN AN ORGANIZED HEALTH CARE EDUCATION/TRAINING PROGRAM

## 2022-05-16 RX ORDER — HYDROXYZINE PAMOATE 50 MG/1
50 CAPSULE ORAL EVERY 4 HOURS PRN
Qty: 120 CAPSULE | Refills: 0
Start: 2022-05-16 | End: 2022-09-21

## 2022-05-16 RX ORDER — QUETIAPINE FUMARATE 200 MG/1
200 TABLET, FILM COATED ORAL DAILY
Qty: 30 TABLET | Refills: 2 | Status: SHIPPED | OUTPATIENT
Start: 2022-05-17 | End: 2022-09-21

## 2022-05-16 RX ORDER — IBUPROFEN 200 MG
1 TABLET ORAL DAILY
COMMUNITY
Start: 2022-05-16 | End: 2022-09-21

## 2022-05-16 RX ORDER — QUETIAPINE FUMARATE 300 MG/1
300 TABLET, FILM COATED ORAL NIGHTLY
Qty: 30 TABLET | Refills: 2 | Status: ON HOLD | OUTPATIENT
Start: 2022-05-16 | End: 2022-10-23 | Stop reason: HOSPADM

## 2022-05-16 RX ADMIN — DIAZEPAM 5 MG: 5 TABLET ORAL at 08:05

## 2022-05-16 RX ADMIN — QUETIAPINE FUMARATE 200 MG: 200 TABLET ORAL at 08:05

## 2022-05-16 RX ADMIN — FOLIC ACID 1 MG: 1 TABLET ORAL at 08:05

## 2022-05-16 RX ADMIN — DOCUSATE SODIUM 100 MG: 100 CAPSULE, LIQUID FILLED ORAL at 08:05

## 2022-05-16 RX ADMIN — THERA TABS 1 TABLET: TAB at 08:05

## 2022-05-16 RX ADMIN — NICOTINE 1 PATCH: 14 PATCH, EXTENDED RELEASE TRANSDERMAL at 08:05

## 2022-05-16 NOTE — PLAN OF CARE
Patient calm and cooperative.  Denies intentions to harm self and/or others at this time. Denies auditory and/or visual hallucinations.  Affect and emotion congruent with situation.  Thoughts are linear and logical. Interacts appropriately with peers and staff. Accepts meals and medications.  Discussed medication and plan of care as well as healthy coping skills and techniques; patient voiced understanding.
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
## 2022-05-16 NOTE — NURSING
Patient will be following up with [facility] @ [address] [place], [place] [address], [place]. Phone number is [phone]. Appointment is on May 19, 2022 @ 10am. Patient will receive tobacco cessation therapy along with substance abuse therapy due to a positive UDS on admit. AVS faxed on 5/16/2022 @ 9:05am.

## 2022-05-16 NOTE — PLAN OF CARE
Following POC.  Makes needs known.  Denies SI/HI,AH/VH.  Out on the unit much of the evening.  Interacting appropriately with peers and staff.  States she is ready for DC, plans to eat crawfish.  Medication complaint.  Appetite is good.  Encouraged adherence to outpt appoints and POC.  Encouraged substance abuse free lifestyle.  Encouraged pt to keep follow up appointments.  Free of fall.

## 2022-05-16 NOTE — PROGRESS NOTES
Psychotherapy:  · Target symptoms: anxiety , substance abuse, mood disorder  · Why chosen therapy is appropriate versus another modality: relevant to diagnosis  · Outcome monitoring methods: self-report, lab data, observation  · Therapeutic intervention type: interactive psychotherapy  · Topics discussed/themes: building skills sets for symptom management, symptom recognition, substance abuse  · -safety plan wrap up session  · The patient's response to the intervention is not able to participate in therapy at this time.. The patient's progress toward treatment goals is poor.   · Duration of intervention: 16 minutes.    Srinath Fowler MD  Psychiatry

## 2022-05-16 NOTE — NURSING
All belongings accounted for and will be physically given to patient upon discharge.  Patient denies intentions to harm self and/or others at this time.  No acute distress noted. Will discharge to home via mother.  Patient educated on discharge plan, aftercare appointments, and medications.

## 2022-09-12 ENCOUNTER — TELEPHONE (OUTPATIENT)
Dept: FAMILY MEDICINE | Facility: CLINIC | Age: 42
End: 2022-09-12
Payer: MEDICARE

## 2022-09-12 NOTE — TELEPHONE ENCOUNTER
----- Message from [place] Cote sent at 2022 11:45 AM CDT -----  Contact: Patient  Joe Henson  MRN: [id number]  : 1980  PCP: Estela Munguia  Home Phone      [phone]  Work Phone      Not on file.  Mobile          [phone]      MESSAGE: has pending refill request with Rosal[facility] for Klonopin -- please send to a  in clinic today     Call [phone]    PCP: Ezra

## 2022-09-18 ENCOUNTER — HOSPITAL ENCOUNTER (EMERGENCY)
Facility: HOSPITAL | Age: 42
Discharge: HOME | End: 2022-09-18
Attending: STUDENT IN AN ORGANIZED HEALTH CARE EDUCATION/TRAINING PROGRAM
Payer: MEDICARE

## 2022-09-18 VITALS
DIASTOLIC BLOOD PRESSURE: 76 MMHG | BODY MASS INDEX: 30.36 KG/M2 | TEMPERATURE: 97 F | SYSTOLIC BLOOD PRESSURE: 110 MMHG | OXYGEN SATURATION: 100 % | WEIGHT: 166 LBS | HEART RATE: 91 BPM | RESPIRATION RATE: 18 BRPM

## 2022-09-18 DIAGNOSIS — M54.31 SCIATICA OF RIGHT SIDE: Primary | ICD-10-CM

## 2022-09-18 PROCEDURE — 25000003 PHARM REV CODE 250: Performed by: STUDENT IN AN ORGANIZED HEALTH CARE EDUCATION/TRAINING PROGRAM

## 2022-09-18 PROCEDURE — 96372 THER/PROPH/DIAG INJ SC/IM: CPT | Performed by: STUDENT IN AN ORGANIZED HEALTH CARE EDUCATION/TRAINING PROGRAM

## 2022-09-18 PROCEDURE — 99284 EMERGENCY DEPT VISIT MOD MDM: CPT

## 2022-09-18 PROCEDURE — 63600175 PHARM REV CODE 636 W HCPCS: Performed by: STUDENT IN AN ORGANIZED HEALTH CARE EDUCATION/TRAINING PROGRAM

## 2022-09-18 RX ORDER — LIDOCAINE 50 MG/G
1 PATCH TOPICAL
Status: DISCONTINUED | OUTPATIENT
Start: 2022-09-18 | End: 2022-09-18

## 2022-09-18 RX ORDER — LIDOCAINE 50 MG/G
1 PATCH TOPICAL DAILY
Qty: 10 PATCH | Refills: 0 | Status: SHIPPED | OUTPATIENT
Start: 2022-09-18 | End: 2022-09-21

## 2022-09-18 RX ORDER — CYCLOBENZAPRINE HCL 10 MG
10 TABLET ORAL
Status: COMPLETED | OUTPATIENT
Start: 2022-09-18 | End: 2022-09-18

## 2022-09-18 RX ORDER — DEXTROMETHORPHAN HYDROBROMIDE, GUAIFENESIN 5; 100 MG/5ML; MG/5ML
650 LIQUID ORAL EVERY 8 HOURS
Qty: 21 TABLET | Refills: 0 | Status: ON HOLD | OUTPATIENT
Start: 2022-09-18 | End: 2022-10-23 | Stop reason: HOSPADM

## 2022-09-18 RX ORDER — ACETAMINOPHEN 325 MG/1
650 TABLET ORAL
Status: COMPLETED | OUTPATIENT
Start: 2022-09-18 | End: 2022-09-18

## 2022-09-18 RX ORDER — KETOROLAC TROMETHAMINE 30 MG/ML
15 INJECTION, SOLUTION INTRAMUSCULAR; INTRAVENOUS
Status: COMPLETED | OUTPATIENT
Start: 2022-09-18 | End: 2022-09-18

## 2022-09-18 RX ADMIN — CYCLOBENZAPRINE HYDROCHLORIDE 10 MG: 10 TABLET, FILM COATED ORAL at 06:09

## 2022-09-18 RX ADMIN — ACETAMINOPHEN 650 MG: 325 TABLET ORAL at 06:09

## 2022-09-18 RX ADMIN — KETOROLAC TROMETHAMINE 15 MG: 30 INJECTION, SOLUTION INTRAMUSCULAR at 06:09

## 2022-09-18 NOTE — ED PROVIDER NOTES
Encounter Date: 2022       History     Chief Complaint   Patient presents with    Back Pain     Patient to ER CC of lower back pain right side that shoots down her right leg onset 2 days      42-year-old female with history of diabetes, COPD, presenting with lower back pain.  Patient reports she has been diagnosed with sciatica previously.  Back pain is right-sided, and a pain radiates down the right leg.  No numbness or weakness.  Patient denies any urinary retention, loss of bowel or bladder control.  No fever, nausea, vomiting.  No other complaints.    Review of patient's allergies indicates:   Allergen Reactions    Latex, natural rubber      Past Medical History:   Diagnosis Date    Abnormal Pap smear age 30    Pos. HPV,     Abnormal Pap smear of cervix     Addiction to drug     ADHD (attention deficit hyperactivity disorder)     Arthritis     Bipolar disorder     COPD (chronic obstructive pulmonary disease)     Depression     Diabetes mellitus     Fatigue     Hallucination     History of psychiatric hospitalization     Hx of psychiatric care     OCD (obsessive compulsive disorder)     Psychiatric problem     PTSD (post-traumatic stress disorder)     Sleep difficulties     Substance abuse     Suicide attempt     Therapy      Past Surgical History:   Procedure Laterality Date    bariatric sleeve N/A 2020     SECTION  ;     DILATION AND CURETTAGE OF UTERUS  2016    ENDOMETRIAL ABLATION  2016    KNEE SURGERY Right     hardware-bone from hip to repair    TUBAL LIGATION       Family History   Problem Relation Age of Onset    Diabetes Maternal Grandmother     Hypertension Father     Colon cancer Father 60    No Known Problems Paternal Aunt     Coronary artery disease Maternal Grandfather     Diabetes Maternal Grandfather     Anxiety disorder Sister     Depression Sister     Drug abuse Sister     No Known Problems Maternal Aunt     No Known Problems Maternal Uncle     No  Known Problems Paternal Uncle     No Known Problems Paternal Grandfather     No Known Problems Paternal Grandmother     No Known Problems Cousin      labor Neg Hx      Social History     Tobacco Use    Smoking status: Every Day     Packs/day: 1.00     Years: 25.00     Pack years: 25.00     Types: Cigarettes     Start date: 1997    Smokeless tobacco: Never    Tobacco comments:     told about brochure and smoking clinic program   Substance Use Topics    Alcohol use: Not Currently     Comment: Socially-2 times a week-4-5 beers    Drug use: Yes     Types: Amphetamines, Benzodiazepines     Review of Systems   Constitutional:  Negative for fever.   HENT:  Negative for sore throat.    Respiratory:  Negative for shortness of breath.    Cardiovascular:  Negative for chest pain.   Gastrointestinal:  Negative for nausea.   Genitourinary:  Negative for dysuria.   Musculoskeletal:  Positive for back pain.   Skin:  Negative for rash.   Neurological:  Negative for weakness.   Hematological:  Does not bruise/bleed easily.     Physical Exam     Initial Vitals   BP Pulse Resp Temp SpO2   22 1827 22 1827 22 1827 22 1828 22 1827   110/76 91 18 96.5 °F (35.8 °C) 100 %      MAP       --                Physical Exam    Nursing note and vitals reviewed.  Constitutional: She appears well-developed.   HENT:   Head: Normocephalic.   Eyes: Pupils are equal, round, and reactive to light.   Neck:   Normal range of motion.  Cardiovascular:            No murmur heard.  Pulmonary/Chest: No respiratory distress.   Abdominal: Abdomen is soft.   Musculoskeletal:         General: No edema.      Cervical back: Normal range of motion.      Comments: No midline tenderness.  Patient has tenderness to palpation to the right lumbar region.  No skin changes.  Full range of motion right hip.     Neurological: She is alert.   5/5 strength in bilateral lower extremities.  Ambulatory without abnormal gait.   Skin: Skin  is warm.   Psychiatric: She has a normal mood and affect.       ED Course   Procedures  Labs Reviewed - No data to display       Imaging Results    None          Medications   ketorolac injection 15 mg (has no administration in time range)   cyclobenzaprine tablet 10 mg (has no administration in time range)   acetaminophen tablet 650 mg (has no administration in time range)   LIDOcaine 5 % patch 1 patch (has no administration in time range)     Medical Decision Making:   Differential Diagnosis:   DDX: Back pain - In review of history and physical there are no red flags for serious illness. There is no history of fever, chills, focal weakness, numbness, tingling,  symptoms, or immunocompromise.There is a normal neuro exam including equal strength and sensation. Patient is not elderly.  Will provide pain control, reassurance and reassess. Patient would not benefit from routine imaging which has been explained to patient. Unlikely fracture given no midline TTP/stepoffs. Unlikely cauda equina given no neurological symptoms, urinary/bowel incontinence, or risk factors. Unlikely pyelonephritis or UTI as no CVAT, fever, or urinary symptoms. More likely exacerbation of her sciatica.  DX: Defer imaging at this time as risks outweigh benefits.  TX: Analgesia PRN.  DISPO: If symptoms controlled, likely discharge to follow up with PMD within 2 days with precautions for RTED and supportive care recommendations.                             Clinical Impression:   Final diagnoses:  [M54.31] Sciatica of right side (Primary)        ED Disposition Condition    Discharge Stable          ED Prescriptions       Medication Sig Dispense Start Date End Date Auth. Provider    LIDOcaine (LIDODERM) 5 % Place 1 patch onto the skin once daily. Remove & Discard patch within 12 hours or as directed by MD 10 patch 9/18/2022 -- Jim Baker MD    acetaminophen (TYLENOL) 650 MG TbSR Take 1 tablet (650 mg total) by mouth every 8 (eight)  hours. 21 tablet 9/18/2022 -- Jmi Baker MD          Follow-up Information       Follow up With Specialties Details Why Contact Info    Estela Munguia NP Family Medicine Schedule an appointment as soon as possible for a visit in 2 days  [address] [address]  [phone]      [facility] Emergency Dept Emergency Medicine  If symptoms worsen [address] [facility] [address]  [phone]             Jim Baker MD  09/18/22 183

## 2022-09-19 ENCOUNTER — HOSPITAL ENCOUNTER (EMERGENCY)
Facility: HOSPITAL | Age: 42
Discharge: LEFT BEFORE DISCHARGE | End: 2022-09-19
Attending: SURGERY
Payer: MEDICARE

## 2022-09-19 VITALS
DIASTOLIC BLOOD PRESSURE: 55 MMHG | SYSTOLIC BLOOD PRESSURE: 105 MMHG | OXYGEN SATURATION: 98 % | RESPIRATION RATE: 18 BRPM | WEIGHT: 165.38 LBS | TEMPERATURE: 98 F | BODY MASS INDEX: 30.24 KG/M2 | HEART RATE: 104 BPM

## 2022-09-19 DIAGNOSIS — M54.9 BACK PAIN, UNSPECIFIED BACK LOCATION, UNSPECIFIED BACK PAIN LATERALITY, UNSPECIFIED CHRONICITY: ICD-10-CM

## 2022-09-19 DIAGNOSIS — Z53.29 LEFT AGAINST MEDICAL ADVICE: Primary | ICD-10-CM

## 2022-09-19 PROCEDURE — 99281 EMR DPT VST MAYX REQ PHY/QHP: CPT

## 2022-09-19 NOTE — ED NOTES
MD reviewed results and DC instructions with pt. Pt verbalized understanding. AVS printed and given. Refer to MD notes for pt assessment

## 2022-09-19 NOTE — ED PROVIDER NOTES
Emergency Room Physician       This patient presented with low back pain, seen in the ER yesterday as well  Patient with low back pain issues, was triaged with an x-ray ordered today  Patient was not seen by the physician, she left against medical advice today  Patient will be walked out of the emergency room not alerting any staff etc       Hitesh Gilliam M.D. 3:05 PM 9/19/2022       Hitesh Gilliam MD  09/19/22 [id number]

## 2022-09-21 ENCOUNTER — OFFICE VISIT (OUTPATIENT)
Dept: FAMILY MEDICINE | Facility: CLINIC | Age: 42
End: 2022-09-21
Payer: MEDICARE

## 2022-09-21 VITALS
DIASTOLIC BLOOD PRESSURE: 76 MMHG | WEIGHT: 163.94 LBS | SYSTOLIC BLOOD PRESSURE: 108 MMHG | BODY MASS INDEX: 27.99 KG/M2 | RESPIRATION RATE: 12 BRPM | HEART RATE: 96 BPM | HEIGHT: 64 IN

## 2022-09-21 DIAGNOSIS — Z12.31 ENCOUNTER FOR SCREENING MAMMOGRAM FOR MALIGNANT NEOPLASM OF BREAST: ICD-10-CM

## 2022-09-21 DIAGNOSIS — Z80.3 FAMILY HISTORY OF BREAST CANCER: ICD-10-CM

## 2022-09-21 DIAGNOSIS — F41.1 GAD (GENERALIZED ANXIETY DISORDER): ICD-10-CM

## 2022-09-21 DIAGNOSIS — G47.30 INSOMNIA WITH SLEEP APNEA: ICD-10-CM

## 2022-09-21 DIAGNOSIS — F43.10 PTSD (POST-TRAUMATIC STRESS DISORDER): ICD-10-CM

## 2022-09-21 DIAGNOSIS — Z01.419 WELL WOMAN EXAM WITH ROUTINE GYNECOLOGICAL EXAM: ICD-10-CM

## 2022-09-21 DIAGNOSIS — Z98.890 HISTORY OF GASTRIC SURGERY: ICD-10-CM

## 2022-09-21 DIAGNOSIS — M54.41 ACUTE RIGHT-SIDED LOW BACK PAIN WITH RIGHT-SIDED SCIATICA: Primary | ICD-10-CM

## 2022-09-21 DIAGNOSIS — G47.00 INSOMNIA WITH SLEEP APNEA: ICD-10-CM

## 2022-09-21 DIAGNOSIS — F31.9 BIPOLAR AFFECTIVE DISORDER, REMISSION STATUS UNSPECIFIED: ICD-10-CM

## 2022-09-21 DIAGNOSIS — E78.2 MIXED HYPERLIPIDEMIA: ICD-10-CM

## 2022-09-21 PROBLEM — F31.12 BIPOLAR AFFECTIVE DISORDER, CURRENTLY MANIC, MODERATE: Status: RESOLVED | Noted: 2022-05-06 | Resolved: 2022-09-21

## 2022-09-21 PROBLEM — F31.4 BIPOLAR I DISORDER, MOST RECENT EPISODE DEPRESSED, SEVERE WITHOUT PSYCHOTIC FEATURES: Status: RESOLVED | Noted: 2017-11-27 | Resolved: 2022-09-21

## 2022-09-21 PROBLEM — I95.9 HYPOTENSION: Status: RESOLVED | Noted: 2021-07-24 | Resolved: 2022-09-21

## 2022-09-21 PROBLEM — Z86.39 HISTORY OF DIABETES MELLITUS, TYPE II: Status: RESOLVED | Noted: 2022-05-06 | Resolved: 2022-09-21

## 2022-09-21 PROBLEM — R05.9 COUGH: Status: RESOLVED | Noted: 2021-07-24 | Resolved: 2022-09-21

## 2022-09-21 PROBLEM — F15.10 AMPHETAMINE ABUSE: Status: RESOLVED | Noted: 2022-05-06 | Resolved: 2022-09-21

## 2022-09-21 PROCEDURE — 99214 PR OFFICE/OUTPT VISIT, EST, LEVL IV, 30-39 MIN: ICD-10-PCS | Mod: 25,S$GLB,, | Performed by: NURSE PRACTITIONER

## 2022-09-21 PROCEDURE — 96372 PR INJECTION,THERAP/PROPH/DIAG2ST, IM OR SUBCUT: ICD-10-PCS | Mod: S$GLB,,, | Performed by: NURSE PRACTITIONER

## 2022-09-21 PROCEDURE — 99999 PR PBB SHADOW E&M-EST. PATIENT-LVL IV: ICD-10-PCS | Mod: PBBFAC,,, | Performed by: NURSE PRACTITIONER

## 2022-09-21 PROCEDURE — 1160F RVW MEDS BY RX/DR IN RCRD: CPT | Mod: CPTII,S$GLB,, | Performed by: NURSE PRACTITIONER

## 2022-09-21 PROCEDURE — 3044F HG A1C LEVEL LT 7.0%: CPT | Mod: CPTII,S$GLB,, | Performed by: NURSE PRACTITIONER

## 2022-09-21 PROCEDURE — 1159F MED LIST DOCD IN RCRD: CPT | Mod: CPTII,S$GLB,, | Performed by: NURSE PRACTITIONER

## 2022-09-21 PROCEDURE — 99499 UNLISTED E&M SERVICE: CPT | Mod: S$GLB,,, | Performed by: NURSE PRACTITIONER

## 2022-09-21 PROCEDURE — 3078F DIAST BP <80 MM HG: CPT | Mod: CPTII,S$GLB,, | Performed by: NURSE PRACTITIONER

## 2022-09-21 PROCEDURE — 1160F PR REVIEW ALL MEDS BY PRESCRIBER/CLIN PHARMACIST DOCUMENTED: ICD-10-PCS | Mod: CPTII,S$GLB,, | Performed by: NURSE PRACTITIONER

## 2022-09-21 PROCEDURE — 3008F BODY MASS INDEX DOCD: CPT | Mod: CPTII,S$GLB,, | Performed by: NURSE PRACTITIONER

## 2022-09-21 PROCEDURE — 99214 OFFICE O/P EST MOD 30 MIN: CPT | Mod: 25,S$GLB,, | Performed by: NURSE PRACTITIONER

## 2022-09-21 PROCEDURE — 3008F PR BODY MASS INDEX (BMI) DOCUMENTED: ICD-10-PCS | Mod: CPTII,S$GLB,, | Performed by: NURSE PRACTITIONER

## 2022-09-21 PROCEDURE — 3074F SYST BP LT 130 MM HG: CPT | Mod: CPTII,S$GLB,, | Performed by: NURSE PRACTITIONER

## 2022-09-21 PROCEDURE — 3074F PR MOST RECENT SYSTOLIC BLOOD PRESSURE < 130 MM HG: ICD-10-PCS | Mod: CPTII,S$GLB,, | Performed by: NURSE PRACTITIONER

## 2022-09-21 PROCEDURE — 99999 PR PBB SHADOW E&M-EST. PATIENT-LVL IV: CPT | Mod: PBBFAC,,, | Performed by: NURSE PRACTITIONER

## 2022-09-21 PROCEDURE — 3044F PR MOST RECENT HEMOGLOBIN A1C LEVEL <7.0%: ICD-10-PCS | Mod: CPTII,S$GLB,, | Performed by: NURSE PRACTITIONER

## 2022-09-21 PROCEDURE — 96372 THER/PROPH/DIAG INJ SC/IM: CPT | Mod: S$GLB,,, | Performed by: NURSE PRACTITIONER

## 2022-09-21 PROCEDURE — 99499 RISK ADDL DX/OHS AUDIT: ICD-10-PCS | Mod: S$GLB,,, | Performed by: NURSE PRACTITIONER

## 2022-09-21 PROCEDURE — 1159F PR MEDICATION LIST DOCUMENTED IN MEDICAL RECORD: ICD-10-PCS | Mod: CPTII,S$GLB,, | Performed by: NURSE PRACTITIONER

## 2022-09-21 PROCEDURE — 3078F PR MOST RECENT DIASTOLIC BLOOD PRESSURE < 80 MM HG: ICD-10-PCS | Mod: CPTII,S$GLB,, | Performed by: NURSE PRACTITIONER

## 2022-09-21 RX ORDER — CYCLOBENZAPRINE HCL 10 MG
10 TABLET ORAL 3 TIMES DAILY PRN
Qty: 90 TABLET | Refills: 3 | Status: SHIPPED | OUTPATIENT
Start: 2022-09-21 | End: 2022-12-21

## 2022-09-21 RX ORDER — TRIAMCINOLONE ACETONIDE 40 MG/ML
60 INJECTION, SUSPENSION INTRA-ARTICULAR; INTRAMUSCULAR
Status: COMPLETED | OUTPATIENT
Start: 2022-09-21 | End: 2022-09-21

## 2022-09-21 RX ORDER — KETOROLAC TROMETHAMINE 30 MG/ML
60 INJECTION, SOLUTION INTRAMUSCULAR; INTRAVENOUS
Status: COMPLETED | OUTPATIENT
Start: 2022-09-21 | End: 2022-09-21

## 2022-09-21 RX ORDER — PALIPERIDONE PALMITATE 234 MG/1.5ML
INJECTION INTRAMUSCULAR
Status: ON HOLD | COMMUNITY
Start: 2022-09-16 | End: 2022-10-23 | Stop reason: HOSPADM

## 2022-09-21 RX ORDER — AMITRIPTYLINE HYDROCHLORIDE 25 MG/1
25 TABLET, FILM COATED ORAL DAILY
Status: ON HOLD | COMMUNITY
Start: 2022-09-06 | End: 2022-10-23 | Stop reason: HOSPADM

## 2022-09-21 RX ADMIN — KETOROLAC TROMETHAMINE 60 MG: 30 INJECTION, SOLUTION INTRAMUSCULAR; INTRAVENOUS at 09:09

## 2022-09-21 RX ADMIN — TRIAMCINOLONE ACETONIDE 60 MG: 40 INJECTION, SUSPENSION INTRA-ARTICULAR; INTRAMUSCULAR at 09:09

## 2022-09-21 NOTE — PROGRESS NOTES
Subjective:       Patient ID: Joe Henson is a 42 y.o. female.    Chief Complaint: Medication Refill and Back Pain (Patient states she has been having some back pain from her sciatic on the right side for about a week now)    Here for med check up for clonazepam. Also complains of back pain with radiation to the right leg.    Medication Refill  Pertinent negatives include no abdominal pain, arthralgias, congestion, coughing, fatigue, fever, headaches, myalgias, nausea, sore throat or vomiting.   Back Pain  Pertinent negatives include no abdominal pain, fever or headaches. Review of Systems   Constitutional: Negative.  Negative for appetite change, fatigue and fever.   HENT: Negative.  Negative for congestion, ear pain and sore throat.    Eyes: Negative.  Negative for visual disturbance.   Respiratory: Negative.  Negative for cough, shortness of breath and wheezing.    Cardiovascular: Negative.    Gastrointestinal: Negative.  Negative for abdominal pain, diarrhea, nausea and vomiting.   Endocrine: Negative.    Genitourinary: Negative.  Negative for difficulty urinating and urgency.   Musculoskeletal:  Positive for back pain (low with right radiculopathy). Negative for arthralgias and myalgias.   Skin: Negative.  Negative for color change.   Allergic/Immunologic: Negative.    Neurological: Negative.  Negative for dizziness and headaches.   Hematological: Negative.    Psychiatric/Behavioral:  Negative for sleep disturbance. The patient is nervous/anxious.         Sees [facility] for treatment of Bipolar disorder   All other systems reviewed and are negative.    Objective:      Physical Exam  Vitals and nursing note reviewed.   Constitutional:       General: She is not in acute distress.     Appearance: Normal appearance. She is well-developed.   HENT:      Head: Normocephalic and atraumatic.   Eyes:      Pupils: Pupils are equal, round, and reactive to light.   Cardiovascular:      Rate and Rhythm: Normal rate and  regular rhythm.      Pulses: Normal pulses.      Heart sounds: Normal heart sounds. No murmur heard.  Pulmonary:      Effort: Pulmonary effort is normal. No respiratory distress.      Breath sounds: Normal breath sounds.   Abdominal:      Tenderness: There is no right CVA tenderness or left CVA tenderness.   Musculoskeletal:         General: Normal range of motion.      Cervical back: Normal range of motion and neck supple.   Skin:     General: Skin is warm and dry.   Neurological:      Mental Status: She is alert and oriented to person, place, and time.   Psychiatric:         Mood and Affect: Mood normal.       Assessment:       1. Acute right-sided low back pain with right-sided sciatica    2. History of gastric surgery    3. Well woman exam with routine gynecological exam    4. Encounter for screening mammogram for malignant neoplasm of breast    5. ITZ (generalized anxiety disorder)    6. Bipolar affective disorder, remission status unspecified    7. Insomnia with sleep apnea    8. Mixed hyperlipidemia    9. PTSD (post-traumatic stress disorder)    10. Family history of breast cancer        Plan:   Joe was seen today for medication refill and back pain.    Diagnoses and all orders for this visit:    Acute right-sided low back pain with right-sided sciatica  -     triamcinolone acetonide injection 60 mg  -     ketorolac injection 60 mg  -     cyclobenzaprine (FLEXERIL) 10 MG tablet; Take 1 tablet (10 mg total) by mouth 3 (three) times daily as needed for Muscle spasms.    History of gastric surgery    Well woman exam with routine gynecological exam  -     Ambulatory referral/consult to Obstetrics / Gynecology; Future    Encounter for screening mammogram for malignant neoplasm of breast  -     Mammo Digital Screening Bilat w/ Jose; Future    ITZ (generalized anxiety disorder) - continue Clonazepam    Bipolar affective disorder, remission status unspecified [place] [facility] [place]    Insomnia with sleep apnea - treated  - CPAP not suggested    Mixed hyperlipidemia - normal in May    PTSD (post-traumatic stress disorder) - treated at [facility] [place]    Family history of breast cancer - mammogram ordered    RTC 6 months for recheck

## 2022-10-11 ENCOUNTER — HOSPITAL ENCOUNTER (EMERGENCY)
Facility: HOSPITAL | Age: 42
Discharge: ANOTHER HOSPITAL | End: 2022-10-11
Attending: SURGERY
Payer: MEDICARE

## 2022-10-11 ENCOUNTER — HOSPITAL ENCOUNTER (INPATIENT)
Facility: HOSPITAL | Age: 42
LOS: 12 days | Discharge: HOME | DRG: 885 | End: 2022-10-23
Attending: PSYCHIATRY & NEUROLOGY | Admitting: PSYCHIATRY & NEUROLOGY
Payer: MEDICARE

## 2022-10-11 VITALS
TEMPERATURE: 97 F | HEIGHT: 64 IN | BODY MASS INDEX: 27.31 KG/M2 | SYSTOLIC BLOOD PRESSURE: 102 MMHG | WEIGHT: 160 LBS | OXYGEN SATURATION: 98 % | DIASTOLIC BLOOD PRESSURE: 62 MMHG | HEART RATE: 81 BPM | RESPIRATION RATE: 18 BRPM

## 2022-10-11 DIAGNOSIS — T50.901A DRUG OVERDOSE: ICD-10-CM

## 2022-10-11 DIAGNOSIS — R45.851 DEPRESSION WITH SUICIDAL IDEATION: Primary | ICD-10-CM

## 2022-10-11 DIAGNOSIS — Z00.8 MEDICAL CLEARANCE FOR PSYCHIATRIC ADMISSION: Primary | ICD-10-CM

## 2022-10-11 DIAGNOSIS — F32.A DEPRESSION WITH SUICIDAL IDEATION: Primary | ICD-10-CM

## 2022-10-11 DIAGNOSIS — R45.851 SUICIDAL IDEATION: ICD-10-CM

## 2022-10-11 LAB
ALBUMIN SERPL BCP-MCNC: 3.2 G/DL (ref 3.5–5.2)
ALP SERPL-CCNC: 110 U/L (ref 55–135)
ALT SERPL W/O P-5'-P-CCNC: 12 U/L (ref 10–44)
AMPHET+METHAMPHET UR QL: ABNORMAL
ANION GAP SERPL CALC-SCNC: 10 MMOL/L (ref 8–16)
APAP SERPL-MCNC: <3 UG/ML (ref 10–20)
AST SERPL-CCNC: 9 U/L (ref 10–40)
B-HCG UR QL: NEGATIVE
BACTERIA #/AREA URNS HPF: ABNORMAL /HPF
BARBITURATES UR QL SCN>200 NG/ML: NEGATIVE
BASOPHILS # BLD AUTO: 0.05 K/UL (ref 0–0.2)
BASOPHILS NFR BLD: 0.4 % (ref 0–1.9)
BENZODIAZ UR QL SCN>200 NG/ML: ABNORMAL
BILIRUB SERPL-MCNC: 0.1 MG/DL (ref 0.1–1)
BILIRUB UR QL STRIP: NEGATIVE
BUN SERPL-MCNC: 17 MG/DL (ref 6–20)
BZE UR QL SCN: NEGATIVE
CALCIUM SERPL-MCNC: 8.8 MG/DL (ref 8.7–10.5)
CANNABINOIDS UR QL SCN: NEGATIVE
CHLORIDE SERPL-SCNC: 109 MMOL/L (ref 95–110)
CLARITY UR: ABNORMAL
CO2 SERPL-SCNC: 22 MMOL/L (ref 23–29)
COLOR UR: YELLOW
CREAT SERPL-MCNC: 0.9 MG/DL (ref 0.5–1.4)
CREAT UR-MCNC: 70.7 MG/DL (ref 15–325)
DIFFERENTIAL METHOD: ABNORMAL
EOSINOPHIL # BLD AUTO: 0.1 K/UL (ref 0–0.5)
EOSINOPHIL NFR BLD: 1 % (ref 0–8)
ERYTHROCYTE [DISTWIDTH] IN BLOOD BY AUTOMATED COUNT: 13.9 % (ref 11.5–14.5)
EST. GFR  (NO RACE VARIABLE): >60 ML/MIN/1.73 M^2
ETHANOL SERPL-MCNC: <10 MG/DL
GLUCOSE SERPL-MCNC: 150 MG/DL (ref 70–110)
GLUCOSE UR QL STRIP: NEGATIVE
HCT VFR BLD AUTO: 37 % (ref 37–48.5)
HGB BLD-MCNC: 12 G/DL (ref 12–16)
HGB UR QL STRIP: ABNORMAL
IMM GRANULOCYTES # BLD AUTO: 0.07 K/UL (ref 0–0.04)
IMM GRANULOCYTES NFR BLD AUTO: 0.6 % (ref 0–0.5)
KETONES UR QL STRIP: NEGATIVE
LEUKOCYTE ESTERASE UR QL STRIP: NEGATIVE
LYMPHOCYTES # BLD AUTO: 3.4 K/UL (ref 1–4.8)
LYMPHOCYTES NFR BLD: 29.7 % (ref 18–48)
MCH RBC QN AUTO: 28.9 PG (ref 27–31)
MCHC RBC AUTO-ENTMCNC: 32.4 G/DL (ref 32–36)
MCV RBC AUTO: 89 FL (ref 82–98)
METHADONE UR QL SCN>300 NG/ML: NEGATIVE
MICROSCOPIC COMMENT: ABNORMAL
MONOCYTES # BLD AUTO: 0.7 K/UL (ref 0.3–1)
MONOCYTES NFR BLD: 5.8 % (ref 4–15)
NEUTROPHILS # BLD AUTO: 7.1 K/UL (ref 1.8–7.7)
NEUTROPHILS NFR BLD: 62.5 % (ref 38–73)
NITRITE UR QL STRIP: POSITIVE
NRBC BLD-RTO: 0 /100 WBC
OPIATES UR QL SCN: NEGATIVE
PCP UR QL SCN>25 NG/ML: NEGATIVE
PH UR STRIP: 6 [PH] (ref 5–8)
PLATELET # BLD AUTO: 408 K/UL (ref 150–450)
PMV BLD AUTO: 9.8 FL (ref 9.2–12.9)
POTASSIUM SERPL-SCNC: 3.7 MMOL/L (ref 3.5–5.1)
PROT SERPL-MCNC: 6.5 G/DL (ref 6–8.4)
PROT UR QL STRIP: NEGATIVE
RBC # BLD AUTO: 4.15 M/UL (ref 4–5.4)
RBC #/AREA URNS HPF: 1 /HPF (ref 0–4)
SALICYLATES SERPL-MCNC: <5 MG/DL (ref 15–30)
SARS-COV-2 RDRP RESP QL NAA+PROBE: NEGATIVE
SODIUM SERPL-SCNC: 141 MMOL/L (ref 136–145)
SP GR UR STRIP: 1.02 (ref 1–1.03)
SQUAMOUS #/AREA URNS HPF: 3 /HPF
T4 FREE SERPL-MCNC: 0.69 NG/DL (ref 0.71–1.51)
TOXICOLOGY INFORMATION: ABNORMAL
TSH SERPL DL<=0.005 MIU/L-ACNC: 0.19 UIU/ML (ref 0.4–4)
URN SPEC COLLECT METH UR: ABNORMAL
UROBILINOGEN UR STRIP-ACNC: NEGATIVE EU/DL
WBC # BLD AUTO: 11.36 K/UL (ref 3.9–12.7)
WBC #/AREA URNS HPF: 11 /HPF (ref 0–5)

## 2022-10-11 PROCEDURE — 82746 ASSAY OF FOLIC ACID SERUM: CPT | Performed by: SURGERY

## 2022-10-11 PROCEDURE — 87077 CULTURE AEROBIC IDENTIFY: CPT | Performed by: SURGERY

## 2022-10-11 PROCEDURE — 93010 ELECTROCARDIOGRAM REPORT: CPT | Mod: ,,, | Performed by: INTERNAL MEDICINE

## 2022-10-11 PROCEDURE — 82077 ASSAY SPEC XCP UR&BREATH IA: CPT | Performed by: SURGERY

## 2022-10-11 PROCEDURE — 80143 DRUG ASSAY ACETAMINOPHEN: CPT | Performed by: SURGERY

## 2022-10-11 PROCEDURE — 81025 URINE PREGNANCY TEST: CPT | Performed by: SURGERY

## 2022-10-11 PROCEDURE — 93005 ELECTROCARDIOGRAM TRACING: CPT

## 2022-10-11 PROCEDURE — 25000003 PHARM REV CODE 250: Performed by: PSYCHIATRY & NEUROLOGY

## 2022-10-11 PROCEDURE — 80053 COMPREHEN METABOLIC PANEL: CPT | Performed by: SURGERY

## 2022-10-11 PROCEDURE — 84439 ASSAY OF FREE THYROXINE: CPT | Performed by: SURGERY

## 2022-10-11 PROCEDURE — 80307 DRUG TEST PRSMV CHEM ANLYZR: CPT | Performed by: SURGERY

## 2022-10-11 PROCEDURE — 87086 URINE CULTURE/COLONY COUNT: CPT | Performed by: SURGERY

## 2022-10-11 PROCEDURE — 85025 COMPLETE CBC W/AUTO DIFF WBC: CPT | Performed by: SURGERY

## 2022-10-11 PROCEDURE — 87088 URINE BACTERIA CULTURE: CPT | Performed by: SURGERY

## 2022-10-11 PROCEDURE — 83036 HEMOGLOBIN GLYCOSYLATED A1C: CPT | Performed by: PSYCHIATRY & NEUROLOGY

## 2022-10-11 PROCEDURE — 82306 VITAMIN D 25 HYDROXY: CPT | Performed by: SURGERY

## 2022-10-11 PROCEDURE — 93010 EKG 12-LEAD: ICD-10-PCS | Mod: ,,, | Performed by: INTERNAL MEDICINE

## 2022-10-11 PROCEDURE — 11400000 HC PSYCH PRIVATE ROOM

## 2022-10-11 PROCEDURE — 82607 VITAMIN B-12: CPT | Performed by: SURGERY

## 2022-10-11 PROCEDURE — 84443 ASSAY THYROID STIM HORMONE: CPT | Performed by: SURGERY

## 2022-10-11 PROCEDURE — 80179 DRUG ASSAY SALICYLATE: CPT | Performed by: SURGERY

## 2022-10-11 PROCEDURE — 81000 URINALYSIS NONAUTO W/SCOPE: CPT | Mod: 59 | Performed by: SURGERY

## 2022-10-11 PROCEDURE — 36415 COLL VENOUS BLD VENIPUNCTURE: CPT | Performed by: SURGERY

## 2022-10-11 PROCEDURE — 87186 SC STD MICRODIL/AGAR DIL: CPT | Performed by: SURGERY

## 2022-10-11 PROCEDURE — S4991 NICOTINE PATCH NONLEGEND: HCPCS | Performed by: SURGERY

## 2022-10-11 PROCEDURE — 25000003 PHARM REV CODE 250: Performed by: SURGERY

## 2022-10-11 PROCEDURE — U0002 COVID-19 LAB TEST NON-CDC: HCPCS | Performed by: SURGERY

## 2022-10-11 PROCEDURE — 99285 EMERGENCY DEPT VISIT HI MDM: CPT

## 2022-10-11 RX ORDER — IBUPROFEN 200 MG
1 TABLET ORAL
Status: DISCONTINUED | OUTPATIENT
Start: 2022-10-11 | End: 2022-10-11 | Stop reason: HOSPADM

## 2022-10-11 RX ORDER — FOLIC ACID 1 MG/1
1 TABLET ORAL DAILY
Status: DISCONTINUED | OUTPATIENT
Start: 2022-10-12 | End: 2022-10-12

## 2022-10-11 RX ORDER — LORAZEPAM 2 MG/ML
2 INJECTION INTRAMUSCULAR EVERY 4 HOURS PRN
Status: DISCONTINUED | OUTPATIENT
Start: 2022-10-11 | End: 2022-10-11 | Stop reason: HOSPADM

## 2022-10-11 RX ORDER — HYDROXYZINE PAMOATE 50 MG/1
50 CAPSULE ORAL EVERY 6 HOURS PRN
Status: DISCONTINUED | OUTPATIENT
Start: 2022-10-11 | End: 2022-10-20

## 2022-10-11 RX ORDER — OLANZAPINE 10 MG/2ML
10 INJECTION, POWDER, FOR SOLUTION INTRAMUSCULAR EVERY 4 HOURS PRN
Status: DISCONTINUED | OUTPATIENT
Start: 2022-10-11 | End: 2022-10-21

## 2022-10-11 RX ORDER — DIPHENHYDRAMINE HYDROCHLORIDE 50 MG/ML
50 INJECTION INTRAMUSCULAR; INTRAVENOUS EVERY 4 HOURS PRN
Status: DISCONTINUED | OUTPATIENT
Start: 2022-10-11 | End: 2022-10-11 | Stop reason: HOSPADM

## 2022-10-11 RX ORDER — PROMETHAZINE HYDROCHLORIDE 25 MG/1
25 TABLET ORAL EVERY 6 HOURS PRN
Status: DISCONTINUED | OUTPATIENT
Start: 2022-10-11 | End: 2022-10-23 | Stop reason: HOSPADM

## 2022-10-11 RX ORDER — MAG HYDROX/ALUMINUM HYD/SIMETH 200-200-20
30 SUSPENSION, ORAL (FINAL DOSE FORM) ORAL EVERY 6 HOURS PRN
Status: DISCONTINUED | OUTPATIENT
Start: 2022-10-11 | End: 2022-10-23 | Stop reason: HOSPADM

## 2022-10-11 RX ORDER — ONDANSETRON 4 MG/1
4 TABLET, ORALLY DISINTEGRATING ORAL EVERY 8 HOURS PRN
Status: DISCONTINUED | OUTPATIENT
Start: 2022-10-11 | End: 2022-10-23 | Stop reason: HOSPADM

## 2022-10-11 RX ORDER — DIAZEPAM 10 MG/1
10 TABLET ORAL 4 TIMES DAILY
Status: DISCONTINUED | OUTPATIENT
Start: 2022-10-11 | End: 2022-10-12

## 2022-10-11 RX ORDER — LOPERAMIDE HYDROCHLORIDE 2 MG/1
2 CAPSULE ORAL
Status: DISCONTINUED | OUTPATIENT
Start: 2022-10-11 | End: 2022-10-23 | Stop reason: HOSPADM

## 2022-10-11 RX ORDER — IBUPROFEN 200 MG
1 TABLET ORAL DAILY
Status: DISCONTINUED | OUTPATIENT
Start: 2022-10-12 | End: 2022-10-23 | Stop reason: HOSPADM

## 2022-10-11 RX ORDER — DOCUSATE SODIUM 100 MG/1
100 CAPSULE, LIQUID FILLED ORAL DAILY PRN
Status: DISCONTINUED | OUTPATIENT
Start: 2022-10-11 | End: 2022-10-23 | Stop reason: HOSPADM

## 2022-10-11 RX ORDER — HALOPERIDOL 5 MG/ML
5 INJECTION INTRAMUSCULAR EVERY 4 HOURS PRN
Status: DISCONTINUED | OUTPATIENT
Start: 2022-10-11 | End: 2022-10-11 | Stop reason: HOSPADM

## 2022-10-11 RX ORDER — ACETAMINOPHEN 325 MG/1
650 TABLET ORAL EVERY 6 HOURS PRN
Status: DISCONTINUED | OUTPATIENT
Start: 2022-10-11 | End: 2022-10-23 | Stop reason: HOSPADM

## 2022-10-11 RX ORDER — OLANZAPINE 10 MG/1
10 TABLET ORAL EVERY 4 HOURS PRN
Status: DISCONTINUED | OUTPATIENT
Start: 2022-10-11 | End: 2022-10-21

## 2022-10-11 RX ADMIN — DIAZEPAM 10 MG: 10 TABLET ORAL at 10:10

## 2022-10-11 RX ADMIN — ACTIVATED CHARCOAL 50 G: 208 SUSPENSION ORAL at 12:10

## 2022-10-11 RX ADMIN — NICOTINE 1 PATCH: 21 PATCH, EXTENDED RELEASE TRANSDERMAL at 01:10

## 2022-10-11 RX ADMIN — HYDROXYZINE PAMOATE 50 MG: 50 CAPSULE ORAL at 10:10

## 2022-10-11 NOTE — ED NOTES
Patient resting with even and unlabored respirations. NAD noted. PEC sitter at bedside for visual monitoring.

## 2022-10-11 NOTE — ED PROVIDER NOTES
Encounter Date: 10/11/2022       History     Chief Complaint   Patient presents with    Drug Overdose    Psychiatric Evaluation     42-year-old female presents with potential overdose issues today  Patient reports taking an overdose on Klonopin, suicidal ideation  Poison control states the patient must take charcoal on arrival  Patient has longstanding issues with bipolar disorder & substance  Patient is bipolar, patient has PTSD, polysubstance abuse daily  Pt is not psychotic or angry but states she no longer wants to live    Review of patient's allergies indicates:   Allergen Reactions    Latex, natural rubber      Past Medical History:   Diagnosis Date    Abnormal Pap smear age 30    Pos. HPV,     Abnormal Pap smear of cervix     Addiction to drug     ADHD (attention deficit hyperactivity disorder)     Arthritis     Bipolar disorder     COPD (chronic obstructive pulmonary disease)     Depression     Diabetes mellitus     Fatigue     Hallucination     History of psychiatric hospitalization     Hx of psychiatric care     OCD (obsessive compulsive disorder)     Psychiatric problem     PTSD (post-traumatic stress disorder)     Sleep difficulties     Substance abuse     Suicide attempt     Therapy      Past Surgical History:   Procedure Laterality Date    bariatric sleeve N/A 2020     SECTION  ;     DILATION AND CURETTAGE OF UTERUS  2016    ENDOMETRIAL ABLATION  2016    KNEE SURGERY Right     hardware-bone from hip to repair    TUBAL LIGATION  2004     Family History   Problem Relation Age of Onset    Diabetes Maternal Grandmother     Hypertension Father     Colon cancer Father 60    No Known Problems Paternal Aunt     Coronary artery disease Maternal Grandfather     Diabetes Maternal Grandfather     Anxiety disorder Sister     Depression Sister     Drug abuse Sister     No Known Problems Maternal Aunt     No Known Problems Maternal Uncle     No Known Problems Paternal Uncle      No Known Problems Paternal Grandfather     No Known Problems Paternal Grandmother     No Known Problems Cousin      labor Neg Hx      Social History     Tobacco Use    Smoking status: Every Day     Packs/day: 1.00     Years: 25.00     Pack years: 25.00     Types: Cigarettes     Start date: 1997    Smokeless tobacco: Never    Tobacco comments:     told about brochure and smoking clinic program   Substance Use Topics    Alcohol use: Yes     Comment: Socially-2 times a week-4-5 beers    Drug use: Not Currently     Types: Amphetamines, Benzodiazepines     Review of Systems   Constitutional: Negative.    HENT: Negative.     Eyes: Negative.    Respiratory: Negative.     Cardiovascular: Negative.    Gastrointestinal: Negative.    Genitourinary: Negative.    Musculoskeletal: Negative.    Skin: Negative.    Neurological: Negative.    Psychiatric/Behavioral:          (+) suicidal ideation  (+) depression   (+) substance abuse     Physical Exam     Initial Vitals [10/11/22 1213]   BP Pulse Resp Temp SpO2   107/79 108 18 96.9 °F (36.1 °C) 98 %      MAP       --         Physical Exam    Nursing note and vitals reviewed.  Constitutional: Vital signs are normal. She appears well-developed and well-nourished. She is cooperative.   HENT:   Head: Normocephalic and atraumatic.   Right Ear: External ear normal.   Left Ear: External ear normal.   Nose: Nose normal.   Mouth/Throat: Oropharynx is clear and moist.   Eyes: Conjunctivae, EOM and lids are normal. Pupils are equal, round, and reactive to light.   Neck: Trachea normal and phonation normal. Neck supple. No JVD present.   Normal range of motion.   Full passive range of motion without pain.     Cardiovascular:  Normal rate, regular rhythm, S1 normal, S2 normal, normal heart sounds, intact distal pulses and normal pulses.           Pulmonary/Chest: Effort normal and breath sounds normal.   Abdominal: Abdomen is soft and flat. Bowel sounds are normal.   Musculoskeletal:          General: Normal range of motion.      Cervical back: Full passive range of motion without pain, normal range of motion and neck supple.     Neurological: She is alert and oriented to person, place, and time. She has normal strength.   Skin: Skin is warm, dry and intact. Capillary refill takes less than 2 seconds.       ED Course   Procedures  Labs Reviewed   COMPREHENSIVE METABOLIC PANEL - Abnormal; Notable for the following components:       Result Value    CO2 22 (*)     Glucose 150 (*)     Albumin 3.2 (*)     AST 9 (*)     All other components within normal limits   CBC W/ AUTO DIFFERENTIAL - Abnormal; Notable for the following components:    Immature Granulocytes 0.6 (*)     Immature Grans (Abs) 0.07 (*)     All other components within normal limits   ACETAMINOPHEN LEVEL - Abnormal; Notable for the following components:    Acetaminophen (Tylenol), Serum <3.0 (*)     All other components within normal limits   SALICYLATE LEVEL - Abnormal; Notable for the following components:    Salicylate Lvl <5.0 (*)     All other components within normal limits   SARS-COV-2 RNA AMPLIFICATION, QUAL   ALCOHOL,MEDICAL (ETHANOL)   URINALYSIS, REFLEX TO URINE CULTURE   DRUG SCREEN PANEL, URINE EMERGENCY   TSH   PREGNANCY TEST, URINE RAPID   T4, FREE   VITAMIN B12   FOLATE   VITAMIN D     EKG Readings: (Independently Interpreted)   No STEMI  Normal sinus rhythm  No ectopy  Normal conduction  Normal ST segments  Normal T-wave  Normal axis  Heart rate in the 80s     Imaging Results    None          Medications   haloperidol lactate injection 5 mg (has no administration in time range)   lorazepam injection 2 mg (has no administration in time range)   diphenhydrAMINE injection 50 mg (has no administration in time range)   nicotine 21 mg/24 hr 1 patch (1 patch Transdermal Patch Applied 10/11/22 1315)   activated charcoaL Susp 50 g (50 g Oral Given 10/11/22 1232)     Medical Decision Making:   Initial Assessment:   42-year-old  female presents with suicidal ideation & depression  Possible overdose on Klonopin with charcoal given immediately    Differential Diagnosis:   Malingering, suicidal ideation, homicidal ideation, depression, polysubstance abuse    Clinical Tests:   Lab Tests: Ordered and Reviewed    ED Management:  Patient is medically cleared for psychiatric evaluation & placement today  Patient showing no evidence of benzodiazepine toxicity on evaluation  Stable for evaluation, patient has been cooperative in the ER today                        Clinical Impression:   Final diagnoses:  [T50.901A] Drug overdose  [Z00.8] Medical clearance for psychiatric admission (Primary)  [R45.851] Suicidal ideation        ED Disposition Condition    Transfer to Psych Facility Stable               Hitesh Gilliam MD  10/11/22 4511

## 2022-10-11 NOTE — ED TRIAGE NOTES
Patient reports took 27-28 Klonopin 0.5 mg approximately 10 minutes ago in an attempt to kill herself. Patient reports has been under a lot of stress.Denies HI.

## 2022-10-11 NOTE — ED NOTES
Patient sitting up on stretcher talking with medical student. Awake and alert, calm, and cooperative. NAD noted. NSR noted per cardiac monitor. PEC sitter at bedside for visual monitoring.

## 2022-10-11 NOTE — ED NOTES
Patient threatening to leave ED. [facility] Security at bedside. Patient pulled herself off of the cardiac monitor. Refuses for monitor to be placed on her. Will continue to monitor patient.

## 2022-10-11 NOTE — ED NOTES
Patient escorted to restroom per security and ED tech Cierra get undress and into hospital scrubs. Patients belongings locked in a secured cabinet.

## 2022-10-11 NOTE — ED NOTES
TAMMY sitter at bedside for visual monitoring.Patient asleep with even and unlabored respirations.

## 2022-10-11 NOTE — ED NOTES
Spoke with Jahaira at [facility] who suggests giving Activated charcoal, CBG if CNS depression and ABG if Respiratory depression, psych work up and EKG.

## 2022-10-12 LAB
25(OH)D3+25(OH)D2 SERPL-MCNC: 32 NG/ML (ref 30–96)
ESTIMATED AVG GLUCOSE: 108 MG/DL (ref 68–131)
FOLATE SERPL-MCNC: 4.5 NG/ML (ref 4–24)
HBA1C MFR BLD: 5.4 % (ref 4–5.6)
VIT B12 SERPL-MCNC: 225 PG/ML (ref 210–950)

## 2022-10-12 PROCEDURE — 99223 PR INITIAL HOSPITAL CARE,LEVL III: ICD-10-PCS | Mod: AI,,, | Performed by: PSYCHIATRY & NEUROLOGY

## 2022-10-12 PROCEDURE — 36415 COLL VENOUS BLD VENIPUNCTURE: CPT | Performed by: PSYCHIATRY & NEUROLOGY

## 2022-10-12 PROCEDURE — 99223 1ST HOSP IP/OBS HIGH 75: CPT | Mod: AI,,, | Performed by: PSYCHIATRY & NEUROLOGY

## 2022-10-12 PROCEDURE — 11400000 HC PSYCH PRIVATE ROOM

## 2022-10-12 PROCEDURE — S4991 NICOTINE PATCH NONLEGEND: HCPCS | Performed by: PSYCHIATRY & NEUROLOGY

## 2022-10-12 PROCEDURE — 25000003 PHARM REV CODE 250: Performed by: NURSE PRACTITIONER

## 2022-10-12 PROCEDURE — 90833 PR PSYCHOTHERAPY W/PATIENT W/E&M, 30 MIN (ADD ON): ICD-10-PCS | Mod: ,,, | Performed by: PSYCHIATRY & NEUROLOGY

## 2022-10-12 PROCEDURE — 99222 1ST HOSP IP/OBS MODERATE 55: CPT | Mod: ,,, | Performed by: NURSE PRACTITIONER

## 2022-10-12 PROCEDURE — 99222 PR INITIAL HOSPITAL CARE,LEVL II: ICD-10-PCS | Mod: ,,, | Performed by: NURSE PRACTITIONER

## 2022-10-12 PROCEDURE — 25000003 PHARM REV CODE 250: Performed by: PSYCHIATRY & NEUROLOGY

## 2022-10-12 PROCEDURE — 63600175 PHARM REV CODE 636 W HCPCS: Performed by: PSYCHIATRY & NEUROLOGY

## 2022-10-12 PROCEDURE — 90833 PSYTX W PT W E/M 30 MIN: CPT | Mod: ,,, | Performed by: PSYCHIATRY & NEUROLOGY

## 2022-10-12 RX ORDER — QUETIAPINE FUMARATE 300 MG/1
300 TABLET, FILM COATED ORAL NIGHTLY
Status: DISCONTINUED | OUTPATIENT
Start: 2022-10-12 | End: 2022-10-16

## 2022-10-12 RX ORDER — IBUPROFEN 800 MG/1
800 TABLET ORAL EVERY 8 HOURS PRN
Status: DISCONTINUED | OUTPATIENT
Start: 2022-10-12 | End: 2022-10-23 | Stop reason: HOSPADM

## 2022-10-12 RX ORDER — FLUCONAZOLE 150 MG/1
150 TABLET ORAL DAILY
Status: DISCONTINUED | OUTPATIENT
Start: 2022-10-12 | End: 2022-10-13

## 2022-10-12 RX ORDER — CYANOCOBALAMIN 1000 UG/ML
1000 INJECTION, SOLUTION INTRAMUSCULAR; SUBCUTANEOUS ONCE
Status: COMPLETED | OUTPATIENT
Start: 2022-10-12 | End: 2022-10-12

## 2022-10-12 RX ORDER — QUETIAPINE FUMARATE 100 MG/1
100 TABLET, FILM COATED ORAL DAILY
Status: DISCONTINUED | OUTPATIENT
Start: 2022-10-12 | End: 2022-10-19

## 2022-10-12 RX ADMIN — FOLIC ACID 1 MG: 1 TABLET ORAL at 08:10

## 2022-10-12 RX ADMIN — DIAZEPAM 10 MG: 10 TABLET ORAL at 08:10

## 2022-10-12 RX ADMIN — NICOTINE 1 PATCH: 14 PATCH, EXTENDED RELEASE TRANSDERMAL at 10:10

## 2022-10-12 RX ADMIN — DIAZEPAM 15 MG: 10 TABLET ORAL at 12:10

## 2022-10-12 RX ADMIN — Medication 1 TABLET: at 10:10

## 2022-10-12 RX ADMIN — FLUCONAZOLE 150 MG: 150 TABLET ORAL at 12:10

## 2022-10-12 RX ADMIN — THERA TABS 1 TABLET: TAB at 08:10

## 2022-10-12 RX ADMIN — IBUPROFEN 800 MG: 800 TABLET, FILM COATED ORAL at 03:10

## 2022-10-12 RX ADMIN — HYDROXYZINE PAMOATE 50 MG: 50 CAPSULE ORAL at 04:10

## 2022-10-12 RX ADMIN — DIAZEPAM 15 MG: 10 TABLET ORAL at 08:10

## 2022-10-12 RX ADMIN — DIAZEPAM 15 MG: 10 TABLET ORAL at 04:10

## 2022-10-12 RX ADMIN — OLANZAPINE 10 MG: 10 TABLET, FILM COATED ORAL at 04:10

## 2022-10-12 RX ADMIN — QUETIAPINE FUMARATE 100 MG: 100 TABLET ORAL at 10:10

## 2022-10-12 RX ADMIN — CYANOCOBALAMIN 1000 MCG: 1000 INJECTION, SOLUTION INTRAMUSCULAR; SUBCUTANEOUS at 10:10

## 2022-10-12 RX ADMIN — QUETIAPINE FUMARATE 300 MG: 300 TABLET ORAL at 08:10

## 2022-10-12 NOTE — HPI
41 yo female patient presented bud ER with c./o suicidal ideation/ overdose of klonopin. Given charcoal upon admission to ER per poison control recs. No s/s of benzo overdose and cleared for [facility] admission. Medicine consulted for H/P. VSS/ afebrile. Low TSH but UDS + benzo and amphetamines.U.a positive nitrite but 11 WBC- no dysuria or frequency complaints - does report itching

## 2022-10-12 NOTE — CONSULTS
[facility] Medicine  Consult Note    Patient Name: Joe Henson  MRN: [id number]  Admission Date: 10/11/2022  Hospital Length of Stay: 1 days  Attending Physician: Jason Romeo III, MD   Primary Care Provider: Estela Munguia NP           Patient information was obtained from patient and ER records.     Inpatient consult to [facility] for History and Physical  Consult performed by: Carol Sherman NP  Consult ordered by: Srinath Fowler MD        Subjective:     Principal Problem: Bipolar disorder    Chief Complaint: No chief complaint on file.       HPI: 41 yo female patient presented bud ER with c./o suicidal ideation/ overdose of klonopin. Given charcoal upon admission to ER per poison control recs. No s/s of benzo overdose and cleared for [facility] admission. Medicine consulted for H/P. VSS/ afebrile. Low TSH but UDS + benzo and amphetamines.U.a positive nitrite but 11 WBC- no dysuria or frequency complaints - does report itching       Past Medical History:   Diagnosis Date    Abnormal Pap smear age 30    Pos. HPV,     Abnormal Pap smear of cervix     Addiction to drug     ADHD (attention deficit hyperactivity disorder)     Arthritis     Bipolar disorder     COPD (chronic obstructive pulmonary disease)     Depression     Diabetes mellitus     Fatigue     Hallucination     History of psychiatric hospitalization     Hx of psychiatric care     OCD (obsessive compulsive disorder)     Psychiatric problem     PTSD (post-traumatic stress disorder)     Sleep difficulties     Substance abuse     Suicide attempt     Therapy        Past Surgical History:   Procedure Laterality Date    bariatric sleeve N/A 2020     SECTION  ;     DILATION AND CURETTAGE OF UTERUS  2016    ENDOMETRIAL ABLATION  2016    KNEE SURGERY Right     hardware-bone from hip to repair    TUBAL LIGATION         Review of patient's allergies  indicates:   Allergen Reactions    Latex, natural rubber        Current Facility-Administered Medications on File Prior to Encounter   Medication    [COMPLETED] activated charcoaL Susp 50 g    [DISCONTINUED] diphenhydrAMINE injection 50 mg    [DISCONTINUED] haloperidol lactate injection 5 mg    [DISCONTINUED] lorazepam injection 2 mg    [DISCONTINUED] nicotine 21 mg/24 hr 1 patch     Current Outpatient Medications on File Prior to Encounter   Medication Sig    acetaminophen (TYLENOL) 650 MG TbSR Take 1 tablet (650 mg total) by mouth every 8 (eight) hours.    amitriptyline (ELAVIL) 25 MG tablet Take 25 mg by mouth once daily.    clonazePAM (KLONOPIN) 0.5 MG tablet TAKE ONE TABLET BY MOUTH TWICE DAILY AS NEEDED FOR ANXIETY    cyclobenzaprine (FLEXERIL) 10 MG tablet Take 1 tablet (10 mg total) by mouth 3 (three) times daily as needed for Muscle spasms.    INVEGA SUSTENNA 234 mg/1.5 mL Syrg injection Inject into the muscle.    QUEtiapine (SEROQUEL) 300 MG Tab Take 1 tablet (300 mg total) by mouth every evening.    VENTOLIN HFA 90 mcg/actuation inhaler INHALE 2 PUFFS EVERY 6   HOURS AS NEEDED FOR      WHEEZING    [DISCONTINUED] diazePAM (VALIUM) 5 MG tablet Take daily for 5 days then half tablet daily for 4 days then stop (Patient not taking: No sig reported)    [DISCONTINUED] fluticasone furoate-vilanteroL (BREO) 100-25 mcg/dose diskus inhaler Inhale 1 puff into the lungs once daily. Controller    [DISCONTINUED] sertraline (ZOLOFT) 25 MG tablet TAKE ONE TABLET BY MOUTH ONCE A DAY AT BEDTIME     Family History       Problem Relation (Age of Onset)    Anxiety disorder Sister    Colon cancer Father (60)    Coronary artery disease Maternal Grandfather    Depression Sister    Diabetes Maternal Grandmother, Maternal Grandfather    Drug abuse Sister    Hypertension Father    No Known Problems Paternal Aunt, Maternal Aunt, Maternal Uncle, Paternal Uncle, Paternal Grandfather, Paternal Grandmother, Cousin           Tobacco Use    Smoking status: Every Day     Packs/day: 1.00     Years: 25.00     Pack years: 25.00     Types: Cigarettes     Start date: 11/28/1997    Smokeless tobacco: Never    Tobacco comments:     told about brochure and smoking clinic program   Substance and Sexual Activity    Alcohol use: Yes     Comment: Socially-2 times a week-4-5 beers    Drug use: Not Currently     Types: Amphetamines, Benzodiazepines    Sexual activity: Yes     Partners: Male     Birth control/protection: Surgical, See Surgical Hx     Comment: - has a boyfriend     Review of Systems   Constitutional:  Negative for chills, fatigue, fever and unexpected weight change.   HENT:  Negative for congestion, ear pain, sore throat and trouble swallowing.    Eyes:  Negative for pain and visual disturbance.   Respiratory:  Negative for cough, chest tightness and shortness of breath.    Cardiovascular:  Negative for chest pain, palpitations and leg swelling.   Gastrointestinal:  Negative for abdominal distention, abdominal pain, constipation, diarrhea and vomiting.   Genitourinary:  Negative for difficulty urinating, dysuria, flank pain, frequency and hematuria.   Musculoskeletal:  Negative for back pain, gait problem, joint swelling, neck pain and neck stiffness.   Skin:  Negative for rash and wound.   Neurological:  Negative for dizziness, seizures, speech difficulty, light-headedness and headaches.   Objective:     Vital Signs (Most Recent):  Temp: 97.5 °F (36.4 °C) (10/12/22 0759)  Pulse: 89 (10/12/22 0759)  Resp: 20 (10/12/22 0759)  BP: 121/85 (10/12/22 0759)  SpO2: 99 % (10/11/22 2150) Vital Signs (24h Range):  Temp:  [96.9 °F (36.1 °C)-98.3 °F (36.8 °C)] 97.5 °F (36.4 °C)  Pulse:  [] 89  Resp:  [16-20] 20  SpO2:  [96 %-99 %] 99 %  BP: ()/(59-85) 121/85     Weight: 73.3 kg (161 lb 9.6 oz)  Body mass index is 27.74 kg/m².    Physical Exam  Vitals reviewed.   Constitutional:       Appearance: She is well-developed.    HENT:      Head: Normocephalic and atraumatic.   Neck:      Thyroid: No thyromegaly.   Cardiovascular:      Rate and Rhythm: Normal rate and regular rhythm.      Heart sounds: Normal heart sounds. No murmur heard.  Pulmonary:      Effort: Pulmonary effort is normal. No respiratory distress.      Breath sounds: Normal breath sounds. No wheezing or rales.   Abdominal:      General: Bowel sounds are normal. There is no distension.      Palpations: Abdomen is soft. There is no mass.      Tenderness: There is no abdominal tenderness.   Musculoskeletal:         General: Normal range of motion.   Lymphadenopathy:      Cervical: No cervical adenopathy.   Skin:     General: Skin is warm and dry.      Findings: No erythema.   Neurological:      Mental Status: She is alert and oriented to person, place, and time.      Comments:   Neuro: Cranial nerves:  CN II Visual fields full to confrontation.   CN III, IV, VI Pupils are equal, round, and reactive to light.  CN III: no palsy  Nystagmus: none   Diplopia: none  Ophthalmoparesis: none  CN V Facial sensation intact.   CN VII Facial expression full, symmetric.   CN VIII normal.   CN IX normal.   CN X normal.   CN XI normal.   CN XII normal.             Significant Labs:     UPT  negative     Urinalysis  Recent Labs   Lab 10/11/22  1428   COLORU Yellow   SPECGRAV 1.025   PHUR 6.0   PROTEINUA Negative   BACTERIA Many*   NITRITE Positive*   LEUKOCYTESUR Negative   UROBILINOGEN Negative         UDS  Results for orders placed or performed during the hospital encounter of 10/11/22   Drug screen panel, in-house   Result Value Ref Range    Benzodiazepines Presumptive Positive (A) Negative    Methadone metabolites Negative Negative    Cocaine (Metab.) Negative Negative    Opiate Scrn, Ur Negative Negative    Barbiturate Screen, Ur Negative Negative    Amphetamine Screen, Ur Presumptive Positive (A) Negative    THC Negative Negative    Phencyclidine Negative Negative    Creatinine, Urine  70.7 15.0 - 325.0 mg/dL    Toxicology Information SEE COMMENT      CBC  Results for orders placed or performed during the hospital encounter of 10/11/22   CBC Auto Differential   Result Value Ref Range    WBC 11.36 3.90 - 12.70 K/uL    RBC 4.15 4.00 - 5.40 M/uL    Hemoglobin 12.0 12.0 - 16.0 g/dL    Hematocrit 37.0 37.0 - 48.5 %    MCV 89 82 - 98 fL    MCH 28.9 27.0 - 31.0 pg    MCHC 32.4 32.0 - 36.0 g/dL    RDW 13.9 11.5 - 14.5 %    Platelets 408 150 - 450 K/uL    MPV 9.8 9.2 - 12.9 fL    Immature Granulocytes 0.6 (H) 0.0 - 0.5 %    Gran # (ANC) 7.1 1.8 - 7.7 K/uL    Immature Grans (Abs) 0.07 (H) 0.00 - 0.04 K/uL    Lymph # 3.4 1.0 - 4.8 K/uL    Mono # 0.7 0.3 - 1.0 K/uL    Eos # 0.1 0.0 - 0.5 K/uL    Baso # 0.05 0.00 - 0.20 K/uL    nRBC 0 0 /100 WBC    Gran % 62.5 38.0 - 73.0 %    Lymph % 29.7 18.0 - 48.0 %    Mono % 5.8 4.0 - 15.0 %    Eosinophil % 1.0 0.0 - 8.0 %    Basophil % 0.4 0.0 - 1.9 %    Differential Method Automated      CMP  Results for orders placed or performed during the hospital encounter of 10/11/22   Comprehensive Metabolic Panel   Result Value Ref Range    Sodium 141 136 - 145 mmol/L    Potassium 3.7 3.5 - 5.1 mmol/L    Chloride 109 95 - 110 mmol/L    CO2 22 (L) 23 - 29 mmol/L    Glucose 150 (H) 70 - 110 mg/dL    BUN 17 6 - 20 mg/dL    Creatinine 0.9 0.5 - 1.4 mg/dL    Calcium 8.8 8.7 - 10.5 mg/dL    Total Protein 6.5 6.0 - 8.4 g/dL    Albumin 3.2 (L) 3.5 - 5.2 g/dL    Total Bilirubin 0.1 0.1 - 1.0 mg/dL    Alkaline Phosphatase 110 55 - 135 U/L    AST 9 (L) 10 - 40 U/L    ALT 12 10 - 44 U/L    Anion Gap 10 8 - 16 mmol/L    eGFR >60 >60 mL/min/1.73 m^2     TSH  Results for orders placed or performed during the hospital encounter of 10/11/22   TSH   Result Value Ref Range    TSH 0.188 (L) 0.400 - 4.000 uIU/mL     ETOH  Results for orders placed or performed during the hospital encounter of 10/11/22   Ethanol   Result Value Ref Range    Alcohol, Serum <10 <10 mg/dL     Salicylate  Results for  orders placed or performed during the hospital encounter of 10/11/22   Salicylate Level   Result Value Ref Range    Salicylate Lvl <5.0 (L) 15.0 - 30.0 mg/dL     Acetaminophen  Results for orders placed or performed during the hospital encounter of 10/11/22   Acetaminophen Level   Result Value Ref Range    Acetaminophen (Tylenol), Serum <3.0 (L) 10.0 - 20.0 ug/mL             Assessment/Plan:     * Bipolar disorder  Further orders per psych       Cigarette nicotine dependence without complication  nicotine patch       Insomnia with sleep apnea  Further orders per psych       PTSD (post-traumatic stress disorder)  Further orders per psych       ITZ (generalized anxiety disorder)  Further orders per psych         VTE Risk Mitigation (From admission, onward)    None          Diflucan x 1   Ibuprofen for back pain she was taking at home ordered her PRN     Thank you for your consult. I will sign off. Please contact us if you have any additional questions.    Carol Sherman NP  Department of Hospital Medicine   [facility]

## 2022-10-12 NOTE — PROGRESS NOTES
"   10/12/22 2722   Assessment   Patient's Identification of the Problem Patient presents irritable, anxious, reports depressed mood, states her admit is due to depression and a lot of stress at home. "I tried to overdose.(used Klonopin)"  Patient admits to negative leisure lifestyle of cigarettes, UDS positive for meth but patient states it's Adderall. Patient reports she is single, have 2 children, high school education, receives SSI disability, wears glasses, and lives with her mother in [place]. Patient verbalized her main goal is "to get better."   Leisure Interest Listen to Music;Watching TV;Enjoy Family/Friends   Leisure Barriers In Pain;Loss of Interest;Lack of Finances;Energy Level;Self Confidence;Drug Use;Poor Social Tolerance  (social anxiety, back pain)   Treatment Focus To Improve Mood;Increase Self Confidence;To Improve Leisure Awareness/Lifestyle/Interest;To Promote Successful and Safe Self Expression;To Improve Coping Skills;To Increase Energy Level;To Educate and Increase Awareness of Sober Free Activities;To Promote Social Skills/Tolerance/Interaction     Treatment Recommendation:   1:1 Intervention (as needed)    Cognitive Stimulation Skilled Activity  Self Expression Skilled Activity  Mild Exercises Skilled Activity  Stress Management Skilled Activity  Coping Skilled Activity  Leisure Education and Awareness Skilled Activity    Treatment Goal(s):  Long Term Goals Refer To Master Treatment Plan    Short Term Treatment Goal(s)  Patient Will:  Comply with Treatment  Exhibit Improvement in Mood  Demonstrate Constructive Expression of Feelings and Behavior  Identify at Least 2 Coping Skills or Leisure Skills to Reduce Depression and Hopelessness Upon Request from Therapist  Identify (+) Leisure / Recreation Activities that Promote Wellness and Promote a Sober Lifestyle    Discharge Recommendations:  Encourage Patient to Actively Utilize Available Community Resources to Increase Leisure Involvement to " Decrease Signs and Symptoms of Illness  Encourage Patient to Utilize Coping Skills on a Regular Basis to Reduce the Risk of Decompensating and Re-Hospitalizations  AA/NA Meetings  Follow Up with After Care Appointments

## 2022-10-12 NOTE — ED NOTES
Patients bottom dentures were removed from styrofoam meal tray and placed in a clear bag then placed on table. Patient made a phone call to home to ask if a family member would bring poly  to her at the ED.

## 2022-10-12 NOTE — PROGRESS NOTES
"   10/12/22 1040   [facility] Group Therapy   Group Name Education  (Positive steps to wellbeing)   Participation Level None   Participation Quality Refused   Patient presents irritable, anxious, reports "not good" mood, walked in and out of group, isolated in assigned room. Patient encouraged to meet with staff a a later time to initiate an assessment.  "

## 2022-10-12 NOTE — SUBJECTIVE & OBJECTIVE
Past Medical History:   Diagnosis Date    Abnormal Pap smear age 30    Pos. HPV,     Abnormal Pap smear of cervix     Addiction to drug     ADHD (attention deficit hyperactivity disorder)     Arthritis     Bipolar disorder     COPD (chronic obstructive pulmonary disease)     Depression     Diabetes mellitus     Fatigue     Hallucination     History of psychiatric hospitalization     Hx of psychiatric care     OCD (obsessive compulsive disorder)     Psychiatric problem     PTSD (post-traumatic stress disorder)     Sleep difficulties     Substance abuse     Suicide attempt     Therapy        Past Surgical History:   Procedure Laterality Date    bariatric sleeve N/A 2020     SECTION  ;     DILATION AND CURETTAGE OF UTERUS  2016    ENDOMETRIAL ABLATION  2016    KNEE SURGERY Right     hardware-bone from hip to repair    TUBAL LIGATION         Review of patient's allergies indicates:   Allergen Reactions    Latex, natural rubber        Current Facility-Administered Medications on File Prior to Encounter   Medication    [COMPLETED] activated charcoaL Susp 50 g    [DISCONTINUED] diphenhydrAMINE injection 50 mg    [DISCONTINUED] haloperidol lactate injection 5 mg    [DISCONTINUED] lorazepam injection 2 mg    [DISCONTINUED] nicotine 21 mg/24 hr 1 patch     Current Outpatient Medications on File Prior to Encounter   Medication Sig    acetaminophen (TYLENOL) 650 MG TbSR Take 1 tablet (650 mg total) by mouth every 8 (eight) hours.    amitriptyline (ELAVIL) 25 MG tablet Take 25 mg by mouth once daily.    clonazePAM (KLONOPIN) 0.5 MG tablet TAKE ONE TABLET BY MOUTH TWICE DAILY AS NEEDED FOR ANXIETY    cyclobenzaprine (FLEXERIL) 10 MG tablet Take 1 tablet (10 mg total) by mouth 3 (three) times daily as needed for Muscle spasms.    INVEGA SUSTENNA 234 mg/1.5 mL Syrg injection Inject into the muscle.    QUEtiapine (SEROQUEL) 300 MG Tab Take 1 tablet (300 mg total) by mouth every evening.     VENTOLIN HFA 90 mcg/actuation inhaler INHALE 2 PUFFS EVERY 6   HOURS AS NEEDED FOR      WHEEZING    [DISCONTINUED] diazePAM (VALIUM) 5 MG tablet Take daily for 5 days then half tablet daily for 4 days then stop (Patient not taking: No sig reported)    [DISCONTINUED] fluticasone furoate-vilanteroL (BREO) 100-25 mcg/dose diskus inhaler Inhale 1 puff into the lungs once daily. Controller    [DISCONTINUED] sertraline (ZOLOFT) 25 MG tablet TAKE ONE TABLET BY MOUTH ONCE A DAY AT BEDTIME     Family History       Problem Relation (Age of Onset)    Anxiety disorder Sister    Colon cancer Father (60)    Coronary artery disease Maternal Grandfather    Depression Sister    Diabetes Maternal Grandmother, Maternal Grandfather    Drug abuse Sister    Hypertension Father    No Known Problems Paternal Aunt, Maternal Aunt, Maternal Uncle, Paternal Uncle, Paternal Grandfather, Paternal Grandmother, Cousin          Tobacco Use    Smoking status: Every Day     Packs/day: 1.00     Years: 25.00     Pack years: 25.00     Types: Cigarettes     Start date: 11/28/1997    Smokeless tobacco: Never    Tobacco comments:     told about brochure and smoking clinic program   Substance and Sexual Activity    Alcohol use: Yes     Comment: Socially-2 times a week-4-5 beers    Drug use: Not Currently     Types: Amphetamines, Benzodiazepines    Sexual activity: Yes     Partners: Male     Birth control/protection: Surgical, See Surgical Hx     Comment: - has a boyfriend     Review of Systems   Constitutional:  Negative for chills, fatigue, fever and unexpected weight change.   HENT:  Negative for congestion, ear pain, sore throat and trouble swallowing.    Eyes:  Negative for pain and visual disturbance.   Respiratory:  Negative for cough, chest tightness and shortness of breath.    Cardiovascular:  Negative for chest pain, palpitations and leg swelling.   Gastrointestinal:  Negative for abdominal distention, abdominal pain, constipation, diarrhea  and vomiting.   Genitourinary:  Negative for difficulty urinating, dysuria, flank pain, frequency and hematuria.   Musculoskeletal:  Negative for back pain, gait problem, joint swelling, neck pain and neck stiffness.   Skin:  Negative for rash and wound.   Neurological:  Negative for dizziness, seizures, speech difficulty, light-headedness and headaches.   Objective:     Vital Signs (Most Recent):  Temp: 97.5 °F (36.4 °C) (10/12/22 0759)  Pulse: 89 (10/12/22 0759)  Resp: 20 (10/12/22 0759)  BP: 121/85 (10/12/22 0759)  SpO2: 99 % (10/11/22 2150) Vital Signs (24h Range):  Temp:  [96.9 °F (36.1 °C)-98.3 °F (36.8 °C)] 97.5 °F (36.4 °C)  Pulse:  [] 89  Resp:  [16-20] 20  SpO2:  [96 %-99 %] 99 %  BP: ()/(59-85) 121/85     Weight: 73.3 kg (161 lb 9.6 oz)  Body mass index is 27.74 kg/m².    Physical Exam  Vitals reviewed.   Constitutional:       Appearance: She is well-developed.   HENT:      Head: Normocephalic and atraumatic.   Neck:      Thyroid: No thyromegaly.   Cardiovascular:      Rate and Rhythm: Normal rate and regular rhythm.      Heart sounds: Normal heart sounds. No murmur heard.  Pulmonary:      Effort: Pulmonary effort is normal. No respiratory distress.      Breath sounds: Normal breath sounds. No wheezing or rales.   Abdominal:      General: Bowel sounds are normal. There is no distension.      Palpations: Abdomen is soft. There is no mass.      Tenderness: There is no abdominal tenderness.   Musculoskeletal:         General: Normal range of motion.   Lymphadenopathy:      Cervical: No cervical adenopathy.   Skin:     General: Skin is warm and dry.      Findings: No erythema.   Neurological:      Mental Status: She is alert and oriented to person, place, and time.      Comments:   Neuro: Cranial nerves:  CN II Visual fields full to confrontation.   CN III, IV, VI Pupils are equal, round, and reactive to light.  CN III: no palsy  Nystagmus: none   Diplopia: none  Ophthalmoparesis: none  CN V  Facial sensation intact.   CN VII Facial expression full, symmetric.   CN VIII normal.   CN IX normal.   CN X normal.   CN XI normal.   CN XII normal.             Significant Labs:     UPT  negative     Urinalysis  Recent Labs   Lab 10/11/22  1428   COLORU Yellow   SPECGRAV 1.025   PHUR 6.0   PROTEINUA Negative   BACTERIA Many*   NITRITE Positive*   LEUKOCYTESUR Negative   UROBILINOGEN Negative         UDS  Results for orders placed or performed during the hospital encounter of 10/11/22   Drug screen panel, in-house   Result Value Ref Range    Benzodiazepines Presumptive Positive (A) Negative    Methadone metabolites Negative Negative    Cocaine (Metab.) Negative Negative    Opiate Scrn, Ur Negative Negative    Barbiturate Screen, Ur Negative Negative    Amphetamine Screen, Ur Presumptive Positive (A) Negative    THC Negative Negative    Phencyclidine Negative Negative    Creatinine, Urine 70.7 15.0 - 325.0 mg/dL    Toxicology Information SEE COMMENT      CBC  Results for orders placed or performed during the hospital encounter of 10/11/22   CBC Auto Differential   Result Value Ref Range    WBC 11.36 3.90 - 12.70 K/uL    RBC 4.15 4.00 - 5.40 M/uL    Hemoglobin 12.0 12.0 - 16.0 g/dL    Hematocrit 37.0 37.0 - 48.5 %    MCV 89 82 - 98 fL    MCH 28.9 27.0 - 31.0 pg    MCHC 32.4 32.0 - 36.0 g/dL    RDW 13.9 11.5 - 14.5 %    Platelets 408 150 - 450 K/uL    MPV 9.8 9.2 - 12.9 fL    Immature Granulocytes 0.6 (H) 0.0 - 0.5 %    Gran # (ANC) 7.1 1.8 - 7.7 K/uL    Immature Grans (Abs) 0.07 (H) 0.00 - 0.04 K/uL    Lymph # 3.4 1.0 - 4.8 K/uL    Mono # 0.7 0.3 - 1.0 K/uL    Eos # 0.1 0.0 - 0.5 K/uL    Baso # 0.05 0.00 - 0.20 K/uL    nRBC 0 0 /100 WBC    Gran % 62.5 38.0 - 73.0 %    Lymph % 29.7 18.0 - 48.0 %    Mono % 5.8 4.0 - 15.0 %    Eosinophil % 1.0 0.0 - 8.0 %    Basophil % 0.4 0.0 - 1.9 %    Differential Method Automated      CMP  Results for orders placed or performed during the hospital encounter of 10/11/22    Comprehensive Metabolic Panel   Result Value Ref Range    Sodium 141 136 - 145 mmol/L    Potassium 3.7 3.5 - 5.1 mmol/L    Chloride 109 95 - 110 mmol/L    CO2 22 (L) 23 - 29 mmol/L    Glucose 150 (H) 70 - 110 mg/dL    BUN 17 6 - 20 mg/dL    Creatinine 0.9 0.5 - 1.4 mg/dL    Calcium 8.8 8.7 - 10.5 mg/dL    Total Protein 6.5 6.0 - 8.4 g/dL    Albumin 3.2 (L) 3.5 - 5.2 g/dL    Total Bilirubin 0.1 0.1 - 1.0 mg/dL    Alkaline Phosphatase 110 55 - 135 U/L    AST 9 (L) 10 - 40 U/L    ALT 12 10 - 44 U/L    Anion Gap 10 8 - 16 mmol/L    eGFR >60 >60 mL/min/1.73 m^2     TSH  Results for orders placed or performed during the hospital encounter of 10/11/22   TSH   Result Value Ref Range    TSH 0.188 (L) 0.400 - 4.000 uIU/mL     ETOH  Results for orders placed or performed during the hospital encounter of 10/11/22   Ethanol   Result Value Ref Range    Alcohol, Serum <10 <10 mg/dL     Salicylate  Results for orders placed or performed during the hospital encounter of 10/11/22   Salicylate Level   Result Value Ref Range    Salicylate Lvl <5.0 (L) 15.0 - 30.0 mg/dL     Acetaminophen  Results for orders placed or performed during the hospital encounter of 10/11/22   Acetaminophen Level   Result Value Ref Range    Acetaminophen (Tylenol), Serum <3.0 (L) 10.0 - 20.0 ug/mL

## 2022-10-12 NOTE — PLAN OF CARE
Problem: Adult Behavioral Health Plan of Care  Goal: Rounds/Family Conference  Outcome: Ongoing, Progressing  Flowsheets (Taken 10/12/2022 2615)  Participants:   psychiatrist   therapeutic recreation   nursing   other ([facility])    TREATMENT TEAM      Chief Complaint:    Pt is a 42 year old female with chief complaints of depression/SI    Pt states she came back to the hospital due to stress at home.     Pt Goal(s):    Pt states to go back home.    Current Progress:   Pt attended treatment team dressed in personal  clothing  Pt affect flat / dysphoric mood  Pt states she has stressors of finances at home which is making her feel depressed for about 1 week.  Pt states her mother moved and it is stressing her which she OD on her Klonopin.   Pt states she was taking her moms aderral every now and again.   Pt states she has had enough and that she just wanted to end her life because she had enough.  Her son brought her to the ER.  Program/groups:    Encourage pt to continue to attend all groups      Revisions to Plan:  Encourage pt to attend treatment team and to attend all groups.  Upon discharge pt will attend [facility]. Pt is refusing rehab at this time.

## 2022-10-12 NOTE — PLAN OF CARE
Patient to [facility] after taking 27-28 0.5mg PTA as a suicide attempt. She reports feeling increasingly depressed due to financial and relationship/family stress. She denies HI. Denies hallucinations at this time. Reports appetite and sleep disturbances. Pt states that she is hungry and wants to sleep, refusing to sign paperwork. UDS +benzos and meth. Alcohol <10. NADN. Oriented to to unit. Safety precautions remain in place.

## 2022-10-12 NOTE — PLAN OF CARE
Pt reports she has a mammogram appointment today, here at 1030.  Pt received last invega injection on 9/27 per mental health clinic.  Pt reports a lot of depression and anxiety.  Pt somewhat withdrawn and guarded.  Denies S/I but is depressed and somewhat hopeless.  AAOx4 at this time, will continue to monitor.

## 2022-10-12 NOTE — NURSING
Pt to [facility] via wheelchair accompanied by MHT and security. Pt wanded on arrival and belongings inventoried. No weapons or paraphernalia found. Skin assessment completed. Pt changed into paper scrubs and nonskid socks. NADN. Safety precautions remain in place.

## 2022-10-12 NOTE — NURSING
Pt sleeping at this time, slept 3.5 hrs with 2 awakenings. NAD. Resp even and unlabored.Pathways clear,bed in low position. Q 15 min safety check ongoing.All precautions maintained.

## 2022-10-13 LAB — BACTERIA UR CULT: ABNORMAL

## 2022-10-13 PROCEDURE — 90833 PSYTX W PT W E/M 30 MIN: CPT | Mod: ,,, | Performed by: PSYCHIATRY & NEUROLOGY

## 2022-10-13 PROCEDURE — 99233 PR SUBSEQUENT HOSPITAL CARE,LEVL III: ICD-10-PCS | Mod: ,,, | Performed by: PSYCHIATRY & NEUROLOGY

## 2022-10-13 PROCEDURE — 90833 PR PSYCHOTHERAPY W/PATIENT W/E&M, 30 MIN (ADD ON): ICD-10-PCS | Mod: ,,, | Performed by: PSYCHIATRY & NEUROLOGY

## 2022-10-13 PROCEDURE — 99233 SBSQ HOSP IP/OBS HIGH 50: CPT | Mod: ,,, | Performed by: PSYCHIATRY & NEUROLOGY

## 2022-10-13 PROCEDURE — S4991 NICOTINE PATCH NONLEGEND: HCPCS | Performed by: PSYCHIATRY & NEUROLOGY

## 2022-10-13 PROCEDURE — 25000003 PHARM REV CODE 250: Performed by: PSYCHIATRY & NEUROLOGY

## 2022-10-13 PROCEDURE — 25000003 PHARM REV CODE 250: Performed by: NURSE PRACTITIONER

## 2022-10-13 PROCEDURE — 11400000 HC PSYCH PRIVATE ROOM

## 2022-10-13 RX ORDER — DIAZEPAM 10 MG/1
10 TABLET ORAL 4 TIMES DAILY
Status: DISCONTINUED | OUTPATIENT
Start: 2022-10-13 | End: 2022-10-14

## 2022-10-13 RX ADMIN — QUETIAPINE FUMARATE 300 MG: 300 TABLET ORAL at 08:10

## 2022-10-13 RX ADMIN — DIAZEPAM 15 MG: 10 TABLET ORAL at 08:10

## 2022-10-13 RX ADMIN — HYDROXYZINE PAMOATE 50 MG: 50 CAPSULE ORAL at 01:10

## 2022-10-13 RX ADMIN — DIAZEPAM 10 MG: 10 TABLET ORAL at 04:10

## 2022-10-13 RX ADMIN — OLANZAPINE 10 MG: 10 TABLET, FILM COATED ORAL at 05:10

## 2022-10-13 RX ADMIN — DIAZEPAM 10 MG: 10 TABLET ORAL at 08:10

## 2022-10-13 RX ADMIN — Medication 1 TABLET: at 08:10

## 2022-10-13 RX ADMIN — DIAZEPAM 10 MG: 10 TABLET ORAL at 12:10

## 2022-10-13 RX ADMIN — IBUPROFEN 800 MG: 800 TABLET, FILM COATED ORAL at 04:10

## 2022-10-13 RX ADMIN — ACETAMINOPHEN 650 MG: 325 TABLET ORAL at 05:10

## 2022-10-13 RX ADMIN — IBUPROFEN 800 MG: 800 TABLET, FILM COATED ORAL at 01:10

## 2022-10-13 RX ADMIN — FLUCONAZOLE 150 MG: 150 TABLET ORAL at 08:10

## 2022-10-13 RX ADMIN — THERA TABS 1 TABLET: TAB at 08:10

## 2022-10-13 RX ADMIN — NICOTINE 1 PATCH: 14 PATCH, EXTENDED RELEASE TRANSDERMAL at 08:10

## 2022-10-13 RX ADMIN — QUETIAPINE FUMARATE 100 MG: 100 TABLET ORAL at 08:10

## 2022-10-13 NOTE — PSYCH
PLPC discussed with pt on collateral consent pt refused. PLPC will attempt at a later time and date.

## 2022-10-13 NOTE — PROGRESS NOTES
"PSYCHIATRY DAILY INPATIENT PROGRESS NOTE  SUBSEQUENT HOSPITAL VISIT    ENCOUNTER DATE: 10/13/2022  SITE: [facility]    DATE OF ADMISSION: 10/11/2022  9:40 PM  LENGTH OF STAY: 2 days      CHIEF COMPLAINT   Joe Henson is a 42 y.o. female, seen during daily salguero rounds on the inpatient unit.  Joe Henson presented with the chief complaint of mood disorder, anxiety and substance problems      The patient was seen and examined. The chart was reviewed.     Reviewed notes from Rns, CTRS, NP, and LPC and labs from the last 24 hours.    The patient's case was discussed with the treatment team/care providers today including Rns, CTRS, and LPC    Staff reports less behavioral or management issues.     The patient has been compliant with treatment.      Subjective 10/13/2022    Today, the patient reports that she is doing "ok.. better." She remains irritable when discussing her SI and triggers. Her plan to prevent SI is to ask help. She was hesitant to discuss her stressors.  -She is inappropriately focused on discharge.     -she is minimizing her substance use  -she reports improvement in her mood- she is likely feigning symptoms to secure discharge.         The patient denies any side effects to medications.          Psychiatric ROS (observed, reported, or endorsed/denied):  Depressed mood - variable  Interest/pleasure/anhedonia: variable  Guilt/hopelessness/worthlessness - variable  Changes in Sleep - variable  Changes in Appetite - variable  Changes in Concentration - variable  Changes in Energy - variable  PMA/R- denies  Suicidal- active/passive ideations - No  Homicidal ideations: active/passive ideations - denies    Hallucinations - denies  Delusions - denies  Disorganized behavior - denies  Disorganized speech - denies  Negative symptoms - denies    Elevated mood - denies  Decreased need for sleep - denies  Grandiosity - denies  Racing thoughts - denies  Impulsivity - denies  Irritability- " denies  Increased energy - denies  Distractibility - denies  Increase in goal-directed activity or PMA- denies    Symptoms of ITZ - Yes  Symptoms of Panic Disorder- Yes  Symptoms of PTSD - Yes        Overall progress: Patient is showing no improvement on the Unit to date        Psychotherapy:  Target symptoms: anxiety , substance abuse, mood disorder  Why chosen therapy is appropriate versus another modality: relevant to diagnosis  Outcome monitoring methods: self-report, lab data, observation  Therapeutic intervention type: interactive psychotherapy  Topics discussed/themes: building skills sets for symptom management, symptom recognition, substance abuse  The patient's response to the intervention is  not able to participate in therapy at this time. . The patient's progress toward treatment goals is poor.   Duration of intervention: 16 minutes.        Medical ROS  Review of Systems   Constitutional:  Negative for chills and fever.   HENT:  Negative for congestion, hearing loss, sore throat and tinnitus.    Eyes:  Negative for blurred vision, double vision, photophobia and pain.   Respiratory:  Negative for cough, hemoptysis, sputum production, shortness of breath and wheezing.    Cardiovascular:  Negative for chest pain, palpitations and leg swelling.   Gastrointestinal:  Negative for abdominal pain, blood in stool, constipation, diarrhea, nausea and vomiting.   Genitourinary:  Negative for dysuria, frequency, hematuria and urgency.   Musculoskeletal:  Negative for back pain, joint pain and myalgias.   Skin:  Negative for rash.   Neurological:  Negative for dizziness, tremors, seizures, weakness and headaches.       PAST MEDICAL HISTORY   Past Medical History:   Diagnosis Date    Abnormal Pap smear age 30    Pos. HPV,     Abnormal Pap smear of cervix     Addiction to drug     ADHD (attention deficit hyperactivity disorder)     Arthritis     Bipolar disorder     COPD (chronic obstructive pulmonary disease)      "Depression     Diabetes mellitus     Fatigue     Hallucination     History of psychiatric hospitalization     Hx of psychiatric care     OCD (obsessive compulsive disorder)     Psychiatric problem     PTSD (post-traumatic stress disorder)     Sleep difficulties     Substance abuse     Suicide attempt     Therapy            PSYCHOTROPIC MEDICATIONS   Scheduled Meds:   diazePAM  15 mg Oral QID    fluconazole  150 mg Oral Daily    folic acid-vit B6-vit B12 2.5-25-2 mg  1 tablet Oral Daily    multivitamin  1 tablet Oral Daily    nicotine  1 patch Transdermal Daily    QUEtiapine  100 mg Oral Daily    QUEtiapine  300 mg Oral Nightly     Continuous Infusions:  PRN Meds:.acetaminophen, aluminum-magnesium hydroxide-simethicone, docusate sodium, hydrOXYzine pamoate, ibuprofen, loperamide, OLANZapine **AND** OLANZapine, ondansetron, promethazine        EXAMINATION    VITALS   Vitals:    10/12/22 0759 10/12/22 0803 10/12/22 2055 10/13/22 0729   BP: 121/85  (!) 96/53 103/69   BP Location: Right arm   Left arm   Patient Position: Lying   Lying   Pulse: 89  74 74   Resp: 20  17 18   Temp: 97.5 °F (36.4 °C)  98 °F (36.7 °C) 97.6 °F (36.4 °C)   TempSrc: Temporal   Temporal   SpO2:       Weight:  73.3 kg (161 lb 9.6 oz)     Height:           Body mass index is 27.74 kg/m².      CONSTITUTIONAL  General Appearance: Female, in casual attire, appears stated age; NAD     MUSCULOSKELETAL  Muscle Strength and Tone: normal  Abnormal Involuntary Movements:none  Gait and Station:normal; non-ataxic     PSYCHIATRIC   Level of Consciousness: awake, alert  Orientation: p/p/t/s  Grooming: adequate to circumstances  Psychomotor Behavior: no PMR, no PMA  Speech: nl r/t/v/s  Language: English fluent  Mood: "ok"  Affect: labile, irritable, anxious, mood incongruent  Thought Process: linear, organized  Associations: intact; no JOSE  Thought Content: less AVH/delusions; denied HI, +SI  Memory: intact to recent and remote events  Attention:  distractible "   Fund of Knowledge: age and education appropriate  Estimate if Intelligence: average based on work/education history, vocabulary and mental status exam  Insight: impaired- seeking help; limitedly understands illness  Judgment: impaired - + bx issues; compliant/cooperative; s/p overdose      DIAGNOSTIC TESTING   Laboratory Results  No results found for this or any previous visit (from the past 24 hour(s)).          MEDICAL DECISION MAKING      ASSESSMENT:   Bipolar I Disorder mre depressed, severe, with psychotic features  ITZ  PTSD     Nicotine Dependence  Polysubstance abuse (h/o alcohol, opiate, amphetamine, sedative hypnotic and cocaine abuse; unable to specify or determine further at this time)     Psychosocial stressors     Chronic pain (sciatica)  B12 deficiency       PROBLEM LIST AND MANAGEMENT PLANS    Mood: pt counseled   -Resumed/continue home medication Serqouel at 100 mg po day and 300 mg po q HS  - will change/optimize as indicated     Anxiety: pt counseled  -serqouel off-label as above  -hold zoloft for now; will-reinitiate if needed     PTSD: pt counseled  -seroquel off-label as above     Insomnia:pt counseled  -seroquel/ off-label as above  -vistaril prn     Nicotine use: pt counseled;   Started/continue nicotine 14 mg patch dermal daily     Substance use: pt counseled  -encouraging rehab  -Started valium 10 mg PO 4 times daily; increase to 15 mg po 4 times daily- decrease to 10 mg po 4 times daily; will taper down/off as able     Psychosocial stressors: pt counseled;   -SW consulted to to assist with resources     Chronic pain: pt counseled     B12 deficiency: pt counseled  -give B12 1000 mcg IM x 1 on 10/12/22  -started/continue Folbic po q day           Discussed diagnosis, risks and benefits of proposed treatment vs alternative treatments vs no treatment, potential side effects of these treatments and the inherent unpredictability of treatment. The patient expresses understanding of the above  and displays the capacity to agree with this treatment given said understanding. Patient also agrees that, currently, the benefits outweigh the risks and would like to pursue/continue treatment at this time.      DISCHARGE PLANNING  Expected Disposition Plan: Home or Self Care      NEED FOR CONTINUED HOSPITALIZATION  Psychiatric illness continues to pose a potential threat to life or bodily function, of self or others, thereby requiring the need for continued inpatient psychiatric hospitalization: Yes, due to: danger to self and gravely disabled, as evidenced by:  Ongoing concerns with grave disability with patient unable to perform basic feeding, hygiene and dressing activities without significant constant support.    Protective inpatient pyschiatric hospitalization required while a safe disposition plan is enacted: Yes    Patient stabilized and ready for discharge from inpatient psychiatric unit: No        STAFF:   Srinath Fowler MD  Psychiatry

## 2022-10-13 NOTE — CONSULTS
"  [facility]  Adult Nutrition  Consult Note    SUMMARY     Recommendations    Recommendation/Intervention:   1. Rec'd honoring pt food preferences to encouage PO intake.   2. RD to follow and make rec's accordingly.    Goals: Pt will meet % ENN by RD follow up.  Nutrition Goal Status: new  Communication of RD Recs: other (comment) (documented in POC)    Assessment and Plan    Nutrition Problem:  Inadequate protein energy intake    Related to (etiology):   Decreased oral intake    Signs and Symptoms (as evidenced by):   Pt meeting 50-75% ENN.    Interventions/Recommendations (treatment strategy):  Recommendation/Intervention:   1. Rec'd honoring pt food preferences to encouage PO intake.   2. RD to follow and make rec's accordingly.    Goals: Pt will meet % ENN by RD follow up.  Nutrition Goal Status: new      Nutrition Diagnosis Status:   new           Malnutrition Assessment                                       Reason for Assessment    Reason For Assessment: consult  Diagnosis: psychological disorder (bipolar disorder)  Relevant Medical History: COPD, ADHD, depression, DM, OCD, PTSD, substance abuse, arthritis, suicide attempt, bariatric sleeve  Interdisciplinary Rounds: did not attend  General Information Comments: Evlauated pt for RD consult. Pt consuming 25% of meals at this time. Noted regular diet with double portions. Will continue to monitor pt for increases in PO intake.  Nutrition Discharge Planning: continue regular diet    Nutrition Risk Screen    Nutrition Risk Screen: no indicators present    Nutrition/Diet History    Food Allergies: NKFA    Anthropometrics    Temp: 97.6 °F (36.4 °C)  Height Method: Stated  Height: 5' 4" (162.6 cm)  Height (inches): 64 in  Weight Method: Standard Scale  Weight: 73.3 kg (161 lb 9.6 oz)  Weight (lb): 161.6 lb  Ideal Body Weight (IBW), Female: 120 lb  % Ideal Body Weight, Female (lb): 135 %  BMI (Calculated): 27.7  BMI Grade: 25 - 29.9 - " overweight       Lab/Procedures/Meds    Pertinent Labs Reviewed: reviewed  Pertinent Medications Reviewed: reviewed  Pertinent Medications Comments: folic acid, B6, B12, MVI    Physical Findings/Assessment         Estimated/Assessed Needs    Weight Used For Calorie Calculations: 73.3 kg (161 lb 9.6 oz)  Energy Calorie Requirements (kcal): 1779  Energy Need Method: [place]-St Jeor (MSJ x 1.3 AF)  Protein Requirements: 59-73  Weight Used For Protein Calculations: 73.3 kg (161 lb 9.6 oz)  Fluid Requirements (mL): 1779  Estimated Fluid Requirement Method: RDA Method (or per MD)  RDA Method (mL): 1779  CHO Requirement: 200-245 (45-55% of kcals)      Nutrition Prescription Ordered    Current Diet Order: regular  Nutrition Order Comments: double portions    Evaluation of Received Nutrient/Fluid Intake    % Kcal Needs: 50-75%  % Protein Needs: 50-75%  Energy Calories Required: not meeting needs  Protein Required: not meeting needs  Tolerance: tolerating  % Intake of Estimated Energy Needs: 50 - 75 %  % Meal Intake: 0 - 25 %    Nutrition Risk    Level of Risk/Frequency of Follow-up: low       Monitor and Evaluation    Food and Nutrient Intake: food and beverage intake, energy intake  Food and Nutrient Adminstration: diet order  Physical Activity and Function: nutrition-related ADLs and IADLs, factors affecting access to physical activity  Anthropometric Measurements: height/length, weight, weight change, body mass index  Biochemical Data, Medical Tests and Procedures: electrolyte and renal panel, gastrointestinal profile, glucose/endocrine profile, inflammatory profile, lipid profile  Nutrition-Focused Physical Findings: overall appearance       Nutrition Follow-Up    RD Follow-up?: Yes

## 2022-10-13 NOTE — PLAN OF CARE
"  Problem: Adult Behavioral Health Plan of Care  Goal: Optimized Coping Skills in Response to Life Stressors  Intervention: Promote Effective Coping Strategies  Flowsheets (Taken 10/13/2022 1316)  Supportive Measures:   active listening utilized   counseling provided   self-care encouraged   self-reflection promoted   Group Note      Behavior    Patient attended group psychotherapy today. Pt affect flat with guarded mood.      Intervention      CBT-based group psychotherapy focused on Letter to Myself. Patients practiced how positive feelings about one-self gives courage and strength. The worksheet provide direction and gave meaning to one's life. Pts explored and discussed positive thinking of them self.        Response: Pt stated  I am stressed because my family has moved out, but I will make it positive again in my life".    Plan Continue to encourage group attendance in future group sessions.  "
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
## 2022-10-13 NOTE — PLAN OF CARE
Patient flat, quiet, and withdrawn.  Denies intentions to harm self and/or others at this time. Denies auditory and/or visual hallucinations.  Affect and emotion blunted and somber.  Little to no insight into substance abuse.  Isolates in bed/room; minimal interactions with peers and staff.  Accepts meals and medications.  Discussed medication and plan of care; staff will continue to educate and reinforce.

## 2022-10-13 NOTE — PLAN OF CARE
Recommendation/Intervention:   1. Rec'd honoring pt food preferences to encouage PO intake.   2. RD to follow and make rec's accordingly.    Goals: Pt will meet % ENN by RD follow up.  Nutrition Goal Status: new

## 2022-10-13 NOTE — PLAN OF CARE
Pt in bed resting most of shift.  Walked the halls, steady gait.  Improved affect when out room.  Sad, anxious, irritable at times.  On double portions and ate snacks, yet asked for more snacks during rounding.  Took meds as ordered.  Continues to say she does not plan on going to rehab.  Denies s/s of detox.  No distress noted.  Will continue to monitor for safety.

## 2022-10-13 NOTE — PLAN OF CARE
Pt is sleeping at this time and has slept 5.5 hours intermittently.  Up to nurses station to check time.  Up another time for prn motrin and vistaril.  Complains of cough.  Peppermints provided.  NAD.  Resp even & unlabored.  Pathways clear.  Q 15 minute safety checks ongoing.  All precautions maintained

## 2022-10-13 NOTE — NURSING
Pt restless in room.  Up to nurses station multiple times.  Walked down A byrd.  Pt escalating agitation.  Zyprexa and tylenol po given.  Pt returned to room.   Will monitor for safety.

## 2022-10-14 PROCEDURE — 90833 PR PSYCHOTHERAPY W/PATIENT W/E&M, 30 MIN (ADD ON): ICD-10-PCS | Mod: ,,, | Performed by: PSYCHIATRY & NEUROLOGY

## 2022-10-14 PROCEDURE — 90833 PSYTX W PT W E/M 30 MIN: CPT | Mod: ,,, | Performed by: PSYCHIATRY & NEUROLOGY

## 2022-10-14 PROCEDURE — 25000003 PHARM REV CODE 250: Performed by: NURSE PRACTITIONER

## 2022-10-14 PROCEDURE — 25000003 PHARM REV CODE 250: Performed by: PSYCHIATRY & NEUROLOGY

## 2022-10-14 PROCEDURE — 99233 SBSQ HOSP IP/OBS HIGH 50: CPT | Mod: ,,, | Performed by: PSYCHIATRY & NEUROLOGY

## 2022-10-14 PROCEDURE — 11400000 HC PSYCH PRIVATE ROOM

## 2022-10-14 PROCEDURE — 99233 PR SUBSEQUENT HOSPITAL CARE,LEVL III: ICD-10-PCS | Mod: ,,, | Performed by: PSYCHIATRY & NEUROLOGY

## 2022-10-14 PROCEDURE — S4991 NICOTINE PATCH NONLEGEND: HCPCS | Performed by: PSYCHIATRY & NEUROLOGY

## 2022-10-14 RX ORDER — DIAZEPAM 10 MG/1
10 TABLET ORAL 3 TIMES DAILY
Status: DISCONTINUED | OUTPATIENT
Start: 2022-10-14 | End: 2022-10-17

## 2022-10-14 RX ORDER — PREGABALIN 25 MG/1
50 CAPSULE ORAL 2 TIMES DAILY
Status: DISCONTINUED | OUTPATIENT
Start: 2022-10-14 | End: 2022-10-19

## 2022-10-14 RX ADMIN — DIAZEPAM 10 MG: 10 TABLET ORAL at 02:10

## 2022-10-14 RX ADMIN — PREGABALIN 50 MG: 25 CAPSULE ORAL at 10:10

## 2022-10-14 RX ADMIN — ONDANSETRON 4 MG: 4 TABLET, ORALLY DISINTEGRATING ORAL at 03:10

## 2022-10-14 RX ADMIN — QUETIAPINE FUMARATE 100 MG: 100 TABLET ORAL at 08:10

## 2022-10-14 RX ADMIN — IBUPROFEN 800 MG: 800 TABLET, FILM COATED ORAL at 11:10

## 2022-10-14 RX ADMIN — ACETAMINOPHEN 650 MG: 325 TABLET ORAL at 03:10

## 2022-10-14 RX ADMIN — Medication 1 TABLET: at 08:10

## 2022-10-14 RX ADMIN — NICOTINE 1 PATCH: 14 PATCH, EXTENDED RELEASE TRANSDERMAL at 08:10

## 2022-10-14 RX ADMIN — THERA TABS 1 TABLET: TAB at 08:10

## 2022-10-14 RX ADMIN — OLANZAPINE 10 MG: 10 TABLET, FILM COATED ORAL at 06:10

## 2022-10-14 RX ADMIN — DIAZEPAM 10 MG: 10 TABLET ORAL at 08:10

## 2022-10-14 RX ADMIN — IBUPROFEN 800 MG: 800 TABLET, FILM COATED ORAL at 01:10

## 2022-10-14 RX ADMIN — QUETIAPINE FUMARATE 300 MG: 300 TABLET ORAL at 08:10

## 2022-10-14 RX ADMIN — HYDROXYZINE PAMOATE 50 MG: 50 CAPSULE ORAL at 01:10

## 2022-10-14 RX ADMIN — IBUPROFEN 800 MG: 800 TABLET, FILM COATED ORAL at 08:10

## 2022-10-14 RX ADMIN — PREGABALIN 50 MG: 25 CAPSULE ORAL at 08:10

## 2022-10-14 NOTE — PLAN OF CARE
Pt is sleeping at this time and has slept 4.5 hours intermittently.  Up to nurses station requesting a toradol and steroid injection for c/o sciatica.  Motrin, vistaril and tylenol prn given this shift along with warm moist towel.  NAD.  Resp even & unlabored.  Pathways clear.  Q 15 minute safety checks ongoing.  All precautions maintained

## 2022-10-14 NOTE — PLAN OF CARE
Pt has been out in dayroom most of shift.  Says she is feeling better.  Improved affect.  Denies SI/HI/hallucinations.  No s/s of detox.  Took meds as ordered.  No complaints voiced.   No prn given.  Ate snacks.  No distress noted.  Will continue to monitor for safety.

## 2022-10-14 NOTE — NURSING
Pt at nurses station crying, agitated.  Zyprexa 10mg po given per order.  Will continue to monitor for safety.

## 2022-10-14 NOTE — PLAN OF CARE
"  Problem: Adult Behavioral Health Plan of Care  Goal: Optimized Coping Skills in Response to Life Stressors  Intervention: Promote Effective Coping Strategies  Flowsheets (Taken 10/14/2022 2102)  Supportive Measures:   active listening utilized   counseling provided   self-care encouraged   self-reflection promoted     Group Note      Behavior    [facility] provided individual  group psychotherapy. Pt affect flat with dysphoric mood. Pt was very addiment about going to rehab to change her life at this time.     Intervention      CBT-based group psychotherapy focused on 10 tips to avoiding relapse. Patients practiced how positive feelings about one-self gives courage and strength. The worksheet provide direction and gave meaning to one's life. Pts explored and discussed positive thinking of them self.        Response: Pt stated   I want to go to rehab so I can feel good about myself and I do not want to relapse again".    Plan Continue to encourage group attendance in future group sessions.  "

## 2022-10-14 NOTE — PLAN OF CARE
"Patient guarded and withdrawn. Blunted affect. Interacting with select peers. Patient reports "I took those Klonopin because my electric bill was $900". Discussed with this patient a better choice that she could have made. Patient denies SI/HI no self harming behavior displayed, no aggressive behavior displayed towards others. Patient contracted safety with staff and unit.  Discussed healthy coping skills and techniques.   Encouraged patient to live a drug free life style.  Discussed healthy coping skills and techniques. Discussed triggers to substance abuse and motivations  to stop use.  Patient reports "I want to go to rehab. I need to get clean". Encouraged patient to seek substance abuse rehab to gain insight and coping skills to maintain soberity. Educated, reviewed  and discussed plan of care and medication regiment with this patient. Patient voiced understanding of all teachings.    "

## 2022-10-14 NOTE — PROGRESS NOTES
"PSYCHIATRY DAILY INPATIENT PROGRESS NOTE  SUBSEQUENT HOSPITAL VISIT    ENCOUNTER DATE: 10/14/2022  SITE: [facility]    DATE OF ADMISSION: 10/11/2022  9:40 PM  LENGTH OF STAY: 3 days      CHIEF COMPLAINT   Joe Henson is a 42 y.o. female, seen during daily salguero rounds on the inpatient unit.  Joe Henson presented with the chief complaint of mood disorder, anxiety and substance problems      The patient was seen and examined. The chart was reviewed.     Reviewed notes from Rns, CTRS, RD, and LPC and labs from the last 24 hours.    The patient's case was discussed with the treatment team/care providers today including Rns, CTRS, and LPC    Staff reports less behavioral or management issues.     The patient has been compliant with treatment.      Subjective 10/14/2022    Today, the patient reports that she is doing "ok.. my sciatica is hurting." She remains irritable when discussing her SI and triggers. Her plan to prevent SI is to ask help. She was hesitant to discuss her stressors.  -She is inappropriately focused on discharge.     -she is minimizing her substance use but stated that she may be interested in rehab    -she reports improvement in her mood- she is likely feigning symptoms to secure discharge.         The patient denies any side effects to medications.          Psychiatric ROS (observed, reported, or endorsed/denied):  Depressed mood - variable  Interest/pleasure/anhedonia: variable  Guilt/hopelessness/worthlessness - variable  Changes in Sleep - variable  Changes in Appetite - variable  Changes in Concentration - variable  Changes in Energy - variable  PMA/R- denies  Suicidal- active/passive ideations - No  Homicidal ideations: active/passive ideations - denies    Hallucinations - denies  Delusions - denies  Disorganized behavior - denies  Disorganized speech - denies  Negative symptoms - denies    Elevated mood - denies  Decreased need for sleep - denies  Grandiosity - denies  Racing " thoughts - denies  Impulsivity - denies  Irritability- denies  Increased energy - denies  Distractibility - denies  Increase in goal-directed activity or PMA- denies    Symptoms of ITZ - Yes  Symptoms of Panic Disorder- Yes  Symptoms of PTSD - Yes        Overall progress: Patient is showing no improvement on the Unit to date        Psychotherapy:  Target symptoms: anxiety , substance abuse, mood disorder  Why chosen therapy is appropriate versus another modality: relevant to diagnosis  Outcome monitoring methods: self-report, lab data, observation  Therapeutic intervention type: interactive psychotherapy  Topics discussed/themes: building skills sets for symptom management, symptom recognition, substance abuse  The patient's response to the intervention is  not able to participate in therapy at this time. . The patient's progress toward treatment goals is poor.   Duration of intervention: 16 minutes.        Medical ROS  Review of Systems   Constitutional:  Negative for chills and fever.   HENT:  Negative for congestion, hearing loss, sore throat and tinnitus.    Eyes:  Negative for blurred vision, double vision, photophobia and pain.   Respiratory:  Negative for cough, hemoptysis, sputum production, shortness of breath and wheezing.    Cardiovascular:  Negative for chest pain, palpitations and leg swelling.   Gastrointestinal:  Negative for abdominal pain, blood in stool, constipation, diarrhea, nausea and vomiting.   Genitourinary:  Negative for dysuria, frequency, hematuria and urgency.   Musculoskeletal:  Negative for back pain, joint pain and myalgias.   Skin:  Negative for rash.   Neurological:  Negative for dizziness, tremors, seizures, weakness and headaches.       PAST MEDICAL HISTORY   Past Medical History:   Diagnosis Date    Abnormal Pap smear age 30    Pos. HPV,     Abnormal Pap smear of cervix     Addiction to drug     ADHD (attention deficit hyperactivity disorder)     Arthritis     Bipolar disorder   "   COPD (chronic obstructive pulmonary disease)     Depression     Diabetes mellitus     Fatigue     Hallucination     History of psychiatric hospitalization     Hx of psychiatric care     OCD (obsessive compulsive disorder)     Psychiatric problem     PTSD (post-traumatic stress disorder)     Sleep difficulties     Substance abuse     Suicide attempt     Therapy            PSYCHOTROPIC MEDICATIONS   Scheduled Meds:   diazePAM  10 mg Oral QID    folic acid-vit B6-vit B12 2.5-25-2 mg  1 tablet Oral Daily    multivitamin  1 tablet Oral Daily    nicotine  1 patch Transdermal Daily    QUEtiapine  100 mg Oral Daily    QUEtiapine  300 mg Oral Nightly     Continuous Infusions:  PRN Meds:.acetaminophen, aluminum-magnesium hydroxide-simethicone, docusate sodium, hydrOXYzine pamoate, ibuprofen, loperamide, OLANZapine **AND** OLANZapine, ondansetron, promethazine        EXAMINATION    VITALS   Vitals:    10/12/22 2055 10/13/22 0729 10/13/22 1954 10/14/22 0744   BP: (!) 96/53 103/69 110/72 129/64   BP Location:  Left arm  Right arm   Patient Position:  Lying  Sitting   Pulse: 74 74 85 82   Resp: 17 18 20 18   Temp: 98 °F (36.7 °C) 97.6 °F (36.4 °C) 97.3 °F (36.3 °C) 97.5 °F (36.4 °C)   TempSrc:  Temporal  Temporal   SpO2:       Weight:       Height:           Body mass index is 27.74 kg/m².        CONSTITUTIONAL  General Appearance: Female, in casual attire, appears stated age; NAD     MUSCULOSKELETAL  Muscle Strength and Tone: normal  Abnormal Involuntary Movements:none  Gait and Station:normal; non-ataxic     PSYCHIATRIC   Level of Consciousness: awake, alert  Orientation: p/p/t/s  Grooming: adequate to circumstances  Psychomotor Behavior: no PMR, no PMA  Speech: nl r/t/v/s  Language: English, fluent  Mood: "ok"  Affect: labile, irritable, anxious, mood incongruent  Thought Process: linear, organized, goal directed  Associations: intact; no JOSE  Thought Content: less AVH/delusions; denied HI, less SI  Memory: intact to recent " and remote events  Attention:   less distractible   Fund of Knowledge: age and education appropriate  Estimate if Intelligence: average based on work/education history, vocabulary and mental status exam  Insight: impaired- seeking help; limitedly understands illness  Judgment: impaired - + bx issues; compliant/cooperative; s/p overdose      DIAGNOSTIC TESTING   Laboratory Results  No results found for this or any previous visit (from the past 24 hour(s)).          MEDICAL DECISION MAKING      ASSESSMENT:   Bipolar I Disorder mre depressed, severe, with psychotic features  ITZ  PTSD     Nicotine Dependence  Polysubstance abuse (h/o alcohol, opiate, amphetamine, sedative hypnotic and cocaine abuse; unable to specify or determine further at this time)     Psychosocial stressors     Chronic pain (sciatica)  B12 deficiency       PROBLEM LIST AND MANAGEMENT PLANS    Mood: pt counseled   -Resumed/continue home medication Serqouel at 100 mg po day and 300 mg po q HS  - will change/optimize as indicated     Anxiety: pt counseled  -serqouel off-label as above  -hold zoloft for now; will-reinitiate if needed     PTSD: pt counseled  -seroquel off-label as above     Insomnia:pt counseled  -seroquel/ off-label as above  -vistaril prn     Nicotine use: pt counseled;   Started/continue nicotine 14 mg patch dermal daily     Substance use: pt counseled  -encouraging rehab  -Started valium 10 mg PO 4 times daily; increase to 15 mg po 4 times daily- decrease to 10 mg po 4 times daily; will taper down/off as able     Psychosocial stressors: pt counseled;   -SW consulted to to assist with resources     Chronic pain: pt counseled  -start Lyrica 50 mg po BID     B12 deficiency: pt counseled  -give B12 1000 mcg IM x 1 on 10/12/22  -started/continue Folbic po q day           Discussed diagnosis, risks and benefits of proposed treatment vs alternative treatments vs no treatment, potential side effects of these treatments and the inherent  unpredictability of treatment. The patient expresses understanding of the above and displays the capacity to agree with this treatment given said understanding. Patient also agrees that, currently, the benefits outweigh the risks and would like to pursue/continue treatment at this time.      DISCHARGE PLANNING  Expected Disposition Plan: Home or Self Care      NEED FOR CONTINUED HOSPITALIZATION  Psychiatric illness continues to pose a potential threat to life or bodily function, of self or others, thereby requiring the need for continued inpatient psychiatric hospitalization: Yes, due to: danger to self and gravely disabled, as evidenced by:  Ongoing concerns with grave disability with patient unable to perform basic feeding, hygiene and dressing activities without significant constant support.    Protective inpatient pyschiatric hospitalization required while a safe disposition plan is enacted: Yes    Patient stabilized and ready for discharge from inpatient psychiatric unit: No        STAFF:   Srinath Fowler MD  Psychiatry

## 2022-10-15 PROBLEM — R45.851 DEPRESSION WITH SUICIDAL IDEATION: Status: ACTIVE | Noted: 2022-10-15

## 2022-10-15 PROBLEM — F32.A DEPRESSION WITH SUICIDAL IDEATION: Status: ACTIVE | Noted: 2022-10-15

## 2022-10-15 PROCEDURE — 25000003 PHARM REV CODE 250: Performed by: PSYCHIATRY & NEUROLOGY

## 2022-10-15 PROCEDURE — 25000003 PHARM REV CODE 250: Performed by: NURSE PRACTITIONER

## 2022-10-15 PROCEDURE — 63600175 PHARM REV CODE 636 W HCPCS: Performed by: FAMILY MEDICINE

## 2022-10-15 PROCEDURE — S4991 NICOTINE PATCH NONLEGEND: HCPCS | Performed by: PSYCHIATRY & NEUROLOGY

## 2022-10-15 PROCEDURE — 11400000 HC PSYCH PRIVATE ROOM

## 2022-10-15 PROCEDURE — 99232 PR SUBSEQUENT HOSPITAL CARE,LEVL II: ICD-10-PCS | Mod: ,,, | Performed by: PSYCHIATRY & NEUROLOGY

## 2022-10-15 PROCEDURE — 99232 SBSQ HOSP IP/OBS MODERATE 35: CPT | Mod: ,,, | Performed by: PSYCHIATRY & NEUROLOGY

## 2022-10-15 RX ORDER — KETOROLAC TROMETHAMINE 30 MG/ML
30 INJECTION, SOLUTION INTRAMUSCULAR; INTRAVENOUS ONCE
Status: COMPLETED | OUTPATIENT
Start: 2022-10-15 | End: 2022-10-15

## 2022-10-15 RX ADMIN — DIAZEPAM 10 MG: 10 TABLET ORAL at 08:10

## 2022-10-15 RX ADMIN — IBUPROFEN 800 MG: 800 TABLET, FILM COATED ORAL at 08:10

## 2022-10-15 RX ADMIN — IBUPROFEN 800 MG: 800 TABLET, FILM COATED ORAL at 04:10

## 2022-10-15 RX ADMIN — NICOTINE 1 PATCH: 14 PATCH, EXTENDED RELEASE TRANSDERMAL at 08:10

## 2022-10-15 RX ADMIN — IBUPROFEN 800 MG: 800 TABLET, FILM COATED ORAL at 12:10

## 2022-10-15 RX ADMIN — QUETIAPINE FUMARATE 100 MG: 100 TABLET ORAL at 08:10

## 2022-10-15 RX ADMIN — HYDROXYZINE PAMOATE 50 MG: 50 CAPSULE ORAL at 08:10

## 2022-10-15 RX ADMIN — THERA TABS 1 TABLET: TAB at 08:10

## 2022-10-15 RX ADMIN — PREGABALIN 50 MG: 25 CAPSULE ORAL at 08:10

## 2022-10-15 RX ADMIN — Medication 1 TABLET: at 08:10

## 2022-10-15 RX ADMIN — HYDROXYZINE PAMOATE 50 MG: 50 CAPSULE ORAL at 12:10

## 2022-10-15 RX ADMIN — ACETAMINOPHEN 650 MG: 325 TABLET ORAL at 12:10

## 2022-10-15 RX ADMIN — DIAZEPAM 10 MG: 10 TABLET ORAL at 03:10

## 2022-10-15 RX ADMIN — KETOROLAC TROMETHAMINE 30 MG: 30 INJECTION, SOLUTION INTRAMUSCULAR at 12:10

## 2022-10-15 RX ADMIN — QUETIAPINE FUMARATE 300 MG: 300 TABLET ORAL at 08:10

## 2022-10-15 NOTE — NURSING
Pt sleeping at this time, slept 6 hrs with   4 awakenings. NAD. Resp even and unlabored.Pathways clear,bed in low position. Q 15 min safety check ongoing.All precautions maintained.

## 2022-10-15 NOTE — PLAN OF CARE
Following POC.  Makes needs known.  Denies SI/HI,AVH.  Medication for pain helps a little, states Toradol IM works the best.  Out on the unit part of the evening.  Minimal interactions with peers.  Medication compliance.  Appetite is good.  Encouraged group attendance.  Encouraged substance abuse free lifestyle.  Free of fall.

## 2022-10-15 NOTE — PLAN OF CARE
"Patient reports " I want to go to rehab. I am just waiting to get accepted to rehab". Encouraged patient to seek substance abuse rehab to gain insight and coping skills to gain soberity. Discussed healthy coping skills and techniques.   Encouraged patient to live a drug free life style.  Discussed healthy coping skills and techniques. Discussed triggers to substance abuse and motivations  to stop use.  Patient denies SI/HI no self harming behavior displayed, no aggressive behavior displayed towards others. Patient contracted safety with staff and unit.  Educated, reviewed  and discussed plan of care and medication regiment with this patient. Patient voiced understanding of all teachings.    "

## 2022-10-15 NOTE — PROGRESS NOTES
"PSYCHIATRY DAILY INPATIENT PROGRESS NOTE  SUBSEQUENT HOSPITAL VISIT    ENCOUNTER DATE: 10/15/2022  SITE: [facility]    DATE OF ADMISSION: 10/11/2022  9:40 PM  LENGTH OF STAY: 4 days      CHIEF COMPLAINT   Joe Henson is a 42 y.o. female, seen during daily salguero rounds on the inpatient unit.  Joe Henson presented with the chief complaint of mood disorder, anxiety and substance problems      The patient was seen and examined. The chart was reviewed.     Reviewed notes from Rns, CTRS, RD, and LPC and labs from the last 24 hours.    The patient's case was discussed with the treatment team/care providers today including Rns, CTRS, and LPC    Staff reports less behavioral or management issues.     The patient has been compliant with treatment.      Subjective 10/15/2022    Today, the patient reports that she is in severe pain from sciatica. States that she had trouble sleeping due to this. States her mood is "ok." Reports anxiety which she relates to being in pain.     -she is minimizing her substance use but stated that she may be interested in rehab    -she reports improvement in her mood- she is likely feigning symptoms to secure discharge, as she appear flat, sad and remains withdrawn and guarded.         The patient denies any side effects to medications.          Psychiatric ROS (observed, reported, or endorsed/denied):  Depressed mood - variable  Interest/pleasure/anhedonia: variable  Guilt/hopelessness/worthlessness - variable  Changes in Sleep - variable  Changes in Appetite - variable  Changes in Concentration - variable  Changes in Energy - variable  PMA/R- denies  Suicidal- active/passive ideations - No  Homicidal ideations: active/passive ideations - denies    Hallucinations - denies  Delusions - denies  Disorganized behavior - denies  Disorganized speech - denies  Negative symptoms - denies    Elevated mood - denies  Decreased need for sleep - denies  Grandiosity - denies  Racing thoughts " - denies  Impulsivity - denies  Irritability- denies  Increased energy - denies  Distractibility - denies  Increase in goal-directed activity or PMA- denies    Symptoms of ITZ - Yes  Symptoms of Panic Disorder- Yes  Symptoms of PTSD - Yes        Overall progress: Patient is showing no improvement on the Unit to date        Psychotherapy:  Target symptoms: anxiety , substance abuse, mood disorder  Why chosen therapy is appropriate versus another modality: relevant to diagnosis  Outcome monitoring methods: self-report, lab data, observation  Therapeutic intervention type: interactive psychotherapy  Topics discussed/themes: building skills sets for symptom management, symptom recognition, substance abuse  The patient's response to the intervention is  not able to participate in therapy at this time. . The patient's progress toward treatment goals is poor.   Duration of intervention: 4 minutes.        Medical ROS  Review of Systems   Constitutional:  Negative for chills and fever.   HENT:  Negative for congestion, hearing loss, sore throat and tinnitus.    Eyes:  Negative for blurred vision, double vision, photophobia and pain.   Respiratory:  Negative for cough, hemoptysis, sputum production, shortness of breath and wheezing.    Cardiovascular:  Negative for chest pain, palpitations and leg swelling.   Gastrointestinal:  Negative for abdominal pain, blood in stool, constipation, diarrhea, nausea and vomiting.   Genitourinary:  Negative for dysuria, frequency, hematuria and urgency.   Musculoskeletal:  Negative for back pain, joint pain and myalgias.   Skin:  Negative for rash.   Neurological:  Negative for dizziness, tremors, seizures, weakness and headaches.       PAST MEDICAL HISTORY   Past Medical History:   Diagnosis Date    Abnormal Pap smear age 30    Pos. HPV,     Abnormal Pap smear of cervix     Addiction to drug     ADHD (attention deficit hyperactivity disorder)     Arthritis     Bipolar disorder     COPD  "(chronic obstructive pulmonary disease)     Depression     Diabetes mellitus     Fatigue     Hallucination     History of psychiatric hospitalization     Hx of psychiatric care     OCD (obsessive compulsive disorder)     Psychiatric problem     PTSD (post-traumatic stress disorder)     Sleep difficulties     Substance abuse     Suicide attempt     Therapy            PSYCHOTROPIC MEDICATIONS   Scheduled Meds:   diazePAM  10 mg Oral TID    folic acid-vit B6-vit B12 2.5-25-2 mg  1 tablet Oral Daily    multivitamin  1 tablet Oral Daily    nicotine  1 patch Transdermal Daily    pregabalin  50 mg Oral BID    QUEtiapine  100 mg Oral Daily    QUEtiapine  300 mg Oral Nightly     Continuous Infusions:  PRN Meds:.acetaminophen, aluminum-magnesium hydroxide-simethicone, docusate sodium, hydrOXYzine pamoate, ibuprofen, loperamide, OLANZapine **AND** OLANZapine, ondansetron, promethazine        EXAMINATION    VITALS   Vitals:    10/13/22 1954 10/14/22 0744 10/14/22 2018 10/15/22 0800   BP: 110/72 129/64 108/72 119/83   BP Location:  Right arm Right arm Right arm   Patient Position:  Sitting Sitting Sitting   Pulse: 85 82 (!) 113 88   Resp: 20 18 16 18   Temp: 97.3 °F (36.3 °C) 97.5 °F (36.4 °C) 97.6 °F (36.4 °C) 97.4 °F (36.3 °C)   TempSrc:  Temporal Temporal Temporal   SpO2:       Weight:       Height:           Body mass index is 27.74 kg/m².        CONSTITUTIONAL  General Appearance: Female, in casual attire, appears stated age; NAD     MUSCULOSKELETAL  Muscle Strength and Tone: normal  Abnormal Involuntary Movements:none  Gait and Station:normal; non-ataxic     PSYCHIATRIC   Level of Consciousness: awake, alert  Orientation: p/p/t/s  Grooming: adequate to circumstances  Psychomotor Behavior: no PMR, no PMA  Speech: nl r/t/v/s  Language: English, fluent  Mood: "ok"  Affect: labile, irritable, anxious, mood incongruent  Thought Process: linear, organized, goal directed  Associations: intact; no JOSE  Thought Content: less " AVH/delusions; denied HI, less SI  Memory: intact to recent and remote events  Attention:   less distractible   Fund of Knowledge: age and education appropriate  Estimate if Intelligence: average based on work/education history, vocabulary and mental status exam  Insight: impaired- seeking help; limitedly understands illness  Judgment: impaired - + bx issues; compliant/cooperative; s/p overdose      DIAGNOSTIC TESTING   Laboratory Results  No results found for this or any previous visit (from the past 24 hour(s)).          MEDICAL DECISION MAKING      ASSESSMENT:   Bipolar I Disorder mre depressed, severe, with psychotic features  ITZ  PTSD     Nicotine Dependence  Polysubstance abuse (h/o alcohol, opiate, amphetamine, sedative hypnotic and cocaine abuse; unable to specify or determine further at this time)     Psychosocial stressors     Chronic pain (sciatica)  B12 deficiency       PROBLEM LIST AND MANAGEMENT PLANS    Mood: pt counseled   -Resumed/continue home medication Serqouel at 100 mg po day and 300 mg po q HS  - will change/optimize as indicated     Anxiety: pt counseled  -serqouel off-label as above  -hold zoloft for now; will-reinitiate if needed     PTSD: pt counseled  -seroquel off-label as above     Insomnia:pt counseled  -seroquel/ off-label as above  -vistaril prn     Nicotine use: pt counseled;   Started/continue nicotine 14 mg patch dermal daily     Substance use: pt counseled  -encouraging rehab  -Started valium 10 mg PO 4 times daily; increase to 15 mg po 4 times daily- decrease to 10 mg po 4 times daily; will taper down/off as able     Psychosocial stressors: pt counseled;   -SW consulted to to assist with resources     Chronic pain: pt counseled  -start Lyrica 50 mg po BID  - Will request mfor medicine team to re-evaluate and provide reccs.      B12 deficiency: pt counseled  -give B12 1000 mcg IM x 1 on 10/12/22  -started/continue Folbic po q day           Discussed diagnosis, risks and benefits  of proposed treatment vs alternative treatments vs no treatment, potential side effects of these treatments and the inherent unpredictability of treatment. The patient expresses understanding of the above and displays the capacity to agree with this treatment given said understanding. Patient also agrees that, currently, the benefits outweigh the risks and would like to pursue/continue treatment at this time.      DISCHARGE PLANNING  Expected Disposition Plan: Home or Self Care      NEED FOR CONTINUED HOSPITALIZATION  Psychiatric illness continues to pose a potential threat to life or bodily function, of self or others, thereby requiring the need for continued inpatient psychiatric hospitalization: Yes, due to: danger to self and gravely disabled, as evidenced by:  Ongoing concerns with grave disability with patient unable to perform basic feeding, hygiene and dressing activities without significant constant support.    Protective inpatient pyschiatric hospitalization required while a safe disposition plan is enacted: Yes    Patient stabilized and ready for discharge from inpatient psychiatric unit: No        STAFF:   Rl Aguilar MD  Psychiatry

## 2022-10-16 PROCEDURE — 11400000 HC PSYCH PRIVATE ROOM

## 2022-10-16 PROCEDURE — 25000003 PHARM REV CODE 250: Performed by: PSYCHIATRY & NEUROLOGY

## 2022-10-16 PROCEDURE — 25000003 PHARM REV CODE 250: Performed by: NURSE PRACTITIONER

## 2022-10-16 PROCEDURE — 99232 PR SUBSEQUENT HOSPITAL CARE,LEVL II: ICD-10-PCS | Mod: ,,, | Performed by: PSYCHIATRY & NEUROLOGY

## 2022-10-16 PROCEDURE — 99232 SBSQ HOSP IP/OBS MODERATE 35: CPT | Mod: ,,, | Performed by: PSYCHIATRY & NEUROLOGY

## 2022-10-16 PROCEDURE — S4991 NICOTINE PATCH NONLEGEND: HCPCS | Performed by: PSYCHIATRY & NEUROLOGY

## 2022-10-16 RX ORDER — QUETIAPINE FUMARATE 200 MG/1
400 TABLET, FILM COATED ORAL NIGHTLY
Status: DISCONTINUED | OUTPATIENT
Start: 2022-10-16 | End: 2022-10-23 | Stop reason: HOSPADM

## 2022-10-16 RX ADMIN — IBUPROFEN 800 MG: 800 TABLET, FILM COATED ORAL at 04:10

## 2022-10-16 RX ADMIN — IBUPROFEN 800 MG: 800 TABLET, FILM COATED ORAL at 08:10

## 2022-10-16 RX ADMIN — Medication 1 TABLET: at 08:10

## 2022-10-16 RX ADMIN — QUETIAPINE FUMARATE 100 MG: 100 TABLET ORAL at 08:10

## 2022-10-16 RX ADMIN — ACETAMINOPHEN 650 MG: 325 TABLET ORAL at 03:10

## 2022-10-16 RX ADMIN — DIAZEPAM 10 MG: 10 TABLET ORAL at 08:10

## 2022-10-16 RX ADMIN — HYDROXYZINE PAMOATE 50 MG: 50 CAPSULE ORAL at 08:10

## 2022-10-16 RX ADMIN — HYDROXYZINE PAMOATE 50 MG: 50 CAPSULE ORAL at 04:10

## 2022-10-16 RX ADMIN — NICOTINE 1 PATCH: 14 PATCH, EXTENDED RELEASE TRANSDERMAL at 08:10

## 2022-10-16 RX ADMIN — THERA TABS 1 TABLET: TAB at 08:10

## 2022-10-16 RX ADMIN — PREGABALIN 50 MG: 25 CAPSULE ORAL at 08:10

## 2022-10-16 RX ADMIN — DIAZEPAM 10 MG: 10 TABLET ORAL at 02:10

## 2022-10-16 RX ADMIN — QUETIAPINE FUMARATE 400 MG: 200 TABLET ORAL at 08:10

## 2022-10-16 NOTE — NURSING
"Patient on the phone crying and came to the nurses station and reported "I am not going to rehab. I am going home. I want to go home now". Explained to this patient the [facility] and [facility] commitment process. Patient reports " I will talk to the doctor tomorrow because I want to go home".   "

## 2022-10-16 NOTE — PLAN OF CARE
Patient slow thoughts. Mood improved. Patient interacting with peers. Reported she is considering substance abuse rehab. Poor insight and judgment into substance abuse. Encouraged patient to seek substance abuse rehab to gain insight into substance abuse. Patient denies SI/HI no self harming behavior displayed, no aggressive behavior displayed towards others. Patient contracted safety with staff and unit.  Discussed healthy coping skills and techniques.  Educated, reviewed  and discussed plan of care and medication regiment with this patient. Patient voiced understanding of all teachings.

## 2022-10-16 NOTE — NURSING
Pt sleeping at this time, slept 7 hrs with 2 awakenings. NAD. Resp even and unlabored.Pathways clear,bed in low position. Q 15 min safety check ongoing.All precautions maintained.

## 2022-10-16 NOTE — PROGRESS NOTES
PSYCHIATRY DAILY INPATIENT PROGRESS NOTE  SUBSEQUENT HOSPITAL VISIT    ENCOUNTER DATE: 10/16/2022  SITE: [facility]    DATE OF ADMISSION: 10/11/2022  9:40 PM  LENGTH OF STAY: 5 days      CHIEF COMPLAINT   Joe Henson is a 42 y.o. female, seen during daily salguero rounds on the inpatient unit.  Joe Henson presented with the chief complaint of mood disorder, anxiety and substance problems      The patient was seen and examined. The chart was reviewed.     Reviewed notes from Rns, CTRS, RD, and LPC and labs from the last 24 hours.    The patient's case was discussed with the treatment team/care providers today including Rns, CTRS, and LPC    Staff reports less behavioral or management issues.     The patient has been compliant with treatment.      Subjective 10/16/2022    Today, the patient reports she is feeling better. Pt reports being seen by medicine service yesterday and receiving ketorolac injection which was helpful for her pain. She is up, walking in the hallway with normal gait this morning which is a substantial improvement from yesterday.     -she is minimizing her substance use but stated that she may be interested in rehab. She is asking about discharge plans today.    She reports continued improvement in her mood and appears to be in brighter spirits this morning. States she had some trouble sleeping last night, and is amenable to titration of seroquel for this.         The patient denies any side effects to medications.          Psychiatric ROS (observed, reported, or endorsed/denied):  Depressed mood - variable  Interest/pleasure/anhedonia: variable  Guilt/hopelessness/worthlessness - variable  Changes in Sleep - variable  Changes in Appetite - variable  Changes in Concentration - variable  Changes in Energy - variable  PMA/R- denies  Suicidal- active/passive ideations - No  Homicidal ideations: active/passive ideations - denies    Hallucinations - denies  Delusions -  denies  Disorganized behavior - denies  Disorganized speech - denies  Negative symptoms - denies    Elevated mood - denies  Decreased need for sleep - denies  Grandiosity - denies  Racing thoughts - denies  Impulsivity - denies  Irritability- denies  Increased energy - denies  Distractibility - denies  Increase in goal-directed activity or PMA- denies    Symptoms of ITZ - Yes  Symptoms of Panic Disorder- Yes  Symptoms of PTSD - Yes        Overall progress: Patient is showing no improvement on the Unit to date        Psychotherapy:  Target symptoms: anxiety , substance abuse, mood disorder  Why chosen therapy is appropriate versus another modality: relevant to diagnosis  Outcome monitoring methods: self-report, lab data, observation  Therapeutic intervention type: interactive psychotherapy  Topics discussed/themes: building skills sets for symptom management, symptom recognition, substance abuse  The patient's response to the intervention is  not able to participate in therapy at this time. . The patient's progress toward treatment goals is poor.   Duration of intervention: 5 minutes.        Medical ROS  Review of Systems   Constitutional:  Negative for chills and fever.   HENT:  Negative for congestion, hearing loss, sore throat and tinnitus.    Eyes:  Negative for blurred vision, double vision, photophobia and pain.   Respiratory:  Negative for cough, hemoptysis, sputum production, shortness of breath and wheezing.    Cardiovascular:  Negative for chest pain, palpitations and leg swelling.   Gastrointestinal:  Negative for abdominal pain, blood in stool, constipation, diarrhea, nausea and vomiting.   Genitourinary:  Negative for dysuria, frequency, hematuria and urgency.   Musculoskeletal:  Negative for back pain, joint pain and myalgias.   Skin:  Negative for rash.   Neurological:  Negative for dizziness, tremors, seizures, weakness and headaches.       PAST MEDICAL HISTORY   Past Medical History:   Diagnosis Date     Abnormal Pap smear age 30    Pos. HPV,     Abnormal Pap smear of cervix     Addiction to drug     ADHD (attention deficit hyperactivity disorder)     Arthritis     Bipolar disorder     COPD (chronic obstructive pulmonary disease)     Depression     Diabetes mellitus     Fatigue     Hallucination     History of psychiatric hospitalization     Hx of psychiatric care     OCD (obsessive compulsive disorder)     Psychiatric problem     PTSD (post-traumatic stress disorder)     Sleep difficulties     Substance abuse     Suicide attempt     Therapy            PSYCHOTROPIC MEDICATIONS   Scheduled Meds:   diazePAM  10 mg Oral TID    folic acid-vit B6-vit B12 2.5-25-2 mg  1 tablet Oral Daily    multivitamin  1 tablet Oral Daily    nicotine  1 patch Transdermal Daily    pregabalin  50 mg Oral BID    QUEtiapine  100 mg Oral Daily    QUEtiapine  400 mg Oral Nightly     Continuous Infusions:  PRN Meds:.acetaminophen, aluminum-magnesium hydroxide-simethicone, docusate sodium, hydrOXYzine pamoate, ibuprofen, loperamide, OLANZapine **AND** OLANZapine, ondansetron, promethazine        EXAMINATION    VITALS   Vitals:    10/14/22 2018 10/15/22 0800 10/15/22 1215 10/15/22 1954   BP: 108/72 119/83  104/62   BP Location: Right arm Right arm  Left arm   Patient Position: Sitting Sitting  Sitting   Pulse: (!) 113 88  80   Resp: 16 18  18   Temp: 97.6 °F (36.4 °C) 97.4 °F (36.3 °C) 97.2 °F (36.2 °C) 98.2 °F (36.8 °C)   TempSrc: Temporal Temporal  Temporal   SpO2:       Weight:       Height:           Body mass index is 27.74 kg/m².        CONSTITUTIONAL  General Appearance: Female, in casual attire, appears stated age; NAD     MUSCULOSKELETAL  Muscle Strength and Tone: normal  Abnormal Involuntary Movements:none  Gait and Station:normal; non-ataxic     PSYCHIATRIC   Level of Consciousness: awake, alert  Orientation: p/p/t/s  Grooming: adequate to circumstances  Psychomotor Behavior: no PMR, no PMA  Speech: nl r/t/v/s  Language: English,  "fluent  Mood: "ok"  Affect: congruent with mood  Thought Process: linear, organized, goal directed  Associations: intact; no JOSE  Thought Content: less AVH/delusions; denied HI, less SI  Memory: intact to recent and remote events  Attention:   less distractible   Fund of Knowledge: age and education appropriate  Estimate if Intelligence: average based on work/education history, vocabulary and mental status exam  Insight: impaired- seeking help; limitedly understands illness  Judgment: impaired - + bx issues; compliant/cooperative; s/p overdose      DIAGNOSTIC TESTING   Laboratory Results  No results found for this or any previous visit (from the past 24 hour(s)).          MEDICAL DECISION MAKING      ASSESSMENT:   Bipolar I Disorder mre depressed, severe, with psychotic features  ITZ  PTSD     Nicotine Dependence  Polysubstance abuse (h/o alcohol, opiate, amphetamine, sedative hypnotic and cocaine abuse; unable to specify or determine further at this time)     Psychosocial stressors     Chronic pain (sciatica)  B12 deficiency       PROBLEM LIST AND MANAGEMENT PLANS    Mood: pt counseled   -Resumed/continue home medication Serqouel at 100 mg po day and 300 mg po q HS --> Titrate QHS dose to 400mg on 10/16  - will change/optimize as indicated     Anxiety: pt counseled  -serqouel off-label as above  -hold zoloft for now; will-reinitiate if needed     PTSD: pt counseled  -seroquel off-label as above     Insomnia:pt counseled  -seroquel/ off-label as above  -vistaril prn     Nicotine use: pt counseled;   Started/continue nicotine 14 mg patch dermal daily     Substance use: pt counseled  -encouraging rehab  -Started valium 10 mg PO 4 times daily; increase to 15 mg po 4 times daily- decrease to 10 mg po 4 times daily; will taper down/off as able     Psychosocial stressors: pt counseled;   -SW consulted to to assist with resources     Chronic pain: pt counseled  -start Lyrica 50 mg po BID  - Will request or medicine team " to re-evaluate and provide reccs.      B12 deficiency: pt counseled  -give B12 1000 mcg IM x 1 on 10/12/22  -started/continue Folbic po q day           Discussed diagnosis, risks and benefits of proposed treatment vs alternative treatments vs no treatment, potential side effects of these treatments and the inherent unpredictability of treatment. The patient expresses understanding of the above and displays the capacity to agree with this treatment given said understanding. Patient also agrees that, currently, the benefits outweigh the risks and would like to pursue/continue treatment at this time.      DISCHARGE PLANNING  Expected Disposition Plan: Home or Self Care      NEED FOR CONTINUED HOSPITALIZATION  Psychiatric illness continues to pose a potential threat to life or bodily function, of self or others, thereby requiring the need for continued inpatient psychiatric hospitalization: Yes, due to: danger to self and gravely disabled, as evidenced by:  Ongoing concerns with grave disability with patient unable to perform basic feeding, hygiene and dressing activities without significant constant support.    Protective inpatient pyschiatric hospitalization required while a safe disposition plan is enacted: Yes    Patient stabilized and ready for discharge from inpatient psychiatric unit: No        STAFF:   Rl Aguilar MD  Psychiatry

## 2022-10-16 NOTE — PLAN OF CARE
Following POC.  Makes needs known.  Denies SI/HI,AVH.  Medication for pain helps.  Out on the unit part of the evening.  Minimal interactions with peers.  Medication compliance.  Appetite is good.  Encouraged group attendance.  Encouraged substance abuse free lifestyle.  Free of fall.    Private car

## 2022-10-17 PROCEDURE — S0166 INJ OLANZAPINE 2.5MG: HCPCS | Performed by: PSYCHIATRY & NEUROLOGY

## 2022-10-17 PROCEDURE — 11400000 HC PSYCH PRIVATE ROOM

## 2022-10-17 PROCEDURE — 25000003 PHARM REV CODE 250: Performed by: PSYCHIATRY & NEUROLOGY

## 2022-10-17 PROCEDURE — 90833 PR PSYCHOTHERAPY W/PATIENT W/E&M, 30 MIN (ADD ON): ICD-10-PCS | Mod: ,,, | Performed by: STUDENT IN AN ORGANIZED HEALTH CARE EDUCATION/TRAINING PROGRAM

## 2022-10-17 PROCEDURE — 25000003 PHARM REV CODE 250: Performed by: STUDENT IN AN ORGANIZED HEALTH CARE EDUCATION/TRAINING PROGRAM

## 2022-10-17 PROCEDURE — 25000003 PHARM REV CODE 250: Performed by: NURSE PRACTITIONER

## 2022-10-17 PROCEDURE — 99233 SBSQ HOSP IP/OBS HIGH 50: CPT | Mod: ,,, | Performed by: STUDENT IN AN ORGANIZED HEALTH CARE EDUCATION/TRAINING PROGRAM

## 2022-10-17 PROCEDURE — S4991 NICOTINE PATCH NONLEGEND: HCPCS | Performed by: PSYCHIATRY & NEUROLOGY

## 2022-10-17 PROCEDURE — 99233 PR SUBSEQUENT HOSPITAL CARE,LEVL III: ICD-10-PCS | Mod: ,,, | Performed by: STUDENT IN AN ORGANIZED HEALTH CARE EDUCATION/TRAINING PROGRAM

## 2022-10-17 PROCEDURE — 90833 PSYTX W PT W E/M 30 MIN: CPT | Mod: ,,, | Performed by: STUDENT IN AN ORGANIZED HEALTH CARE EDUCATION/TRAINING PROGRAM

## 2022-10-17 RX ORDER — DIAZEPAM 10 MG/1
10 TABLET ORAL 2 TIMES DAILY
Status: DISCONTINUED | OUTPATIENT
Start: 2022-10-17 | End: 2022-10-19

## 2022-10-17 RX ADMIN — NICOTINE 1 PATCH: 14 PATCH, EXTENDED RELEASE TRANSDERMAL at 08:10

## 2022-10-17 RX ADMIN — Medication 1 TABLET: at 08:10

## 2022-10-17 RX ADMIN — DIAZEPAM 10 MG: 10 TABLET ORAL at 08:10

## 2022-10-17 RX ADMIN — OLANZAPINE 10 MG: 10 INJECTION, POWDER, LYOPHILIZED, FOR SOLUTION INTRAMUSCULAR at 12:10

## 2022-10-17 RX ADMIN — QUETIAPINE FUMARATE 100 MG: 100 TABLET ORAL at 08:10

## 2022-10-17 RX ADMIN — HYDROXYZINE PAMOATE 50 MG: 50 CAPSULE ORAL at 03:10

## 2022-10-17 RX ADMIN — QUETIAPINE FUMARATE 400 MG: 200 TABLET ORAL at 08:10

## 2022-10-17 RX ADMIN — ACETAMINOPHEN 650 MG: 325 TABLET ORAL at 02:10

## 2022-10-17 RX ADMIN — HYDROXYZINE PAMOATE 50 MG: 50 CAPSULE ORAL at 04:10

## 2022-10-17 RX ADMIN — PREGABALIN 50 MG: 25 CAPSULE ORAL at 08:10

## 2022-10-17 RX ADMIN — IBUPROFEN 800 MG: 800 TABLET, FILM COATED ORAL at 04:10

## 2022-10-17 RX ADMIN — THERA TABS 1 TABLET: TAB at 08:10

## 2022-10-17 NOTE — PROGRESS NOTES
"PSYCHIATRY DAILY INPATIENT PROGRESS NOTE  SUBSEQUENT HOSPITAL VISIT    ENCOUNTER DATE: 10/17/2022  SITE: [facility]    DATE OF ADMISSION: 10/11/2022  9:40 PM  LENGTH OF STAY: 6 days      CHIEF COMPLAINT   Joe Henson is a 42 y.o. female, seen during daily salguero rounds on the inpatient unit.  Joe Henson presented with the chief complaint of mood disorder, anxiety and substance problems      The patient was seen and examined. The chart was reviewed.     Reviewed notes from Rns and labs from the last 24 hours.    The patient's case was discussed with the treatment team/care providers today including Rns    Staff reports less behavioral or management issues.     The patient has been compliant with treatment.        Subjective 10/17/2022    Reports "I'm not going to rehab." She has very poor insight into her substance abuse. She admits to taking unprescribed adderall from a friend. She minimizes her suicide attempt via klonopin overdose. Her mood remains labile.    The patient denies any side effects to medications.        Per RN: Patient on the phone crying and came to the nurses station and reported "I am not going to rehab. I am going home. I want to go home now". Explained to this patient the PEC and CEC commitment process. Patient reports " I will talk to the doctor tomorrow because I want to go home".             Psychiatric ROS (observed, reported, or endorsed/denied):  Depressed mood - variable  Interest/pleasure/anhedonia: variable  Guilt/hopelessness/worthlessness - variable  Changes in Sleep - variable  Changes in Appetite - variable  Changes in Concentration - variable  Changes in Energy - variable  PMA/R- denies  Suicidal- active/passive ideations - No  Homicidal ideations: active/passive ideations - denies    Hallucinations - denies  Delusions - denies  Disorganized behavior - denies  Disorganized speech - denies  Negative symptoms - denies    Elevated mood - denies  Decreased need for " sleep - denies  Grandiosity - denies  Racing thoughts - denies  Impulsivity - denies  Irritability- denies  Increased energy - denies  Distractibility - denies  Increase in goal-directed activity or PMA- denies    Symptoms of ITZ - Yes  Symptoms of Panic Disorder- Yes  Symptoms of PTSD - Yes          Psychotherapy:  Target symptoms: depression, anxiety , substance abuse  Why chosen therapy is appropriate versus another modality: relevant to diagnosis  Outcome monitoring methods: self-report  Therapeutic intervention type: supportive psychotherapy  Topics discussed/themes: building skills sets for symptom management, symptom recognition  The patient's response to the intervention is guarded. The patient's progress toward treatment goals is limited.   Duration of intervention: 16 minutes.          Overall progress: Patient is showing no improvement on the Unit to date            Medical ROS  Review of Systems   Constitutional:  Negative for chills and fever.   HENT:  Negative for congestion, hearing loss, sore throat and tinnitus.    Eyes:  Negative for blurred vision, double vision, photophobia and pain.   Respiratory:  Negative for cough, hemoptysis, sputum production, shortness of breath and wheezing.    Cardiovascular:  Negative for chest pain, palpitations and leg swelling.   Gastrointestinal:  Negative for abdominal pain, blood in stool, constipation, diarrhea, nausea and vomiting.   Genitourinary:  Negative for dysuria, frequency, hematuria and urgency.   Musculoskeletal:  Negative for back pain, joint pain and myalgias.   Skin:  Negative for rash.   Neurological:  Negative for dizziness, tremors, seizures, weakness and headaches.       PAST MEDICAL HISTORY   Past Medical History:   Diagnosis Date    Abnormal Pap smear age 30    Pos. HPV,     Abnormal Pap smear of cervix     Addiction to drug     ADHD (attention deficit hyperactivity disorder)     Arthritis     Bipolar disorder     COPD (chronic obstructive  "pulmonary disease)     Depression     Diabetes mellitus     Fatigue     Hallucination     History of psychiatric hospitalization     Hx of psychiatric care     OCD (obsessive compulsive disorder)     Psychiatric problem     PTSD (post-traumatic stress disorder)     Sleep difficulties     Substance abuse     Suicide attempt     Therapy            PSYCHOTROPIC MEDICATIONS   Scheduled Meds:   diazePAM  10 mg Oral TID    folic acid-vit B6-vit B12 2.5-25-2 mg  1 tablet Oral Daily    multivitamin  1 tablet Oral Daily    nicotine  1 patch Transdermal Daily    pregabalin  50 mg Oral BID    QUEtiapine  100 mg Oral Daily    QUEtiapine  400 mg Oral Nightly     Continuous Infusions:  PRN Meds:.acetaminophen, aluminum-magnesium hydroxide-simethicone, docusate sodium, hydrOXYzine pamoate, ibuprofen, loperamide, OLANZapine **AND** OLANZapine, ondansetron, promethazine        EXAMINATION    VITALS   Vitals:    10/15/22 1954 10/16/22 0800 10/16/22 2032 10/17/22 0741   BP: 104/62 112/82 113/78 (!) 99/55   BP Location: Left arm Left arm Right arm    Patient Position: Sitting Sitting Sitting    Pulse: 80 86 89 80   Resp: 18 16 18 18   Temp: 98.2 °F (36.8 °C) 97.4 °F (36.3 °C) 97.5 °F (36.4 °C) 97.4 °F (36.3 °C)   TempSrc: Temporal Temporal Temporal Temporal   SpO2:       Weight:  73.5 kg (162 lb 2.4 oz)     Height:           Body mass index is 27.83 kg/m².        CONSTITUTIONAL  General Appearance: Female, in casual attire, appears stated age; NAD     MUSCULOSKELETAL  Muscle Strength and Tone: normal  Abnormal Involuntary Movements:none  Gait and Station:normal; non-ataxic     PSYCHIATRIC   Level of Consciousness: awake, alert  Orientation: p/p/t/s  Grooming: adequate to circumstances  Psychomotor Behavior: no PMR, no PMA  Speech: nl r/t/v/s  Language: English, fluent  Mood: "ok"  Affect: congruent with mood  Thought Process: linear, organized, goal directed  Associations: intact; no JOSE  Thought Content: less AVH/delusions; denied HI, " less SI  Memory: intact to recent and remote events  Attention:   less distractible   Fund of Knowledge: age and education appropriate  Estimate if Intelligence: average based on work/education history, vocabulary and mental status exam  Insight: impaired- seeking help; limitedly understands illness  Judgment: impaired - + bx issues; compliant/cooperative; s/p overdose      DIAGNOSTIC TESTING   Laboratory Results  No results found for this or any previous visit (from the past 24 hour(s)).          MEDICAL DECISION MAKING      ASSESSMENT:   Bipolar I Disorder mre depressed, severe, with psychotic features  ITZ  PTSD     Nicotine Dependence  Polysubstance abuse (h/o alcohol, opiate, amphetamine, sedative hypnotic and cocaine abuse; unable to specify or determine further at this time)     Psychosocial stressors     Chronic pain (sciatica)  B12 deficiency           PROBLEM LIST AND MANAGEMENT PLANS          Mood: pt counseled   -Resumed/continue home medication Serqouel at 100 mg po day and 300 mg po q HS --> Titrate QHS dose to 400mg on 10/16  - will change/optimize as indicated     Anxiety: pt counseled  -serqouel off-label as above  -hold zoloft for now; will-reinitiate if needed     PTSD: pt counseled  -seroquel off-label as above     Insomnia:pt counseled  -seroquel/ off-label as above  -vistaril prn     Nicotine use: pt counseled;   Started/continue nicotine 14 mg patch dermal daily     Substance use: pt counseled  - encouraging rehab  - Started valium 10 mg PO 4 times daily; increase to 15 mg po 4 times daily- decrease to 10 mg po 4 times   -decreased to 10 mg PO TID -decrease to BID -will taper down/off as able     Psychosocial stressors: pt counseled;   -SW consulted to to assist with resources     Chronic pain: pt counseled  - continue Lyrica 50 mg po BID  - monitor      B12 deficiency: pt counseled  -give B12 1000 mcg IM x 1 on 10/12/22  -started/continue Folbic po q day           Discussed diagnosis, risks  and benefits of proposed treatment vs alternative treatments vs no treatment, potential side effects of these treatments and the inherent unpredictability of treatment. The patient expresses understanding of the above and displays the capacity to agree with this treatment given said understanding. Patient also agrees that, currently, the benefits outweigh the risks and would like to pursue/continue treatment at this time.          DISCHARGE PLANNING  Expected Disposition Plan: Home or Self Care      NEED FOR CONTINUED HOSPITALIZATION  Psychiatric illness continues to pose a potential threat to life or bodily function, of self or others, thereby requiring the need for continued inpatient psychiatric hospitalization: Yes, due to: danger to self and gravely disabled, as evidenced by:  Ongoing concerns with grave disability with patient unable to perform basic feeding, hygiene and dressing activities without significant constant support.    Protective inpatient pyschiatric hospitalization required while a safe disposition plan is enacted: Yes    Patient stabilized and ready for discharge from inpatient psychiatric unit: No        STAFF:   Jason Romeo III, MD  Psychiatry

## 2022-10-17 NOTE — PLAN OF CARE
Following POC.  Makes needs known.  Denies SI/HI AVH.  Medication for pain helps.  Out on the unit most of the evening.  States she is feeling better today.  Appropriate interaction with peers and staff.  Showered.  Appetite is good.  Medication compliance.  Encouraged substance abuse free lifestyle.  Encouraged adherence to follow-up POC.  Free of fall.

## 2022-10-17 NOTE — PSYCH
PT discussed with [facility] about her income decreasing and was wondering if she could apply for medicaid. [facility] discussed with pt that a staff member will come to assist her in applying for medicaid. [facility] contacted Ms. Juárez ( [phone]. Ms. Saran Mariee states she will come to assist pt in applying for medicaid.

## 2022-10-17 NOTE — PLAN OF CARE
"Patient argumentative, demanding, and hostile.  Denies intentions to harm self and/or others at this time. Denies auditory and/or visual hallucinations.  Affect and emotion labile and elevated.  Pressured speech with little to no insight into substance ab/use.  Patient states that she does not want rehab at this time and wants to go home because her parents are out of town and she needs to go home and take care of her 18 year old son, she plans to throw him a belated birthday/halloween party (halloween weekend), then camping the first weekend in November and will have outpatient mental health set her up with rehab after all of her events.  Accepts meals and medications.  Patient unwilling to learn new information at this time.    12:30--PRN Zyprexa IM given per patient demand/request.  Patient irrate on the phone speaking in a loud voice disputing not being discharged today and demands for Toradol IM not immediately being addressed. Patient yelling "they letting these heroin addicts out before me and I just tried to commit suicide."  Attempts as verbal de escalation unsuccessful prior to IM injection.    15:25--PRN Vistaril po given per patient request for anxiety  "

## 2022-10-18 PROBLEM — M54.41 ACUTE RIGHT-SIDED LOW BACK PAIN WITH RIGHT-SIDED SCIATICA: Status: ACTIVE | Noted: 2022-10-18

## 2022-10-18 PROCEDURE — 25000003 PHARM REV CODE 250: Performed by: NURSE PRACTITIONER

## 2022-10-18 PROCEDURE — 25000003 PHARM REV CODE 250: Performed by: PSYCHIATRY & NEUROLOGY

## 2022-10-18 PROCEDURE — 25000003 PHARM REV CODE 250: Performed by: STUDENT IN AN ORGANIZED HEALTH CARE EDUCATION/TRAINING PROGRAM

## 2022-10-18 PROCEDURE — 99233 PR SUBSEQUENT HOSPITAL CARE,LEVL III: ICD-10-PCS | Mod: ,,, | Performed by: STUDENT IN AN ORGANIZED HEALTH CARE EDUCATION/TRAINING PROGRAM

## 2022-10-18 PROCEDURE — S4991 NICOTINE PATCH NONLEGEND: HCPCS | Performed by: PSYCHIATRY & NEUROLOGY

## 2022-10-18 PROCEDURE — 11400000 HC PSYCH PRIVATE ROOM

## 2022-10-18 PROCEDURE — S0166 INJ OLANZAPINE 2.5MG: HCPCS | Performed by: PSYCHIATRY & NEUROLOGY

## 2022-10-18 PROCEDURE — 99232 PR SUBSEQUENT HOSPITAL CARE,LEVL II: ICD-10-PCS | Mod: ,,, | Performed by: NURSE PRACTITIONER

## 2022-10-18 PROCEDURE — 99233 SBSQ HOSP IP/OBS HIGH 50: CPT | Mod: ,,, | Performed by: STUDENT IN AN ORGANIZED HEALTH CARE EDUCATION/TRAINING PROGRAM

## 2022-10-18 PROCEDURE — 99232 SBSQ HOSP IP/OBS MODERATE 35: CPT | Mod: ,,, | Performed by: NURSE PRACTITIONER

## 2022-10-18 PROCEDURE — 90833 PSYTX W PT W E/M 30 MIN: CPT | Mod: ,,, | Performed by: STUDENT IN AN ORGANIZED HEALTH CARE EDUCATION/TRAINING PROGRAM

## 2022-10-18 PROCEDURE — 90833 PR PSYCHOTHERAPY W/PATIENT W/E&M, 30 MIN (ADD ON): ICD-10-PCS | Mod: ,,, | Performed by: STUDENT IN AN ORGANIZED HEALTH CARE EDUCATION/TRAINING PROGRAM

## 2022-10-18 RX ORDER — CYCLOBENZAPRINE HCL 5 MG
10 TABLET ORAL 3 TIMES DAILY PRN
Status: DISCONTINUED | OUTPATIENT
Start: 2022-10-18 | End: 2022-10-23 | Stop reason: HOSPADM

## 2022-10-18 RX ORDER — LIDOCAINE 50 MG/G
1 PATCH TOPICAL
Status: DISCONTINUED | OUTPATIENT
Start: 2022-10-18 | End: 2022-10-23 | Stop reason: HOSPADM

## 2022-10-18 RX ADMIN — Medication 1 TABLET: at 08:10

## 2022-10-18 RX ADMIN — LIDOCAINE 5% 1 PATCH: 700 PATCH TOPICAL at 01:10

## 2022-10-18 RX ADMIN — QUETIAPINE FUMARATE 400 MG: 200 TABLET ORAL at 08:10

## 2022-10-18 RX ADMIN — QUETIAPINE FUMARATE 100 MG: 100 TABLET ORAL at 08:10

## 2022-10-18 RX ADMIN — OLANZAPINE 10 MG: 10 INJECTION, POWDER, LYOPHILIZED, FOR SOLUTION INTRAMUSCULAR at 10:10

## 2022-10-18 RX ADMIN — DIAZEPAM 10 MG: 10 TABLET ORAL at 08:10

## 2022-10-18 RX ADMIN — PREGABALIN 50 MG: 25 CAPSULE ORAL at 08:10

## 2022-10-18 RX ADMIN — IBUPROFEN 800 MG: 800 TABLET, FILM COATED ORAL at 02:10

## 2022-10-18 RX ADMIN — OLANZAPINE 10 MG: 10 INJECTION, POWDER, LYOPHILIZED, FOR SOLUTION INTRAMUSCULAR at 04:10

## 2022-10-18 RX ADMIN — NICOTINE 1 PATCH: 14 PATCH, EXTENDED RELEASE TRANSDERMAL at 08:10

## 2022-10-18 RX ADMIN — CYCLOBENZAPRINE HYDROCHLORIDE 10 MG: 5 TABLET, FILM COATED ORAL at 01:10

## 2022-10-18 RX ADMIN — HYDROXYZINE PAMOATE 50 MG: 50 CAPSULE ORAL at 02:10

## 2022-10-18 RX ADMIN — THERA TABS 1 TABLET: TAB at 08:10

## 2022-10-18 NOTE — SUBJECTIVE & OBJECTIVE
Past Medical History:   Diagnosis Date    Abnormal Pap smear age 30    Pos. HPV,     Abnormal Pap smear of cervix     Addiction to drug     ADHD (attention deficit hyperactivity disorder)     Arthritis     Bipolar disorder     COPD (chronic obstructive pulmonary disease)     Depression     Diabetes mellitus     Fatigue     Hallucination     History of psychiatric hospitalization     Hx of psychiatric care     OCD (obsessive compulsive disorder)     Psychiatric problem     PTSD (post-traumatic stress disorder)     Sleep difficulties     Substance abuse     Suicide attempt     Therapy        Past Surgical History:   Procedure Laterality Date    bariatric sleeve N/A 2020     SECTION  ;     DILATION AND CURETTAGE OF UTERUS  2016    ENDOMETRIAL ABLATION  2016    KNEE SURGERY Right     hardware-bone from hip to repair    TUBAL LIGATION         Review of patient's allergies indicates:   Allergen Reactions    Latex, natural rubber        No current facility-administered medications on file prior to encounter.     Current Outpatient Medications on File Prior to Encounter   Medication Sig    acetaminophen (TYLENOL) 650 MG TbSR Take 1 tablet (650 mg total) by mouth every 8 (eight) hours.    amitriptyline (ELAVIL) 25 MG tablet Take 25 mg by mouth once daily.    clonazePAM (KLONOPIN) 0.5 MG tablet TAKE ONE TABLET BY MOUTH TWICE DAILY AS NEEDED FOR ANXIETY    cyclobenzaprine (FLEXERIL) 10 MG tablet Take 1 tablet (10 mg total) by mouth 3 (three) times daily as needed for Muscle spasms.    INVEGA SUSTENNA 234 mg/1.5 mL Syrg injection Inject into the muscle.    QUEtiapine (SEROQUEL) 300 MG Tab Take 1 tablet (300 mg total) by mouth every evening.    VENTOLIN HFA 90 mcg/actuation inhaler INHALE 2 PUFFS EVERY 6   HOURS AS NEEDED FOR      WHEEZING    [DISCONTINUED] diazePAM (VALIUM) 5 MG tablet Take daily for 5 days then half tablet daily for 4 days then stop (Patient not taking: No sig  reported)    [DISCONTINUED] fluticasone furoate-vilanteroL (BREO) 100-25 mcg/dose diskus inhaler Inhale 1 puff into the lungs once daily. Controller    [DISCONTINUED] sertraline (ZOLOFT) 25 MG tablet TAKE ONE TABLET BY MOUTH ONCE A DAY AT BEDTIME     Family History       Problem Relation (Age of Onset)    Anxiety disorder Sister    Colon cancer Father (60)    Coronary artery disease Maternal Grandfather    Depression Sister    Diabetes Maternal Grandmother, Maternal Grandfather    Drug abuse Sister    Hypertension Father    No Known Problems Paternal Aunt, Maternal Aunt, Maternal Uncle, Paternal Uncle, Paternal Grandfather, Paternal Grandmother, Cousin          Tobacco Use    Smoking status: Every Day     Packs/day: 1.00     Years: 25.00     Pack years: 25.00     Types: Cigarettes     Start date: 11/28/1997    Smokeless tobacco: Never    Tobacco comments:     told about brochure and smoking clinic program   Substance and Sexual Activity    Alcohol use: Yes     Comment: Socially-2 times a week-4-5 beers    Drug use: Not Currently     Types: Amphetamines, Benzodiazepines    Sexual activity: Yes     Partners: Male     Birth control/protection: Surgical, See Surgical Hx     Comment: - has a boyfriend     Review of Systems   Musculoskeletal:  Positive for arthralgias and back pain.   Objective:     Vital Signs (Most Recent):  Temp: 97.3 °F (36.3 °C) (10/18/22 0748)  Pulse: 79 (10/18/22 0748)  Resp: 18 (10/18/22 0748)  BP: 108/65 (10/18/22 0748)  SpO2: 99 % (10/11/22 2150) Vital Signs (24h Range):  Temp:  [97.3 °F (36.3 °C)-98.1 °F (36.7 °C)] 97.3 °F (36.3 °C)  Pulse:  [79-93] 79  Resp:  [16-18] 18  BP: (107-108)/(63-65) 108/65     Weight: 73.5 kg (162 lb 2.4 oz)  Body mass index is 27.83 kg/m².    Physical Exam  Vitals and nursing note reviewed.   Constitutional:       General: She is not in acute distress.     Appearance: She is well-developed.   HENT:      Head: Normocephalic and atraumatic.   Eyes:       Pupils: Pupils are equal, round, and reactive to light.   Neck:      Thyroid: No thyromegaly.   Cardiovascular:      Rate and Rhythm: Normal rate and regular rhythm.      Heart sounds: Normal heart sounds. No murmur heard.  Pulmonary:      Effort: Pulmonary effort is normal. No respiratory distress.      Breath sounds: Normal breath sounds. No wheezing or rales.   Abdominal:      General: Bowel sounds are normal. There is no distension.      Palpations: Abdomen is soft. There is no mass.      Tenderness: There is no abdominal tenderness.   Musculoskeletal:         General: Tenderness (SI joint tenderness, ROM ok, on exam pt was sitting on floor and able to get up from floor and ambualte without difficulty) present. No deformity. Normal range of motion.   Lymphadenopathy:      Cervical: No cervical adenopathy.   Skin:     General: Skin is warm and dry.      Findings: No erythema or rash.   Neurological:      Mental Status: She is alert and oriented to person, place, and time.      Comments: CN II visual fields full to confrontation  CN III, Iv, VI- pupils ERRL   CN III- no palsy  Nystagmus; none   Diplopia- none  ophthalmoparesis- none  CN V- facial sensation intact  CN VII- facial expression full and symmetric  CNVIII- normal  CN IV-Normal  CN X- normal  CN XI- Normal   CN XII normal          Significant Labs: All pertinent labs within the past 24 hours have been reviewed.  A1C:   Recent Labs   Lab 05/05/22  2100 10/11/22  1254   HGBA1C 5.1 5.4     ABGs: No results for input(s): PH, PCO2, HCO3, POCSATURATED, BE, TOTALHB, COHB, METHB, O2HB, POCFIO2, PO2 in the last 48 hours.  Bilirubin:   Recent Labs   Lab 10/11/22  1254   BILITOT 0.1     Blood Culture: No results for input(s): LABBLOO in the last 48 hours.  CBC: No results for input(s): WBC, HGB, HCT, PLT in the last 48 hours.  CMP: No results for input(s): NA, K, CL, CO2, GLU, BUN, CREATININE, CALCIUM, PROT, ALBUMIN, BILITOT, ALKPHOS, AST, ALT, ANIONGAP, EGFRNONAA  in the last 48 hours.    Invalid input(s): ESTGFAFRICA  Lactic Acid: No results for input(s): LACTATE in the last 48 hours.  Magnesium: No results for input(s): MG in the last 48 hours.  POCT Glucose: No results for input(s): POCTGLUCOSE in the last 48 hours.  Troponin: No results for input(s): TROPONINI, TROPONINIHS in the last 48 hours.  TSH:   Recent Labs   Lab 10/11/22  1254   TSH 0.188*     Urine Culture: No results for input(s): LABURIN in the last 48 hours.  Urine Studies: No results for input(s): COLORU, APPEARANCEUA, PHUR, SPECGRAV, PROTEINUA, GLUCUA, KETONESU, BILIRUBINUA, OCCULTUA, NITRITE, UROBILINOGEN, LEUKOCYTESUR, RBCUA, WBCUA, BACTERIA, SQUAMEPITHEL, HYALINECASTS in the last 48 hours.    Invalid input(s): WRIGHTSUR    Significant Imaging: I have reviewed and interpreted all pertinent imaging results/findings within the past 24 hours.

## 2022-10-18 NOTE — PROGRESS NOTES
"PSYCHIATRY DAILY INPATIENT PROGRESS NOTE  SUBSEQUENT HOSPITAL VISIT    ENCOUNTER DATE: 10/18/2022  SITE: [facility]    DATE OF ADMISSION: 10/11/2022  9:40 PM  LENGTH OF STAY: 7 days      CHIEF COMPLAINT   Joe Henson is a 42 y.o. female, seen during daily salguero rounds on the inpatient unit.  Joe Henson presented with the chief complaint of mood disorder, anxiety and substance problems      The patient was seen and examined. The chart was reviewed.     Reviewed notes from Rns and LPC and labs from the last 24 hours.    The patient's case was discussed with the treatment team/care providers today including Rns    Staff reports less behavioral or management issues.     The patient has been compliant with treatment.        Subjective 10/18/2022    Reports "If I really wanted to die, I wouldn't have told someone, I don't know how it would be to be dead.... I took the pills where my son could see."    Mood very labile, crying heavily, primary focus is blaming other and an external locus of control, very poor coping skills. She discussed her financial stressors at length. Her goal is to get a part time job at [employer] as a .    Illogical, "I have to go to rehab," however refuses to go.    The patient denies any side effects to medications.          Per RN:   Patient argumentative, demanding, and hostile.  Denies intentions to harm self and/or others at this time. Denies auditory and/or visual hallucinations.  Affect and emotion labile and elevated.  Pressured speech with little to no insight into substance ab/use.  Patient states that she does not want rehab at this time and wants to go home because her parents are out of town and she needs to go home and take care of her 18 year old son, she plans to throw him a belated birthday/halloween party (halloween weekend), then camping the first weekend in November and will have outpatient mental health set her up with rehab after all of her " "events.  Accepts meals and medications.  Patient unwilling to learn new information at this time.     12:30--PRN Zyprexa IM given per patient demand/request.  Patient irrate on the phone speaking in a loud voice disputing not being discharged today and demands for Toradol IM not immediately being addressed. Patient yelling "they letting these heroin addicts out before me and I just tried to commit suicide."  Attempts as verbal de escalation unsuccessful prior to IM injection.     15:25--PRN Vistaril po given per patient request for anxiety            Psychiatric ROS (observed, reported, or endorsed/denied):  Depressed mood - variable  Interest/pleasure/anhedonia: variable  Guilt/hopelessness/worthlessness - variable  Changes in Sleep - variable  Changes in Appetite - variable  Changes in Concentration - variable  Changes in Energy - variable  PMA/R- denies  Suicidal- active/passive ideations - No  Homicidal ideations: active/passive ideations - denies    Hallucinations - denies  Delusions - denies  Disorganized behavior - denies  Disorganized speech - denies  Negative symptoms - denies    Elevated mood - denies  Decreased need for sleep - denies  Grandiosity - denies  Racing thoughts - denies  Impulsivity - denies  Irritability- denies  Increased energy - denies  Distractibility - denies  Increase in goal-directed activity or PMA- denies    Symptoms of ITZ - Yes  Symptoms of Panic Disorder- Yes  Symptoms of PTSD - Yes          Psychotherapy:  Target symptoms: depression, anxiety , substance abuse  Why chosen therapy is appropriate versus another modality: relevant to diagnosis  Outcome monitoring methods: self-report  Therapeutic intervention type: supportive psychotherapy  Topics discussed/themes: building skills sets for symptom management, symptom recognition  The patient's response to the intervention is guarded. The patient's progress toward treatment goals is limited.   Duration of intervention: 16 " minutes.          Overall progress: Patient is showing no improvement on the Unit to date            Medical ROS  Review of Systems   Constitutional:  Negative for chills and fever.   HENT:  Negative for congestion, hearing loss, sore throat and tinnitus.    Eyes:  Negative for blurred vision, double vision, photophobia and pain.   Respiratory:  Negative for cough, hemoptysis, sputum production, shortness of breath and wheezing.    Cardiovascular:  Negative for chest pain, palpitations and leg swelling.   Gastrointestinal:  Negative for abdominal pain, blood in stool, constipation, diarrhea, nausea and vomiting.   Genitourinary:  Negative for dysuria, frequency, hematuria and urgency.   Musculoskeletal:  Negative for back pain, joint pain and myalgias.   Skin:  Negative for rash.   Neurological:  Negative for dizziness, tremors, seizures, weakness and headaches.       PAST MEDICAL HISTORY   Past Medical History:   Diagnosis Date    Abnormal Pap smear age 30    Pos. HPV,     Abnormal Pap smear of cervix     Acute right-sided low back pain with right-sided sciatica 10/18/2022    Addiction to drug     ADHD (attention deficit hyperactivity disorder)     Arthritis     Bipolar disorder     COPD (chronic obstructive pulmonary disease)     Depression     Diabetes mellitus     Fatigue     Hallucination     History of psychiatric hospitalization     Hx of psychiatric care     OCD (obsessive compulsive disorder)     Psychiatric problem     PTSD (post-traumatic stress disorder)     Sleep difficulties     Substance abuse     Suicide attempt     Therapy            PSYCHOTROPIC MEDICATIONS   Scheduled Meds:   diazePAM  10 mg Oral BID    folic acid-vit B6-vit B12 2.5-25-2 mg  1 tablet Oral Daily    multivitamin  1 tablet Oral Daily    nicotine  1 patch Transdermal Daily    pregabalin  50 mg Oral BID    QUEtiapine  100 mg Oral Daily    QUEtiapine  400 mg Oral Nightly     Continuous Infusions:  PRN Meds:.acetaminophen,  "aluminum-magnesium hydroxide-simethicone, docusate sodium, hydrOXYzine pamoate, ibuprofen, loperamide, OLANZapine **AND** OLANZapine, ondansetron, promethazine        EXAMINATION    VITALS   Vitals:    10/16/22 2032 10/17/22 0741 10/17/22 1924 10/18/22 0748   BP: 113/78 (!) 99/55 107/63 108/65   BP Location: Right arm   Left arm   Patient Position: Sitting      Pulse: 89 80 93 79   Resp: 18 18 16 18   Temp: 97.5 °F (36.4 °C) 97.4 °F (36.3 °C) 98.1 °F (36.7 °C) 97.3 °F (36.3 °C)   TempSrc: Temporal Temporal  Temporal   SpO2:       Weight:       Height:           Body mass index is 27.83 kg/m².        CONSTITUTIONAL  General Appearance: Female, in casual attire, appears stated age; NAD     MUSCULOSKELETAL  Muscle Strength and Tone: normal  Abnormal Involuntary Movements:none  Gait and Station:normal; non-ataxic     PSYCHIATRIC   Level of Consciousness: awake, alert  Orientation: p/p/t/s  Grooming: adequate to circumstances  Psychomotor Behavior: no PMR, no PMA  Speech: nl r/t/v/s  Language: English, fluent  Mood: "ok"  Affect: congruent with mood  Thought Process: linear, organized, goal directed  Associations: intact; no JOSE  Thought Content: less AVH/delusions; denied HI, less SI  Memory: intact to recent and remote events  Attention:   less distractible   Fund of Knowledge: age and education appropriate  Estimate if Intelligence: average based on work/education history, vocabulary and mental status exam  Insight: impaired- seeking help; limitedly understands illness  Judgment: impaired - + bx issues; compliant/cooperative; s/p overdose      DIAGNOSTIC TESTING   Laboratory Results  No results found for this or any previous visit (from the past 24 hour(s)).          MEDICAL DECISION MAKING        ASSESSMENT:   Bipolar I Disorder mre depressed, severe, with psychotic features  ITZ  PTSD     Nicotine Dependence  Polysubstance abuse (h/o alcohol, opiate, amphetamine, sedative hypnotic and cocaine abuse; unable to " specify or determine further at this time)     Psychosocial stressors     Chronic pain (sciatica)  B12 deficiency           PROBLEM LIST AND MANAGEMENT PLANS          Mood: pt counseled   -Resumed/continue home medication Serqouel at 100 mg po day and 300 mg po q HS --> Titrate QHS dose to 400mg on 10/16  - will change/optimize as indicated     Anxiety: pt counseled  -serqouel off-label as above  -hold zoloft for now; will-reinitiate if needed     PTSD: pt counseled  -seroquel off-label as above     Insomnia:pt counseled  -seroquel/ off-label as above  -vistaril prn     Nicotine use: pt counseled;   Started/continue nicotine 14 mg patch dermal daily     Substance use: pt counseled  - encouraging rehab  - Started valium 10 mg PO 4 times daily; increase to 15 mg po 4 times daily- decrease to 10 mg po 4 times   -decreased to 10 mg PO TID -decrease to BID -will taper down/off as able     Psychosocial stressors: pt counseled;   -SW consulted to to assist with resources     Chronic pain: pt counseled  - continue Lyrica 50 mg po BID  - monitor      B12 deficiency: pt counseled  -give B12 1000 mcg IM x 1 on 10/12/22  -started/continue Folbic po q day           Discussed diagnosis, risks and benefits of proposed treatment vs alternative treatments vs no treatment, potential side effects of these treatments and the inherent unpredictability of treatment. The patient expresses understanding of the above and displays the capacity to agree with this treatment given said understanding. Patient also agrees that, currently, the benefits outweigh the risks and would like to pursue/continue treatment at this time.          DISCHARGE PLANNING  Expected Disposition Plan: Home or Self Care      NEED FOR CONTINUED HOSPITALIZATION  Psychiatric illness continues to pose a potential threat to life or bodily function, of self or others, thereby requiring the need for continued inpatient psychiatric hospitalization: Yes, due to: danger to  self and gravely disabled, as evidenced by:  Ongoing concerns with grave disability with patient unable to perform basic feeding, hygiene and dressing activities without significant constant support.    Protective inpatient pyschiatric hospitalization required while a safe disposition plan is enacted: Yes    Patient stabilized and ready for discharge from inpatient psychiatric unit: No        STAFF:   Jason Romeo III, MD  Psychiatry

## 2022-10-18 NOTE — PLAN OF CARE
"Patient hostile, irrational, and labile.  Denies intentions to harm self and/or others at this time. Denies auditory and/or visual hallucinations.  Affect and emotion labile and angry.  Patient has no insight into substance ab/use; irrational statements relating to events leading to admit and excuses for not wanting to discharge to rehab for extensive adderall and benzo abuse..  Interacts with peers and staff.  Accepts meals and medications.  Discussed medication and plan of care as well as healthy coping skills and techniques; staff will continue to reinforce and educate.      10:35--PRN Zyprexa IM given for observation/report of agitation, frustration, and hostility  11:50-follow up-patient demanding "booty juice" d/t frustration with psychiatry decision for continued stay for safety; no new orders at this time    13:20--PRN Flexeril given for c/o right sciatic pain  "

## 2022-10-18 NOTE — CONSULTS
[facility] Medicine  Consult Note    Patient Name: Joe Henson  MRN: [id number]  Admission Date: 10/11/2022  Hospital Length of Stay: 7 days  Attending Physician: Jason Romeo III, MD   Primary Care Provider: Estela Munguia NP           Patient information was obtained from patient, past medical records and ER records.     Inpatient consult to Family Medicine  Consult performed by: Wendy Babcock NP  Consult ordered by: Jermaine Aguilar MD        Subjective:     Principal Problem: Bipolar disorder    Chief Complaint: No chief complaint on file.       HPI: 43 yo female patient presented bud ER with c./o suicidal ideation/ overdose of klonopin. Given charcoal upon admission to ER per poison control recs. No s/s of benzo overdose and cleared for [facility] admission. Medicine consulted for H/P. VSS/ afebrile. Low TSH but UDS + benzo and amphetamines.U.a positive nitrite but 11 WBC- no dysuria or frequency complaints - does report itching       Past Medical History:   Diagnosis Date    Abnormal Pap smear age 30    Pos. HPV,     Abnormal Pap smear of cervix     Addiction to drug     ADHD (attention deficit hyperactivity disorder)     Arthritis     Bipolar disorder     COPD (chronic obstructive pulmonary disease)     Depression     Diabetes mellitus     Fatigue     Hallucination     History of psychiatric hospitalization     Hx of psychiatric care     OCD (obsessive compulsive disorder)     Psychiatric problem     PTSD (post-traumatic stress disorder)     Sleep difficulties     Substance abuse     Suicide attempt     Therapy        Past Surgical History:   Procedure Laterality Date    bariatric sleeve N/A 2020     SECTION  ;     DILATION AND CURETTAGE OF UTERUS  2016    ENDOMETRIAL ABLATION  2016    KNEE SURGERY Right     hardware-bone from hip to repair    TUBAL LIGATION         Review of patient's allergies  indicates:   Allergen Reactions    Latex, natural rubber        No current facility-administered medications on file prior to encounter.     Current Outpatient Medications on File Prior to Encounter   Medication Sig    acetaminophen (TYLENOL) 650 MG TbSR Take 1 tablet (650 mg total) by mouth every 8 (eight) hours.    amitriptyline (ELAVIL) 25 MG tablet Take 25 mg by mouth once daily.    clonazePAM (KLONOPIN) 0.5 MG tablet TAKE ONE TABLET BY MOUTH TWICE DAILY AS NEEDED FOR ANXIETY    cyclobenzaprine (FLEXERIL) 10 MG tablet Take 1 tablet (10 mg total) by mouth 3 (three) times daily as needed for Muscle spasms.    INVEGA SUSTENNA 234 mg/1.5 mL Syrg injection Inject into the muscle.    QUEtiapine (SEROQUEL) 300 MG Tab Take 1 tablet (300 mg total) by mouth every evening.    VENTOLIN HFA 90 mcg/actuation inhaler INHALE 2 PUFFS EVERY 6   HOURS AS NEEDED FOR      WHEEZING    [DISCONTINUED] diazePAM (VALIUM) 5 MG tablet Take daily for 5 days then half tablet daily for 4 days then stop (Patient not taking: No sig reported)    [DISCONTINUED] fluticasone furoate-vilanteroL (BREO) 100-25 mcg/dose diskus inhaler Inhale 1 puff into the lungs once daily. Controller    [DISCONTINUED] sertraline (ZOLOFT) 25 MG tablet TAKE ONE TABLET BY MOUTH ONCE A DAY AT BEDTIME     Family History       Problem Relation (Age of Onset)    Anxiety disorder Sister    Colon cancer Father (60)    Coronary artery disease Maternal Grandfather    Depression Sister    Diabetes Maternal Grandmother, Maternal Grandfather    Drug abuse Sister    Hypertension Father    No Known Problems Paternal Aunt, Maternal Aunt, Maternal Uncle, Paternal Uncle, Paternal Grandfather, Paternal Grandmother, Cousin          Tobacco Use    Smoking status: Every Day     Packs/day: 1.00     Years: 25.00     Pack years: 25.00     Types: Cigarettes     Start date: 11/28/1997    Smokeless tobacco: Never    Tobacco comments:     told about brochure and smoking clinic  program   Substance and Sexual Activity    Alcohol use: Yes     Comment: Socially-2 times a week-4-5 beers    Drug use: Not Currently     Types: Amphetamines, Benzodiazepines    Sexual activity: Yes     Partners: Male     Birth control/protection: Surgical, See Surgical Hx     Comment: - has a boyfriend     Review of Systems   Musculoskeletal:  Positive for arthralgias and back pain.   Objective:     Vital Signs (Most Recent):  Temp: 97.3 °F (36.3 °C) (10/18/22 0748)  Pulse: 79 (10/18/22 0748)  Resp: 18 (10/18/22 0748)  BP: 108/65 (10/18/22 0748)  SpO2: 99 % (10/11/22 2150) Vital Signs (24h Range):  Temp:  [97.3 °F (36.3 °C)-98.1 °F (36.7 °C)] 97.3 °F (36.3 °C)  Pulse:  [79-93] 79  Resp:  [16-18] 18  BP: (107-108)/(63-65) 108/65     Weight: 73.5 kg (162 lb 2.4 oz)  Body mass index is 27.83 kg/m².    Physical Exam  Vitals and nursing note reviewed.   Constitutional:       General: She is not in acute distress.     Appearance: She is well-developed.   HENT:      Head: Normocephalic and atraumatic.   Eyes:      Pupils: Pupils are equal, round, and reactive to light.   Neck:      Thyroid: No thyromegaly.   Cardiovascular:      Rate and Rhythm: Normal rate and regular rhythm.      Heart sounds: Normal heart sounds. No murmur heard.  Pulmonary:      Effort: Pulmonary effort is normal. No respiratory distress.      Breath sounds: Normal breath sounds. No wheezing or rales.   Abdominal:      General: Bowel sounds are normal. There is no distension.      Palpations: Abdomen is soft. There is no mass.      Tenderness: There is no abdominal tenderness.   Musculoskeletal:         General: Tenderness (SI joint tenderness, ROM ok, on exam pt was sitting on floor and able to get up from floor and ambualte without difficulty) present. No deformity. Normal range of motion.   Lymphadenopathy:      Cervical: No cervical adenopathy.   Skin:     General: Skin is warm and dry.      Findings: No erythema or rash.    Neurological:      Mental Status: She is alert and oriented to person, place, and time.      Comments: CN II visual fields full to confrontation  CN III, Iv, VI- pupils ERRL   CN III- no palsy  Nystagmus; none   Diplopia- none  ophthalmoparesis- none  CN V- facial sensation intact  CN VII- facial expression full and symmetric  CNVIII- normal  CN IV-Normal  CN X- normal  CN XI- Normal   CN XII normal          Significant Labs: All pertinent labs within the past 24 hours have been reviewed.  A1C:   Recent Labs   Lab 05/05/22  2100 10/11/22  1254   HGBA1C 5.1 5.4     ABGs: No results for input(s): PH, PCO2, HCO3, POCSATURATED, BE, TOTALHB, COHB, METHB, O2HB, POCFIO2, PO2 in the last 48 hours.  Bilirubin:   Recent Labs   Lab 10/11/22  1254   BILITOT 0.1     Blood Culture: No results for input(s): LABBLOO in the last 48 hours.  CBC: No results for input(s): WBC, HGB, HCT, PLT in the last 48 hours.  CMP: No results for input(s): NA, K, CL, CO2, GLU, BUN, CREATININE, CALCIUM, PROT, ALBUMIN, BILITOT, ALKPHOS, AST, ALT, ANIONGAP, EGFRNONAA in the last 48 hours.    Invalid input(s): ESTGFAFRICA  Lactic Acid: No results for input(s): LACTATE in the last 48 hours.  Magnesium: No results for input(s): MG in the last 48 hours.  POCT Glucose: No results for input(s): POCTGLUCOSE in the last 48 hours.  Troponin: No results for input(s): TROPONINI, TROPONINIHS in the last 48 hours.  TSH:   Recent Labs   Lab 10/11/22  1254   TSH 0.188*     Urine Culture: No results for input(s): LABURIN in the last 48 hours.  Urine Studies: No results for input(s): COLORU, APPEARANCEUA, PHUR, SPECGRAV, PROTEINUA, GLUCUA, KETONESU, BILIRUBINUA, OCCULTUA, NITRITE, UROBILINOGEN, LEUKOCYTESUR, RBCUA, WBCUA, BACTERIA, SQUAMEPITHEL, HYALINECASTS in the last 48 hours.    Invalid input(s): WRIGHTSUR    Significant Imaging: I have reviewed and interpreted all pertinent imaging results/findings within the past 24 hours.    Assessment/Plan:     * Bipolar  disorder  Further orders per psych       Acute right-sided low back pain with right-sided sciatica  Continue lyrica; consider increased dose   Oral NSAID prn ; has Ibuprofen 800mg tid prn and tylenol prn   Resume flexeril if OK  With psychiatry   lidocaine patch 5% here - can try OTC 4% at home as needed  F/u with PCP as OP       Depression with suicidal ideation  Patient has    Cigarette nicotine dependence without complication  nicotine patch       Insomnia with sleep apnea  Further orders per psych       PTSD (post-traumatic stress disorder)  Further orders per psych       ITZ (generalized anxiety disorder)  Further orders per psych         VTE Risk Mitigation (From admission, onward)    None              Thank you for your consult. I will sign off. Please contact us if you have any additional questions.    Wendy Babcock NP  Department of Hospital Medicine   [facility]

## 2022-10-18 NOTE — PLAN OF CARE
Pt was in bed most of shift, out for snacks and meds.  Brighter affect, improved mood.  Returned to bed after meds.  No complaints voiced this shift.  No distress noted.  Will continue to monitor for safety.

## 2022-10-18 NOTE — HOSPITAL COURSE
10/18 re- consulted for low back pain , sciatica  Records reviewed; recent evaluation with PCP 9/21/22 Back Pain (Patient states she has been having some back pain from her sciatic on the right side for about a week now) with radiation to the right leg; given   triamcinolone acetonide injection 60 mg  -     ketorolac injection 60 mg  -     cyclobenzaprine (FLEXERIL) 10 MG tablet; Take 1 tablet (10 mg total) by mouth 3 (three) times daily as needed for Muscle spasms.   lyrica prescribed 10/14 per Dr. Fowler for chronic pain. Also given toradol 30mg IM 4 days ago.   She reports mild benefit from Lyrica. Still having pain from right SI joint with radiation down to right calf.   She is requesting repeat IM Toradol. Notes benefit from flexeril at home

## 2022-10-18 NOTE — PLAN OF CARE
Pt is sleeping at this time and has slept 4.5 hours intermittently.  Received motrin and vistaril prn at 0224.  NAD.  Resp even & unlabored.  Pathways clear.  Q 15 minute safety checks ongoing.  All precautions maintained

## 2022-10-18 NOTE — NURSING
Pt agitated, irrational, and liable, pt given 10mg Zyprexa IM to left upper out gluteal , with Airam RN in attendance

## 2022-10-19 PROCEDURE — 25000003 PHARM REV CODE 250: Performed by: NURSE PRACTITIONER

## 2022-10-19 PROCEDURE — S4991 NICOTINE PATCH NONLEGEND: HCPCS | Performed by: PSYCHIATRY & NEUROLOGY

## 2022-10-19 PROCEDURE — 25000003 PHARM REV CODE 250: Performed by: PSYCHIATRY & NEUROLOGY

## 2022-10-19 PROCEDURE — 25000003 PHARM REV CODE 250: Performed by: STUDENT IN AN ORGANIZED HEALTH CARE EDUCATION/TRAINING PROGRAM

## 2022-10-19 PROCEDURE — S0166 INJ OLANZAPINE 2.5MG: HCPCS | Performed by: PSYCHIATRY & NEUROLOGY

## 2022-10-19 PROCEDURE — 99233 SBSQ HOSP IP/OBS HIGH 50: CPT | Mod: ,,, | Performed by: PSYCHIATRY & NEUROLOGY

## 2022-10-19 PROCEDURE — 11400000 HC PSYCH PRIVATE ROOM

## 2022-10-19 PROCEDURE — 90833 PSYTX W PT W E/M 30 MIN: CPT | Mod: ,,, | Performed by: PSYCHIATRY & NEUROLOGY

## 2022-10-19 PROCEDURE — 90833 PR PSYCHOTHERAPY W/PATIENT W/E&M, 30 MIN (ADD ON): ICD-10-PCS | Mod: ,,, | Performed by: PSYCHIATRY & NEUROLOGY

## 2022-10-19 PROCEDURE — 99233 PR SUBSEQUENT HOSPITAL CARE,LEVL III: ICD-10-PCS | Mod: ,,, | Performed by: PSYCHIATRY & NEUROLOGY

## 2022-10-19 RX ORDER — PREGABALIN 75 MG/1
75 CAPSULE ORAL 2 TIMES DAILY
Status: DISCONTINUED | OUTPATIENT
Start: 2022-10-19 | End: 2022-10-20

## 2022-10-19 RX ORDER — DIAZEPAM 5 MG/1
5 TABLET ORAL 3 TIMES DAILY
Status: DISCONTINUED | OUTPATIENT
Start: 2022-10-19 | End: 2022-10-20

## 2022-10-19 RX ORDER — QUETIAPINE FUMARATE 200 MG/1
200 TABLET, FILM COATED ORAL DAILY
Status: DISCONTINUED | OUTPATIENT
Start: 2022-10-20 | End: 2022-10-23 | Stop reason: HOSPADM

## 2022-10-19 RX ADMIN — DIAZEPAM 5 MG: 5 TABLET ORAL at 08:10

## 2022-10-19 RX ADMIN — ONDANSETRON 4 MG: 4 TABLET, ORALLY DISINTEGRATING ORAL at 12:10

## 2022-10-19 RX ADMIN — IBUPROFEN 800 MG: 800 TABLET, FILM COATED ORAL at 09:10

## 2022-10-19 RX ADMIN — OLANZAPINE 10 MG: 10 INJECTION, POWDER, LYOPHILIZED, FOR SOLUTION INTRAMUSCULAR at 12:10

## 2022-10-19 RX ADMIN — IBUPROFEN 800 MG: 800 TABLET, FILM COATED ORAL at 02:10

## 2022-10-19 RX ADMIN — PREGABALIN 50 MG: 25 CAPSULE ORAL at 08:10

## 2022-10-19 RX ADMIN — QUETIAPINE FUMARATE 100 MG: 100 TABLET ORAL at 08:10

## 2022-10-19 RX ADMIN — CYCLOBENZAPRINE HYDROCHLORIDE 10 MG: 5 TABLET, FILM COATED ORAL at 07:10

## 2022-10-19 RX ADMIN — OLANZAPINE 10 MG: 10 INJECTION, POWDER, LYOPHILIZED, FOR SOLUTION INTRAMUSCULAR at 09:10

## 2022-10-19 RX ADMIN — ACETAMINOPHEN 650 MG: 325 TABLET ORAL at 04:10

## 2022-10-19 RX ADMIN — NICOTINE 1 PATCH: 14 PATCH, EXTENDED RELEASE TRANSDERMAL at 07:10

## 2022-10-19 RX ADMIN — CYCLOBENZAPRINE HYDROCHLORIDE 10 MG: 5 TABLET, FILM COATED ORAL at 12:10

## 2022-10-19 RX ADMIN — CYCLOBENZAPRINE HYDROCHLORIDE 10 MG: 5 TABLET, FILM COATED ORAL at 03:10

## 2022-10-19 RX ADMIN — Medication 1 TABLET: at 08:10

## 2022-10-19 RX ADMIN — DOCUSATE SODIUM 100 MG: 100 CAPSULE, LIQUID FILLED ORAL at 07:10

## 2022-10-19 RX ADMIN — QUETIAPINE FUMARATE 400 MG: 200 TABLET ORAL at 08:10

## 2022-10-19 RX ADMIN — DIAZEPAM 10 MG: 10 TABLET ORAL at 08:10

## 2022-10-19 RX ADMIN — HYDROXYZINE PAMOATE 50 MG: 50 CAPSULE ORAL at 04:10

## 2022-10-19 RX ADMIN — LIDOCAINE 5% 1 PATCH: 700 PATCH TOPICAL at 01:10

## 2022-10-19 RX ADMIN — OLANZAPINE 10 MG: 10 INJECTION, POWDER, LYOPHILIZED, FOR SOLUTION INTRAMUSCULAR at 04:10

## 2022-10-19 RX ADMIN — THERA TABS 1 TABLET: TAB at 08:10

## 2022-10-19 RX ADMIN — HYDROXYZINE PAMOATE 50 MG: 50 CAPSULE ORAL at 09:10

## 2022-10-19 RX ADMIN — OLANZAPINE 10 MG: 10 INJECTION, POWDER, LYOPHILIZED, FOR SOLUTION INTRAMUSCULAR at 03:10

## 2022-10-19 RX ADMIN — DIAZEPAM 5 MG: 5 TABLET ORAL at 02:10

## 2022-10-19 RX ADMIN — OLANZAPINE 10 MG: 10 INJECTION, POWDER, LYOPHILIZED, FOR SOLUTION INTRAMUSCULAR at 07:10

## 2022-10-19 RX ADMIN — PREGABALIN 75 MG: 75 CAPSULE ORAL at 08:10

## 2022-10-19 NOTE — PROGRESS NOTES
"PSYCHIATRY DAILY INPATIENT PROGRESS NOTE  SUBSEQUENT HOSPITAL VISIT    ENCOUNTER DATE: 10/19/2022  SITE: [facility]    DATE OF ADMISSION: 10/11/2022  9:40 PM  LENGTH OF STAY: 8 days    CHIEF COMPLAINT   Joe Henson is a 42 y.o. female, seen during daily salguero rounds on the inpatient unit.  Joe Henson presented with the chief complaint of mood disorder, anxiety and substance problems      The patient was seen and examined. The chart was reviewed.     Reviewed notes from Rns, NP, and LPC and labs from the last 24 hours.    The patient's case was discussed with the treatment team/care providers today including Rns, MD, and NP    Staff reports no behavioral or management issues.     The patient has been compliant with treatment.        Subjective 10/19/2022    Yesterday, the following was reported: Reports "If I really wanted to die, I wouldn't have told someone, I don't know how it would be to be dead.... I took the pills where my son could see."  Mood very labile, crying heavily, primary focus is blaming other and an external locus of control, very poor coping skills. She discussed her financial stressors at length. Her goal is to get a part time job at [employer] as a .  Illogical, "I have to go to rehab," however refuses to go.    Today, the patient remains very oppositional and inappropriately focused in discharged. She reports varying motivation to attend rehab.   She is denying SI but has no insight into her behaviors and triggers; she remains reluctant to participate in her care.     The patient denies any side effects to medications.            Psychiatric ROS (observed, reported, or endorsed/denied):  Depressed mood - variable  Interest/pleasure/anhedonia: variable  Guilt/hopelessness/worthlessness - variable  Changes in Sleep - variable  Changes in Appetite - variable  Changes in Concentration - variable  Changes in Energy - variable  PMA/R- denies  Suicidal- active/passive ideations " - No  Homicidal ideations: active/passive ideations - denies    Hallucinations - denies  Delusions - denies  Disorganized behavior - denies  Disorganized speech - denies  Negative symptoms - denies    Elevated mood - denies  Decreased need for sleep - denies  Grandiosity - denies  Racing thoughts - denies  Impulsivity - denies  Irritability- denies  Increased energy - denies  Distractibility - denies  Increase in goal-directed activity or PMA- denies    Symptoms of ITZ - Yes  Symptoms of Panic Disorder- Yes  Symptoms of PTSD - Yes          Psychotherapy:  Target symptoms: depression, anxiety , substance abuse  Why chosen therapy is appropriate versus another modality: relevant to diagnosis  Outcome monitoring methods: self-report  Therapeutic intervention type: supportive psychotherapy  Topics discussed/themes: building skills sets for symptom management, symptom recognition  The patient's response to the intervention is guarded, hostile, reluctant. The patient's progress toward treatment goals is limited.   Duration of intervention: 16 minutes.          Overall progress: Patient is showing no improvement on the Unit to date        Medical ROS  Review of Systems   Constitutional:  Negative for chills and fever.   HENT:  Negative for congestion, hearing loss, sore throat and tinnitus.    Eyes:  Negative for blurred vision, double vision, photophobia and pain.   Respiratory:  Negative for cough, hemoptysis, sputum production, shortness of breath and wheezing.    Cardiovascular:  Negative for chest pain, palpitations and leg swelling.   Gastrointestinal:  Negative for abdominal pain, blood in stool, constipation, diarrhea, nausea and vomiting.   Genitourinary:  Negative for dysuria, frequency, hematuria and urgency.   Musculoskeletal:  Negative for back pain, joint pain and myalgias.   Skin:  Negative for rash.   Neurological:  Negative for dizziness, tremors, seizures, weakness and headaches.       PAST MEDICAL  HISTORY   Past Medical History:   Diagnosis Date    Abnormal Pap smear age 30    Pos. HPV,     Abnormal Pap smear of cervix     Acute right-sided low back pain with right-sided sciatica 10/18/2022    Addiction to drug     ADHD (attention deficit hyperactivity disorder)     Arthritis     Bipolar disorder     COPD (chronic obstructive pulmonary disease)     Depression     Diabetes mellitus     Fatigue     Hallucination     History of psychiatric hospitalization     Hx of psychiatric care     OCD (obsessive compulsive disorder)     Psychiatric problem     PTSD (post-traumatic stress disorder)     Sleep difficulties     Substance abuse     Suicide attempt     Therapy            PSYCHOTROPIC MEDICATIONS   Scheduled Meds:   diazePAM  10 mg Oral BID    folic acid-vit B6-vit B12 2.5-25-2 mg  1 tablet Oral Daily    LIDOcaine  1 patch Transdermal Q24H    multivitamin  1 tablet Oral Daily    nicotine  1 patch Transdermal Daily    pregabalin  50 mg Oral BID    QUEtiapine  100 mg Oral Daily    QUEtiapine  400 mg Oral Nightly     Continuous Infusions:  PRN Meds:.acetaminophen, aluminum-magnesium hydroxide-simethicone, cyclobenzaprine, docusate sodium, hydrOXYzine pamoate, ibuprofen, loperamide, OLANZapine **AND** OLANZapine, ondansetron, promethazine        EXAMINATION    VITALS   Vitals:    10/18/22 1900 10/18/22 1957 10/19/22 0800 10/19/22 0813   BP: 117/79 117/79 111/69    BP Location: Right arm Right arm Right arm    Patient Position: Sitting Sitting Sitting    Pulse: 79 87 88    Resp: 16 16 18    Temp: 97.1 °F (36.2 °C) 97.1 °F (36.2 °C) 97.2 °F (36.2 °C)    TempSrc: Temporal Temporal Temporal    SpO2:       Weight:    75 kg (165 lb 5.5 oz)   Height:           Body mass index is 28.38 kg/m².        CONSTITUTIONAL  General Appearance: Female, in casual attire, appears stated age; NAD     MUSCULOSKELETAL  Muscle Strength and Tone: normal  Abnormal Involuntary Movements:none  Gait and Station:normal; non-ataxic     PSYCHIATRIC  "  Level of Consciousness: awake, alert  Orientation: p/p/t/s  Grooming: adequate to circumstances  Psychomotor Behavior: no PMR, no PMA  Speech: nl r/t/v/s  Language: English, fluent  Mood: "ok"  Affect: congruent with mood, irritable,   Thought Process: linear, organized, goal directed  Associations: intact; no JOSE  Thought Content: no AVH/delusions; denied HI, noSI  Memory: intact to recent and remote events  Attention:   less distractible   Fund of Knowledge: age and education appropriate  Estimate if Intelligence: average based on work/education history, vocabulary and mental status exam  Insight: impaired- seeking help; limitedly understands illness  Judgment: impaired - + bx issues; compliant/cooperative; s/p overdose      DIAGNOSTIC TESTING   Laboratory Results  No results found for this or any previous visit (from the past 24 hour(s)).          MEDICAL DECISION MAKING        ASSESSMENT:   Bipolar I Disorder mre depressed, severe, with psychotic features  ITZ  PTSD     Nicotine Dependence  Polysubstance abuse (h/o alcohol, opiate, amphetamine, sedative hypnotic and cocaine abuse; unable to specify or determine further at this time)     Psychosocial stressors     Chronic pain (sciatica)  B12 deficiency           PROBLEM LIST AND MANAGEMENT PLANS          Mood: pt counseled   -Resumed/continue home medication Serqouel at 100 mg po day and 300 mg po q HS --> Titrate QHS dose to 400mg on 10/16- increase to 100 mg po q day and 400 mg po q HS- increase to 200 mg po q day and 400 mg po q HS starting tomorrow  - will change/optimize as indicated     Anxiety: pt counseled  -serqouel off-label as above  -hold zoloft for now; will-reinitiate if needed     PTSD: pt counseled  -seroquel off-label as above     Insomnia:pt counseled  -seroquel/ off-label as above  -vistaril prn     Nicotine use: pt counseled;   Started/continue nicotine 14 mg patch dermal daily     Substance use: pt counseled  - encouraging rehab  - Started " valium 10 mg PO 4 times daily; increase to 15 mg po 4 times daily- decrease to 10 mg po 4 times   -decreased to 10 mg PO TID -decrease to BID -decrease to 5 mg po TID; will taper down/off as able     Psychosocial stressors: pt counseled;   -SW consulted to to assist with resources     Chronic pain: pt counseled  - continue Lyrica 50 mg po BID- increase to 75 mg po BID  -started/continue Lidocaine 5% patch dermal q day   - monitor      B12 deficiency: pt counseled  -give B12 1000 mcg IM x 1 on 10/12/22  -started/continue Folbic po q day           Discussed diagnosis, risks and benefits of proposed treatment vs alternative treatments vs no treatment, potential side effects of these treatments and the inherent unpredictability of treatment. The patient expresses understanding of the above and displays the capacity to agree with this treatment given said understanding. Patient also agrees that, currently, the benefits outweigh the risks and would like to pursue/continue treatment at this time.          DISCHARGE PLANNING  Expected Disposition Plan: Home or Self Care      NEED FOR CONTINUED HOSPITALIZATION  Psychiatric illness continues to pose a potential threat to life or bodily function, of self or others, thereby requiring the need for continued inpatient psychiatric hospitalization: Yes, due to: danger to self and gravely disabled, as evidenced by:  Ongoing concerns with grave disability with patient unable to perform basic feeding, hygiene and dressing activities without significant constant support.    Protective inpatient pyschiatric hospitalization required while a safe disposition plan is enacted: Yes    Patient stabilized and ready for discharge from inpatient psychiatric unit: No        STAFF:   Srinath Fowler MD  Psychiatry

## 2022-10-19 NOTE — NURSING
Pt agitated about being in hospital, upset about being here, verbally stating needs shot to calm down  Zyprexa 10mg IM left upper outer gluteal muscle and also wanted her  PRN flexeril 10mg PO for back muscle spasm

## 2022-10-19 NOTE — PLAN OF CARE
Pt calm and cooperative presently, denies SI at this time, appetite good, med compliant, safety precautions maintained, will continue to monitor

## 2022-10-19 NOTE — PLAN OF CARE
Pt is awake in room at this time and has slept 4 hours intermittently even with multiple prns.  NAD.  Resp even & unlabored.  Pathways clear.  Q 15 minute safety checks ongoing.  All precautions maintained

## 2022-10-19 NOTE — PLAN OF CARE
"Patient irritable. Patient reports "I need to go home. I am aggravated" Discussed with this patient why she came to the ER. Patient reports " I overdosed on Klonopin's to kill myself". Discussed with this patient the severity of overdose and the responsibility of taking that act seriously. Patient has poor insight and judgement into impulsiveness. Patient denies SI/HI no self harming behavior displayed, no aggressive behavior displayed towards others. Patient contracted safety with staff and unit.  Discussed healthy coping skills and techniques.   Encouraged patient to live a drug free life style.  Discussed healthy coping skills and techniques. Discussed triggers to substance abuse and motivations  to stop use.  Encouraged patient to seek substance abuse rehab to gain insight into substance abuse. Educated, reviewed  and discussed plan of care and medication regiment with this patient. Patient voiced understanding of all teachings.    "

## 2022-10-19 NOTE — PROGRESS NOTES
"   10/19/22 1035   [facility] Group Therapy   Group Name Therapeutic Recreation   Specific Interventions Cognitive Stimulation Training   Participation Level Appropriate   Participation Quality Cooperative   Insight/Motivation Improved   Affect/Mood Display Irritable;Other (see comments)   Cognition Alert   Psychomotor WNL   Patient presents irritable, tearful, reluctant to attend group, said "no" but attended shortly after group started. Patient reports "rough, don't feel good, my anxiety up" mood, states she is having trouble moving forward, is not suicidal anymore and want to be discharge. Patient shared coping skills after discharge are go to the mental health clinic, listen to music, exercise and drink a daiquiri. Patient encouraged to participate in alcohol and drug free activities to improve her quality of life.  "

## 2022-10-19 NOTE — NURSING
Zyprexa 10mg IM given for continued agitation.  At nurses station loud, wanting powerade and food.  Pacing hallway.  Complaining that none of the meds help her stay asleep.

## 2022-10-20 ENCOUNTER — TELEPHONE (OUTPATIENT)
Dept: FAMILY MEDICINE | Facility: CLINIC | Age: 42
End: 2022-10-20
Payer: MEDICARE

## 2022-10-20 PROCEDURE — 90836 PR PSYCHOTHERAPY W/PATIENT W/E&M, 45 MIN (ADD ON): ICD-10-PCS | Mod: ,,, | Performed by: PSYCHIATRY & NEUROLOGY

## 2022-10-20 PROCEDURE — 99233 PR SUBSEQUENT HOSPITAL CARE,LEVL III: ICD-10-PCS | Mod: ,,, | Performed by: PSYCHIATRY & NEUROLOGY

## 2022-10-20 PROCEDURE — 90836 PSYTX W PT W E/M 45 MIN: CPT | Mod: ,,, | Performed by: PSYCHIATRY & NEUROLOGY

## 2022-10-20 PROCEDURE — 11400000 HC PSYCH PRIVATE ROOM

## 2022-10-20 PROCEDURE — 25000003 PHARM REV CODE 250: Performed by: NURSE PRACTITIONER

## 2022-10-20 PROCEDURE — 99233 SBSQ HOSP IP/OBS HIGH 50: CPT | Mod: ,,, | Performed by: PSYCHIATRY & NEUROLOGY

## 2022-10-20 PROCEDURE — 25000003 PHARM REV CODE 250: Performed by: PSYCHIATRY & NEUROLOGY

## 2022-10-20 PROCEDURE — S4991 NICOTINE PATCH NONLEGEND: HCPCS | Performed by: PSYCHIATRY & NEUROLOGY

## 2022-10-20 PROCEDURE — S0166 INJ OLANZAPINE 2.5MG: HCPCS | Performed by: PSYCHIATRY & NEUROLOGY

## 2022-10-20 RX ORDER — PRAZOSIN HYDROCHLORIDE 1 MG/1
1 CAPSULE ORAL NIGHTLY
Status: DISCONTINUED | OUTPATIENT
Start: 2022-10-20 | End: 2022-10-22

## 2022-10-20 RX ORDER — HYDROXYZINE PAMOATE 50 MG/1
100 CAPSULE ORAL EVERY 6 HOURS PRN
Status: DISCONTINUED | OUTPATIENT
Start: 2022-10-20 | End: 2022-10-23 | Stop reason: HOSPADM

## 2022-10-20 RX ORDER — TALC
6 POWDER (GRAM) TOPICAL NIGHTLY
Status: DISCONTINUED | OUTPATIENT
Start: 2022-10-20 | End: 2022-10-23 | Stop reason: HOSPADM

## 2022-10-20 RX ORDER — DIAZEPAM 5 MG/1
5 TABLET ORAL 2 TIMES DAILY
Status: DISCONTINUED | OUTPATIENT
Start: 2022-10-20 | End: 2022-10-21

## 2022-10-20 RX ADMIN — IBUPROFEN 800 MG: 800 TABLET, FILM COATED ORAL at 02:10

## 2022-10-20 RX ADMIN — NICOTINE 1 PATCH: 14 PATCH, EXTENDED RELEASE TRANSDERMAL at 08:10

## 2022-10-20 RX ADMIN — DIAZEPAM 5 MG: 5 TABLET ORAL at 08:10

## 2022-10-20 RX ADMIN — OLANZAPINE 10 MG: 10 INJECTION, POWDER, LYOPHILIZED, FOR SOLUTION INTRAMUSCULAR at 08:10

## 2022-10-20 RX ADMIN — OLANZAPINE 10 MG: 10 INJECTION, POWDER, LYOPHILIZED, FOR SOLUTION INTRAMUSCULAR at 03:10

## 2022-10-20 RX ADMIN — THERA TABS 1 TABLET: TAB at 08:10

## 2022-10-20 RX ADMIN — OLANZAPINE 10 MG: 10 TABLET, FILM COATED ORAL at 02:10

## 2022-10-20 RX ADMIN — CYCLOBENZAPRINE HYDROCHLORIDE 10 MG: 5 TABLET, FILM COATED ORAL at 10:10

## 2022-10-20 RX ADMIN — PREGABALIN 100 MG: 25 CAPSULE ORAL at 08:10

## 2022-10-20 RX ADMIN — PREGABALIN 75 MG: 75 CAPSULE ORAL at 08:10

## 2022-10-20 RX ADMIN — IBUPROFEN 800 MG: 800 TABLET, FILM COATED ORAL at 05:10

## 2022-10-20 RX ADMIN — DOCUSATE SODIUM 100 MG: 100 CAPSULE, LIQUID FILLED ORAL at 03:10

## 2022-10-20 RX ADMIN — QUETIAPINE FUMARATE 200 MG: 200 TABLET ORAL at 08:10

## 2022-10-20 RX ADMIN — LIDOCAINE 5% 1 PATCH: 700 PATCH TOPICAL at 01:10

## 2022-10-20 RX ADMIN — PRAZOSIN HYDROCHLORIDE 1 MG: 1 CAPSULE ORAL at 08:10

## 2022-10-20 RX ADMIN — Medication 1 TABLET: at 08:10

## 2022-10-20 RX ADMIN — Medication 6 MG: at 08:10

## 2022-10-20 RX ADMIN — QUETIAPINE FUMARATE 400 MG: 200 TABLET ORAL at 08:10

## 2022-10-20 RX ADMIN — CYCLOBENZAPRINE HYDROCHLORIDE 10 MG: 5 TABLET, FILM COATED ORAL at 07:10

## 2022-10-20 RX ADMIN — HYDROXYZINE PAMOATE 100 MG: 50 CAPSULE ORAL at 08:10

## 2022-10-20 NOTE — PROGRESS NOTES
"   10/20/22 1040   [facility] Group Therapy   Group Name Therapeutic Recreation   Specific Interventions Cognitive Stimulation Training   Participation Level Appropriate   Participation Quality Cooperative   Insight/Motivation Improved   Affect/Mood Display Anxious;Irritable   Cognition Alert   Psychomotor WNL   Patient presents anxious, irritable,  reports an "alright, better than yesterday" mood, states she needs to go home, reports sleeping and the medicines are helping her mood.   "

## 2022-10-20 NOTE — NURSING
Zyprexa 10mg po given for agitation despite requesting injection.  Ibuprofen 800mg po given for back pain.  Refused to show staff her mouth after taking pills.  Walked away angrily and closed door loudly.

## 2022-10-20 NOTE — NURSING
Rested intermittently with closed eyes and even resp for 5 hours.  See earlier entries.  Modified VC and all precautions maintained.  Pathways clear and bed in low position.

## 2022-10-20 NOTE — PLAN OF CARE
"Patient agitated and restless.  Denies intentions to harm self and/or others at this time. Denies auditory and/or visual hallucinations.  Affect and emotion labile, angry, and irritable.  No insight into substance abuse with denial of need/want and multiple excuses to not discharge to rehab.  Interacts appropriately with peers and staff.  Accepts meals and medications.  Patient unwilling to discuss plan of care at this time "unless plans are to discharge her sooner than Monday."    8:51--PRN Zyprexa IM given for patient complaints of agitation and restlessness.    10:43--PRN Flexeril given for c/o right sciatic pain    15:08--PRN Zyprexa IM given per patient request for complaints of agitation    17:26--PRN Ibuprofen given for c/o right sciatic pain  "

## 2022-10-20 NOTE — PSYCH
Spoke with Adonis at Dr. Arora' office to inform of patient's multiple U admits for Clonazepam abuse and frequent detox attemtps.  Adonis noted in patient's chart of prescription abuse and informed [facility] nurse of 5 remaining refills requiring cancellation at [facility].    [facility] called ([phone]) and remaining clonazepam refills cancelled.

## 2022-10-20 NOTE — PROGRESS NOTES
PSYCHIATRY DAILY INPATIENT PROGRESS NOTE  SUBSEQUENT HOSPITAL VISIT    ENCOUNTER DATE: 10/20/2022  SITE: [facility]    DATE OF ADMISSION: 10/11/2022  9:40 PM  LENGTH OF STAY: 9 days    CHIEF COMPLAINT   Joe Henson is a 42 y.o. female, seen during daily salguero rounds on the inpatient unit.  Joe Henson presented with the chief complaint of mood disorder, anxiety and substance problems    The patient was seen and examined. The chart was reviewed.     Reviewed notes from Rns, NP, and LPC and labs from the last 24 hours.    The patient's case was discussed with the treatment team/care providers today including Rns, MD, and NP    Staff reports no behavioral or management issues.     The patient has been compliant with treatment.        Subjective 10/20/2022    Yesterday, the following was reported: the patient remains very oppositional and inappropriately focused in discharged. She reports varying motivation to attend rehab.   She is denying SI but has no insight into her behaviors and triggers; she remains reluctant to participate in her care.     Today, she remains resistant to tx, rehab and continues to have med seeking behaviors. She has severe avoidant behaviors which exacerbate her symptoms; her primary mens of avoidance seem to be benzos.  -she has significant dependency issues        The patient denies any side effects to medications.            Psychiatric ROS (observed, reported, or endorsed/denied):  Depressed mood - variable  Interest/pleasure/anhedonia: variable  Guilt/hopelessness/worthlessness - variable  Changes in Sleep - variable  Changes in Appetite - variable  Changes in Concentration - variable  Changes in Energy - variable  PMA/R- denies  Suicidal- active/passive ideations - No  Homicidal ideations: active/passive ideations - denies    Hallucinations - denies  Delusions - denies  Disorganized behavior - denies  Disorganized speech - denies  Negative symptoms - denies    Elevated  mood - denies  Decreased need for sleep - denies  Grandiosity - denies  Racing thoughts - denies  Impulsivity - denies  Irritability- denies  Increased energy - denies  Distractibility - denies  Increase in goal-directed activity or PMA- denies    Symptoms of ITZ - variable  Symptoms of Panic Disorder- variable  Symptoms of PTSD - variable          Psychotherapy:  Target symptoms: depression, anxiety , substance abuse  Why chosen therapy is appropriate versus another modality: relevant to diagnosis  Outcome monitoring methods: self-report  Therapeutic intervention type: supportive psychotherapy  Topics discussed/themes: building skills sets for symptom management, symptom recognition  The patient's response to the intervention is guarded, hostile, reluctant. The patient's progress toward treatment goals is limited.   Duration of intervention: 40 minutes.          Overall progress: Patient is showing no improvement on the Unit to date        Medical ROS  Review of Systems   Constitutional:  Negative for chills and fever.   HENT:  Negative for congestion, hearing loss, sore throat and tinnitus.    Eyes:  Negative for blurred vision, double vision, photophobia and pain.   Respiratory:  Negative for cough, hemoptysis, sputum production, shortness of breath and wheezing.    Cardiovascular:  Negative for chest pain, palpitations and leg swelling.   Gastrointestinal:  Negative for abdominal pain, blood in stool, constipation, diarrhea, nausea and vomiting.   Genitourinary:  Negative for dysuria, frequency, hematuria and urgency.   Musculoskeletal:  Negative for back pain, joint pain and myalgias.   Skin:  Negative for rash.   Neurological:  Negative for dizziness, tremors, seizures, weakness and headaches.       PAST MEDICAL HISTORY   Past Medical History:   Diagnosis Date    Abnormal Pap smear age 30    Pos. HPV,     Abnormal Pap smear of cervix     Acute right-sided low back pain with right-sided sciatica 10/18/2022     "Addiction to drug     ADHD (attention deficit hyperactivity disorder)     Arthritis     Bipolar disorder     COPD (chronic obstructive pulmonary disease)     Depression     Diabetes mellitus     Fatigue     Hallucination     History of psychiatric hospitalization     Hx of psychiatric care     OCD (obsessive compulsive disorder)     Psychiatric problem     PTSD (post-traumatic stress disorder)     Sleep difficulties     Substance abuse     Suicide attempt     Therapy            PSYCHOTROPIC MEDICATIONS   Scheduled Meds:   diazePAM  5 mg Oral TID    folic acid-vit B6-vit B12 2.5-25-2 mg  1 tablet Oral Daily    LIDOcaine  1 patch Transdermal Q24H    multivitamin  1 tablet Oral Daily    nicotine  1 patch Transdermal Daily    pregabalin  75 mg Oral BID    QUEtiapine  200 mg Oral Daily    QUEtiapine  400 mg Oral Nightly     Continuous Infusions:  PRN Meds:.acetaminophen, aluminum-magnesium hydroxide-simethicone, cyclobenzaprine, docusate sodium, hydrOXYzine pamoate, ibuprofen, loperamide, OLANZapine **AND** OLANZapine, ondansetron, promethazine        EXAMINATION    VITALS   Vitals:    10/19/22 0813 10/19/22 1606 10/19/22 1916 10/20/22 0712   BP:  118/72 101/62 114/82   BP Location:       Patient Position:       Pulse:   89 91   Resp:   16 18   Temp:  97.2 °F (36.2 °C) 96.6 °F (35.9 °C) 97.6 °F (36.4 °C)   TempSrc:    Temporal   SpO2:       Weight: 75 kg (165 lb 5.5 oz)      Height:           Body mass index is 28.38 kg/m².        CONSTITUTIONAL  General Appearance: Female, in casual attire, appears stated age; NAD     MUSCULOSKELETAL  Muscle Strength and Tone: normal  Abnormal Involuntary Movements:none  Gait and Station:normal; non-ataxic     PSYCHIATRIC   Level of Consciousness: awake, alert  Orientation: p/p/t/s  Grooming: adequate to circumstances  Psychomotor Behavior: no PMR, no PMA  Speech: nl r/t/v/s  Language: English, fluent  Mood: "anxious"  Affect: congruent with mood, irritable,   Thought Process: linear, " organized, goal directed  Associations: intact; no JOSE  Thought Content: no AVH/delusions; denied HI, noSI  Memory: intact to recent and remote events  Attention:   less distractible   Fund of Knowledge: age and education appropriate  Estimate if Intelligence: average based on work/education history, vocabulary and mental status exam  Insight: impaired- seeking help; limitedly understands illness  Judgment: impaired - + bx issues; compliant/cooperative; s/p overdose      DIAGNOSTIC TESTING   Laboratory Results  No results found for this or any previous visit (from the past 24 hour(s)).          MEDICAL DECISION MAKING        ASSESSMENT:   Bipolar I Disorder mre depressed, severe, with psychotic features  ITZ  PTSD     Nicotine Dependence  Polysubstance abuse (h/o alcohol, opiate, amphetamine, sedative hypnotic and cocaine abuse; unable to specify or determine further at this time)     Psychosocial stressors     Chronic pain (sciatica)  B12 deficiency           PROBLEM LIST AND MANAGEMENT PLANS          Mood: pt counseled   -Resumed/continue home medication Serqouel at 100 mg po day and 300 mg po q HS --> Titrate QHS dose to 400mg on 10/16- increase to 100 mg po q day and 400 mg po q HS- increase to 200 mg po q day and 400 mg po q HS starting today  - will change/optimize as indicated     Anxiety: pt counseled  -serqouel off-label as above  -hold zoloft for now; will-reinitiate if needed     PTSD: pt counseled  -seroquel off-label as above  -start trial of prazosin 1 mg po q HS     Insomnia:pt counseled  -seroquel/ off-label as above  -start melatonin 6 mg po q HS  -vistaril prn     Nicotine use: pt counseled;   Started/continue nicotine 14 mg patch dermal daily     Substance use: pt counseled  - encouraging rehab  - Started valium 10 mg PO 4 times daily; increase to 15 mg po 4 times daily- decrease to 10 mg po 4 times   -decreased to 10 mg PO TID -decrease to BID -decrease to 5 mg po TID- decrease to BID ; will  taper down/off as able     Psychosocial stressors: pt counseled;   -SW consulted to to assist with resources     Chronic pain: pt counseled  - continue Lyrica 50 mg po BID- increase to 75 mg po BID- increase to 100 mg po BID  -started/continue Lidocaine 5% patch dermal q day   - monitor      B12 deficiency: pt counseled  -give B12 1000 mcg IM x 1 on 10/12/22  -started/continue Folbic po q day           Discussed diagnosis, risks and benefits of proposed treatment vs alternative treatments vs no treatment, potential side effects of these treatments and the inherent unpredictability of treatment. The patient expresses understanding of the above and displays the capacity to agree with this treatment given said understanding. Patient also agrees that, currently, the benefits outweigh the risks and would like to pursue/continue treatment at this time.          DISCHARGE PLANNING  Expected Disposition Plan: Home or Self Care      NEED FOR CONTINUED HOSPITALIZATION  Psychiatric illness continues to pose a potential threat to life or bodily function, of self or others, thereby requiring the need for continued inpatient psychiatric hospitalization: Yes, due to: danger to self and gravely disabled, as evidenced by:  Ongoing concerns with grave disability with patient unable to perform basic feeding, hygiene and dressing activities without significant constant support.    Protective inpatient pyschiatric hospitalization required while a safe disposition plan is enacted: Yes    Patient stabilized and ready for discharge from inpatient psychiatric unit: No        STAFF:   Srinath Fowler MD  Psychiatry

## 2022-10-20 NOTE — PLAN OF CARE
"Denies suicidal thoughts at present.  Complained of feeling agitated.  Noted pacing with tense facial expression.  Fussing about being locked up and the dayroom being locked off and on.  Prn zyprexa given IM at 19:54.  Encouraged increased fluids and regular meals.  VS stable.   Said she's not going straight to rehab.  "I'm going to rehab after my son's court date November 29th".  "I can't go right now".  "I need some booty juice".  Complained of trouble sleeping.  Hydroxyzine given at 21:37 for insomnia.  Focused on getting more medications.  Goal upon discharge is to "Go see my son and pay the electric bill".  Accepted hs meds.  Stressed compliance with meds upon discharge.    "

## 2022-10-20 NOTE — TELEPHONE ENCOUNTER
Spoke with Radha at [facility]--pt is currently admitted. Radha stated that pt is abusing her Klonopin and Adderall.  Her last rx was on 9/12/22 #60 x 5 refills. The pt has had 4 psych encounters since December 2021. I gave the ok for Radha to d/c additional refills at [facility]. If you have any further questions, please contact [facility], [place], thank you.

## 2022-10-21 PROCEDURE — 25000003 PHARM REV CODE 250: Performed by: NURSE PRACTITIONER

## 2022-10-21 PROCEDURE — S0166 INJ OLANZAPINE 2.5MG: HCPCS | Performed by: PSYCHIATRY & NEUROLOGY

## 2022-10-21 PROCEDURE — S0166 INJ OLANZAPINE 2.5MG: HCPCS | Performed by: STUDENT IN AN ORGANIZED HEALTH CARE EDUCATION/TRAINING PROGRAM

## 2022-10-21 PROCEDURE — 25000003 PHARM REV CODE 250: Performed by: STUDENT IN AN ORGANIZED HEALTH CARE EDUCATION/TRAINING PROGRAM

## 2022-10-21 PROCEDURE — S4991 NICOTINE PATCH NONLEGEND: HCPCS | Performed by: PSYCHIATRY & NEUROLOGY

## 2022-10-21 PROCEDURE — 99233 PR SUBSEQUENT HOSPITAL CARE,LEVL III: ICD-10-PCS | Mod: ,,, | Performed by: STUDENT IN AN ORGANIZED HEALTH CARE EDUCATION/TRAINING PROGRAM

## 2022-10-21 PROCEDURE — 90833 PR PSYCHOTHERAPY W/PATIENT W/E&M, 30 MIN (ADD ON): ICD-10-PCS | Mod: ,,, | Performed by: STUDENT IN AN ORGANIZED HEALTH CARE EDUCATION/TRAINING PROGRAM

## 2022-10-21 PROCEDURE — 25000003 PHARM REV CODE 250: Performed by: PSYCHIATRY & NEUROLOGY

## 2022-10-21 PROCEDURE — 11400000 HC PSYCH PRIVATE ROOM

## 2022-10-21 PROCEDURE — 99233 SBSQ HOSP IP/OBS HIGH 50: CPT | Mod: ,,, | Performed by: STUDENT IN AN ORGANIZED HEALTH CARE EDUCATION/TRAINING PROGRAM

## 2022-10-21 PROCEDURE — 90833 PSYTX W PT W E/M 30 MIN: CPT | Mod: ,,, | Performed by: STUDENT IN AN ORGANIZED HEALTH CARE EDUCATION/TRAINING PROGRAM

## 2022-10-21 RX ORDER — DIAZEPAM 5 MG/1
5 TABLET ORAL DAILY
Status: DISCONTINUED | OUTPATIENT
Start: 2022-10-22 | End: 2022-10-22

## 2022-10-21 RX ORDER — OLANZAPINE 10 MG/2ML
5 INJECTION, POWDER, FOR SOLUTION INTRAMUSCULAR EVERY 4 HOURS PRN
Status: DISCONTINUED | OUTPATIENT
Start: 2022-10-21 | End: 2022-10-23 | Stop reason: HOSPADM

## 2022-10-21 RX ORDER — OLANZAPINE 5 MG/1
5 TABLET ORAL EVERY 4 HOURS PRN
Status: DISCONTINUED | OUTPATIENT
Start: 2022-10-21 | End: 2022-10-23 | Stop reason: HOSPADM

## 2022-10-21 RX ADMIN — OLANZAPINE 5 MG: 5 TABLET, FILM COATED ORAL at 10:10

## 2022-10-21 RX ADMIN — QUETIAPINE FUMARATE 400 MG: 200 TABLET ORAL at 08:10

## 2022-10-21 RX ADMIN — THERA TABS 1 TABLET: TAB at 08:10

## 2022-10-21 RX ADMIN — OLANZAPINE 10 MG: 10 INJECTION, POWDER, LYOPHILIZED, FOR SOLUTION INTRAMUSCULAR at 08:10

## 2022-10-21 RX ADMIN — QUETIAPINE FUMARATE 200 MG: 200 TABLET ORAL at 08:10

## 2022-10-21 RX ADMIN — CYCLOBENZAPRINE HYDROCHLORIDE 10 MG: 5 TABLET, FILM COATED ORAL at 02:10

## 2022-10-21 RX ADMIN — PREGABALIN 100 MG: 25 CAPSULE ORAL at 08:10

## 2022-10-21 RX ADMIN — Medication 6 MG: at 08:10

## 2022-10-21 RX ADMIN — Medication 1 TABLET: at 08:10

## 2022-10-21 RX ADMIN — NICOTINE 1 PATCH: 14 PATCH, EXTENDED RELEASE TRANSDERMAL at 08:10

## 2022-10-21 RX ADMIN — DOCUSATE SODIUM 100 MG: 100 CAPSULE, LIQUID FILLED ORAL at 03:10

## 2022-10-21 RX ADMIN — CYCLOBENZAPRINE HYDROCHLORIDE 10 MG: 5 TABLET, FILM COATED ORAL at 08:10

## 2022-10-21 RX ADMIN — OLANZAPINE 10 MG: 10 TABLET, FILM COATED ORAL at 12:10

## 2022-10-21 RX ADMIN — LIDOCAINE 5% 1 PATCH: 700 PATCH TOPICAL at 02:10

## 2022-10-21 RX ADMIN — CYCLOBENZAPRINE HYDROCHLORIDE 10 MG: 5 TABLET, FILM COATED ORAL at 09:10

## 2022-10-21 RX ADMIN — PRAZOSIN HYDROCHLORIDE 1 MG: 1 CAPSULE ORAL at 08:10

## 2022-10-21 RX ADMIN — HYDROXYZINE PAMOATE 100 MG: 50 CAPSULE ORAL at 08:10

## 2022-10-21 RX ADMIN — OLANZAPINE 5 MG: 10 INJECTION, POWDER, FOR SOLUTION INTRAMUSCULAR at 04:10

## 2022-10-21 RX ADMIN — DIAZEPAM 5 MG: 5 TABLET ORAL at 08:10

## 2022-10-21 NOTE — NURSING
Pt to nurses station displaying med seeking behaviors. Pt reporting that she is agitated. PRN Zyprexa administered IM to aid with relief.

## 2022-10-21 NOTE — PLAN OF CARE
"  Problem: Adult Behavioral Health Plan of Care  Goal: Optimized Coping Skills in Response to Life Stressors  Intervention: Promote Effective Coping Strategies  Flowsheets (Taken 10/20/2022 2023)  Supportive Measures: counseling provided   Group Note        Behavior      Patient attended group psychotherapy this evening. Patient exhibited severe depression with guarded affect.         Intervention     CBT-based group psychotherapy focused today on how does one choose cuca with their thoughts as a response to feeling depressed, anxious, angry or suicidal.           Response    As evidence by needing significant on ongoing prompting, patient stated that she was not able to sit at the table of her family when they ate because the adults ate on one table and the children ate at the other. Patient also stated that she had difficulty what people were saying because "even one spoke Korean"; patient would like to explore with her family as an adult how they felt about their childhoods. Patient stated that she had many advantages than other neighbors because she was a very intuitive person. Patient was affirmed for the statements she made in group. Patient was able, after initially significant prompting, to positively contribute to the group discussion.         Plan    To continue to encourage the patient to attend group psychotherapy with focused attention on continued work in the area of looking at how she may be able to apply cognitive behavioral therapy techniques to present day challenges of wanting to have a lessening of depressive symptoms.     "

## 2022-10-21 NOTE — NURSING
"Patient at the nurses station asking for her toothbrush. Patient educated that the time to get toiletries is 0630, patient walks down the byrd and says loudly "bitch" and slams her door.  "

## 2022-10-21 NOTE — PLAN OF CARE
No falls, accepting meals and meds, increased anxiety and agitation this morning needing prn Zyprexa IM. PRN med effective for anxiety and agitation. Denies suicidal ideation. Attended group session offered on unit this morning. Safety maintained.

## 2022-10-21 NOTE — PROGRESS NOTES
10/21/22 1040   [facility] Group Therapy   Group Name Therapeutic Recreation   Specific Interventions Cognitive Stimulation Training  (brain teaser)   Participation Level Appropriate   Participation Quality Cooperative   Insight/Motivation Improved   Affect/Mood Display Appropriate   Cognition Alert   Psychomotor WNL   Patient presents calm, cooperative, shows interest and initial ability to follow directions, becomes involved, enjoys the activity

## 2022-10-21 NOTE — PROGRESS NOTES
"PSYCHIATRY DAILY INPATIENT PROGRESS NOTE  SUBSEQUENT HOSPITAL VISIT    ENCOUNTER DATE: 10/21/2022  SITE: [facility]    DATE OF ADMISSION: 10/11/2022  9:40 PM  LENGTH OF STAY: 10 days    CHIEF COMPLAINT   Joe Henson is a 42 y.o. female, seen during daily salguero rounds on the inpatient unit.  Joe Henson presented with the chief complaint of mood disorder, anxiety and substance problems    The patient was seen and examined. The chart was reviewed.     Reviewed notes from Rns and labs from the last 24 hours.    The patient's case was discussed with the treatment team/care providers today including Rns    Staff reports no behavioral or management issues.     The patient has been compliant with treatment.        Subjective 10/21/2022    Patient remains labile. Poor insight into substance. Refuses rehab placement at this time, states she will go later. She is only focused on discharged. Counseled patient extensively on consequences/risks of substance abuse. She was minimally engaged.      The patient denies any side effects to medications.        Per RN: Patient at the nurses station asking for her toothbrush. Patient educated that the time to get toiletries is 0630, patient walks down the byrd and says loudly "bitch" and slams her door.    Patient anxious and agitated. Zyprexa 10mg Im given for moderate agitation and anxiety.           Psychiatric ROS (observed, reported, or endorsed/denied):  Depressed mood - less  Interest/pleasure/anhedonia: variable  Guilt/hopelessness/worthlessness - variable  Changes in Sleep - variable  Changes in Appetite - less  Changes in Concentration - variable  Changes in Energy - variable  PMA/R- denies  Suicidal- active/passive ideations - No  Homicidal ideations: active/passive ideations - denies    Hallucinations - denies  Delusions - denies  Disorganized behavior - denies  Disorganized speech - denies  Negative symptoms - denies    Elevated mood - denies  Decreased " need for sleep - denies  Grandiosity - denies  Racing thoughts - denies  Impulsivity - denies  Irritability- denies  Increased energy - denies  Distractibility - denies  Increase in goal-directed activity or PMA- denies    Symptoms of ITZ - variable  Symptoms of Panic Disorder- variable  Symptoms of PTSD - variable          Psychotherapy:  Target symptoms: depression, anxiety , substance abuse  Why chosen therapy is appropriate versus another modality: relevant to diagnosis  Outcome monitoring methods: self-report  Therapeutic intervention type: supportive psychotherapy  Topics discussed/themes: building skills sets for symptom management, symptom recognition  The patient's response to the intervention is guarded, reluctant. The patient's progress toward treatment goals is limited.   Duration of intervention: 16 minutes.          Overall progress: Patient is showing mild improvement           Medical ROS  Review of Systems   Constitutional:  Negative for chills and fever.   HENT:  Negative for congestion, hearing loss, sore throat and tinnitus.    Eyes:  Negative for blurred vision, double vision, photophobia and pain.   Respiratory:  Negative for cough, hemoptysis, sputum production, shortness of breath and wheezing.    Cardiovascular:  Negative for chest pain, palpitations and leg swelling.   Gastrointestinal:  Negative for abdominal pain, blood in stool, constipation, diarrhea, nausea and vomiting.   Genitourinary:  Negative for dysuria, frequency, hematuria and urgency.   Musculoskeletal:  Negative for back pain, joint pain and myalgias.   Skin:  Negative for rash.   Neurological:  Negative for dizziness, tremors, seizures, weakness and headaches.       PAST MEDICAL HISTORY   Past Medical History:   Diagnosis Date    Abnormal Pap smear age 30    Pos. HPV,     Abnormal Pap smear of cervix     Acute right-sided low back pain with right-sided sciatica 10/18/2022    Addiction to drug     ADHD (attention deficit  hyperactivity disorder)     Arthritis     Bipolar disorder     COPD (chronic obstructive pulmonary disease)     Depression     Diabetes mellitus     Fatigue     Hallucination     History of psychiatric hospitalization     Hx of psychiatric care     OCD (obsessive compulsive disorder)     Psychiatric problem     PTSD (post-traumatic stress disorder)     Sleep difficulties     Substance abuse     Suicide attempt     Therapy            PSYCHOTROPIC MEDICATIONS   Scheduled Meds:   diazePAM  5 mg Oral BID    folic acid-vit B6-vit B12 2.5-25-2 mg  1 tablet Oral Daily    LIDOcaine  1 patch Transdermal Q24H    melatonin  6 mg Oral Nightly    multivitamin  1 tablet Oral Daily    nicotine  1 patch Transdermal Daily    prazosin  1 mg Oral QHS    pregabalin  100 mg Oral BID    QUEtiapine  200 mg Oral Daily    QUEtiapine  400 mg Oral Nightly     Continuous Infusions:  PRN Meds:.acetaminophen, aluminum-magnesium hydroxide-simethicone, cyclobenzaprine, docusate sodium, hydrOXYzine pamoate, ibuprofen, loperamide, OLANZapine **AND** OLANZapine, ondansetron, promethazine        EXAMINATION    VITALS   Vitals:    10/19/22 1916 10/20/22 0712 10/20/22 2015 10/21/22 0735   BP: 101/62 114/82 (!) 140/95 (!) 118/90   BP Location:   Right arm Left arm   Patient Position:   Sitting Sitting   Pulse: 89 91 87 93   Resp: 16 18 16 18   Temp: 96.6 °F (35.9 °C) 97.6 °F (36.4 °C) 97.4 °F (36.3 °C) 97.4 °F (36.3 °C)   TempSrc:  Temporal Temporal Temporal   SpO2:       Weight:       Height:           Body mass index is 28.38 kg/m².            CONSTITUTIONAL  General Appearance: Female, in casual attire, appears stated age; NAD     MUSCULOSKELETAL  Muscle Strength and Tone: normal  Abnormal Involuntary Movements:none  Gait and Station:normal; non-ataxic     PSYCHIATRIC   Level of Consciousness: awake, alert  Orientation: p/p/t/s  Grooming: adequate to circumstances  Psychomotor Behavior: no PMR, no PMA  Speech: nl r/t/v/s  Language: English,  "fluent  Mood: "not good"  Affect: congruent with mood, irritable,   Thought Process: linear, organized, goal directed  Associations: intact; no JOSE  Thought Content: no AVH/delusions; denied HI, noSI  Memory: intact to recent and remote events  Attention:   less distractible   Fund of Knowledge: age and education appropriate  Estimate if Intelligence: average based on work/education history, vocabulary and mental status exam  Insight: impaired- seeking help; limitedly understands illness  Judgment: impaired - + bx issues; compliant/cooperative; s/p overdose      DIAGNOSTIC TESTING   Laboratory Results  No results found for this or any previous visit (from the past 24 hour(s)).          MEDICAL DECISION MAKING        ASSESSMENT:   Bipolar I Disorder mre depressed, severe, with psychotic features  ITZ  PTSD     Nicotine Dependence  Polysubstance abuse (h/o alcohol, opiate, amphetamine, sedative hypnotic and cocaine abuse; unable to specify or determine further at this time)     Psychosocial stressors     Chronic pain (sciatica)  B12 deficiency         PROBLEM LIST AND MANAGEMENT PLANS        Mood: pt counseled   - Resumed/continue home medication Serqouel at 100 mg po day and 300 mg po q HS --> Titrate QHS dose to 400mg on 10/16- increase to 100 mg po q day and 400 mg po q HS   - increase to 200 mg po q day and 400 mg po q HS   - will change/optimize as indicated       Anxiety: pt counseled  - serqouel off-label as above  - hold zoloft for now; will-reinitiate if needed       PTSD: pt counseled  - seroquel off-label as above  - started trial of prazosin 1 mg po q HS     Insomnia:pt counseled  -seroquel/ off-label as above  -start melatonin 6 mg po q HS  -vistaril prn     Nicotine use: pt counseled;   Started/continue nicotine 14 mg patch dermal daily       Substance use: pt counseled  - encouraging rehab  - Started valium 10 mg PO 4 times daily; increase to 15 mg po 4 times daily- decrease to 10 mg po 4 times   " -decreased to 10 mg PO TID -decrease to BID -decrease to 5 mg po TID -decrease to BID -decrease to qd today; will taper down/off as able       Psychosocial stressors: pt counseled;   -SW consulted to to assist with resources       Chronic pain: pt counseled  - continue Lyrica 50 mg po BID- increase to 75 mg po BID- increase to 100 mg po BID  -started/continue Lidocaine 5% patch dermal q day   - monitor      B12 deficiency: pt counseled  - give B12 1000 mcg IM x 1 on 10/12/22  - started/continue Folbic po q day           Discussed diagnosis, risks and benefits of proposed treatment vs alternative treatments vs no treatment, potential side effects of these treatments and the inherent unpredictability of treatment. The patient expresses understanding of the above and displays the capacity to agree with this treatment given said understanding. Patient also agrees that, currently, the benefits outweigh the risks and would like to pursue/continue treatment at this time.        DISCHARGE PLANNING  Expected Disposition Plan: Home or Self Care      NEED FOR CONTINUED HOSPITALIZATION  Psychiatric illness continues to pose a potential threat to life or bodily function, of self or others, thereby requiring the need for continued inpatient psychiatric hospitalization: Yes, due to: danger to self and gravely disabled, as evidenced by:  Ongoing concerns with grave disability with patient unable to perform basic feeding, hygiene and dressing activities without significant constant support.    Protective inpatient pyschiatric hospitalization required while a safe disposition plan is enacted: Yes    Patient stabilized and ready for discharge from inpatient psychiatric unit: No        STAFF:   Jason Romeo III, MD  Psychiatry

## 2022-10-21 NOTE — NURSING
Pt sleeping at this time, slept 5.5 hrs with 2 awakenings, one and extended time.NAD. Resp even and unlabored.Pathways clear,bed in low position. Q 15 min safety check ongoing.All precautions maintained.

## 2022-10-21 NOTE — PSYCH
Discharge Planning   The patient attended discharge planning group. Pt affect was bright with cheerful mood    Intervention  Discharge planning group discussion of next step and aftercare plans.     Response   The patient was able to verbalize that she will be returning to her current living situation and will follow up with [facility].     PLAN  Will continue to encourage patient to follow and focus on plans for aftercare

## 2022-10-21 NOTE — PLAN OF CARE
Following POC.  Makes needs known.  Denies SI/HI, AVH.  Somatic, sad.  States she is doing ok.  Out on the unit much of the evening.  Minimal interactions with peers.  Appetite is good.  Medication compliance.  Encouraged substance abuse free lifestyle.  Encouraged group attendance.  Free of fall.

## 2022-10-21 NOTE — PLAN OF CARE
Recommendation/Intervention:   1. Rec'd continue regular diet. 1800 kcal consistent CHO diet if needed for DM.   2. RD to follow and make rec's accordingly.    Goals: Pt will meet % ENN by RD follow up.  Nutrition Goal Status: goal met

## 2022-10-21 NOTE — PROGRESS NOTES
"[facility]  Adult Nutrition  Progress Note    SUMMARY       Recommendations    Recommendation/Intervention:   1. Rec'd continue regular diet. 1800 kcal consistent CHO diet if needed for DM.   2. RD to follow and make rec's accordingly.    Goals: Pt will meet % ENN by RD follow up.  Nutrition Goal Status: goal met  Communication of RD Recs: other (comment) (POC)    Assessment and Plan    No nutrition diagnosis at this time. Please contact if any acute nutrition concerns arise before RD follow up on 10/28/2022.     Malnutrition Assessment                                       Reason for Assessment    Reason For Assessment: RD follow-up  Diagnosis: psychological disorder (bipolar disorder)  Relevant Medical History: COPD, ADHD, depression, DM, OCD, PTSD, substance abuse, arthritis, suicide attempt, bariatric sleeve  Interdisciplinary Rounds: did not attend  General Information Comments: Re-evaluated pt's status for follow up. Pt's meal intake has increased since last visit. Meeting needs at this time and also consuming snacks. 4# weight gain since consult. Noted sx hx of bariatric sleeve. LBM: 10/19/2022 per chart. Noted diet order no longer states double portions; pt still on regular diet. Will continue to monitor to assess for nutrition-related changes.  Nutrition Discharge Planning: Continue regular diet or diabetic diet    Nutrition Risk Screen    Nutrition Risk Screen: no indicators present    Nutrition/Diet History    Spiritual, Cultural Beliefs, Caodaism Practices, Values that Affect Care: no  Food Allergies: NKFA    Anthropometrics    Temp: 97.4 °F (36.3 °C)  Height Method: Stated  Height: 5' 4" (162.6 cm)  Height (inches): 64 in  Weight Method: Standard Scale  Weight: 75 kg (165 lb 5.5 oz)  Weight (lb): 165.35 lb  Ideal Body Weight (IBW), Female: 120 lb  % Ideal Body Weight, Female (lb): 135 %  BMI (Calculated): 28.4  BMI Grade: 25 - 29.9 - overweight   "     Lab/Procedures/Meds    Pertinent Labs Reviewed: reviewed  Pertinent Labs Comments: N/A  Pertinent Medications Reviewed: reviewed  Pertinent Medications Comments: folic acid, B6, B12, MVI    Physical Findings/Assessment         Estimated/Assessed Needs    Weight Used For Calorie Calculations: 75 kg (165 lb 5.5 oz)  Energy Calorie Requirements (kcal): 1801  Energy Need Method: [place]-St Jeor (MSJ x 1.3 AF)  Protein Requirements: 60-75  Weight Used For Protein Calculations: 75 kg (165 lb 5.5 oz)  Fluid Requirements (mL): 1801  Estimated Fluid Requirement Method: RDA Method  RDA Method (mL): 1801  CHO Requirement: 203-248      Nutrition Prescription Ordered    Current Diet Order: regular  Nutrition Order Comments: N/A    Evaluation of Received Nutrient/Fluid Intake    % Kcal Needs: %  % Protein Needs: %  Energy Calories Required: meeting needs  Protein Required: meeting needs  Tolerance: tolerating  % Intake of Estimated Energy Needs: 75 - 100 %  % Meal Intake: 50 - 75 %    Nutrition Risk    Level of Risk/Frequency of Follow-up: low     Monitor and Evaluation    Food and Nutrient Intake: energy intake, food and beverage intake  Food and Nutrient Adminstration: diet order  Physical Activity and Function: nutrition-related ADLs and IADLs, factors affecting access to physical activity  Anthropometric Measurements: height/length, weight, weight change, body mass index  Biochemical Data, Medical Tests and Procedures: electrolyte and renal panel, gastrointestinal profile, glucose/endocrine profile, inflammatory profile, lipid profile  Nutrition-Focused Physical Findings: overall appearance     Nutrition Follow-Up    RD Follow-up?: Yes

## 2022-10-22 LAB
BILIRUB UR QL STRIP: NEGATIVE
CLARITY UR: CLEAR
COLOR UR: YELLOW
GLUCOSE UR QL STRIP: NEGATIVE
HGB UR QL STRIP: ABNORMAL
KETONES UR QL STRIP: NEGATIVE
LEUKOCYTE ESTERASE UR QL STRIP: NEGATIVE
NITRITE UR QL STRIP: NEGATIVE
PH UR STRIP: 6 [PH] (ref 5–8)
PROT UR QL STRIP: NEGATIVE
SP GR UR STRIP: 1.01 (ref 1–1.03)
URN SPEC COLLECT METH UR: ABNORMAL
UROBILINOGEN UR STRIP-ACNC: NEGATIVE EU/DL

## 2022-10-22 PROCEDURE — 99232 SBSQ HOSP IP/OBS MODERATE 35: CPT | Mod: ,,, | Performed by: PSYCHIATRY & NEUROLOGY

## 2022-10-22 PROCEDURE — 25000003 PHARM REV CODE 250: Performed by: STUDENT IN AN ORGANIZED HEALTH CARE EDUCATION/TRAINING PROGRAM

## 2022-10-22 PROCEDURE — 11400000 HC PSYCH PRIVATE ROOM

## 2022-10-22 PROCEDURE — 90833 PSYTX W PT W E/M 30 MIN: CPT | Mod: ,,, | Performed by: PSYCHIATRY & NEUROLOGY

## 2022-10-22 PROCEDURE — S4991 NICOTINE PATCH NONLEGEND: HCPCS | Performed by: PSYCHIATRY & NEUROLOGY

## 2022-10-22 PROCEDURE — 90833 PR PSYCHOTHERAPY W/PATIENT W/E&M, 30 MIN (ADD ON): ICD-10-PCS | Mod: ,,, | Performed by: PSYCHIATRY & NEUROLOGY

## 2022-10-22 PROCEDURE — 81003 URINALYSIS AUTO W/O SCOPE: CPT | Performed by: FAMILY MEDICINE

## 2022-10-22 PROCEDURE — 25000003 PHARM REV CODE 250: Performed by: NURSE PRACTITIONER

## 2022-10-22 PROCEDURE — 25000003 PHARM REV CODE 250: Performed by: FAMILY MEDICINE

## 2022-10-22 PROCEDURE — 99232 PR SUBSEQUENT HOSPITAL CARE,LEVL II: ICD-10-PCS | Mod: ,,, | Performed by: PSYCHIATRY & NEUROLOGY

## 2022-10-22 PROCEDURE — S0166 INJ OLANZAPINE 2.5MG: HCPCS | Performed by: STUDENT IN AN ORGANIZED HEALTH CARE EDUCATION/TRAINING PROGRAM

## 2022-10-22 PROCEDURE — 25000003 PHARM REV CODE 250: Performed by: PSYCHIATRY & NEUROLOGY

## 2022-10-22 RX ORDER — PRAZOSIN HYDROCHLORIDE 1 MG/1
2 CAPSULE ORAL NIGHTLY
Status: DISCONTINUED | OUTPATIENT
Start: 2022-10-22 | End: 2022-10-23 | Stop reason: HOSPADM

## 2022-10-22 RX ORDER — SULFAMETHOXAZOLE AND TRIMETHOPRIM 800; 160 MG/1; MG/1
1 TABLET ORAL 2 TIMES DAILY
Status: DISCONTINUED | OUTPATIENT
Start: 2022-10-22 | End: 2022-10-23 | Stop reason: HOSPADM

## 2022-10-22 RX ORDER — ADHESIVE BANDAGE
30 BANDAGE TOPICAL DAILY PRN
Status: DISCONTINUED | OUTPATIENT
Start: 2022-10-22 | End: 2022-10-23 | Stop reason: HOSPADM

## 2022-10-22 RX ADMIN — LIDOCAINE 5% 1 PATCH: 700 PATCH TOPICAL at 12:10

## 2022-10-22 RX ADMIN — IBUPROFEN 800 MG: 800 TABLET, FILM COATED ORAL at 03:10

## 2022-10-22 RX ADMIN — CYCLOBENZAPRINE HYDROCHLORIDE 10 MG: 5 TABLET, FILM COATED ORAL at 12:10

## 2022-10-22 RX ADMIN — Medication 6 MG: at 08:10

## 2022-10-22 RX ADMIN — QUETIAPINE FUMARATE 200 MG: 200 TABLET ORAL at 08:10

## 2022-10-22 RX ADMIN — PRAZOSIN HYDROCHLORIDE 2 MG: 1 CAPSULE ORAL at 08:10

## 2022-10-22 RX ADMIN — DIAZEPAM 5 MG: 5 TABLET ORAL at 08:10

## 2022-10-22 RX ADMIN — THERA TABS 1 TABLET: TAB at 08:10

## 2022-10-22 RX ADMIN — PREGABALIN 100 MG: 25 CAPSULE ORAL at 08:10

## 2022-10-22 RX ADMIN — NICOTINE 1 PATCH: 14 PATCH, EXTENDED RELEASE TRANSDERMAL at 08:10

## 2022-10-22 RX ADMIN — Medication 1 TABLET: at 08:10

## 2022-10-22 RX ADMIN — CYCLOBENZAPRINE HYDROCHLORIDE 10 MG: 5 TABLET, FILM COATED ORAL at 11:10

## 2022-10-22 RX ADMIN — QUETIAPINE FUMARATE 400 MG: 200 TABLET ORAL at 08:10

## 2022-10-22 RX ADMIN — ACETAMINOPHEN 650 MG: 325 TABLET ORAL at 11:10

## 2022-10-22 RX ADMIN — OLANZAPINE 5 MG: 5 TABLET, FILM COATED ORAL at 11:10

## 2022-10-22 RX ADMIN — SULFAMETHOXAZOLE AND TRIMETHOPRIM 1 TABLET: 800; 160 TABLET ORAL at 08:10

## 2022-10-22 RX ADMIN — IBUPROFEN 800 MG: 800 TABLET, FILM COATED ORAL at 12:10

## 2022-10-22 RX ADMIN — DOCUSATE SODIUM 100 MG: 100 CAPSULE, LIQUID FILLED ORAL at 08:10

## 2022-10-22 RX ADMIN — OLANZAPINE 5 MG: 10 INJECTION, POWDER, FOR SOLUTION INTRAMUSCULAR at 02:10

## 2022-10-22 RX ADMIN — MAGNESIUM HYDROXIDE 2400 MG: 400 SUSPENSION ORAL at 01:10

## 2022-10-22 RX ADMIN — HYDROXYZINE PAMOATE 100 MG: 50 CAPSULE ORAL at 08:10

## 2022-10-22 RX ADMIN — OLANZAPINE 5 MG: 5 TABLET, FILM COATED ORAL at 08:10

## 2022-10-22 RX ADMIN — CYCLOBENZAPRINE HYDROCHLORIDE 10 MG: 5 TABLET, FILM COATED ORAL at 08:10

## 2022-10-22 NOTE — NURSING
"Pt at nurses station complaining of agitation and wanting a "shot."  Pt had been walking hallway, restless in room.  Zyprexa 5mg IM given per order.  "

## 2022-10-22 NOTE — PLAN OF CARE
Patient calm and cooperative, vs stable, appetite good, taking medication as ordered. Promoted safety plan, effective coping skills, mood improvement, absence of HI/SI will continue to monitor safety

## 2022-10-22 NOTE — PROGRESS NOTES
"PSYCHIATRY DAILY INPATIENT PROGRESS NOTE  SUBSEQUENT HOSPITAL VISIT    ENCOUNTER DATE: 10/22/2022  [facility]    DATE OF ADMISSION: 10/11/2022  9:40 PM  LENGTH OF STAY: 11 days    CHIEF COMPLAINT   Joe Henson is a 42 y.o. female, seen during daily salguero rounds on the inpatient unit.  Joe Henson presented with the chief complaint of mood disorder, anxiety and substance problems    The patient was seen and examined. The chart was reviewed.     Reviewed notes from Rns, MD, CTRS, Speciality services, and RD and labs from the last 24 hours.    The patient's case was discussed with the treatment team/care providers today including Rns    Staff reports no behavioral or management issues.     The patient has been compliant with treatment.        Subjective 10/22/2022    Per yesterday's notes: Patient remains labile. Poor insight into substance. Refuses rehab placement at this time, states she will go later. She is only focused on discharged. Counseled patient extensively on consequences/risks of substance abuse. She was minimally engaged.    Today, the patient was calmer. She denied all psychiatric symptoms as documented below. She attended and participated in groups. She is having drug seeking behaviors and acting out to receive sedatives. Behaviors issues are escalating and triggered by the confines of the hospital environment- it is expected that they will not occur in the home environment.   -she adamantly denies SI and maintains that she wants to live "for my son." She was able to cite positive social support. She can identify positive coping skills. She readily discusses her short and long term goals, including an upcoming family camping-tip and helping her son handle his legal stressors in late November.   -She is tolerating the valium taper well and without incident. She has no current symptoms of withdrawal. She denied cravings. She is refusing rehab at this time but maintains that she " plans to attend once her upcoming issues are resolved.   -if symptoms continue to improve, then she juan c be discharged tomorrow       The patient denies any side effects to medications.      Psychiatric ROS (observed, reported, or endorsed/denied):  Depressed mood - denies  Interest/pleasure/anhedonia: denies  Guilt/hopelessness/worthlessness - denies  Changes in Sleep - denies  Changes in Appetite - denies  Changes in Concentration - denies  Changes in Energy - denies  PMA/R- denies  Suicidal- active/passive ideations - No  Homicidal ideations: active/passive ideations - denies    Hallucinations - denies  Delusions - denies  Disorganized behavior - denies  Disorganized speech - denies  Negative symptoms - denies    Elevated mood - denies  Decreased need for sleep - denies  Grandiosity - denies  Racing thoughts - denies  Impulsivity - denies  Irritability- denies  Increased energy - denies  Distractibility - denies  Increase in goal-directed activity or PMA- denies    Symptoms of ITZ - denies  Symptoms of Panic Disorder- denies  Symptoms of PTSD - denies          Psychotherapy:  Target symptoms: depression, anxiety , substance abuse  Why chosen therapy is appropriate versus another modality: relevant to diagnosis  Outcome monitoring methods: self-report  Therapeutic intervention type: supportive psychotherapy  Topics discussed/themes: building skills sets for symptom management, symptom recognition; safety plan and resources  The patient's response to the intervention is accepting. The patient's progress toward treatment goals is limited.   Duration of intervention: 16 minutes.          Overall progress: Patient is showing moderate improvement          Medical ROS  Review of Systems   Constitutional:  Negative for chills and fever.   HENT:  Negative for congestion, hearing loss, sore throat and tinnitus.    Eyes:  Negative for blurred vision, double vision, photophobia and pain.   Respiratory:  Negative for cough,  hemoptysis, sputum production, shortness of breath and wheezing.    Cardiovascular:  Negative for chest pain, palpitations and leg swelling.   Gastrointestinal:  Negative for abdominal pain, blood in stool, constipation, diarrhea, nausea and vomiting.   Genitourinary:  Negative for dysuria, frequency, hematuria and urgency.   Musculoskeletal:  Negative for back pain, joint pain and myalgias.   Skin:  Negative for rash.   Neurological:  Negative for dizziness, tremors, seizures, weakness and headaches.       PAST MEDICAL HISTORY   Past Medical History:   Diagnosis Date    Abnormal Pap smear age 30    Pos. HPV,     Abnormal Pap smear of cervix     Acute right-sided low back pain with right-sided sciatica 10/18/2022    Addiction to drug     ADHD (attention deficit hyperactivity disorder)     Arthritis     Bipolar disorder     COPD (chronic obstructive pulmonary disease)     Depression     Diabetes mellitus     Fatigue     Hallucination     History of psychiatric hospitalization     Hx of psychiatric care     OCD (obsessive compulsive disorder)     Psychiatric problem     PTSD (post-traumatic stress disorder)     Sleep difficulties     Substance abuse     Suicide attempt     Therapy            PSYCHOTROPIC MEDICATIONS   Scheduled Meds:   folic acid-vit B6-vit B12 2.5-25-2 mg  1 tablet Oral Daily    LIDOcaine  1 patch Transdermal Q24H    melatonin  6 mg Oral Nightly    multivitamin  1 tablet Oral Daily    nicotine  1 patch Transdermal Daily    prazosin  2 mg Oral QHS    pregabalin  100 mg Oral BID    QUEtiapine  200 mg Oral Daily    QUEtiapine  400 mg Oral Nightly     Continuous Infusions:  PRN Meds:.acetaminophen, aluminum-magnesium hydroxide-simethicone, cyclobenzaprine, docusate sodium, hydrOXYzine pamoate, ibuprofen, loperamide, OLANZapine **AND** OLANZapine, ondansetron, promethazine        EXAMINATION    VITALS   Vitals:    10/20/22 2015 10/21/22 0735 10/21/22 1951 10/22/22 0742   BP: (!) 140/95 (!) 118/90 110/77  "112/84   BP Location: Right arm Left arm Left arm Left arm   Patient Position: Sitting Sitting Sitting Sitting   Pulse: 87 93 83 106   Resp: 16 18 18 20   Temp: 97.4 °F (36.3 °C) 97.4 °F (36.3 °C) 97.4 °F (36.3 °C) 97.1 °F (36.2 °C)   TempSrc: Temporal Temporal Temporal Temporal   SpO2:       Weight:       Height:           Body mass index is 28.38 kg/m².            CONSTITUTIONAL  General Appearance: Female, in casual attire, appears stated age; NAD     MUSCULOSKELETAL  Muscle Strength and Tone: normal  Abnormal Involuntary Movements:none  Gait and Station:normal; non-ataxic     PSYCHIATRIC   Level of Consciousness: awake, alert  Orientation: p/p/t/s  Grooming: adequate to circumstances  Psychomotor Behavior: no PMR, no PMA  Speech: nl r/t/v/s  Language: English, fluent  Mood: "better"  Affect: congruent with mood, less irritable,   Thought Process: linear, organized, goal directed  Associations: intact; no JOSE  Thought Content: no AVH/delusions; denied HI, noSI  Memory: intact to recent and remote events  Attention:   less distractible   Fund of Knowledge: age and education appropriate  Estimate if Intelligence: average based on work/education history, vocabulary and mental status exam  Insight: fair- seeking help; limitedly understands addictions; accepts most tx  Judgment: fair - less bx issues; compliant/cooperative      DIAGNOSTIC TESTING   Laboratory Results  No results found for this or any previous visit (from the past 24 hour(s)).          MEDICAL DECISION MAKING        ASSESSMENT:   Bipolar I Disorder mre depressed, severe, with psychotic features  ITZ  PTSD     Nicotine Dependence  Polysubstance abuse (h/o alcohol, opiate, amphetamine, sedative hypnotic and cocaine abuse; unable to specify or determine further at this time)     Psychosocial stressors     Chronic pain (sciatica)  B12 deficiency         PROBLEM LIST AND MANAGEMENT PLANS        Mood: pt counseled   - Resumed/continue home medication " Serqouel at 100 mg po day and 300 mg po q HS --> Titrate QHS dose to 400mg on 10/16- increase to 100 mg po q day and 400 mg po q HS   - increase to 200 mg po q day and 400 mg po q HS   - will change/optimize as indicated       Anxiety: pt counseled  - serqouel off-label as above  - hold zoloft for now; will-reinitiate if needed       PTSD: pt counseled  - seroquel off-label as above  - started trial of prazosin 1 mg po q HS- increase to 2 mg po q HS     Insomnia:pt counseled  -seroquel/ off-label as above  -start melatonin 6 mg po q HS  -vistaril prn     Nicotine use: pt counseled;   Started/continue nicotine 14 mg patch dermal daily       Substance use: pt counseled  - encouraging rehab- pt refused  - Started valium 10 mg PO 4 times daily; increase to 15 mg po 4 times daily- decrease to 10 mg po 4 times   -decreased to 10 mg PO TID -decrease to BID -decrease to 5 mg po TID -decrease to BID -decrease to qd the stop; taper completed  -refer for outpatient tx program/12 step meetings       Psychosocial stressors: pt counseled;   -SW consulted to to assist with resources       Chronic pain: pt counseled  - continue Lyrica 50 mg po BID- increase to 75 mg po BID- increase to 100 mg po BID  -started/continue Lidocaine 5% patch dermal q day   - monitor      B12 deficiency: pt counseled  - give B12 1000 mcg IM x 1 on 10/12/22  - started/continue Folbic po q day           Discussed diagnosis, risks and benefits of proposed treatment vs alternative treatments vs no treatment, potential side effects of these treatments and the inherent unpredictability of treatment. The patient expresses understanding of the above and displays the capacity to agree with this treatment given said understanding. Patient also agrees that, currently, the benefits outweigh the risks and would like to pursue/continue treatment at this time.        DISCHARGE PLANNING  Expected Disposition Plan: Home or Self Care- discharge tomorrow if  stable      NEED FOR CONTINUED HOSPITALIZATION  Psychiatric illness continues to pose a potential threat to life or bodily function, of self or others, thereby requiring the need for continued inpatient psychiatric hospitalization: Yes, due to: danger to self and gravely disabled, as evidenced by:  Ongoing concerns with grave disability with patient unable to perform basic feeding, hygiene and dressing activities without significant constant support.    Protective inpatient pyschiatric hospitalization required while a safe disposition plan is enacted: Yes    Patient stabilized and ready for discharge from inpatient psychiatric unit: No        STAFF:   Srinath Fowler MD  Psychiatry

## 2022-10-22 NOTE — PLAN OF CARE
Problem: Adult Behavioral Health Plan of Care  Goal: Optimized Coping Skills in Response to Life Stressors  Intervention: Promote Effective Coping Strategies  Flowsheets (Taken 10/22/2022 1241)  Supportive Measures: active listening utilized   Group Note        Behavior    Patient attended group psychotherapy today. Patient exhibited some difficulty regulating her emotions as evidenced by initially wanting to pass during check-in time and then guardedly participating in the role playing scenario.        Intervention     CBT-based group psychotherapy focused on three topics: How to address and deal with anxiety and depression and what can an individual do on their own to manage their issues before it becomes an crisis.          Response    Patient participated in a role playing exercise concerning how to decrease the power of moods; patient was able to grasp key concepts introduced.        Plan    Patient will be encouraged to continue to attend group psychotherapy with focused attention on fully grasping the steps of how to address and manage a potential crisis.

## 2022-10-22 NOTE — NURSING
Patient reporting having a bump on her vagina that hurts when touched. Dr. Mendoza at bedside witnessed per myself. Small bump appearing irritated noted on the left side of her vaginal wall. Verbal order with readback to complete a urinalysis.

## 2022-10-22 NOTE — NURSING
Pt is sleeping at this time and has slept 6 hours intermittently.  NAD.  Resp even & unlabored.  Pathways clear and bed in low position.  Q 15 minute safety checks ongoing.  All precautions maintained

## 2022-10-22 NOTE — PLAN OF CARE
"Pt states that she feels "pretty good" today, spending majority of time in activity room and in room interacting appropriately with staff and peers. Frequently seeking out staff for PRN medications. Denies SI/HI. Denies hallucinations. Appetite adequate. Denies sleep disturbances. Medication compliant. NADN. Remains calm and cooperative. Safety precautions remain in place.  "

## 2022-10-23 VITALS
DIASTOLIC BLOOD PRESSURE: 72 MMHG | SYSTOLIC BLOOD PRESSURE: 108 MMHG | HEIGHT: 64 IN | BODY MASS INDEX: 28.24 KG/M2 | HEART RATE: 84 BPM | RESPIRATION RATE: 16 BRPM | OXYGEN SATURATION: 99 % | TEMPERATURE: 99 F | WEIGHT: 165.38 LBS

## 2022-10-23 PROCEDURE — 25000003 PHARM REV CODE 250: Performed by: FAMILY MEDICINE

## 2022-10-23 PROCEDURE — S4991 NICOTINE PATCH NONLEGEND: HCPCS | Performed by: PSYCHIATRY & NEUROLOGY

## 2022-10-23 PROCEDURE — 25000003 PHARM REV CODE 250: Performed by: NURSE PRACTITIONER

## 2022-10-23 PROCEDURE — 99238 PR HOSPITAL DISCHARGE DAY,<30 MIN: ICD-10-PCS | Mod: ,,, | Performed by: PSYCHIATRY & NEUROLOGY

## 2022-10-23 PROCEDURE — 25000003 PHARM REV CODE 250: Performed by: STUDENT IN AN ORGANIZED HEALTH CARE EDUCATION/TRAINING PROGRAM

## 2022-10-23 PROCEDURE — 99238 HOSP IP/OBS DSCHRG MGMT 30/<: CPT | Mod: ,,, | Performed by: PSYCHIATRY & NEUROLOGY

## 2022-10-23 PROCEDURE — 25000003 PHARM REV CODE 250: Performed by: PSYCHIATRY & NEUROLOGY

## 2022-10-23 RX ORDER — PREGABALIN 100 MG/1
100 CAPSULE ORAL 2 TIMES DAILY
Qty: 60 CAPSULE | Refills: 0 | Status: SHIPPED | OUTPATIENT
Start: 2022-10-23 | End: 2022-12-21

## 2022-10-23 RX ORDER — QUETIAPINE FUMARATE 200 MG/1
200 TABLET, FILM COATED ORAL DAILY
Qty: 30 TABLET | Refills: 2 | Status: SHIPPED | OUTPATIENT
Start: 2022-10-24 | End: 2022-12-21

## 2022-10-23 RX ORDER — PRAZOSIN HYDROCHLORIDE 2 MG/1
2 CAPSULE ORAL NIGHTLY
Qty: 30 CAPSULE | Refills: 2 | Status: SHIPPED | OUTPATIENT
Start: 2022-10-23 | End: 2022-12-21

## 2022-10-23 RX ORDER — LIDOCAINE 50 MG/G
1 PATCH TOPICAL DAILY
Qty: 30 PATCH | Refills: 0 | Status: SHIPPED | OUTPATIENT
Start: 2022-10-23 | End: 2022-10-26

## 2022-10-23 RX ORDER — IBUPROFEN 200 MG
1 TABLET ORAL DAILY
COMMUNITY
Start: 2022-10-23 | End: 2022-12-21

## 2022-10-23 RX ORDER — SULFAMETHOXAZOLE AND TRIMETHOPRIM 800; 160 MG/1; MG/1
1 TABLET ORAL 2 TIMES DAILY
Qty: 8 TABLET | Refills: 0 | Status: SHIPPED | OUTPATIENT
Start: 2022-10-23 | End: 2022-10-27

## 2022-10-23 RX ORDER — QUETIAPINE FUMARATE 400 MG/1
400 TABLET, FILM COATED ORAL NIGHTLY
Qty: 30 TABLET | Refills: 2 | Status: SHIPPED | OUTPATIENT
Start: 2022-10-23 | End: 2022-10-26

## 2022-10-23 RX ADMIN — NICOTINE 1 PATCH: 14 PATCH, EXTENDED RELEASE TRANSDERMAL at 08:10

## 2022-10-23 RX ADMIN — OLANZAPINE 5 MG: 5 TABLET, FILM COATED ORAL at 01:10

## 2022-10-23 RX ADMIN — Medication 1 TABLET: at 08:10

## 2022-10-23 RX ADMIN — QUETIAPINE FUMARATE 200 MG: 200 TABLET ORAL at 08:10

## 2022-10-23 RX ADMIN — PREGABALIN 100 MG: 25 CAPSULE ORAL at 08:10

## 2022-10-23 RX ADMIN — CYCLOBENZAPRINE HYDROCHLORIDE 10 MG: 5 TABLET, FILM COATED ORAL at 03:10

## 2022-10-23 RX ADMIN — SULFAMETHOXAZOLE AND TRIMETHOPRIM 1 TABLET: 800; 160 TABLET ORAL at 08:10

## 2022-10-23 RX ADMIN — OLANZAPINE 5 MG: 5 TABLET, FILM COATED ORAL at 06:10

## 2022-10-23 RX ADMIN — THERA TABS 1 TABLET: TAB at 08:10

## 2022-10-23 RX ADMIN — IBUPROFEN 800 MG: 800 TABLET, FILM COATED ORAL at 01:10

## 2022-10-23 NOTE — PLAN OF CARE
Pt denies suicidal ideations at this time,no self harming behavior noted. Poor insight into substance abuse issues,no plans to attend rehab,encourage healthy lifestyle choices and to follow treatment plan. VSS, no s/s of detox noted.
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
## 2022-10-23 NOTE — PLAN OF CARE
"Pt rested intermittenly,5 hours totaled, awake now, no distress, "couldn't sleep,I'm anxious to go home".  "

## 2022-10-23 NOTE — PSYCH
Pt will be following up with [facility] at [address] [place] [address] , [place] [address].  Phone number [phone].  Pt will be called Monday with appointment details, understanding verbalized.  Pt will also receive tobacco cessation therapy and substance abuse therapy at appointment d/t positive uds on admit.  AVS faxed on 10/23/22 at [phone].  Pt has own transportation.

## 2022-10-23 NOTE — NURSING
Pt agitated, at nurses station wanting zyprexa and flexeril and motrin.  Informed pt too soon for flexeril.  Zyprexa 5mg and motrin 800mg provided

## 2022-10-23 NOTE — DISCHARGE INSTRUCTIONS
Reviewed with patient all aspects of AVS, denies any needs or questions, verbalized understanding,  denies suicidal ideation, ready for discharge. Appointment will be called tomorrow to patient for follow up, verbalized understanding. Prescriptions called into pharmacy. Belongings reviewed and pt reports all belongings are accounted for.

## 2022-10-23 NOTE — NURSING
Patient belongings given to patient. Discharge paperwork given to patient with belongings. Ambulated off unit with staff stable, ambulated without difficulty

## 2022-10-24 NOTE — PSYCH
Tried to call patient to give her the aftercare appointment.  Facility was closed when patient left, therefore she was not able to have the appointment when she was discharged.  Patient's number on file says that the number will not go through.  Patient was told we would have her appointment today.  Hopefully, she will call later and writer will relay the information.

## 2022-10-24 NOTE — CARE UPDATE
Encounter Date: 10/11/2022  9:40 PM    Discharge Date 10/23/2022  9:38 AM   Hospital Account: [id number]    MRN: [id number]   Guarantor: SATURNINO MUSTAFA   Contact Serial #: [id number]         ENCOUNTER             Patient Class: IP Psych   Unit: [facility]*   Hospital Service: Psychiatry   Bed: 213   Admitting Provider: Srinath Fowler Md   Referring Physician: Srinath Fowler   Attending Provider:     Adm Diagnosis: Depression with suicidal*      PATIENT                  Name: SATURNINO MUSTAFA : 1980 (42 yrs)   Address: [address] [place] [address] [address] Sex: Female   City: [place] [address]       Primary Care Provider: Estela Munguia NP         Primary Phone: [phone]   EMERGENCY CONTACT   Contact Name Legal Guardian? Relationship to Patient Home Phone Work Phone Mobile Phone   1. keaton,son  2. Hallie Stinson No    Son  Mother           [phone]      GUARANTOR                  Guarantor: SATURNINO MUSTAFA       : 1980   Address: [facility] [address]    Sex: [place] [address] Guarantor  Type: B/H   Relation to Patient: Self         Home Phone: [phone]   Guarantor ID: [id number]         Work Phone:     GUARANTOR EMPLOYER     Employer: [employer]           Status: PART TIME      COVERAGE          PRIMARY INSURANCE   Payor / Plan: HUMANA MANAGED MEDICARE/HUMANA MEDICARE HMO       Group Number: [id number]       Subscriber Name: SATURNINO MUSTAFA Subscriber : 1980   Subscriber ID: [id number] Pat. Rel. to Subscriber: Self   Insurance Address: [address] [address]       SECONDARY INSURANCE   Payor / Plan: - No Secondary Coverage -       Group Number:         Subscriber Name:   Subscriber :     Subscriber ID:   Pat. Rel. to Subscriber:     Insurance Address:            Contact Serial # ([id number])       2022    Chart ID ([id number])

## 2022-10-25 ENCOUNTER — PATIENT OUTREACH (OUTPATIENT)
Dept: ADMINISTRATIVE | Facility: CLINIC | Age: 42
End: 2022-10-25
Payer: MEDICARE

## 2022-10-25 ENCOUNTER — PATIENT MESSAGE (OUTPATIENT)
Dept: ADMINISTRATIVE | Facility: CLINIC | Age: 42
End: 2022-10-25
Payer: MEDICARE

## 2022-10-25 NOTE — PROGRESS NOTES
C3 nurse attempted to contact Joe Henson for a TCC post hospital discharge follow up call. No answer.. The patient does not have a scheduled HOSFU appointment. Message sent to PCP staff for assistance with scheduling visit with patient.

## 2022-10-26 ENCOUNTER — OFFICE VISIT (OUTPATIENT)
Dept: OBSTETRICS AND GYNECOLOGY | Facility: CLINIC | Age: 42
End: 2022-10-26
Payer: MEDICARE

## 2022-10-26 VITALS
SYSTOLIC BLOOD PRESSURE: 112 MMHG | HEIGHT: 64 IN | RESPIRATION RATE: 15 BRPM | DIASTOLIC BLOOD PRESSURE: 78 MMHG | WEIGHT: 171.63 LBS | HEART RATE: 109 BPM | BODY MASS INDEX: 29.3 KG/M2

## 2022-10-26 DIAGNOSIS — Z01.419 WELL WOMAN EXAM WITH ROUTINE GYNECOLOGICAL EXAM: Primary | ICD-10-CM

## 2022-10-26 DIAGNOSIS — Z12.4 CERVICAL CANCER SCREENING: ICD-10-CM

## 2022-10-26 PROCEDURE — 1111F PR DISCHARGE MEDS RECONCILED W/ CURRENT OUTPATIENT MED LIST: ICD-10-PCS | Mod: CPTII,S$GLB,, | Performed by: OBSTETRICS & GYNECOLOGY

## 2022-10-26 PROCEDURE — G0101 CA SCREEN;PELVIC/BREAST EXAM: HCPCS | Mod: GZ,S$GLB,, | Performed by: OBSTETRICS & GYNECOLOGY

## 2022-10-26 PROCEDURE — 3078F PR MOST RECENT DIASTOLIC BLOOD PRESSURE < 80 MM HG: ICD-10-PCS | Mod: CPTII,S$GLB,, | Performed by: OBSTETRICS & GYNECOLOGY

## 2022-10-26 PROCEDURE — 87624 HPV HI-RISK TYP POOLED RSLT: CPT | Performed by: OBSTETRICS & GYNECOLOGY

## 2022-10-26 PROCEDURE — 99999 PR PBB SHADOW E&M-EST. PATIENT-LVL III: ICD-10-PCS | Mod: PBBFAC,,, | Performed by: OBSTETRICS & GYNECOLOGY

## 2022-10-26 PROCEDURE — 88175 CYTOPATH C/V AUTO FLUID REDO: CPT | Performed by: OBSTETRICS & GYNECOLOGY

## 2022-10-26 PROCEDURE — 3044F HG A1C LEVEL LT 7.0%: CPT | Mod: CPTII,S$GLB,, | Performed by: OBSTETRICS & GYNECOLOGY

## 2022-10-26 PROCEDURE — 1160F PR REVIEW ALL MEDS BY PRESCRIBER/CLIN PHARMACIST DOCUMENTED: ICD-10-PCS | Mod: CPTII,S$GLB,, | Performed by: OBSTETRICS & GYNECOLOGY

## 2022-10-26 PROCEDURE — 3074F PR MOST RECENT SYSTOLIC BLOOD PRESSURE < 130 MM HG: ICD-10-PCS | Mod: CPTII,S$GLB,, | Performed by: OBSTETRICS & GYNECOLOGY

## 2022-10-26 PROCEDURE — 3078F DIAST BP <80 MM HG: CPT | Mod: CPTII,S$GLB,, | Performed by: OBSTETRICS & GYNECOLOGY

## 2022-10-26 PROCEDURE — 3044F PR MOST RECENT HEMOGLOBIN A1C LEVEL <7.0%: ICD-10-PCS | Mod: CPTII,S$GLB,, | Performed by: OBSTETRICS & GYNECOLOGY

## 2022-10-26 PROCEDURE — 3074F SYST BP LT 130 MM HG: CPT | Mod: CPTII,S$GLB,, | Performed by: OBSTETRICS & GYNECOLOGY

## 2022-10-26 PROCEDURE — 1111F DSCHRG MED/CURRENT MED MERGE: CPT | Mod: CPTII,S$GLB,, | Performed by: OBSTETRICS & GYNECOLOGY

## 2022-10-26 PROCEDURE — 99999 PR PBB SHADOW E&M-EST. PATIENT-LVL III: CPT | Mod: PBBFAC,,, | Performed by: OBSTETRICS & GYNECOLOGY

## 2022-10-26 PROCEDURE — 1159F MED LIST DOCD IN RCRD: CPT | Mod: CPTII,S$GLB,, | Performed by: OBSTETRICS & GYNECOLOGY

## 2022-10-26 PROCEDURE — 1159F PR MEDICATION LIST DOCUMENTED IN MEDICAL RECORD: ICD-10-PCS | Mod: CPTII,S$GLB,, | Performed by: OBSTETRICS & GYNECOLOGY

## 2022-10-26 PROCEDURE — 1160F RVW MEDS BY RX/DR IN RCRD: CPT | Mod: CPTII,S$GLB,, | Performed by: OBSTETRICS & GYNECOLOGY

## 2022-10-26 PROCEDURE — G0101 PR CA SCREEN;PELVIC/BREAST EXAM: ICD-10-PCS | Mod: GZ,S$GLB,, | Performed by: OBSTETRICS & GYNECOLOGY

## 2022-10-26 RX ORDER — IBUPROFEN 800 MG/1
800 TABLET ORAL 3 TIMES DAILY PRN
COMMUNITY
Start: 2022-10-06

## 2022-10-26 RX ORDER — PROPRANOLOL HYDROCHLORIDE 20 MG/1
20 TABLET ORAL 2 TIMES DAILY
COMMUNITY
Start: 2022-10-04 | End: 2022-12-21

## 2022-10-26 RX ORDER — AMITRIPTYLINE HYDROCHLORIDE 50 MG/1
50 TABLET, FILM COATED ORAL NIGHTLY
Status: ON HOLD | COMMUNITY
Start: 2022-09-20 | End: 2023-05-26 | Stop reason: HOSPADM

## 2022-10-26 NOTE — PROGRESS NOTES
Subjective:    Patient ID: Joe Henson is a 42 y.o. y.o. female.     Chief Complaint: Annual Well Woman Exam     History of Present Illness:  Joe presents today for Annual Well Woman exam. She describes her menses as  absent, h/o ablation .She denies pelvic pain.  She denies breast tenderness, masses, nipple discharge. She denies GYN complaints. She reports no difficulty with urination but  is currently receiving tx for a bladder infection.   Constipation with bowel movements. She admits to menopausal symptoms such as hotflashes, vaginal dryness, and night sweats. She denies bloating, early satiety, or weight changes. She is sexually active and does report vaginal dryness.      Menstrual History:   No LMP recorded. Patient has had an ablation..     OB History    : 2  Para: 2  Livin            The following portions of the patient's history were reviewed and updated as appropriate: allergies, current medications, past family history, past medical history, past social history, past surgical history, and problem list.    ROS:   CONSTITUTIONAL: fatigue, weight gain, Denies: fever, chills, diaphoresis, weakness  ENT: tinnitus, negative for sore throat, nasal congestion, nasal discharge, epistaxis, hearing loss  EYES: blurry vision, negative for decreased vision, loss of vision, eye pain, diplopia, photophobia, discharge  SKIN: hair growth, Negative for rash, itching, hives  RESPIRATORY: shortness of breath, negative for cough, hemoptysis,  pleuritic chest pain, wheezing  CARDIOVASCULAR: negative for chest pain, dyspnea on exertion, orthopnea, paroxysmal nocturnal dyspnea, edema, palpitations  BREAST: negative for breast  tenderness, breast mass, nipple discharge, or skin changes  GASTROINTESTINAL: constipation, negative for abdominal pain, flank pain, nausea, vomiting, diarrhea, black stool, blood in stool  GENITOURINARY: negative for abnormal vaginal bleeding, amenorrhea, decreased libido, dysuria,  genital sores, hematuria, incontinence, menorrhagia, pelvic pain, urinary frequency, vaginal discharge  HEMATOLOGIC/LYMPHATIC: negative for swollen lymph nodes, bleeding, bruising  MUSCULOSKELETAL: back pain, muscle pain, negative for  joint pain, joint stiffness, joint swelling, muscle weakness  NEUROLOGICAL: negative for dizzy/vertigo, headache, focal weakness, numbness/tingling, speech problems, loss of consciousness, confusion, memory loss  BEHAVORIAL/PSYCH: No psychosis and Positive for anxiety and depression  ENDOCRINE: negative for polydipsia/polyuria, palpitations, skin changes, temperature intolerance, unexpected weight changes  ALLERGIC/IMMUNOLOGIC: negative for urticaria, hay fever, angioedema      Objective:    Vital Signs:  Vitals:    10/26/22 1302   BP: 112/78   Pulse: 109   Resp: 15       Physical Exam:  General:  alert, cooperative, no distress   Skin:  Skin color, texture, turgor normal. No rashes or lesions   HEENT:  extra ocular movement intact, sclera clear, anicteric   Neck: supple, trachea midline, no adenopathy or thyromegally   Respiratory:  Normal effort   Breasts:  no discharge, erythema, tenderness, or palpable masses; no axillary lymphadenopathy   Abdomen:  soft, nontender, no palpable masses   Pelvis: External genitalia: normal general appearance  Urinary system: urethral meatus normal, bladder nontender  Vaginal: normal mucosa without prolapse or lesions  Cervix: normal appearance  Uterus: normal size, shape, position  Adnexa: normal size, nontender bilaterally   Extremities: Normal ROM; no edema, no cyanosis   Neurologial: Normal strength and tone. No focal numbness or weakness.   Psychiatric: normal mood, speech, dress, and thought processes         Assessment:       Healthy female exam.     1. Well woman exam with routine gynecological exam    2. Cervical cancer screening          Plan:      Well woman exam with routine gynecological exam    Cervical cancer screening  -      Liquid-Based Pap Smear, Screening  -     HPV High Risk Genotypes, PCR    MMG scheduled    COUNSELING:  Joe was counseled on A.C.O.G. Pap guidelines and recommendations for yearly pelvic exams in addition to recommendations for monthly self breast exams; to see her PCP for other health maintenance.

## 2022-11-02 LAB
FINAL PATHOLOGIC DIAGNOSIS: NORMAL
Lab: NORMAL

## 2022-11-03 LAB
HPV HR 12 DNA SPEC QL NAA+PROBE: NEGATIVE
HPV16 AG SPEC QL: NEGATIVE
HPV18 DNA SPEC QL NAA+PROBE: NEGATIVE

## 2022-11-18 ENCOUNTER — HOSPITAL ENCOUNTER (EMERGENCY)
Facility: HOSPITAL | Age: 42
Discharge: HOME | End: 2022-11-18
Attending: STUDENT IN AN ORGANIZED HEALTH CARE EDUCATION/TRAINING PROGRAM
Payer: MEDICARE

## 2022-11-18 VITALS
BODY MASS INDEX: 30.7 KG/M2 | WEIGHT: 179.81 LBS | SYSTOLIC BLOOD PRESSURE: 130 MMHG | RESPIRATION RATE: 20 BRPM | OXYGEN SATURATION: 99 % | HEART RATE: 103 BPM | DIASTOLIC BLOOD PRESSURE: 71 MMHG | TEMPERATURE: 97 F | HEIGHT: 64 IN

## 2022-11-18 DIAGNOSIS — M54.40 CHRONIC RIGHT-SIDED LOW BACK PAIN WITH SCIATICA, SCIATICA LATERALITY UNSPECIFIED: Primary | ICD-10-CM

## 2022-11-18 DIAGNOSIS — G89.29 CHRONIC RIGHT-SIDED LOW BACK PAIN WITH SCIATICA, SCIATICA LATERALITY UNSPECIFIED: Primary | ICD-10-CM

## 2022-11-18 PROCEDURE — 96372 THER/PROPH/DIAG INJ SC/IM: CPT | Performed by: STUDENT IN AN ORGANIZED HEALTH CARE EDUCATION/TRAINING PROGRAM

## 2022-11-18 PROCEDURE — 99284 EMERGENCY DEPT VISIT MOD MDM: CPT

## 2022-11-18 PROCEDURE — 63600175 PHARM REV CODE 636 W HCPCS: Performed by: STUDENT IN AN ORGANIZED HEALTH CARE EDUCATION/TRAINING PROGRAM

## 2022-11-18 PROCEDURE — 25000003 PHARM REV CODE 250: Performed by: STUDENT IN AN ORGANIZED HEALTH CARE EDUCATION/TRAINING PROGRAM

## 2022-11-18 RX ORDER — LIDOCAINE 50 MG/G
1 PATCH TOPICAL
Status: DISCONTINUED | OUTPATIENT
Start: 2022-11-18 | End: 2022-11-18

## 2022-11-18 RX ORDER — CYCLOBENZAPRINE HCL 10 MG
10 TABLET ORAL
Status: COMPLETED | OUTPATIENT
Start: 2022-11-18 | End: 2022-11-18

## 2022-11-18 RX ORDER — IBUPROFEN 600 MG/1
600 TABLET ORAL EVERY 6 HOURS PRN
Qty: 20 TABLET | Refills: 0 | Status: SHIPPED | OUTPATIENT
Start: 2022-11-18 | End: 2022-12-21

## 2022-11-18 RX ORDER — CYCLOBENZAPRINE HCL 10 MG
10 TABLET ORAL 3 TIMES DAILY PRN
Qty: 15 TABLET | Refills: 0 | Status: SHIPPED | OUTPATIENT
Start: 2022-11-18 | End: 2022-11-23

## 2022-11-18 RX ORDER — KETOROLAC TROMETHAMINE 30 MG/ML
15 INJECTION, SOLUTION INTRAMUSCULAR; INTRAVENOUS
Status: COMPLETED | OUTPATIENT
Start: 2022-11-18 | End: 2022-11-18

## 2022-11-18 RX ORDER — LIDOCAINE 50 MG/G
1 PATCH TOPICAL DAILY
Qty: 10 PATCH | Refills: 0 | Status: SHIPPED | OUTPATIENT
Start: 2022-11-18 | End: 2022-12-21

## 2022-11-18 RX ORDER — LIDOCAINE 50 MG/G
1 PATCH TOPICAL
Status: DISCONTINUED | OUTPATIENT
Start: 2022-11-18 | End: 2022-11-18 | Stop reason: HOSPADM

## 2022-11-18 RX ADMIN — KETOROLAC TROMETHAMINE 15 MG: 30 INJECTION, SOLUTION INTRAMUSCULAR at 08:11

## 2022-11-18 RX ADMIN — LIDOCAINE 1 PATCH: 50 PATCH TOPICAL at 08:11

## 2022-11-18 RX ADMIN — CYCLOBENZAPRINE HYDROCHLORIDE 10 MG: 10 TABLET, FILM COATED ORAL at 08:11

## 2022-11-18 NOTE — ED PROVIDER NOTES
Encounter Date: 2022       History     Chief Complaint   Patient presents with    Buttock Pain     42-year-old female with history of diabetes, COPD, presenting with right-sided lower back pain.  Patient has previously diagnosed sciatica.  Back pain is right-sided, and a pain radiates down the right leg.  No numbness or weakness.  Patient denies any urinary retention, loss of bowel or bladder control.  No fever, nausea, vomiting.  No other complaints.    Review of patient's allergies indicates:   Allergen Reactions    Latex, natural rubber      Past Medical History:   Diagnosis Date    Abnormal Pap smear age 30    Pos. HPV,     Abnormal Pap smear of cervix     Acute right-sided low back pain with right-sided sciatica 10/18/2022    Addiction to drug     ADHD (attention deficit hyperactivity disorder)     Arthritis     Bipolar disorder     COPD (chronic obstructive pulmonary disease)     Depression     Diabetes mellitus     Fatigue     Hallucination     History of psychiatric hospitalization     Hx of psychiatric care     OCD (obsessive compulsive disorder)     Psychiatric problem     PTSD (post-traumatic stress disorder)     Sleep difficulties     Substance abuse     Suicide attempt     Therapy      Past Surgical History:   Procedure Laterality Date    bariatric sleeve N/A 2020     SECTION  ;     DILATION AND CURETTAGE OF UTERUS  2016    ENDOMETRIAL ABLATION  2016    KNEE SURGERY Right     hardware-bone from hip to repair    TUBAL LIGATION       Family History   Problem Relation Age of Onset    No Known Problems Paternal Grandfather     No Known Problems Paternal Grandmother     Diabetes Maternal Grandmother     Coronary artery disease Maternal Grandfather     Diabetes Maternal Grandfather     Hypertension Father     Colon cancer Father 60    Anxiety disorder Sister     Depression Sister     Drug abuse Sister     No Known Problems Maternal Aunt     No Known Problems  Maternal Uncle     Breast cancer Paternal Aunt     No Known Problems Paternal Uncle     No Known Problems Cousin      labor Neg Hx      Social History     Tobacco Use    Smoking status: Every Day     Packs/day: 1.00     Years: 25.00     Pack years: 25.00     Types: Cigarettes     Start date: 1997    Smokeless tobacco: Never    Tobacco comments:     told about brochure and smoking clinic program   Substance Use Topics    Alcohol use: Yes     Comment: Socially-2 times a week-4-5 beers    Drug use: Not Currently     Types: Amphetamines, Benzodiazepines     Review of Systems   Constitutional:  Negative for fever.   HENT:  Negative for sore throat.    Respiratory:  Negative for shortness of breath.    Cardiovascular:  Negative for chest pain.   Gastrointestinal:  Negative for nausea.   Genitourinary:  Negative for dysuria.   Musculoskeletal:  Positive for back pain.   Skin:  Negative for rash.   Neurological:  Negative for weakness.   Hematological:  Does not bruise/bleed easily.     Physical Exam     Initial Vitals   BP Pulse Resp Temp SpO2   22 0745 22 0745 22 0745 22 0748 22 0745   130/71 103 20 96.9 °F (36.1 °C) 99 %      MAP       --                Physical Exam    Nursing note and vitals reviewed.  Constitutional: She appears well-developed.   HENT:   Head: Normocephalic.   Eyes: Pupils are equal, round, and reactive to light.   Neck:   Normal range of motion.  Cardiovascular:            No murmur heard.  Pulmonary/Chest: No respiratory distress.   Abdominal: Abdomen is soft.   Musculoskeletal:         General: No edema.      Cervical back: Normal range of motion.      Comments: Right lumbar tenderness.  No skin changes.  No midline tenderness.     Neurological: She is alert.   5/5 strength in bilateral lower extremities.  Ambulatory with stable gait.   Skin: Skin is warm.   Psychiatric: She has a normal mood and affect.       ED Course   Procedures  Labs Reviewed - No  data to display       Imaging Results    None          Medications   LIDOcaine 5 % patch 1 patch (has no administration in time range)   ketorolac injection 15 mg (15 mg Intramuscular Given 11/18/22 0803)   cyclobenzaprine tablet 10 mg (10 mg Oral Given 11/18/22 0803)     Medical Decision Making:   Differential Diagnosis:   DDX: Back pain - In review of history and physical there are no red flags for serious illness. There is no history of IVDU,  fever, chills, focal weakness, numbness, tingling,  symptoms, or immunocompromise.There is a normal neuro exam including equal strength and sensation. Patient is not elderly. Pain likely MSK in nature.  Will provide pain control, reassurance and reassess. Patient would not benefit from routine imaging which has been explained to patient. Unlikely fracture given no midline TTP/stepoffs. Unlikely cauda equina given no neurological symptoms, urinary/bowel incontinence, or risk factors. Unlikely pyelonephritis or UTI as no CVAT, fever, or urinary symptoms. More likely contusion vs. muscle strain given history, exam.  DX: Defer imaging at this time as risks outweigh benefits.  TX: Analgesia PRN.  DISPO: If symptoms controlled, likely discharge to follow up with PMD within 2 days with precautions for RTED and supportive care recommendations.                             Clinical Impression:   Final diagnoses:  [M54.40, G89.29] Chronic right-sided low back pain with sciatica, sciatica laterality unspecified (Primary)             Jmi Baker MD  11/18/22 0804

## 2022-12-21 ENCOUNTER — OFFICE VISIT (OUTPATIENT)
Dept: FAMILY MEDICINE | Facility: CLINIC | Age: 42
End: 2022-12-21
Payer: MEDICARE

## 2022-12-21 ENCOUNTER — IMMUNIZATION (OUTPATIENT)
Dept: FAMILY MEDICINE | Facility: CLINIC | Age: 42
End: 2022-12-21
Payer: MEDICARE

## 2022-12-21 ENCOUNTER — LAB VISIT (OUTPATIENT)
Dept: LAB | Facility: HOSPITAL | Age: 42
End: 2022-12-21
Attending: NURSE PRACTITIONER
Payer: MEDICARE

## 2022-12-21 VITALS
HEART RATE: 94 BPM | BODY MASS INDEX: 31.24 KG/M2 | RESPIRATION RATE: 18 BRPM | DIASTOLIC BLOOD PRESSURE: 80 MMHG | OXYGEN SATURATION: 98 % | SYSTOLIC BLOOD PRESSURE: 102 MMHG | WEIGHT: 183 LBS | HEIGHT: 64 IN

## 2022-12-21 DIAGNOSIS — R79.89 LOW SERUM THYROID STIMULATING HORMONE (TSH): Primary | ICD-10-CM

## 2022-12-21 DIAGNOSIS — R79.89 LOW SERUM THYROID STIMULATING HORMONE (TSH): ICD-10-CM

## 2022-12-21 DIAGNOSIS — R79.89 LOW SERUM T4 LEVEL: ICD-10-CM

## 2022-12-21 DIAGNOSIS — Z23 NEED FOR VACCINATION: Primary | ICD-10-CM

## 2022-12-21 DIAGNOSIS — R94.6 THYROID FUNCTION STUDY ABNORMALITY: ICD-10-CM

## 2022-12-21 LAB
T4 FREE SERPL-MCNC: 0.94 NG/DL (ref 0.71–1.51)
TSH SERPL DL<=0.005 MIU/L-ACNC: 0.78 UIU/ML (ref 0.4–4)

## 2022-12-21 PROCEDURE — G0008 ADMIN INFLUENZA VIRUS VAC: HCPCS | Mod: HCNC,S$GLB,, | Performed by: FAMILY MEDICINE

## 2022-12-21 PROCEDURE — 90686 FLU VACCINE (QUAD) GREATER THAN OR EQUAL TO 3YO PRESERVATIVE FREE IM: ICD-10-PCS | Mod: HCNC,S$GLB,, | Performed by: FAMILY MEDICINE

## 2022-12-21 PROCEDURE — 84439 ASSAY OF FREE THYROXINE: CPT | Mod: HCNC | Performed by: NURSE PRACTITIONER

## 2022-12-21 PROCEDURE — 84480 ASSAY TRIIODOTHYRONINE (T3): CPT | Mod: HCNC | Performed by: NURSE PRACTITIONER

## 2022-12-21 PROCEDURE — 1159F PR MEDICATION LIST DOCUMENTED IN MEDICAL RECORD: ICD-10-PCS | Mod: HCNC,CPTII,S$GLB, | Performed by: NURSE PRACTITIONER

## 2022-12-21 PROCEDURE — 3008F PR BODY MASS INDEX (BMI) DOCUMENTED: ICD-10-PCS | Mod: HCNC,CPTII,S$GLB, | Performed by: NURSE PRACTITIONER

## 2022-12-21 PROCEDURE — 0134A COVID-19, MRNA, LNP-S, BIVALENT BOOSTER, PF, 50 MCG/0.5 ML: CPT | Mod: HCNC,PBBFAC | Performed by: FAMILY MEDICINE

## 2022-12-21 PROCEDURE — 99999 PR PBB SHADOW E&M-EST. PATIENT-LVL III: CPT | Mod: PBBFAC,HCNC,, | Performed by: NURSE PRACTITIONER

## 2022-12-21 PROCEDURE — 36415 COLL VENOUS BLD VENIPUNCTURE: CPT | Mod: HCNC | Performed by: NURSE PRACTITIONER

## 2022-12-21 PROCEDURE — 84443 ASSAY THYROID STIM HORMONE: CPT | Mod: HCNC | Performed by: NURSE PRACTITIONER

## 2022-12-21 PROCEDURE — 91313 COVID-19, MRNA, LNP-S, BIVALENT BOOSTER, PF, 50 MCG/0.5 ML: ICD-10-PCS | Mod: HCNC,S$GLB,, | Performed by: FAMILY MEDICINE

## 2022-12-21 PROCEDURE — 3074F SYST BP LT 130 MM HG: CPT | Mod: HCNC,CPTII,S$GLB, | Performed by: NURSE PRACTITIONER

## 2022-12-21 PROCEDURE — 99213 PR OFFICE/OUTPT VISIT, EST, LEVL III, 20-29 MIN: ICD-10-PCS | Mod: HCNC,S$GLB,, | Performed by: NURSE PRACTITIONER

## 2022-12-21 PROCEDURE — 90686 IIV4 VACC NO PRSV 0.5 ML IM: CPT | Mod: HCNC,S$GLB,, | Performed by: FAMILY MEDICINE

## 2022-12-21 PROCEDURE — 3044F HG A1C LEVEL LT 7.0%: CPT | Mod: HCNC,CPTII,S$GLB, | Performed by: NURSE PRACTITIONER

## 2022-12-21 PROCEDURE — 1160F RVW MEDS BY RX/DR IN RCRD: CPT | Mod: HCNC,CPTII,S$GLB, | Performed by: NURSE PRACTITIONER

## 2022-12-21 PROCEDURE — 3079F PR MOST RECENT DIASTOLIC BLOOD PRESSURE 80-89 MM HG: ICD-10-PCS | Mod: HCNC,CPTII,S$GLB, | Performed by: NURSE PRACTITIONER

## 2022-12-21 PROCEDURE — 91313 COVID-19, MRNA, LNP-S, BIVALENT BOOSTER, PF, 50 MCG/0.5 ML: CPT | Mod: HCNC,S$GLB,, | Performed by: FAMILY MEDICINE

## 2022-12-21 PROCEDURE — 3044F PR MOST RECENT HEMOGLOBIN A1C LEVEL <7.0%: ICD-10-PCS | Mod: HCNC,CPTII,S$GLB, | Performed by: NURSE PRACTITIONER

## 2022-12-21 PROCEDURE — 1160F PR REVIEW ALL MEDS BY PRESCRIBER/CLIN PHARMACIST DOCUMENTED: ICD-10-PCS | Mod: HCNC,CPTII,S$GLB, | Performed by: NURSE PRACTITIONER

## 2022-12-21 PROCEDURE — 99213 OFFICE O/P EST LOW 20 MIN: CPT | Mod: HCNC,S$GLB,, | Performed by: NURSE PRACTITIONER

## 2022-12-21 PROCEDURE — 86800 THYROGLOBULIN ANTIBODY: CPT | Mod: HCNC | Performed by: NURSE PRACTITIONER

## 2022-12-21 PROCEDURE — 3074F PR MOST RECENT SYSTOLIC BLOOD PRESSURE < 130 MM HG: ICD-10-PCS | Mod: HCNC,CPTII,S$GLB, | Performed by: NURSE PRACTITIONER

## 2022-12-21 PROCEDURE — G0008 FLU VACCINE (QUAD) GREATER THAN OR EQUAL TO 3YO PRESERVATIVE FREE IM: ICD-10-PCS | Mod: HCNC,S$GLB,, | Performed by: FAMILY MEDICINE

## 2022-12-21 PROCEDURE — 3008F BODY MASS INDEX DOCD: CPT | Mod: HCNC,CPTII,S$GLB, | Performed by: NURSE PRACTITIONER

## 2022-12-21 PROCEDURE — 99999 PR PBB SHADOW E&M-EST. PATIENT-LVL III: ICD-10-PCS | Mod: PBBFAC,HCNC,, | Performed by: NURSE PRACTITIONER

## 2022-12-21 PROCEDURE — 1159F MED LIST DOCD IN RCRD: CPT | Mod: HCNC,CPTII,S$GLB, | Performed by: NURSE PRACTITIONER

## 2022-12-21 PROCEDURE — 3079F DIAST BP 80-89 MM HG: CPT | Mod: HCNC,CPTII,S$GLB, | Performed by: NURSE PRACTITIONER

## 2022-12-21 RX ORDER — PALIPERIDONE PALMITATE 234 MG/1.5ML
INJECTION INTRAMUSCULAR
COMMUNITY
Start: 2022-10-25

## 2022-12-21 NOTE — PROGRESS NOTES
Subjective:       Patient ID: Joe Henson is a 42 y.o. female.    Chief Complaint: Thyroid Problem (Pt here for thyroid issues. )    Had low TSH in October and wants to get her thyroid function checked. Not sleeping well.     Review of Systems   Constitutional:  Positive for fatigue and unexpected weight change (gain). Negative for appetite change and fever.   HENT: Negative.  Negative for congestion, ear pain and sore throat.    Eyes: Negative.  Negative for visual disturbance.   Respiratory: Negative.  Negative for cough, shortness of breath and wheezing.    Cardiovascular: Negative.    Gastrointestinal: Negative.  Negative for abdominal pain, diarrhea, nausea and vomiting.   Endocrine: Negative.    Genitourinary: Negative.  Negative for difficulty urinating and urgency. Menstrual problem: ablation.  Musculoskeletal: Negative.  Negative for arthralgias and myalgias.   Skin: Negative.  Negative for color change.   Allergic/Immunologic: Negative.    Neurological: Negative.  Negative for dizziness and headaches.   Hematological: Negative.    Psychiatric/Behavioral: Negative.  Negative for sleep disturbance.    All other systems reviewed and are negative.    Objective:      Physical Exam  Vitals and nursing note reviewed.   Constitutional:       General: She is not in acute distress.     Appearance: Normal appearance. She is well-developed.   HENT:      Head: Normocephalic and atraumatic.   Eyes:      Pupils: Pupils are equal, round, and reactive to light.   Cardiovascular:      Rate and Rhythm: Normal rate and regular rhythm.      Pulses: Normal pulses.      Heart sounds: Normal heart sounds. No murmur heard.  Pulmonary:      Effort: Pulmonary effort is normal. No respiratory distress.      Breath sounds: Normal breath sounds.   Abdominal:      Tenderness: There is no right CVA tenderness or left CVA tenderness.   Musculoskeletal:      Cervical back: Normal range of motion and neck supple.   Skin:     General:  Skin is warm and dry.   Neurological:      Mental Status: She is alert and oriented to person, place, and time.   Psychiatric:         Mood and Affect: Mood normal.       Assessment:       1. Low serum thyroid stimulating hormone (TSH)    2. Low serum T4 level    3. Thyroid function study abnormality        Plan:   1. Low serum thyroid stimulating hormone (TSH)  -     Anti-Thyroglobulin Antibody; Future; Expected date: 12/21/2022  -     TSH; Future; Expected date: 12/21/2022  -     T4, Free; Future; Expected date: 12/21/2022  -     T3; Future; Expected date: 12/21/2022    2. Low serum T4 level  -     Anti-Thyroglobulin Antibody; Future; Expected date: 12/21/2022  -     TSH; Future; Expected date: 12/21/2022  -     T4, Free; Future; Expected date: 12/21/2022  -     T3; Future; Expected date: 12/21/2022    3. Thyroid function study abnormality  -     Anti-Thyroglobulin Antibody; Future; Expected date: 12/21/2022  -     TSH; Future; Expected date: 12/21/2022  -     T4, Free; Future; Expected date: 12/21/2022  -     T3; Future; Expected date: 12/21/2022    RTC PRN - will call with lab results

## 2022-12-22 ENCOUNTER — TELEPHONE (OUTPATIENT)
Dept: FAMILY MEDICINE | Facility: CLINIC | Age: 42
End: 2022-12-22
Payer: MEDICARE

## 2022-12-22 LAB
T3 SERPL-MCNC: 79 NG/DL (ref 60–180)
THYROGLOB AB SERPL IA-ACNC: <4 IU/ML (ref 0–3.9)

## 2022-12-22 NOTE — TELEPHONE ENCOUNTER
----- Message from Ventura Cote sent at 2022 12:21 PM CST -----  Contact: Patient  Joe Henson  MRN: [id number]  : 1980  PCP: Estela Munguia  Home Phone      [phone]  Work Phone      Not on file.  Mobile          [phone]      MESSAGE: was to receive Rx's for inhalers - Breo & Ventolin -- nothing @ pharmacy -- uses [facility]    Call [phone]    PCP: Ezra

## 2023-01-09 ENCOUNTER — PATIENT MESSAGE (OUTPATIENT)
Dept: ADMINISTRATIVE | Facility: HOSPITAL | Age: 43
End: 2023-01-09
Payer: MEDICARE

## 2023-03-02 ENCOUNTER — TELEPHONE (OUTPATIENT)
Dept: OBSTETRICS AND GYNECOLOGY | Facility: CLINIC | Age: 43
End: 2023-03-02
Payer: MEDICARE

## 2023-03-02 ENCOUNTER — OFFICE VISIT (OUTPATIENT)
Dept: FAMILY MEDICINE | Facility: CLINIC | Age: 43
End: 2023-03-02
Payer: MEDICARE

## 2023-03-02 VITALS
DIASTOLIC BLOOD PRESSURE: 84 MMHG | BODY MASS INDEX: 33.42 KG/M2 | HEART RATE: 100 BPM | HEIGHT: 64 IN | RESPIRATION RATE: 20 BRPM | WEIGHT: 195.75 LBS | SYSTOLIC BLOOD PRESSURE: 108 MMHG

## 2023-03-02 DIAGNOSIS — N95.1 HOT FLASH, MENOPAUSAL: ICD-10-CM

## 2023-03-02 DIAGNOSIS — J44.9 CHRONIC OBSTRUCTIVE PULMONARY DISEASE, UNSPECIFIED COPD TYPE: Primary | ICD-10-CM

## 2023-03-02 DIAGNOSIS — R23.2 HOT FLASHES: Primary | ICD-10-CM

## 2023-03-02 PROCEDURE — 3074F SYST BP LT 130 MM HG: CPT | Mod: CPTII,S$GLB,, | Performed by: STUDENT IN AN ORGANIZED HEALTH CARE EDUCATION/TRAINING PROGRAM

## 2023-03-02 PROCEDURE — 99214 OFFICE O/P EST MOD 30 MIN: CPT | Mod: S$GLB,,, | Performed by: STUDENT IN AN ORGANIZED HEALTH CARE EDUCATION/TRAINING PROGRAM

## 2023-03-02 PROCEDURE — 3079F PR MOST RECENT DIASTOLIC BLOOD PRESSURE 80-89 MM HG: ICD-10-PCS | Mod: CPTII,S$GLB,, | Performed by: STUDENT IN AN ORGANIZED HEALTH CARE EDUCATION/TRAINING PROGRAM

## 2023-03-02 PROCEDURE — 3079F DIAST BP 80-89 MM HG: CPT | Mod: CPTII,S$GLB,, | Performed by: STUDENT IN AN ORGANIZED HEALTH CARE EDUCATION/TRAINING PROGRAM

## 2023-03-02 PROCEDURE — 99999 PR PBB SHADOW E&M-EST. PATIENT-LVL III: ICD-10-PCS | Mod: PBBFAC,,, | Performed by: STUDENT IN AN ORGANIZED HEALTH CARE EDUCATION/TRAINING PROGRAM

## 2023-03-02 PROCEDURE — 1159F MED LIST DOCD IN RCRD: CPT | Mod: CPTII,S$GLB,, | Performed by: STUDENT IN AN ORGANIZED HEALTH CARE EDUCATION/TRAINING PROGRAM

## 2023-03-02 PROCEDURE — 99999 PR PBB SHADOW E&M-EST. PATIENT-LVL III: CPT | Mod: PBBFAC,,, | Performed by: STUDENT IN AN ORGANIZED HEALTH CARE EDUCATION/TRAINING PROGRAM

## 2023-03-02 PROCEDURE — 1159F PR MEDICATION LIST DOCUMENTED IN MEDICAL RECORD: ICD-10-PCS | Mod: CPTII,S$GLB,, | Performed by: STUDENT IN AN ORGANIZED HEALTH CARE EDUCATION/TRAINING PROGRAM

## 2023-03-02 PROCEDURE — 3008F BODY MASS INDEX DOCD: CPT | Mod: CPTII,S$GLB,, | Performed by: STUDENT IN AN ORGANIZED HEALTH CARE EDUCATION/TRAINING PROGRAM

## 2023-03-02 PROCEDURE — 3008F PR BODY MASS INDEX (BMI) DOCUMENTED: ICD-10-PCS | Mod: CPTII,S$GLB,, | Performed by: STUDENT IN AN ORGANIZED HEALTH CARE EDUCATION/TRAINING PROGRAM

## 2023-03-02 PROCEDURE — 3074F PR MOST RECENT SYSTOLIC BLOOD PRESSURE < 130 MM HG: ICD-10-PCS | Mod: CPTII,S$GLB,, | Performed by: STUDENT IN AN ORGANIZED HEALTH CARE EDUCATION/TRAINING PROGRAM

## 2023-03-02 PROCEDURE — 99214 PR OFFICE/OUTPT VISIT, EST, LEVL IV, 30-39 MIN: ICD-10-PCS | Mod: S$GLB,,, | Performed by: STUDENT IN AN ORGANIZED HEALTH CARE EDUCATION/TRAINING PROGRAM

## 2023-03-02 RX ORDER — ALBUTEROL SULFATE 90 UG/1
2 AEROSOL, METERED RESPIRATORY (INHALATION) EVERY 6 HOURS PRN
Qty: 18 G | Refills: 0 | Status: SHIPPED | OUTPATIENT
Start: 2023-03-02 | End: 2023-08-09

## 2023-03-02 RX ORDER — CLONAZEPAM 0.5 MG/1
0.5 TABLET ORAL 2 TIMES DAILY
Status: ON HOLD | COMMUNITY
Start: 2023-02-14 | End: 2023-05-26 | Stop reason: HOSPADM

## 2023-03-02 RX ORDER — FLUTICASONE FUROATE AND VILANTEROL 200; 25 UG/1; UG/1
1 POWDER RESPIRATORY (INHALATION) DAILY
Qty: 60 EACH | Refills: 0 | Status: SHIPPED | OUTPATIENT
Start: 2023-03-02 | End: 2024-04-03 | Stop reason: SDUPTHER

## 2023-03-02 RX ORDER — CLONIDINE HYDROCHLORIDE 0.1 MG/1
0.1 TABLET ORAL NIGHTLY
Qty: 30 TABLET | Refills: 1 | Status: SHIPPED | OUTPATIENT
Start: 2023-03-02 | End: 2023-04-26

## 2023-03-02 RX ORDER — AMITRIPTYLINE HYDROCHLORIDE 150 MG/1
150 TABLET ORAL NIGHTLY
Status: ON HOLD | COMMUNITY
Start: 2023-02-27 | End: 2023-05-26 | Stop reason: HOSPADM

## 2023-03-02 NOTE — TELEPHONE ENCOUNTER
----- Message from Taylor Flores MA sent at 3/2/2023  8:45 AM CST -----  Contact: self  Joe Henson  MRN: [id number]  Home Phone      [phone]  Work Phone      Not on file.  Mobile          [phone]    Patient Care Team:  Estela Munguia NP as PCP - General (Family Medicine)  Oscar Mata MD as Obstetrician (Obstetrics)  Leeanne Osorio LPN as Care Coordinator  OB? No  What phone number can you be reached at? [phone]  Message: Would like to speak to nurse regarding hot flashes and mood swings.

## 2023-03-02 NOTE — TELEPHONE ENCOUNTER
Pt was called and she stated that vaginal dryness, hot flashes, night sweats, and decreased libido were discussed at annual on 10/26/22. Pt saw Dr. Head today and he gave her a rx of clonidine 0.1 mg for her symptoms. Pt desires to know your recommendations and if lab work is needed. Also wants your recommendations on decreased libido. Pt aware Dr. Sharma is off today and will review tomorrow. Please advise. Thank you.

## 2023-03-02 NOTE — PROGRESS NOTES
Subjective:       Patient ID: Joe Henson is a 42 y.o. female.    Chief Complaint: Menopause (Pt states has had no sex drive lately, hot flashes, and night sweats.)    Pt here with concern for early menopause symptoms. She had an ablation back in 2006 for heavy menstrual cycles and since that time has not had menses. She believes her mother went through menopause in her 40s. She has noticed decreased sex drive, hot flashes, irritability/mood swings, and night sweats. It is putting a stress on her relationship.     Review of Systems   Constitutional:  Negative for chills and fever.   HENT:  Negative for congestion and sore throat.    Respiratory:  Positive for cough (chronic, smoker's) and shortness of breath (intermittent, at baseline with COPD).    Gastrointestinal:  Negative for abdominal pain, diarrhea, nausea and vomiting.   Endocrine: Positive for heat intolerance.   Genitourinary:  Negative for dysuria and hematuria.   Neurological:  Negative for dizziness, syncope and light-headedness.     Objective:      Physical Exam   Constitutional: She is oriented to person, place, and time. No distress.   HENT:   Head: Normocephalic and atraumatic.   Eyes: Conjunctivae are normal.   Cardiovascular: Normal rate, regular rhythm and normal heart sounds.   No murmur heard.  Pulmonary/Chest: Effort normal and breath sounds normal. No respiratory distress.   Musculoskeletal:      Cervical back: Neck supple.   Neurological: She is alert and oriented to person, place, and time.   Psychiatric: Her behavior is normal. Mood normal.   Vitals reviewed.    Assessment:       1. Chronic obstructive pulmonary disease, unspecified COPD type    2. Hot flash, menopausal        Plan:           1. Chronic obstructive pulmonary disease, unspecified COPD type  -     fluticasone furoate-vilanteroL (BREO ELLIPTA) 200-25 mcg/dose DsDv diskus inhaler; Inhale 1 puff into the lungs once daily. Controller  Dispense: 60 each; Refill: 0  -      albuterol (PROVENTIL/VENTOLIN HFA) 90 mcg/actuation inhaler; Inhale 2 puffs into the lungs every 6 (six) hours as needed for Wheezing.  Dispense: 18 g; Refill: 0    2. Hot flash, menopausal  -     cloNIDine (CATAPRES) 0.1 MG tablet; Take 1 tablet (0.1 mg total) by mouth nightly.  Dispense: 30 tablet; Refill: 1    Will start clonidine to help with hot flashes. Trying to avoid SSRI as this may worsen sex drive  Encourage follow-up with OB; likely needs hormone labs  Refills sent for albuterol and breo per patient request  RTC 6 weeks with PCP

## 2023-03-03 ENCOUNTER — LAB VISIT (OUTPATIENT)
Dept: LAB | Facility: HOSPITAL | Age: 43
End: 2023-03-03
Attending: OBSTETRICS & GYNECOLOGY
Payer: MEDICARE

## 2023-03-03 ENCOUNTER — TELEPHONE (OUTPATIENT)
Dept: OBSTETRICS AND GYNECOLOGY | Facility: CLINIC | Age: 43
End: 2023-03-03
Payer: MEDICARE

## 2023-03-03 DIAGNOSIS — R23.2 HOT FLASHES: ICD-10-CM

## 2023-03-03 PROCEDURE — 82670 ASSAY OF TOTAL ESTRADIOL: CPT | Performed by: OBSTETRICS & GYNECOLOGY

## 2023-03-03 PROCEDURE — 36415 COLL VENOUS BLD VENIPUNCTURE: CPT | Performed by: OBSTETRICS & GYNECOLOGY

## 2023-03-03 PROCEDURE — 83001 ASSAY OF GONADOTROPIN (FSH): CPT | Performed by: OBSTETRICS & GYNECOLOGY

## 2023-03-03 NOTE — TELEPHONE ENCOUNTER
Pt was called and informed that message was sent to Dr. Sharma yesterday so she can review it today. Pt that once Dr. Sharma reviews message and gives me her thoughts and recommendations that I will call her. Pt voiced understanding.

## 2023-03-03 NOTE — TELEPHONE ENCOUNTER
----- Message from Taylor Flores MA sent at 3/3/2023 11:02 AM CST -----  Contact: self  Joe Henson  MRN: [id number]  Home Phone      [phone]  Work Phone      Not on file.  Mobile          [phone]    Patient Care Team:  Estela Munguia NP as PCP - General (Family Medicine)  Oscar Mata MD as Obstetrician (Obstetrics)  Leeanne Osorio LPN as Care Coordinator  OB? No  What phone number can you be reached at? [phone]  Message: Would like to speak to nurse regarding hot flashes and mood swings.

## 2023-03-03 NOTE — TELEPHONE ENCOUNTER
I would like for her to have labs done to see if she is in menopause since her periods were stopped with the ablation.  If in menopause, we may need to consider hormone replacement.    Labs ordered.

## 2023-03-04 LAB
ESTRADIOL SERPL-MCNC: 11 PG/ML
FSH SERPL-ACNC: 18.29 MIU/ML

## 2023-03-06 ENCOUNTER — PATIENT MESSAGE (OUTPATIENT)
Dept: OBSTETRICS AND GYNECOLOGY | Facility: CLINIC | Age: 43
End: 2023-03-06
Payer: MEDICARE

## 2023-03-06 ENCOUNTER — TELEPHONE (OUTPATIENT)
Dept: OBSTETRICS AND GYNECOLOGY | Facility: CLINIC | Age: 43
End: 2023-03-06
Payer: MEDICARE

## 2023-03-06 NOTE — TELEPHONE ENCOUNTER
Labs ordered due to pt complaints of vaginal dryness, hot flashes, night sweats, and decreased libido. No menses due to having an endometrial ablation. Results copied below. Please advise. Thank you.    Component Ref Range & Units 3 d ago   Estradiol See Text pg/mL 11    Comment: Estradiol Reference Ranges (Female):   Follicular phase:  pg/mL   Midcycle:          pg/mL   Luteal phase:      pg/mL   Post-menopausal(Not on HRT): <10-28 pg/mL   Post-menopausal(On HRT): < pg/mL   Males: 11-44 pg/mL     The drug Fulvestrant (Faslodex) may interfere with the   assay leading to falsely elevated Estradiol results.     Patients treated with Mifepristone should not be tested with   the  or Alinity I Estradiol assay for up to two   weeks due to the interference of the drug in this assay.    Resulting Agency  [facility]              Specimen Collected: 03/03/23 12:55 Last Resulted: 03/04/23 02:15           Follicle Stimulating Hormone  Order: [id number]  Status: Final result     Visible to patient: Yes (seen)     Next appt: 04/14/2023 at 12:45 PM in Family Medicine (Estela Munguia NP)     Dx: Hot flashes     0 Result Notes       Component Ref Range & Units 3 d ago   Follicle Stimulating Hormone See Text mIU/mL 18.29    Comment: Female Reference Ranges:   Follicular Phase.................3.03-8.08 mIU/mL   Midcycle Peak....................2.55-16.69 mIU/mL   Luteal Phase.....................1.38-5.47 mIU/mL   Postmenopausal...................26..41 mIU/mL   Male Reference Range:............0.95-11.95 mIU/mL    Resulting Agency  [facility]              Specimen Collected: 03/03/23 12:55 Last Resulted: 03/04/23 02:15

## 2023-03-06 NOTE — TELEPHONE ENCOUNTER
----- Message from Catie Shaikh sent at 3/6/2023  2:29 PM CST -----  Contact: self  Joe Henson  MRN: [id number]  Home Phone      [phone]  Work Phone      Not on file.  Mobile          [phone]    Patient Care Team:  Estela Munguia NP as PCP - General (Family Medicine)  Oscar Mata MD as Obstetrician (Obstetrics)  Leeanne Osorio LPN as Care Coordinator  OB? No  What phone number can you be reached at? [phone]  Message: patient is wanting to schedule an appointment f/u blood work appointment.

## 2023-03-08 ENCOUNTER — OFFICE VISIT (OUTPATIENT)
Dept: OBSTETRICS AND GYNECOLOGY | Facility: CLINIC | Age: 43
End: 2023-03-08
Payer: MEDICARE

## 2023-03-08 VITALS
DIASTOLIC BLOOD PRESSURE: 74 MMHG | WEIGHT: 195.38 LBS | RESPIRATION RATE: 15 BRPM | HEIGHT: 64 IN | SYSTOLIC BLOOD PRESSURE: 116 MMHG | HEART RATE: 102 BPM | BODY MASS INDEX: 33.35 KG/M2

## 2023-03-08 DIAGNOSIS — N95.1 SYMPTOMATIC MENOPAUSAL OR FEMALE CLIMACTERIC STATES: Primary | ICD-10-CM

## 2023-03-08 PROCEDURE — 3074F SYST BP LT 130 MM HG: CPT | Mod: CPTII,S$GLB,, | Performed by: OBSTETRICS & GYNECOLOGY

## 2023-03-08 PROCEDURE — 99213 OFFICE O/P EST LOW 20 MIN: CPT | Mod: S$GLB,,, | Performed by: OBSTETRICS & GYNECOLOGY

## 2023-03-08 PROCEDURE — 99213 PR OFFICE/OUTPT VISIT, EST, LEVL III, 20-29 MIN: ICD-10-PCS | Mod: S$GLB,,, | Performed by: OBSTETRICS & GYNECOLOGY

## 2023-03-08 PROCEDURE — 3008F BODY MASS INDEX DOCD: CPT | Mod: CPTII,S$GLB,, | Performed by: OBSTETRICS & GYNECOLOGY

## 2023-03-08 PROCEDURE — 3078F PR MOST RECENT DIASTOLIC BLOOD PRESSURE < 80 MM HG: ICD-10-PCS | Mod: CPTII,S$GLB,, | Performed by: OBSTETRICS & GYNECOLOGY

## 2023-03-08 PROCEDURE — 1160F RVW MEDS BY RX/DR IN RCRD: CPT | Mod: CPTII,S$GLB,, | Performed by: OBSTETRICS & GYNECOLOGY

## 2023-03-08 PROCEDURE — 1160F PR REVIEW ALL MEDS BY PRESCRIBER/CLIN PHARMACIST DOCUMENTED: ICD-10-PCS | Mod: CPTII,S$GLB,, | Performed by: OBSTETRICS & GYNECOLOGY

## 2023-03-08 PROCEDURE — 99999 PR PBB SHADOW E&M-EST. PATIENT-LVL III: ICD-10-PCS | Mod: PBBFAC,,, | Performed by: OBSTETRICS & GYNECOLOGY

## 2023-03-08 PROCEDURE — 3074F PR MOST RECENT SYSTOLIC BLOOD PRESSURE < 130 MM HG: ICD-10-PCS | Mod: CPTII,S$GLB,, | Performed by: OBSTETRICS & GYNECOLOGY

## 2023-03-08 PROCEDURE — 1159F MED LIST DOCD IN RCRD: CPT | Mod: CPTII,S$GLB,, | Performed by: OBSTETRICS & GYNECOLOGY

## 2023-03-08 PROCEDURE — 99999 PR PBB SHADOW E&M-EST. PATIENT-LVL III: CPT | Mod: PBBFAC,,, | Performed by: OBSTETRICS & GYNECOLOGY

## 2023-03-08 PROCEDURE — 3078F DIAST BP <80 MM HG: CPT | Mod: CPTII,S$GLB,, | Performed by: OBSTETRICS & GYNECOLOGY

## 2023-03-08 PROCEDURE — 3008F PR BODY MASS INDEX (BMI) DOCUMENTED: ICD-10-PCS | Mod: CPTII,S$GLB,, | Performed by: OBSTETRICS & GYNECOLOGY

## 2023-03-08 PROCEDURE — 1159F PR MEDICATION LIST DOCUMENTED IN MEDICAL RECORD: ICD-10-PCS | Mod: CPTII,S$GLB,, | Performed by: OBSTETRICS & GYNECOLOGY

## 2023-03-08 NOTE — PROGRESS NOTES
Subjective:       Patient ID: Joe Henson is a 42 y.o. female.    Chief Complaint:  Consult (Test results)      History of Present Illness  HPI  Pt presents today to discuss perimenopausal symptoms. She reports Insomnia, fatigue, decreased libido, vaginal dryness, abdominal weight gain, hot flashes, sweats.  This started about 6 weeks ago has been progressing.  She has a h/o an endometrial ablation, and she does not have periods.   Estradiol 11; FSH 18    GYN & OB History  No LMP recorded. Patient has had an ablation.   Date of Last Pap: 2022    OB History    Para Term  AB Living   2 2       2   SAB IAB Ectopic Multiple Live Births                  # Outcome Date GA Lbr Christiano/2nd Weight Sex Delivery Anes PTL Lv   2 Para            1 Para                Review of Systems  Review of Systems   Constitutional:  Positive for diaphoresis, fatigue and unexpected weight change. Negative for chills and fever.   HENT:  Negative for congestion, hearing loss, rhinorrhea and sore throat.    Eyes:  Negative for pain, discharge and visual disturbance.   Respiratory:  Positive for shortness of breath and wheezing. Negative for apnea and cough.    Cardiovascular:  Negative for chest pain, palpitations and leg swelling.   Gastrointestinal:  Positive for constipation and nausea. Negative for abdominal pain, diarrhea and vomiting.   Endocrine: Positive for cold intolerance and heat intolerance.   Genitourinary:  Positive for dyspareunia. Negative for difficulty urinating, dysuria, flank pain, frequency, genital sores, hematuria, menstrual problem, pelvic pain, vaginal bleeding, vaginal discharge and vaginal pain.   Musculoskeletal:  Negative for arthralgias, back pain and joint swelling.   Skin:  Negative for rash.   Neurological:  Negative for dizziness, weakness, light-headedness, numbness and headaches.   Hematological:  Bruises/bleeds easily.   Psychiatric/Behavioral:  Positive for dysphoric mood. Negative  for agitation and confusion. The patient is nervous/anxious.          Objective:    Physical Exam:   Constitutional: She is oriented to person, place, and time. She appears well-developed and well-nourished. No distress.    HENT:   Head: Normocephalic and atraumatic.    Eyes: Conjunctivae and EOM are normal.      Pulmonary/Chest: Effort normal. No respiratory distress.                  Musculoskeletal: Normal range of motion.       Neurological: She is alert and oriented to person, place, and time.    Skin: Skin is warm and dry.    Psychiatric: She has a normal mood and affect. Her behavior is normal. Judgment and thought content normal.        Assessment:        1. Symptomatic menopausal or female climacteric states                Plan:      Joe was seen today for consult.    Diagnoses and all orders for this visit:    Symptomatic menopausal or female climacteric states  -     estrogen, conjugated,-medroxyprogesterone (PREMPRO) 0.45-1.5 mg per tablet; Take 1 tablet by mouth once daily.      A full discussion of the benefit-risk ratio of hormonal replacement therapy was carried out. Improvement in vasomotor and other climacteric symptoms were discussed, including possible improvements in sleep and mood. Reduction of risk for osteoporosis was explained. We discussed the study data showing increased risk of thrombo-embolic events such as myocardial infarction, stroke and also possibly breast cancer with estrogen replacement, and how this might affect her. The range of side effects such as breast tenderness, weight gain and including possible increases in lifetime risk of breast cancer and possible thrombotic complications was discussed. We also discussed ACOG's recommendation to use hormone replacement therapy for the relief of hot flashes alone and to be on the lowest dose possible for the shortest amount of time.  Alternative such as herbal and soy-based products were reviewed as well as behavioral modifications  and non hormonal prescription medications. All of her questions about this therapy were answered.

## 2023-03-10 ENCOUNTER — TELEPHONE (OUTPATIENT)
Dept: FAMILY MEDICINE | Facility: CLINIC | Age: 43
End: 2023-03-10
Payer: MEDICARE

## 2023-03-10 NOTE — TELEPHONE ENCOUNTER
----- Message from Ventura Cote sent at 3/10/2023 12:01 PM CST -----  Contact: Patient  Joe Henson  MRN: [id number]  : 1980  PCP: Estela Munguia  Home Phone      [phone]  Work Phone      Not on file.  Mobile          [phone]      MESSAGE: sciatica -- just seen @ Urgent Care - instructed to obtain referral to back specialist from PCP     Call [phone]    PCP: Ezra

## 2023-03-16 ENCOUNTER — OFFICE VISIT (OUTPATIENT)
Dept: FAMILY MEDICINE | Facility: CLINIC | Age: 43
End: 2023-03-16
Payer: MEDICARE

## 2023-03-16 ENCOUNTER — HOSPITAL ENCOUNTER (EMERGENCY)
Facility: HOSPITAL | Age: 43
Discharge: HOME | End: 2023-03-16
Attending: STUDENT IN AN ORGANIZED HEALTH CARE EDUCATION/TRAINING PROGRAM
Payer: MEDICARE

## 2023-03-16 ENCOUNTER — CLINICAL SUPPORT (OUTPATIENT)
Dept: FAMILY MEDICINE | Facility: CLINIC | Age: 43
End: 2023-03-16
Payer: MEDICARE

## 2023-03-16 VITALS
BODY MASS INDEX: 35.72 KG/M2 | DIASTOLIC BLOOD PRESSURE: 76 MMHG | WEIGHT: 208.13 LBS | RESPIRATION RATE: 18 BRPM | TEMPERATURE: 98 F | HEART RATE: 108 BPM | SYSTOLIC BLOOD PRESSURE: 123 MMHG | OXYGEN SATURATION: 96 %

## 2023-03-16 VITALS
SYSTOLIC BLOOD PRESSURE: 112 MMHG | BODY MASS INDEX: 35.06 KG/M2 | HEIGHT: 64 IN | RESPIRATION RATE: 18 BRPM | TEMPERATURE: 98 F | HEART RATE: 113 BPM | WEIGHT: 205.38 LBS | OXYGEN SATURATION: 96 % | DIASTOLIC BLOOD PRESSURE: 64 MMHG

## 2023-03-16 DIAGNOSIS — J40 TRACHEOBRONCHITIS: ICD-10-CM

## 2023-03-16 DIAGNOSIS — Z87.39 HISTORY OF ACQUIRED SPONDYLOLISTHESIS: ICD-10-CM

## 2023-03-16 DIAGNOSIS — M54.32 SCIATICA OF LEFT SIDE: Primary | ICD-10-CM

## 2023-03-16 DIAGNOSIS — R05.9 COUGH, UNSPECIFIED TYPE: Primary | ICD-10-CM

## 2023-03-16 DIAGNOSIS — H65.93 BILATERAL SEROUS OTITIS MEDIA, UNSPECIFIED CHRONICITY: ICD-10-CM

## 2023-03-16 DIAGNOSIS — J44.1 CHRONIC OBSTRUCTIVE PULMONARY DISEASE WITH ACUTE EXACERBATION: ICD-10-CM

## 2023-03-16 DIAGNOSIS — M54.32 SCIATICA, LEFT SIDE: ICD-10-CM

## 2023-03-16 DIAGNOSIS — M54.16 LUMBAR RADICULOPATHY, CHRONIC: ICD-10-CM

## 2023-03-16 LAB
CTP QC/QA: YES
CTP QC/QA: YES
POC MOLECULAR INFLUENZA A AGN: NEGATIVE
POC MOLECULAR INFLUENZA B AGN: NEGATIVE
SARS-COV-2 RDRP RESP QL NAA+PROBE: NEGATIVE

## 2023-03-16 PROCEDURE — 63600175 PHARM REV CODE 636 W HCPCS: Performed by: STUDENT IN AN ORGANIZED HEALTH CARE EDUCATION/TRAINING PROGRAM

## 2023-03-16 PROCEDURE — 96372 PR INJECTION,THERAP/PROPH/DIAG2ST, IM OR SUBCUT: ICD-10-PCS | Mod: S$GLB,,, | Performed by: NURSE PRACTITIONER

## 2023-03-16 PROCEDURE — 99214 PR OFFICE/OUTPT VISIT, EST, LEVL IV, 30-39 MIN: ICD-10-PCS | Mod: 25,S$GLB,, | Performed by: NURSE PRACTITIONER

## 2023-03-16 PROCEDURE — 3074F PR MOST RECENT SYSTOLIC BLOOD PRESSURE < 130 MM HG: ICD-10-PCS | Mod: CPTII,S$GLB,, | Performed by: NURSE PRACTITIONER

## 2023-03-16 PROCEDURE — 87502 POCT INFLUENZA A/B MOLECULAR: ICD-10-PCS | Mod: QW,S$GLB,, | Performed by: NURSE PRACTITIONER

## 2023-03-16 PROCEDURE — 3078F PR MOST RECENT DIASTOLIC BLOOD PRESSURE < 80 MM HG: ICD-10-PCS | Mod: CPTII,S$GLB,, | Performed by: NURSE PRACTITIONER

## 2023-03-16 PROCEDURE — 3074F SYST BP LT 130 MM HG: CPT | Mod: CPTII,S$GLB,, | Performed by: NURSE PRACTITIONER

## 2023-03-16 PROCEDURE — 99284 EMERGENCY DEPT VISIT MOD MDM: CPT

## 2023-03-16 PROCEDURE — 3008F PR BODY MASS INDEX (BMI) DOCUMENTED: ICD-10-PCS | Mod: CPTII,S$GLB,, | Performed by: NURSE PRACTITIONER

## 2023-03-16 PROCEDURE — 96372 THER/PROPH/DIAG INJ SC/IM: CPT | Performed by: STUDENT IN AN ORGANIZED HEALTH CARE EDUCATION/TRAINING PROGRAM

## 2023-03-16 PROCEDURE — 3008F BODY MASS INDEX DOCD: CPT | Mod: CPTII,S$GLB,, | Performed by: NURSE PRACTITIONER

## 2023-03-16 PROCEDURE — 99214 OFFICE O/P EST MOD 30 MIN: CPT | Mod: 25,S$GLB,, | Performed by: NURSE PRACTITIONER

## 2023-03-16 PROCEDURE — 99999 PR PBB SHADOW E&M-EST. PATIENT-LVL V: CPT | Mod: PBBFAC,,, | Performed by: NURSE PRACTITIONER

## 2023-03-16 PROCEDURE — 87635: ICD-10-PCS | Mod: QW,S$GLB,, | Performed by: NURSE PRACTITIONER

## 2023-03-16 PROCEDURE — 3078F DIAST BP <80 MM HG: CPT | Mod: CPTII,S$GLB,, | Performed by: NURSE PRACTITIONER

## 2023-03-16 PROCEDURE — 25000003 PHARM REV CODE 250: Performed by: STUDENT IN AN ORGANIZED HEALTH CARE EDUCATION/TRAINING PROGRAM

## 2023-03-16 PROCEDURE — 96372 THER/PROPH/DIAG INJ SC/IM: CPT | Mod: S$GLB,,, | Performed by: NURSE PRACTITIONER

## 2023-03-16 PROCEDURE — 99999 PR PBB SHADOW E&M-EST. PATIENT-LVL V: ICD-10-PCS | Mod: PBBFAC,,, | Performed by: NURSE PRACTITIONER

## 2023-03-16 PROCEDURE — 87502 INFLUENZA DNA AMP PROBE: CPT | Mod: QW,S$GLB,, | Performed by: NURSE PRACTITIONER

## 2023-03-16 PROCEDURE — 87635 SARS-COV-2 COVID-19 AMP PRB: CPT | Mod: QW,S$GLB,, | Performed by: NURSE PRACTITIONER

## 2023-03-16 RX ORDER — KETOROLAC TROMETHAMINE 30 MG/ML
60 INJECTION, SOLUTION INTRAMUSCULAR; INTRAVENOUS
Status: COMPLETED | OUTPATIENT
Start: 2023-03-16 | End: 2023-03-16

## 2023-03-16 RX ORDER — IPRATROPIUM BROMIDE AND ALBUTEROL SULFATE 2.5; .5 MG/3ML; MG/3ML
3 SOLUTION RESPIRATORY (INHALATION) 2 TIMES DAILY PRN
Qty: 60 EACH | Refills: 1 | Status: SHIPPED | OUTPATIENT
Start: 2023-03-16 | End: 2024-04-03

## 2023-03-16 RX ORDER — KETOROLAC TROMETHAMINE 30 MG/ML
15 INJECTION, SOLUTION INTRAMUSCULAR; INTRAVENOUS
Status: COMPLETED | OUTPATIENT
Start: 2023-03-16 | End: 2023-03-16

## 2023-03-16 RX ORDER — CYCLOBENZAPRINE HCL 10 MG
10 TABLET ORAL
Status: COMPLETED | OUTPATIENT
Start: 2023-03-16 | End: 2023-03-16

## 2023-03-16 RX ORDER — NEBULIZER ACCESSORIES
KIT MISCELLANEOUS
Qty: 3 KIT | Refills: 0 | Status: SHIPPED | OUTPATIENT
Start: 2023-03-16

## 2023-03-16 RX ORDER — METHYLPREDNISOLONE ACETATE 40 MG/ML
60 INJECTION, SUSPENSION INTRA-ARTICULAR; INTRALESIONAL; INTRAMUSCULAR; SOFT TISSUE
Status: COMPLETED | OUTPATIENT
Start: 2023-03-16 | End: 2023-03-16

## 2023-03-16 RX ORDER — PROMETHAZINE HYDROCHLORIDE AND DEXTROMETHORPHAN HYDROBROMIDE 6.25; 15 MG/5ML; MG/5ML
5 SYRUP ORAL EVERY 8 HOURS PRN
Qty: 240 ML | Refills: 0 | Status: SHIPPED | OUTPATIENT
Start: 2023-03-16 | End: 2024-03-05 | Stop reason: HOSPADM

## 2023-03-16 RX ORDER — DEXAMETHASONE SODIUM PHOSPHATE 4 MG/ML
8 INJECTION, SOLUTION INTRA-ARTICULAR; INTRALESIONAL; INTRAMUSCULAR; INTRAVENOUS; SOFT TISSUE
Status: COMPLETED | OUTPATIENT
Start: 2023-03-16 | End: 2023-03-16

## 2023-03-16 RX ORDER — CYCLOBENZAPRINE HCL 10 MG
10 TABLET ORAL 3 TIMES DAILY PRN
Qty: 15 TABLET | Refills: 0 | Status: SHIPPED | OUTPATIENT
Start: 2023-03-16 | End: 2023-03-21

## 2023-03-16 RX ORDER — AZITHROMYCIN 500 MG/1
500 TABLET, FILM COATED ORAL DAILY
Qty: 3 TABLET | Refills: 0 | Status: SHIPPED | OUTPATIENT
Start: 2023-03-16 | End: 2024-03-05 | Stop reason: HOSPADM

## 2023-03-16 RX ADMIN — KETOROLAC TROMETHAMINE 60 MG: 30 INJECTION, SOLUTION INTRAMUSCULAR; INTRAVENOUS at 03:03

## 2023-03-16 RX ADMIN — DEXAMETHASONE SODIUM PHOSPHATE 8 MG: 4 INJECTION, SOLUTION INTRA-ARTICULAR; INTRALESIONAL; INTRAMUSCULAR; INTRAVENOUS; SOFT TISSUE at 10:03

## 2023-03-16 RX ADMIN — CYCLOBENZAPRINE HYDROCHLORIDE 10 MG: 10 TABLET, FILM COATED ORAL at 10:03

## 2023-03-16 RX ADMIN — KETOROLAC TROMETHAMINE 15 MG: 30 INJECTION, SOLUTION INTRAMUSCULAR; INTRAVENOUS at 10:03

## 2023-03-16 RX ADMIN — METHYLPREDNISOLONE ACETATE 60 MG: 40 INJECTION, SUSPENSION INTRA-ARTICULAR; INTRALESIONAL; INTRAMUSCULAR; SOFT TISSUE at 03:03

## 2023-03-16 NOTE — PROGRESS NOTES
Subjective:       Patient ID: Joe Henson is a 42 y.o. female.    Chief Complaint: Cough, Nasal Congestion, and Shortness of Breath (Pt here for cough,congestion and sob.)    4 day history of cough, congestion, hoarseness,    Cough  Cough characteristics: both wet and dry. Associated symptoms include nasal congestion, rhinorrhea, shortness of breath and wheezing. Pertinent negatives include no ear pain, fever, headaches, myalgias or sore throat. The symptoms are aggravated by lying down. She has tried OTC cough suppressant and a beta-agonist inhaler for the symptoms. Her past medical history is significant for COPD.   Shortness of Breath  This is a new problem. The current episode started in the past 7 days. Associated symptoms include rhinorrhea and wheezing. Pertinent negatives include no abdominal pain, ear pain, fever, headaches, sore throat or vomiting. Her past medical history is significant for COPD.   Review of Systems   Constitutional: Negative.  Negative for appetite change, fatigue and fever.   HENT:  Positive for congestion and rhinorrhea. Negative for ear pain and sore throat.    Eyes: Negative.  Negative for visual disturbance.   Respiratory:  Positive for cough, shortness of breath and wheezing.    Cardiovascular: Negative.    Gastrointestinal: Negative.  Negative for abdominal pain, diarrhea, nausea and vomiting.   Endocrine: Negative.    Genitourinary: Negative.  Negative for difficulty urinating and urgency.   Musculoskeletal: Negative.  Negative for arthralgias and myalgias.   Skin: Negative.  Negative for color change.   Allergic/Immunologic: Negative.    Neurological: Negative.  Negative for dizziness and headaches.   Hematological: Negative.    Psychiatric/Behavioral: Negative.  Negative for sleep disturbance.    All other systems reviewed and are negative.    Objective:      Physical Exam  Vitals and nursing note reviewed.   Constitutional:       Appearance: Normal appearance. She is  well-developed.   HENT:      Head: Normocephalic and atraumatic.      Right Ear: External ear normal. A middle ear effusion is present. Tympanic membrane is retracted.      Left Ear: External ear normal. A middle ear effusion is present. Tympanic membrane is retracted.      Nose: Mucosal edema, congestion and rhinorrhea present.      Mouth/Throat:      Mouth: Mucous membranes are moist.      Pharynx: Uvula midline.   Eyes:      Conjunctiva/sclera: Conjunctivae normal.   Neck:      Thyroid: No thyromegaly.      Trachea: Trachea normal.   Cardiovascular:      Rate and Rhythm: Normal rate and regular rhythm.      Pulses: Normal pulses.      Heart sounds: Normal heart sounds, S1 normal and S2 normal. No murmur heard.  Pulmonary:      Effort: Pulmonary effort is normal. No respiratory distress.      Breath sounds: Normal breath sounds.   Abdominal:      Palpations: Abdomen is soft.      Tenderness: There is no abdominal tenderness.   Musculoskeletal:         General: Normal range of motion.      Cervical back: Normal range of motion and neck supple.   Lymphadenopathy:      Cervical: No cervical adenopathy.   Skin:     General: Skin is warm and dry.   Neurological:      Mental Status: She is alert and oriented to person, place, and time.   Psychiatric:         Mood and Affect: Mood normal.         Speech: Speech normal.       Assessment:       1. Cough, unspecified type    2. Tracheobronchitis    3. Bilateral serous otitis media, unspecified chronicity    4. Sciatica, left side    5. Chronic obstructive pulmonary disease with acute exacerbation    6. Lumbar radiculopathy, chronic    7. History of acquired spondylolisthesis          Plan:     1. Cough, unspecified type  -     POCT COVID-19 Rapid Screening  -     POCT Influenza A/B Molecular  -     promethazine-dextromethorphan (PROMETHAZINE-DM) 6.25-15 mg/5 mL Syrp; Take 5 mLs by mouth every 8 (eight) hours as needed (cough).  Dispense: 240 mL; Refill: 0    2.  Tracheobronchitis  -     methylPREDNISolone acetate injection 60 mg  -     azithromycin (ZITHROMAX) 500 MG tablet; Take 1 tablet (500 mg total) by mouth once daily.  Dispense: 3 tablet; Refill: 0    3. Bilateral serous otitis media, unspecified chronicity  -     methylPREDNISolone acetate injection 60 mg    4. Sciatica, left side  -     ketorolac injection 60 mg    5. Chronic obstructive pulmonary disease with acute exacerbation  -     nebulizer accessories (REUSABLE NEBULIZER KIT) Kit; Use Qid with nebulizer treatments  Dispense: 3 kit; Refill: 0  -     NEBULIZER FOR HOME USE  -     albuterol-ipratropium (DUO-NEB) 2.5 mg-0.5 mg/3 mL nebulizer solution; Take 3 mLs by nebulization 2 (two) times daily as needed for Wheezing or Shortness of Breath (cough). Rescue  Dispense: 60 each; Refill: 1    6. Lumbar radiculopathy, chronic  -     Ambulatory referral/consult to Pain Clinic; Future; Expected date: 03/23/2023  -     X-Ray Lumbar Spine AP And Lateral; Future; Expected date: 03/16/2023  -     MRI Lumbar Spine Without Contrast; Future; Expected date: 03/16/2023    7. History of acquired spondylolisthesis  -     Ambulatory referral/consult to Pain Clinic; Future; Expected date: 03/23/2023  -     X-Ray Lumbar Spine AP And Lateral; Future; Expected date: 03/16/2023  -     MRI Lumbar Spine Without Contrast; Future; Expected date: 03/16/2023     RTC PRN

## 2023-03-17 ENCOUNTER — HOSPITAL ENCOUNTER (EMERGENCY)
Facility: HOSPITAL | Age: 43
Discharge: HOME | End: 2023-03-17
Attending: STUDENT IN AN ORGANIZED HEALTH CARE EDUCATION/TRAINING PROGRAM
Payer: MEDICARE

## 2023-03-17 VITALS
BODY MASS INDEX: 35.23 KG/M2 | TEMPERATURE: 98 F | HEART RATE: 87 BPM | SYSTOLIC BLOOD PRESSURE: 119 MMHG | DIASTOLIC BLOOD PRESSURE: 69 MMHG | OXYGEN SATURATION: 96 % | WEIGHT: 205.25 LBS | RESPIRATION RATE: 20 BRPM

## 2023-03-17 DIAGNOSIS — S61.412A LACERATION OF LEFT HAND WITHOUT FOREIGN BODY, INITIAL ENCOUNTER: Primary | ICD-10-CM

## 2023-03-17 PROCEDURE — 25000003 PHARM REV CODE 250: Performed by: NURSE PRACTITIONER

## 2023-03-17 PROCEDURE — 12001 RPR S/N/AX/GEN/TRNK 2.5CM/<: CPT

## 2023-03-17 PROCEDURE — 99284 EMERGENCY DEPT VISIT MOD MDM: CPT | Mod: 25

## 2023-03-17 RX ORDER — MUPIROCIN 20 MG/G
OINTMENT TOPICAL 3 TIMES DAILY
Qty: 15 G | Refills: 0 | Status: SHIPPED | OUTPATIENT
Start: 2023-03-17 | End: 2024-04-03

## 2023-03-17 RX ORDER — MUPIROCIN 20 MG/G
1 OINTMENT TOPICAL
Status: COMPLETED | OUTPATIENT
Start: 2023-03-17 | End: 2023-03-17

## 2023-03-17 RX ORDER — CLINDAMYCIN HYDROCHLORIDE 300 MG/1
300 CAPSULE ORAL EVERY 6 HOURS
Qty: 28 CAPSULE | Refills: 0 | Status: SHIPPED | OUTPATIENT
Start: 2023-03-17 | End: 2023-03-24

## 2023-03-17 RX ORDER — LIDOCAINE HYDROCHLORIDE 10 MG/ML
10 INJECTION, SOLUTION EPIDURAL; INFILTRATION; INTRACAUDAL; PERINEURAL
Status: COMPLETED | OUTPATIENT
Start: 2023-03-17 | End: 2023-03-17

## 2023-03-17 RX ADMIN — LIDOCAINE HYDROCHLORIDE 100 MG: 10 INJECTION, SOLUTION EPIDURAL; INFILTRATION; INTRACAUDAL; PERINEURAL at 12:03

## 2023-03-17 RX ADMIN — MUPIROCIN 22 G: 20 OINTMENT TOPICAL at 12:03

## 2023-03-17 NOTE — ED PROVIDER NOTES
Encounter Date: 3/17/2023       History     Chief Complaint   Patient presents with    Hand Injury     Pt states yesterday, she was sharpening a knife and she cut her hand.     Joe Henson is a 42 y.o. female with PMH of COPD, DM who presents the ED for evaluation of left hand laceration.  Patient reports that she accidentally lacerated her left hand yesterday evening while sharpening a knife.  She presents with a small, linear laceration to her left 3rd digit MCP.  She denies numbness or decreased range of motion of finger.  Currently denies pain  Reports that she was told to come to the ED by urgent care.    The history is provided by the patient.   Review of patient's allergies indicates:   Allergen Reactions    Latex, natural rubber      Past Medical History:   Diagnosis Date    Abnormal Pap smear age 30    Pos. HPV,     Abnormal Pap smear of cervix     Acute right-sided low back pain with right-sided sciatica 10/18/2022    Addiction to drug     ADHD (attention deficit hyperactivity disorder)     Arthritis     Bipolar disorder     COPD (chronic obstructive pulmonary disease)     Depression     Diabetes mellitus     Fatigue     Hallucination     History of psychiatric hospitalization     Hx of psychiatric care     OCD (obsessive compulsive disorder)     Psychiatric problem     PTSD (post-traumatic stress disorder)     Sleep difficulties     Substance abuse     Suicide attempt     Therapy      Past Surgical History:   Procedure Laterality Date    bariatric sleeve N/A 2020     SECTION  ;     DILATION AND CURETTAGE OF UTERUS  2016    ENDOMETRIAL ABLATION  2016    KNEE SURGERY Right     hardware-bone from hip to repair    TUBAL LIGATION       Family History   Problem Relation Age of Onset    No Known Problems Paternal Grandfather     No Known Problems Paternal Grandmother     Diabetes Maternal Grandmother     Coronary artery disease Maternal Grandfather     Diabetes  Maternal Grandfather     Hypertension Father     Colon cancer Father 60    Anxiety disorder Sister     Depression Sister     Drug abuse Sister     No Known Problems Maternal Aunt     No Known Problems Maternal Uncle     Breast cancer Paternal Aunt     No Known Problems Paternal Uncle     No Known Problems Cousin      labor Neg Hx     Ovarian cancer Neg Hx      Social History     Tobacco Use    Smoking status: Every Day     Packs/day: 1.00     Years: 25.00     Pack years: 25.00     Types: Cigarettes     Start date: 1997    Smokeless tobacco: Never    Tobacco comments:     told about brochure and smoking clinic program   Substance Use Topics    Alcohol use: Yes     Comment: Socially-2 times a week-4-5 beers    Drug use: Not Currently     Types: Amphetamines, Benzodiazepines     Review of Systems   Constitutional:  Negative for fever.   HENT:  Negative for sore throat.    Respiratory:  Negative for chest tightness and shortness of breath.    Cardiovascular:  Negative for chest pain.   Gastrointestinal:  Negative for abdominal pain and nausea.   Genitourinary:  Negative for dysuria, frequency and urgency.   Musculoskeletal:  Positive for arthralgias. Negative for back pain.   Skin:  Positive for wound (Left hand). Negative for rash.   Neurological:  Negative for dizziness, weakness, light-headedness and numbness.   Hematological:  Does not bruise/bleed easily.     Physical Exam     Initial Vitals [23 1108]   BP Pulse Resp Temp SpO2   119/69 87 20 98.3 °F (36.8 °C) 96 %      MAP       --         Physical Exam    Nursing note and vitals reviewed.  Constitutional: She appears well-developed and well-nourished.   HENT:   Head: Normocephalic and atraumatic.   Right Ear: Tympanic membrane, external ear and ear canal normal. Tympanic membrane is not erythematous. No middle ear effusion.   Left Ear: Tympanic membrane, external ear and ear canal normal. Tympanic membrane is not erythematous.  No middle ear  effusion.   Nose: Nose normal.   Mouth/Throat: Uvula is midline, oropharynx is clear and moist and mucous membranes are normal. Mucous membranes are not pale and not dry.   Eyes: Conjunctivae and EOM are normal. Pupils are equal, round, and reactive to light.   Neck: Neck supple.   Normal range of motion.  Cardiovascular:  Normal rate, regular rhythm, normal heart sounds and intact distal pulses.           Pulmonary/Chest: Effort normal and breath sounds normal. She has no decreased breath sounds. She has no wheezes. She has no rhonchi. She has no rales.   Abdominal: Abdomen is soft. Bowel sounds are normal. There is no abdominal tenderness.   Musculoskeletal:         General: Normal range of motion.      Cervical back: Normal range of motion and neck supple.     Neurological: She is alert and oriented to person, place, and time. She has normal strength. She displays normal reflexes. No cranial nerve deficit or sensory deficit.   Skin: Skin is warm and dry. Capillary refill takes less than 2 seconds. Laceration (0.5 cm) noted. No rash noted.   Psychiatric: She has a normal mood and affect. Her behavior is normal. Judgment and thought content normal.       ED Course   Lac Repair    Date/Time: 3/17/2023 1:51 PM  Performed by: Sailaja Stinson NP  Authorized by: Binu Parra DO     Consent:     Consent obtained:  Verbal    Consent given by:  Patient    Risks, benefits, and alternatives were discussed: yes      Risks discussed:  Infection, need for additional repair, nerve damage, poor wound healing, poor cosmetic result, pain, retained foreign body, tendon damage and vascular damage    Alternatives discussed:  No treatment, referral and observation  Universal protocol:     Procedure explained and questions answered to patient or proxy's satisfaction: yes      Patient identity confirmed:  Verbally with patient, hospital-assigned identification number and arm band  Anesthesia:     Anesthesia method:  Local infiltration     Local anesthetic:  Lidocaine 1% w/o epi  Laceration details:     Location:  Hand    Hand location:  L hand, dorsum    Length (cm):  0.5  Pre-procedure details:     Preparation:  Patient was prepped and draped in usual sterile fashion and imaging obtained to evaluate for foreign bodies  Exploration:     Hemostasis achieved with:  Direct pressure    Imaging obtained: x-ray      Imaging outcome: foreign body not noted      Wound exploration: wound explored through full range of motion and entire depth of wound visualized      Wound extent: no areolar tissue violation noted, no fascia violation noted, no foreign bodies/material noted, no muscle damage noted, no nerve damage noted, no tendon damage noted, no underlying fracture noted and no vascular damage noted    Treatment:     Area cleansed with:  Chlorhexidine and saline    Amount of cleaning:  Extensive    Irrigation solution:  Sterile saline    Irrigation method:  Syringe  Skin repair:     Repair method:  Sutures    Suture size:  5-0    Suture material:  Nylon    Suture technique:  Simple interrupted    Number of sutures:  3  Approximation:     Approximation:  Loose  Repair type:     Repair type:  Simple  Post-procedure details:     Dressing:  Antibiotic ointment    Procedure completion:  Tolerated well, no immediate complications  Labs Reviewed - No data to display       Imaging Results              X-Ray Hand 3 view Left (Final result)  Result time 03/17/23 12:45:10      Final result by Acacia Schmidt MD (03/17/23 12:45:10)                   Impression:      No evidence of fracture.No radiopaque foreign bodies are seen.      Electronically signed by: Acacia Schmidt MD  Date:    03/17/2023  Time:    12:45               Narrative:    EXAMINATION:  XR HAND COMPLETE 3 VIEW LEFT    CLINICAL HISTORY:  laceration of left hand;    TECHNIQUE:  Three views of the left hand    COMPARISON:  None.    FINDINGS:  The alignment is within normal limits.  No displaced fractures  identified.  No evidence of lytic or blastic lesions.Joint spaces are unremarkable.Soft tissues are unremarkable.No radiopaque foreign bodies.                                       Medications   Tdap (BOOSTRIX) vaccine injection 0.5 mL (has no administration in time range)   LIDOcaine (PF) 10 mg/ml (1%) injection 100 mg (100 mg Infiltration Given by Provider 3/17/23 5687)   mupirocin 2 % ointment 22 g (22 g Topical (Top) Given 3/17/23 3404)     Medical Decision Making:   Differential Diagnosis:   Tendon injury, fracture, foreign body  Clinical Tests:   Radiological Study: Ordered and Reviewed  ED Management:  Evaluation of a 42-year-old female with left dorsal hand laceration.  Patient reports that yesterday evening she accidentally lacerated her hand with a clean knife.  She presented to urgent care on sent to the ED for repair.  She presents with a 0.5 cm linear laceration to her left 3rd MCP.  She remains with full range of motion of left middle finger, no sensory deficits or evidence of extensor tendon injury.  X-ray shows no fracture or foreign body.  See procedure note for laceration details  Referral to orthopedist placed for follow-up.  Patient will be discharged home, wound care instructions discussed with patient with voiced understanding. Patient/caregiver voices understanding and feels comfortable with discharge plan.      The patient acknowledges that close follow up with medical provider is required. Instructed to follow up with PCP within 2 days. Patient was given specific return precautions. The patient agrees to comply with all instruction and directions given in the ER.                          Clinical Impression:   Final diagnoses:  [S61.412A] Laceration of left hand without foreign body, initial encounter (Primary)        ED Disposition Condition    Discharge Stable          ED Prescriptions       Medication Sig Dispense Start Date End Date Auth. Provider    mupirocin (BACTROBAN) 2 % ointment  Apply topically 3 (three) times daily. 15 g 3/17/2023 -- Sailaja Stinson NP    clindamycin (CLEOCIN) 300 MG capsule Take 1 capsule (300 mg total) by mouth every 6 (six) hours. for 7 days 28 capsule 3/17/2023 3/24/2023 Sailaja Stinson NP          Follow-up Information       Follow up With Specialties Details Why Contact Info    Estela Munguia NP Family Medicine Schedule an appointment as soon as possible for a visit in 2 days  [address] [place] [address]  [phone]      Vaibhav Ibarra MD Orthopedic Surgery Schedule an appointment as soon as possible for a visit in 2 days  [address]   [address]  [place] [address]  [phone]               Sailaja Stinson NP  03/17/23 7092

## 2023-03-17 NOTE — ED PROVIDER NOTES
Encounter Date: 3/16/2023       History   No chief complaint on file.    42-year-old female with history of diabetes, COPD, known left-sided sciatica, presenting with an exacerbation of her sciatica.  She states that this feels identical to prior episodes.  No trauma.  Denies fever, midline back pain.  No saddle anesthesia, urinary retention, loss of bowel or bladder control.  No numbness or weakness.    Review of patient's allergies indicates:   Allergen Reactions    Latex, natural rubber      Past Medical History:   Diagnosis Date    Abnormal Pap smear age 30    Pos. HPV,     Abnormal Pap smear of cervix     Acute right-sided low back pain with right-sided sciatica 10/18/2022    Addiction to drug     ADHD (attention deficit hyperactivity disorder)     Arthritis     Bipolar disorder     COPD (chronic obstructive pulmonary disease)     Depression     Diabetes mellitus     Fatigue     Hallucination     History of psychiatric hospitalization     Hx of psychiatric care     OCD (obsessive compulsive disorder)     Psychiatric problem     PTSD (post-traumatic stress disorder)     Sleep difficulties     Substance abuse     Suicide attempt     Therapy      Past Surgical History:   Procedure Laterality Date    bariatric sleeve N/A 2020     SECTION  ;     DILATION AND CURETTAGE OF UTERUS  2016    ENDOMETRIAL ABLATION  2016    KNEE SURGERY Right     hardware-bone from hip to repair    TUBAL LIGATION       Family History   Problem Relation Age of Onset    No Known Problems Paternal Grandfather     No Known Problems Paternal Grandmother     Diabetes Maternal Grandmother     Coronary artery disease Maternal Grandfather     Diabetes Maternal Grandfather     Hypertension Father     Colon cancer Father 60    Anxiety disorder Sister     Depression Sister     Drug abuse Sister     No Known Problems Maternal Aunt     No Known Problems Maternal Uncle     Breast cancer Paternal Aunt     No Known  Problems Paternal Uncle     No Known Problems Cousin      labor Neg Hx     Ovarian cancer Neg Hx      Social History     Tobacco Use    Smoking status: Every Day     Packs/day: 1.00     Years: 25.00     Pack years: 25.00     Types: Cigarettes     Start date: 1997    Smokeless tobacco: Never    Tobacco comments:     told about brochure and smoking clinic program   Substance Use Topics    Alcohol use: Yes     Comment: Socially-2 times a week-4-5 beers    Drug use: Not Currently     Types: Amphetamines, Benzodiazepines     Review of Systems   Constitutional:  Negative for fever.   HENT:  Negative for sore throat.    Respiratory:  Negative for shortness of breath.    Cardiovascular:  Negative for chest pain.   Gastrointestinal:  Negative for nausea.   Genitourinary:  Negative for dysuria.   Musculoskeletal:  Negative for back pain.        Left leg pain   Skin:  Negative for rash.   Neurological:  Negative for weakness.   Hematological:  Does not bruise/bleed easily.     Physical Exam     Initial Vitals [23 2216]   BP Pulse Resp Temp SpO2   123/76 108 18 98.4 °F (36.9 °C) 96 %      MAP       --         Physical Exam    Nursing note and vitals reviewed.  Constitutional: She appears well-developed.   HENT:   Head: Normocephalic.   Eyes: Pupils are equal, round, and reactive to light.   Neck:   Normal range of motion.  Cardiovascular:            No murmur heard.  Pulmonary/Chest: No respiratory distress.   Abdominal: Abdomen is soft.   Musculoskeletal:         General: No edema.      Cervical back: Normal range of motion.      Comments: No midline pain.  Left lumbar tenderness to palpation.  No skin changes.     Neurological: She is alert.   Skin: Skin is warm.   Psychiatric: She has a normal mood and affect.       ED Course   Procedures  Labs Reviewed - No data to display       Imaging Results    None          Medications   dexAMETHasone injection 8 mg (has no administration in time range)   ketorolac  injection 15 mg (has no administration in time range)   cyclobenzaprine tablet 10 mg (has no administration in time range)     Medical Decision Making:   Differential Diagnosis:   DDX: Back pain - In review of history and physical there are no red flags for serious illness. There is no history of fever, chills, focal weakness, numbness, tingling,  symptoms, or immunocompromise.There is a normal neuro exam including equal strength and sensation. Patient is not elderly. Pain likely MSK in nature.  Will provide pain control, reassurance and reassess. Patient would not benefit from routine imaging which has been explained to patient. Unlikely fracture given no midline TTP/stepoffs. Unlikely cauda equina given no neurological symptoms, urinary/bowel incontinence, or risk factors. Unlikely pyelonephritis or UTI as no CVAT, fever, or urinary symptoms. More likely contusion vs. muscle strain given history, exam.  DX: Defer imaging at this time as risks outweigh benefits.  TX: Analgesia PRN.  DISPO: If symptoms controlled, likely discharge to follow up with PMD within 2 days with precautions for RTED and supportive care recommendations.                             Clinical Impression:   Final diagnoses:  [M54.32] Sciatica of left side (Primary)        ED Disposition Condition    Discharge Stable          ED Prescriptions       Medication Sig Dispense Start Date End Date Auth. Provider    cyclobenzaprine (FLEXERIL) 10 MG tablet Take 1 tablet (10 mg total) by mouth 3 (three) times daily as needed for Muscle spasms. 15 tablet 3/16/2023 3/21/2023 Jim Baker MD          Follow-up Information       Follow up With Specialties Details Why Contact Info    Estela Munguia NP Family Medicine Schedule an appointment as soon as possible for a visit in 2 days  [facility] [place] [id number]  [phone]      [facility] - Emergency Dept Emergency Medicine  If symptoms worsen [address] [facility]  [id number]  [phone]             Jim Baker MD  03/16/23 2213

## 2023-03-22 ENCOUNTER — OFFICE VISIT (OUTPATIENT)
Dept: FAMILY MEDICINE | Facility: CLINIC | Age: 43
End: 2023-03-22
Payer: MEDICARE

## 2023-03-22 VITALS
SYSTOLIC BLOOD PRESSURE: 102 MMHG | OXYGEN SATURATION: 98 % | HEIGHT: 64 IN | DIASTOLIC BLOOD PRESSURE: 64 MMHG | HEART RATE: 96 BPM | RESPIRATION RATE: 16 BRPM | BODY MASS INDEX: 34.44 KG/M2 | WEIGHT: 201.75 LBS

## 2023-03-22 DIAGNOSIS — S61.412D LACERATION OF LEFT HAND WITHOUT FOREIGN BODY, SUBSEQUENT ENCOUNTER: Primary | ICD-10-CM

## 2023-03-22 DIAGNOSIS — R11.0 NAUSEA: ICD-10-CM

## 2023-03-22 DIAGNOSIS — F90.0 ATTENTION DEFICIT HYPERACTIVITY DISORDER (ADHD), PREDOMINANTLY INATTENTIVE TYPE: ICD-10-CM

## 2023-03-22 DIAGNOSIS — E66.9 OBESITY (BMI 30-39.9): ICD-10-CM

## 2023-03-22 PROCEDURE — 3078F PR MOST RECENT DIASTOLIC BLOOD PRESSURE < 80 MM HG: ICD-10-PCS | Mod: CPTII,S$GLB,, | Performed by: NURSE PRACTITIONER

## 2023-03-22 PROCEDURE — 3074F PR MOST RECENT SYSTOLIC BLOOD PRESSURE < 130 MM HG: ICD-10-PCS | Mod: CPTII,S$GLB,, | Performed by: NURSE PRACTITIONER

## 2023-03-22 PROCEDURE — 99024 POSTOP FOLLOW-UP VISIT: CPT | Mod: S$GLB,,, | Performed by: NURSE PRACTITIONER

## 2023-03-22 PROCEDURE — 3074F SYST BP LT 130 MM HG: CPT | Mod: CPTII,S$GLB,, | Performed by: NURSE PRACTITIONER

## 2023-03-22 PROCEDURE — 99213 OFFICE O/P EST LOW 20 MIN: CPT | Mod: 25,S$GLB,, | Performed by: NURSE PRACTITIONER

## 2023-03-22 PROCEDURE — 99999 PR PBB SHADOW E&M-EST. PATIENT-LVL IV: CPT | Mod: PBBFAC,,, | Performed by: NURSE PRACTITIONER

## 2023-03-22 PROCEDURE — 3078F DIAST BP <80 MM HG: CPT | Mod: CPTII,S$GLB,, | Performed by: NURSE PRACTITIONER

## 2023-03-22 PROCEDURE — 99999 PR PBB SHADOW E&M-EST. PATIENT-LVL IV: ICD-10-PCS | Mod: PBBFAC,,, | Performed by: NURSE PRACTITIONER

## 2023-03-22 PROCEDURE — 1159F MED LIST DOCD IN RCRD: CPT | Mod: CPTII,S$GLB,, | Performed by: NURSE PRACTITIONER

## 2023-03-22 PROCEDURE — 3008F BODY MASS INDEX DOCD: CPT | Mod: CPTII,S$GLB,, | Performed by: NURSE PRACTITIONER

## 2023-03-22 PROCEDURE — 3008F PR BODY MASS INDEX (BMI) DOCUMENTED: ICD-10-PCS | Mod: CPTII,S$GLB,, | Performed by: NURSE PRACTITIONER

## 2023-03-22 PROCEDURE — 1159F PR MEDICATION LIST DOCUMENTED IN MEDICAL RECORD: ICD-10-PCS | Mod: CPTII,S$GLB,, | Performed by: NURSE PRACTITIONER

## 2023-03-22 PROCEDURE — 99024 PR POST-OP FOLLOW-UP VISIT: ICD-10-PCS | Mod: S$GLB,,, | Performed by: NURSE PRACTITIONER

## 2023-03-22 PROCEDURE — 1160F RVW MEDS BY RX/DR IN RCRD: CPT | Mod: CPTII,S$GLB,, | Performed by: NURSE PRACTITIONER

## 2023-03-22 PROCEDURE — 1160F PR REVIEW ALL MEDS BY PRESCRIBER/CLIN PHARMACIST DOCUMENTED: ICD-10-PCS | Mod: CPTII,S$GLB,, | Performed by: NURSE PRACTITIONER

## 2023-03-22 PROCEDURE — 99213 PR OFFICE/OUTPT VISIT, EST, LEVL III, 20-29 MIN: ICD-10-PCS | Mod: 25,S$GLB,, | Performed by: NURSE PRACTITIONER

## 2023-03-22 RX ORDER — ONDANSETRON 4 MG/1
4 TABLET, FILM COATED ORAL EVERY 12 HOURS PRN
Qty: 30 TABLET | Refills: 1 | Status: SHIPPED | OUTPATIENT
Start: 2023-03-22 | End: 2024-03-05 | Stop reason: HOSPADM

## 2023-03-22 RX ORDER — LISDEXAMFETAMINE DIMESYLATE 30 MG/1
30 CAPSULE ORAL EVERY MORNING
Qty: 30 CAPSULE | Refills: 0 | Status: SHIPPED | OUTPATIENT
Start: 2023-03-22 | End: 2023-04-19

## 2023-03-22 RX ORDER — SEMAGLUTIDE 1.34 MG/ML
0.25 INJECTION, SOLUTION SUBCUTANEOUS
Qty: 1.5 ML | Refills: 1 | Status: SHIPPED | OUTPATIENT
Start: 2023-03-22 | End: 2023-06-21

## 2023-03-22 NOTE — PROGRESS NOTES
Subjective:       Patient ID: Joe Henson is a 42 y.o. female.    Chief Complaint: Suture / Staple Removal (Pt here for stitch removal from left hand. )    Here for suture removal from left hand. Seen in ER after cutting herself while cooking. Wants to try Wegovy for weight loss. Is weaning off the psychotropics that may be causing weight gain.    Review of Systems   Constitutional: Negative.  Negative for appetite change, fatigue and fever.   HENT: Negative.  Negative for congestion, ear pain and sore throat.    Eyes: Negative.  Negative for visual disturbance.   Respiratory: Negative.  Negative for cough, shortness of breath and wheezing.    Cardiovascular: Negative.    Gastrointestinal: Negative.  Negative for abdominal pain, diarrhea, nausea and vomiting.   Endocrine: Negative.    Genitourinary: Negative.  Negative for difficulty urinating and urgency.   Musculoskeletal: Negative.  Negative for arthralgias and myalgias.   Skin:  Positive for wound (laceration back of left hand). Negative for color change.   Allergic/Immunologic: Negative.    Neurological: Negative.  Negative for dizziness and headaches.   Hematological: Negative.    Psychiatric/Behavioral: Negative.  Negative for sleep disturbance.    All other systems reviewed and are negative.    Objective:      Physical Exam  Vitals and nursing note reviewed.   Constitutional:       General: She is not in acute distress.     Appearance: Normal appearance. She is well-developed.   HENT:      Head: Normocephalic and atraumatic.   Eyes:      Pupils: Pupils are equal, round, and reactive to light.   Cardiovascular:      Rate and Rhythm: Normal rate and regular rhythm.      Pulses: Normal pulses.      Heart sounds: Normal heart sounds. No murmur heard.  Pulmonary:      Effort: Pulmonary effort is normal. No respiratory distress.      Breath sounds: Normal breath sounds.   Abdominal:      Tenderness: There is no right CVA tenderness or left CVA tenderness.    Musculoskeletal:         General: Normal range of motion.      Cervical back: Normal range of motion and neck supple.   Skin:     General: Skin is warm and dry.      Comments: Laceration at the left hand dorsal aspect - 3 sutures removed. Wound well approximated and clean with no D/C or redness   Neurological:      Mental Status: She is alert and oriented to person, place, and time.   Psychiatric:         Mood and Affect: Mood normal.       Assessment:       1. Laceration of left hand without foreign body, subsequent encounter    2. Obesity (BMI 30-39.9)    3. Nausea    4. Attention deficit hyperactivity disorder (ADHD), predominantly inattentive type        Plan:     1. Laceration of left hand without foreign body, subsequent encounter    2. Obesity (BMI 30-39.9)  -     semaglutide (OZEMPIC) 0.25 mg or 0.5 mg(2 mg/1.5 mL) pen injector; Inject 0.25 mg into the skin every 7 days.  Dispense: 1.5 mL; Refill: 1    3. Nausea  -     ondansetron (ZOFRAN) 4 MG tablet; Take 1 tablet (4 mg total) by mouth every 12 (twelve) hours as needed for Nausea.  Dispense: 30 tablet; Refill: 1    4. Attention deficit hyperactivity disorder (ADHD), predominantly inattentive type  -     lisdexamfetamine (VYVANSE) 30 MG capsule; Take 1 capsule (30 mg total) by mouth every morning.  Dispense: 30 capsule; Refill: 0     RTC 4 weeks for recheck

## 2023-03-28 ENCOUNTER — HOSPITAL ENCOUNTER (OUTPATIENT)
Dept: RADIOLOGY | Facility: HOSPITAL | Age: 43
Discharge: HOME | End: 2023-03-28
Attending: NURSE PRACTITIONER
Payer: MEDICARE

## 2023-03-28 VITALS — HEIGHT: 64 IN | WEIGHT: 201 LBS | BODY MASS INDEX: 34.31 KG/M2

## 2023-03-28 DIAGNOSIS — Z12.31 ENCOUNTER FOR SCREENING MAMMOGRAM FOR MALIGNANT NEOPLASM OF BREAST: ICD-10-CM

## 2023-03-28 PROCEDURE — 77067 SCR MAMMO BI INCL CAD: CPT | Mod: 26,,, | Performed by: RADIOLOGY

## 2023-03-28 PROCEDURE — 77067 MAMMO DIGITAL SCREENING BILAT WITH TOMO: ICD-10-PCS | Mod: 26,,, | Performed by: RADIOLOGY

## 2023-03-28 PROCEDURE — 77063 MAMMO DIGITAL SCREENING BILAT WITH TOMO: ICD-10-PCS | Mod: 26,,, | Performed by: RADIOLOGY

## 2023-03-28 PROCEDURE — 77063 BREAST TOMOSYNTHESIS BI: CPT | Mod: 26,,, | Performed by: RADIOLOGY

## 2023-03-28 PROCEDURE — 77067 SCR MAMMO BI INCL CAD: CPT | Mod: TC

## 2023-03-29 ENCOUNTER — HOSPITAL ENCOUNTER (OUTPATIENT)
Dept: RADIOLOGY | Facility: HOSPITAL | Age: 43
Discharge: HOME | End: 2023-03-29
Attending: NURSE PRACTITIONER
Payer: MEDICARE

## 2023-03-29 DIAGNOSIS — M54.16 LUMBAR RADICULOPATHY, CHRONIC: ICD-10-CM

## 2023-03-29 DIAGNOSIS — Z87.39 HISTORY OF ACQUIRED SPONDYLOLISTHESIS: ICD-10-CM

## 2023-03-29 PROCEDURE — 72148 MRI LUMBAR SPINE W/O DYE: CPT | Mod: TC

## 2023-05-15 DIAGNOSIS — F90.0 ATTENTION DEFICIT HYPERACTIVITY DISORDER (ADHD), PREDOMINANTLY INATTENTIVE TYPE: ICD-10-CM

## 2023-05-15 RX ORDER — LISDEXAMFETAMINE DIMESYLATE 30 MG/1
CAPSULE ORAL
Qty: 30 CAPSULE | Refills: 0 | Status: SHIPPED | OUTPATIENT
Start: 2023-05-15 | End: 2024-03-05 | Stop reason: HOSPADM

## 2023-05-15 NOTE — TELEPHONE ENCOUNTER
LOV 03/22/2023    patient requesting refill for   VYVANSE 30 mg capsule 30 capsule           RX pending   pharmacy confirmed  please advise

## 2023-05-18 ENCOUNTER — TELEPHONE (OUTPATIENT)
Dept: FAMILY MEDICINE | Facility: CLINIC | Age: 43
End: 2023-05-18
Payer: MEDICARE

## 2023-05-18 ENCOUNTER — PATIENT MESSAGE (OUTPATIENT)
Dept: FAMILY MEDICINE | Facility: CLINIC | Age: 43
End: 2023-05-18
Payer: MEDICARE

## 2023-05-18 DIAGNOSIS — E66.9 OBESITY (BMI 30-39.9): Primary | ICD-10-CM

## 2023-05-18 RX ORDER — SEMAGLUTIDE 1.34 MG/ML
1 INJECTION, SOLUTION SUBCUTANEOUS
Qty: 3 ML | Refills: 3 | Status: SHIPPED | OUTPATIENT
Start: 2023-05-18 | End: 2023-06-21 | Stop reason: SDUPTHER

## 2023-05-18 NOTE — TELEPHONE ENCOUNTER
----- Message from Ventura Cote sent at 2023  8:43 AM CDT -----  Contact: Patient  Joe Henson  MRN: [id number]  : 1980  PCP: Estela Munguia  Home Phone      [phone]  Work Phone      Not on file.  Mobile          [phone]      MESSAGE: has 1 week of Ozempic left -- will need new Rx for higher dose -- is another appt needed -- please advise    Call [phone]    PCP: Ezra

## 2023-05-21 ENCOUNTER — HOSPITAL ENCOUNTER (EMERGENCY)
Facility: HOSPITAL | Age: 43
Discharge: PSYCHIATRIC OR SUBSTANCE USE FACILITY | End: 2023-05-21
Attending: SURGERY
Payer: MEDICARE

## 2023-05-21 VITALS
BODY MASS INDEX: 34.31 KG/M2 | SYSTOLIC BLOOD PRESSURE: 157 MMHG | WEIGHT: 201 LBS | RESPIRATION RATE: 18 BRPM | HEART RATE: 85 BPM | DIASTOLIC BLOOD PRESSURE: 95 MMHG | OXYGEN SATURATION: 98 % | TEMPERATURE: 98 F | HEIGHT: 64 IN

## 2023-05-21 DIAGNOSIS — R45.851 SUICIDAL IDEATION: ICD-10-CM

## 2023-05-21 DIAGNOSIS — T65.94XA INGESTION OF NONTOXIC SUBSTANCE, UNDETERMINED INTENT, INITIAL ENCOUNTER: ICD-10-CM

## 2023-05-21 DIAGNOSIS — T50.901A DRUG OVERDOSE: ICD-10-CM

## 2023-05-21 DIAGNOSIS — N30.00 ACUTE CYSTITIS WITHOUT HEMATURIA: ICD-10-CM

## 2023-05-21 DIAGNOSIS — Z00.8 MEDICAL CLEARANCE FOR PSYCHIATRIC ADMISSION: Primary | ICD-10-CM

## 2023-05-21 LAB
25(OH)D3+25(OH)D2 SERPL-MCNC: 26 NG/ML (ref 30–96)
ALBUMIN SERPL BCP-MCNC: 3.4 G/DL (ref 3.5–5.2)
ALP SERPL-CCNC: 118 U/L (ref 55–135)
ALT SERPL W/O P-5'-P-CCNC: 20 U/L (ref 10–44)
AMPHET+METHAMPHET UR QL: ABNORMAL
ANION GAP SERPL CALC-SCNC: 13 MMOL/L (ref 8–16)
APAP SERPL-MCNC: <3 UG/ML (ref 10–20)
AST SERPL-CCNC: 14 U/L (ref 10–40)
B-HCG UR QL: NEGATIVE
BACTERIA #/AREA URNS HPF: ABNORMAL /HPF
BARBITURATES UR QL SCN>200 NG/ML: NEGATIVE
BASOPHILS # BLD AUTO: 0.06 K/UL (ref 0–0.2)
BASOPHILS NFR BLD: 0.5 % (ref 0–1.9)
BENZODIAZ UR QL SCN>200 NG/ML: NEGATIVE
BILIRUB SERPL-MCNC: 0.3 MG/DL (ref 0.1–1)
BILIRUB UR QL STRIP: NEGATIVE
BUN SERPL-MCNC: 11 MG/DL (ref 6–20)
BZE UR QL SCN: NEGATIVE
CALCIUM SERPL-MCNC: 9 MG/DL (ref 8.7–10.5)
CANNABINOIDS UR QL SCN: NEGATIVE
CHLORIDE SERPL-SCNC: 110 MMOL/L (ref 95–110)
CLARITY UR: ABNORMAL
CO2 SERPL-SCNC: 19 MMOL/L (ref 23–29)
COLOR UR: YELLOW
CREAT SERPL-MCNC: 1 MG/DL (ref 0.5–1.4)
CREAT UR-MCNC: 71.9 MG/DL (ref 15–325)
DIFFERENTIAL METHOD: ABNORMAL
EOSINOPHIL # BLD AUTO: 0.1 K/UL (ref 0–0.5)
EOSINOPHIL NFR BLD: 0.8 % (ref 0–8)
ERYTHROCYTE [DISTWIDTH] IN BLOOD BY AUTOMATED COUNT: 14.5 % (ref 11.5–14.5)
EST. GFR  (NO RACE VARIABLE): >60 ML/MIN/1.73 M^2
ETHANOL SERPL-MCNC: <10 MG/DL
FOLATE SERPL-MCNC: 8.7 NG/ML (ref 4–24)
GLUCOSE SERPL-MCNC: 100 MG/DL (ref 70–110)
GLUCOSE UR QL STRIP: NEGATIVE
HCT VFR BLD AUTO: 37 % (ref 37–48.5)
HGB BLD-MCNC: 11.7 G/DL (ref 12–16)
HGB UR QL STRIP: ABNORMAL
IMM GRANULOCYTES # BLD AUTO: 0.04 K/UL (ref 0–0.04)
IMM GRANULOCYTES NFR BLD AUTO: 0.3 % (ref 0–0.5)
KETONES UR QL STRIP: NEGATIVE
LEUKOCYTE ESTERASE UR QL STRIP: ABNORMAL
LYMPHOCYTES # BLD AUTO: 2.6 K/UL (ref 1–4.8)
LYMPHOCYTES NFR BLD: 22.2 % (ref 18–48)
MCH RBC QN AUTO: 28.2 PG (ref 27–31)
MCHC RBC AUTO-ENTMCNC: 31.6 G/DL (ref 32–36)
MCV RBC AUTO: 89 FL (ref 82–98)
METHADONE UR QL SCN>300 NG/ML: NEGATIVE
MICROSCOPIC COMMENT: ABNORMAL
MONOCYTES # BLD AUTO: 0.7 K/UL (ref 0.3–1)
MONOCYTES NFR BLD: 5.6 % (ref 4–15)
NEUTROPHILS # BLD AUTO: 8.2 K/UL (ref 1.8–7.7)
NEUTROPHILS NFR BLD: 70.6 % (ref 38–73)
NITRITE UR QL STRIP: POSITIVE
NRBC BLD-RTO: 0 /100 WBC
OPIATES UR QL SCN: NEGATIVE
PCP UR QL SCN>25 NG/ML: NEGATIVE
PH UR STRIP: 6 [PH] (ref 5–8)
PLATELET # BLD AUTO: 443 K/UL (ref 150–450)
PMV BLD AUTO: 9.8 FL (ref 9.2–12.9)
POTASSIUM SERPL-SCNC: 3.6 MMOL/L (ref 3.5–5.1)
PROT SERPL-MCNC: 7.4 G/DL (ref 6–8.4)
PROT UR QL STRIP: NEGATIVE
RBC # BLD AUTO: 4.15 M/UL (ref 4–5.4)
RBC #/AREA URNS HPF: 0 /HPF (ref 0–4)
SALICYLATES SERPL-MCNC: <5 MG/DL (ref 15–30)
SODIUM SERPL-SCNC: 142 MMOL/L (ref 136–145)
SP GR UR STRIP: 1.01 (ref 1–1.03)
SQUAMOUS #/AREA URNS HPF: 4 /HPF
T4 FREE SERPL-MCNC: 0.93 NG/DL (ref 0.71–1.51)
TOXICOLOGY INFORMATION: ABNORMAL
TSH SERPL DL<=0.005 MIU/L-ACNC: 1.41 UIU/ML (ref 0.4–4)
URN SPEC COLLECT METH UR: ABNORMAL
UROBILINOGEN UR STRIP-ACNC: NEGATIVE EU/DL
VIT B12 SERPL-MCNC: 469 PG/ML (ref 210–950)
WBC # BLD AUTO: 11.58 K/UL (ref 3.9–12.7)
WBC #/AREA URNS HPF: 2 /HPF (ref 0–5)

## 2023-05-21 PROCEDURE — 80143 DRUG ASSAY ACETAMINOPHEN: CPT | Performed by: SURGERY

## 2023-05-21 PROCEDURE — 93005 ELECTROCARDIOGRAM TRACING: CPT

## 2023-05-21 PROCEDURE — 93010 ELECTROCARDIOGRAM REPORT: CPT | Mod: ,,, | Performed by: INTERNAL MEDICINE

## 2023-05-21 PROCEDURE — 80179 DRUG ASSAY SALICYLATE: CPT | Performed by: SURGERY

## 2023-05-21 PROCEDURE — 82077 ASSAY SPEC XCP UR&BREATH IA: CPT | Performed by: SURGERY

## 2023-05-21 PROCEDURE — 81000 URINALYSIS NONAUTO W/SCOPE: CPT | Performed by: SURGERY

## 2023-05-21 PROCEDURE — 96361 HYDRATE IV INFUSION ADD-ON: CPT

## 2023-05-21 PROCEDURE — 80307 DRUG TEST PRSMV CHEM ANLYZR: CPT | Performed by: SURGERY

## 2023-05-21 PROCEDURE — 80053 COMPREHEN METABOLIC PANEL: CPT | Performed by: SURGERY

## 2023-05-21 PROCEDURE — 84443 ASSAY THYROID STIM HORMONE: CPT | Performed by: SURGERY

## 2023-05-21 PROCEDURE — 93010 EKG 12-LEAD: ICD-10-PCS | Mod: ,,, | Performed by: INTERNAL MEDICINE

## 2023-05-21 PROCEDURE — 96374 THER/PROPH/DIAG INJ IV PUSH: CPT

## 2023-05-21 PROCEDURE — 63600175 PHARM REV CODE 636 W HCPCS: Performed by: SURGERY

## 2023-05-21 PROCEDURE — 82746 ASSAY OF FOLIC ACID SERUM: CPT | Performed by: SURGERY

## 2023-05-21 PROCEDURE — 99285 EMERGENCY DEPT VISIT HI MDM: CPT

## 2023-05-21 PROCEDURE — 85025 COMPLETE CBC W/AUTO DIFF WBC: CPT | Performed by: SURGERY

## 2023-05-21 PROCEDURE — 82607 VITAMIN B-12: CPT | Performed by: SURGERY

## 2023-05-21 PROCEDURE — 96375 TX/PRO/DX INJ NEW DRUG ADDON: CPT

## 2023-05-21 PROCEDURE — 25000003 PHARM REV CODE 250: Performed by: SURGERY

## 2023-05-21 PROCEDURE — 36415 COLL VENOUS BLD VENIPUNCTURE: CPT | Performed by: SURGERY

## 2023-05-21 PROCEDURE — 81025 URINE PREGNANCY TEST: CPT | Performed by: SURGERY

## 2023-05-21 PROCEDURE — 84425 ASSAY OF VITAMIN B-1: CPT | Performed by: SURGERY

## 2023-05-21 PROCEDURE — 82306 VITAMIN D 25 HYDROXY: CPT | Performed by: SURGERY

## 2023-05-21 PROCEDURE — 96372 THER/PROPH/DIAG INJ SC/IM: CPT | Mod: 59 | Performed by: SURGERY

## 2023-05-21 PROCEDURE — 84439 ASSAY OF FREE THYROXINE: CPT | Performed by: SURGERY

## 2023-05-21 RX ORDER — CEFTRIAXONE 1 G/1
1 INJECTION, POWDER, FOR SOLUTION INTRAMUSCULAR; INTRAVENOUS
Status: COMPLETED | OUTPATIENT
Start: 2023-05-21 | End: 2023-05-21

## 2023-05-21 RX ORDER — SODIUM BICARBONATE 1 MEQ/ML
50 SYRINGE (ML) INTRAVENOUS
Status: COMPLETED | OUTPATIENT
Start: 2023-05-21 | End: 2023-05-21

## 2023-05-21 RX ADMIN — CEFTRIAXONE SODIUM 1 G: 1 INJECTION, POWDER, FOR SOLUTION INTRAMUSCULAR; INTRAVENOUS at 05:05

## 2023-05-21 RX ADMIN — SODIUM CHLORIDE 1000 ML: 9 INJECTION, SOLUTION INTRAVENOUS at 01:05

## 2023-05-21 RX ADMIN — SODIUM BICARBONATE 50 MEQ: 84 INJECTION INTRAVENOUS at 01:05

## 2023-05-21 NOTE — ED TRIAGE NOTES
"42 y.o. female presents to ER ED 03 /ED 03A   Chief Complaint   Patient presents with    Ingestion     Pt presents to ED with AASI with c/o overdose on amitriptyline 150 mg tablets. There were 30 pills in the bottle that was filled on 5/15/23 and 1 pill was found. Pt is a poor historian. Pt family reports they found patient unconscious around 0030. Pt mother states the last time she saw patient was at 1630. Pt reports depression because "they are holding my grandkids from me."   Pt mother reports patient has tried to OD before. Pt endorses SI. Pt denies HI, AVH, drug use or alcohol use.  "

## 2023-05-21 NOTE — ED NOTES
Spoke to Rolanda at poison control at [phone]. Poison Control recommends 1 amp of bicarb and watch QRS. Repeat EKG 1 hour post bicarb. If prolonged QRS 1 hour post bicarb drip may be needed. Then repeat EKG 6-8 hours. Watch for CNS and respiratory depression. ABG and intubate if needed.

## 2023-05-21 NOTE — ED PROVIDER NOTES
"Encounter Date: 2023       History     Chief Complaint   Patient presents with    Ingestion     Pt presents to ED with AASI with c/o overdose on amitriptyline 150 mg tablets. There were 30 pills in the bottle that was filled on 5/15/23 and 1 pill was found. Pt is a poor historian. Pt family reports they found patient unconscious around 0030. Pt mother states the last time she saw patient was at 1630. Pt reports depression because "they are holding my grandkids from me."     Joe Henson is a 42 y.o. female that presents after Rx overdose  Took a proximally 15 amitriptyline pills, depressed & suicidal on interview  Having severe social issues with her family, not able to see grandchildren  Patient has longstanding issues with bipolar disorder & psychosis per HX  Poisoning control immediately called who recommended bicarb & monitoring  Normal sinus rhythm on EKG with no prolonged QRS interval on evaluation      Review of patient's allergies indicates:   Allergen Reactions    Latex, natural rubber      Past Medical History:   Diagnosis Date    Abnormal Pap smear age 30    Pos. HPV,     Abnormal Pap smear of cervix     Acute right-sided low back pain with right-sided sciatica 10/18/2022    Addiction to drug     ADHD (attention deficit hyperactivity disorder)     Arthritis     Bipolar disorder     COPD (chronic obstructive pulmonary disease)     Depression     Diabetes mellitus     Fatigue     Hallucination     History of psychiatric hospitalization     Hx of psychiatric care     OCD (obsessive compulsive disorder)     Psychiatric problem     PTSD (post-traumatic stress disorder)     Sleep difficulties     Substance abuse     Suicide attempt     Therapy      Past Surgical History:   Procedure Laterality Date    bariatric sleeve N/A 2020     SECTION  ;     DILATION AND CURETTAGE OF UTERUS  2016    ENDOMETRIAL ABLATION  2016    KNEE SURGERY Right     hardware-bone from hip " to repair    TUBAL LIGATION  2004     Family History   Problem Relation Age of Onset    No Known Problems Paternal Grandfather     No Known Problems Paternal Grandmother     Diabetes Maternal Grandmother     Coronary artery disease Maternal Grandfather     Diabetes Maternal Grandfather     Hypertension Father     Colon cancer Father 60    Anxiety disorder Sister     Depression Sister     Drug abuse Sister     No Known Problems Maternal Aunt     No Known Problems Maternal Uncle     Breast cancer Paternal Aunt     No Known Problems Paternal Uncle     No Known Problems Cousin      labor Neg Hx     Ovarian cancer Neg Hx      Social History     Tobacco Use    Smoking status: Every Day     Packs/day: 1.00     Years: 25.00     Pack years: 25.00     Types: Cigarettes     Start date: 1997    Smokeless tobacco: Never    Tobacco comments:     told about brochure and smoking clinic program   Substance Use Topics    Alcohol use: Yes     Comment: Socially-2 times a week-4-5 beers    Drug use: Not Currently     Types: Amphetamines, Benzodiazepines     Review of Systems   Constitutional: Negative.    HENT: Negative.     Eyes: Negative.    Respiratory: Negative.     Cardiovascular: Negative.    Gastrointestinal: Negative.    Genitourinary: Negative.    Musculoskeletal: Negative.    Skin: Negative.    Neurological: Negative.    Psychiatric/Behavioral:  Positive for dysphoric mood and suicidal ideas.      Physical Exam     Initial Vitals [23 0043]   BP Pulse Resp Temp SpO2   130/83 97 14 98 °F (36.7 °C) 97 %      MAP       --         Physical Exam    Nursing note and vitals reviewed.  Constitutional: Vital signs are normal. She appears well-developed and well-nourished. She is cooperative.   HENT:   Head: Normocephalic and atraumatic.   Right Ear: External ear normal.   Left Ear: External ear normal.   Nose: Nose normal.   Mouth/Throat: Oropharynx is clear and moist.   Eyes: Conjunctivae, EOM and lids are normal.  Pupils are equal, round, and reactive to light.   Neck: Trachea normal and phonation normal. Neck supple. No JVD present.   Normal range of motion.   Full passive range of motion without pain.     Cardiovascular:  Normal rate, regular rhythm, S1 normal, S2 normal, normal heart sounds, intact distal pulses and normal pulses.           Pulmonary/Chest: Effort normal and breath sounds normal.   Abdominal: Abdomen is soft and flat. Bowel sounds are normal.   Musculoskeletal:         General: Normal range of motion.      Cervical back: Full passive range of motion without pain, normal range of motion and neck supple.     Neurological: She is alert and oriented to person, place, and time. She has normal strength.   Skin: Skin is warm, dry and intact. Capillary refill takes less than 2 seconds.       ED Course   Procedures  Labs Reviewed   COMPREHENSIVE METABOLIC PANEL - Abnormal; Notable for the following components:       Result Value    CO2 19 (*)     Albumin 3.4 (*)     All other components within normal limits   URINALYSIS, REFLEX TO URINE CULTURE - Abnormal; Notable for the following components:    Appearance, UA Hazy (*)     Occult Blood UA Trace (*)     Nitrite, UA Positive (*)     Leukocytes, UA 1+ (*)     All other components within normal limits    Narrative:     Specimen Source->Urine   DRUG SCREEN PANEL, URINE EMERGENCY - Abnormal; Notable for the following components:    Amphetamine Screen, Ur Presumptive Positive (*)     All other components within normal limits    Narrative:     Specimen Source->Urine   SALICYLATE LEVEL - Abnormal; Notable for the following components:    Salicylate Lvl <5.0 (*)     All other components within normal limits   ACETAMINOPHEN LEVEL - Abnormal; Notable for the following components:    Acetaminophen (Tylenol), Serum <3.0 (*)     All other components within normal limits   CBC W/ AUTO DIFFERENTIAL - Abnormal; Notable for the following components:    Hemoglobin 11.7 (*)     MCHC  31.6 (*)     Gran # (ANC) 8.2 (*)     All other components within normal limits   URINALYSIS MICROSCOPIC - Abnormal; Notable for the following components:    Bacteria Many (*)     All other components within normal limits    Narrative:     Specimen Source->Urine   TSH   ALCOHOL,MEDICAL (ETHANOL)   T4, FREE   PREGNANCY TEST, URINE RAPID    Narrative:     Specimen Source->Urine   FOLATE   VITAMIN B12   VITAMIN B1   VITAMIN D     EKG Readings: (Independently Interpreted)   No STEMI  Normal sinus rhythm  No ectopy  Normal conduction  Normal ST segments  Normal T-wave  Normal axis  Heart rate in the 80s     Imaging Results    None          Medications   cefTRIAXone injection 1 g (has no administration in time range)   sodium chloride 0.9% bolus 1,000 mL 1,000 mL (0 mLs Intravenous Stopped 23 0207)   sodium bicarbonate 8.4 % (1 mEq/mL) injection 50 mEq (50 mEq Intravenous Given 23 0106)     Medical Decision Makin-year-old female with amitriptyline ingestion for suicidal ideation  Poison Control called, monitoring, bicarb given at their request  Patient otherwise is medically cleared for psychiatric placement     Critical Care ED Physician Time (minutes):  -- Performed by: Hitesh Gilliam M.D.  -- Date/Time: 1:04 AM 2023   -- Direct Patient Care (Face Time): 5  -- Additional History from Records or Taking Additional History: 5  -- Ordering, Reviewing, and Interpreting Diagnostic Studies: 5  -- Total Time in Documentation: 11  -- Consultation with Other Physicians: 5  -- Consultation with Family Related to Condition: 0  -- Total Critical Care Time: 31  -- Critical care was necessary to treat amitriptyline overdose  -- Critical care was time spent personally by me on the following activities:  -- examination of patient, ordering and performing treatments   -- review of radiographic studies, re-evaluation of pt's condition  -- review of labs and evaluation of response to treatment                 Medically  cleared for psychiatry placement: 5/21/2023  5:19 AM         Clinical Impression:   Final diagnoses:  [T50.901A] Drug overdose  [Z00.8] Medical clearance for psychiatric admission (Primary)  [R45.851] Suicidal ideation  [T65.94XA] Ingestion of nontoxic substance, undetermined intent, initial encounter  [N30.00] Acute cystitis without hematuria        ED Disposition Condition    Transfer to Psych Facility Stable                 Hitesh Gilliam MD  05/21/23 0519

## 2023-05-22 PROBLEM — Z13.9 ENCOUNTER FOR MEDICAL SCREENING EXAMINATION: Status: ACTIVE | Noted: 2023-05-22

## 2023-05-22 PROBLEM — N39.0 UTI (URINARY TRACT INFECTION): Status: ACTIVE | Noted: 2023-05-22

## 2023-05-22 PROBLEM — J44.1 COPD EXACERBATION: Chronic | Status: ACTIVE | Noted: 2023-05-22

## 2023-05-25 LAB — VIT B1 BLD-MCNC: 54 UG/L (ref 38–122)

## 2023-05-26 PROBLEM — F31.75 BIPOLAR 1 DISORDER, DEPRESSED, PARTIAL REMISSION: Status: ACTIVE | Noted: 2023-05-26

## 2023-06-02 ENCOUNTER — PATIENT MESSAGE (OUTPATIENT)
Dept: NEUROLOGY | Facility: CLINIC | Age: 43
End: 2023-06-02
Payer: MEDICARE

## 2023-06-11 NOTE — ASSESSMENT & PLAN NOTE
Continue lyrica; consider increased dose   Oral NSAID prn ; has Ibuprofen 800mg tid prn and tylenol prn   Resume flexeril if OK  With psychiatry   lidocaine patch 5% here - can try OTC 4% at home as needed  F/u with PCP as OP     
Further orders per psych     
Patient has  
nicotine patch     
nicotine patch     
No

## 2023-06-12 DIAGNOSIS — F90.0 ATTENTION DEFICIT HYPERACTIVITY DISORDER (ADHD), PREDOMINANTLY INATTENTIVE TYPE: ICD-10-CM

## 2023-06-12 RX ORDER — LISDEXAMFETAMINE DIMESYLATE 30 MG/1
30 CAPSULE ORAL DAILY
Qty: 30 CAPSULE | Refills: 0 | OUTPATIENT
Start: 2023-06-12

## 2023-06-15 ENCOUNTER — TELEPHONE (OUTPATIENT)
Dept: FAMILY MEDICINE | Facility: CLINIC | Age: 43
End: 2023-06-15
Payer: MEDICARE

## 2023-06-15 NOTE — TELEPHONE ENCOUNTER
----- Message from Ventura Luis sent at 6/15/2023  9:41 AM CDT -----  Contact: self  Joe Henson  MRN: [id number]  : 1980  PCP: Estela Munguia  Home Phone      [phone]  Work Phone      Not on file.  Mobile          [phone]      MESSAGE:   Pt requesting refill or new Rx.   Is this a refill or new RX:  refill  RX name and strength: VYVANSE 30 mg capsule  Last office visit: 3/22/2023  Is this a 30-day or 90-day RX:  30  Pharmacy name and location:  [facility]  Comments:      Phone:  [phone]

## 2023-06-20 ENCOUNTER — TELEPHONE (OUTPATIENT)
Dept: INTERNAL MEDICINE | Facility: CLINIC | Age: 43
End: 2023-06-20
Payer: MEDICARE

## 2023-06-20 DIAGNOSIS — E66.9 OBESITY (BMI 30-39.9): ICD-10-CM

## 2023-06-21 RX ORDER — SEMAGLUTIDE 1.34 MG/ML
1 INJECTION, SOLUTION SUBCUTANEOUS
Qty: 3 ML | Refills: 3 | Status: SHIPPED | OUTPATIENT
Start: 2023-06-21 | End: 2023-08-30 | Stop reason: SDUPTHER

## 2023-06-26 ENCOUNTER — TELEPHONE (OUTPATIENT)
Dept: FAMILY MEDICINE | Facility: CLINIC | Age: 43
End: 2023-06-26
Payer: MEDICARE

## 2023-06-26 NOTE — TELEPHONE ENCOUNTER
Joe Brandale  MRN: [id number]  : 1980  PCP: Estela Munguia  Home Phone [phone]   Work Phone Not on file.   Mobile [phone]       MESSAGE: patient questioning the dosage of her Ozempic, states she should be moving up to the 2 mg dose, but states pharmacy has the 1 mg dose ready.    Please advise    Phone [phone]    [facility]

## 2023-07-21 ENCOUNTER — HOSPITAL ENCOUNTER (EMERGENCY)
Facility: HOSPITAL | Age: 43
Discharge: HOME | End: 2023-07-22
Attending: EMERGENCY MEDICINE
Payer: MEDICARE

## 2023-07-21 DIAGNOSIS — R55 SYNCOPE: ICD-10-CM

## 2023-07-21 DIAGNOSIS — R55 NEAR SYNCOPE: Primary | ICD-10-CM

## 2023-07-21 PROCEDURE — 99283 EMERGENCY DEPT VISIT LOW MDM: CPT

## 2023-07-21 PROCEDURE — 93010 EKG 12-LEAD: ICD-10-PCS | Mod: ,,, | Performed by: INTERNAL MEDICINE

## 2023-07-21 PROCEDURE — 93005 ELECTROCARDIOGRAM TRACING: CPT

## 2023-07-21 PROCEDURE — 93010 ELECTROCARDIOGRAM REPORT: CPT | Mod: ,,, | Performed by: INTERNAL MEDICINE

## 2023-07-22 VITALS
RESPIRATION RATE: 21 BRPM | SYSTOLIC BLOOD PRESSURE: 107 MMHG | HEIGHT: 64 IN | DIASTOLIC BLOOD PRESSURE: 70 MMHG | OXYGEN SATURATION: 100 % | TEMPERATURE: 97 F | HEART RATE: 89 BPM | BODY MASS INDEX: 34.31 KG/M2 | WEIGHT: 201 LBS

## 2023-07-22 NOTE — DISCHARGE INSTRUCTIONS
FOLLOW UP WITH CARDIOLOGY ON DISCHARGE.  CALL FOR AN APPOINTMENT.  RETURN TO THE ED FOR WORSENING OF CONDITION.

## 2023-07-22 NOTE — ED TRIAGE NOTES
Patient arrived to ED with c/o her heart monitor blinking read at home when she went to the bathroom. Pt reports she had the monitor placed by [facility] due to frequent syncopal episodes. Pt reports some dizziness and nausea at this time but no chest pain. States she is short of breath. NADN in triage. Rates pain 0/10.

## 2023-07-22 NOTE — ED PROVIDER NOTES
Encounter Date: 2023       History     Chief Complaint   Patient presents with    Dizziness     Patient arrived to ED with c/o her heart monitor blinking read at home when she went to the bathroom. Pt reports she had the monitor placed by [facility] due to frequent syncopal episodes. Pt reports some dizziness and nausea at this time but no chest pain. States she is short of breath. NADN in triage. Rates pain 0/10.      42 YO FEMALE WHO COMES IN TODAY DUE TO CARDIAC MONITOR ISSUES.  SHE STATES THAT HAS A TWO MONTH HISTORY OF SYNCOPAL EPISODES WHERE SHE PASSES OUT.  SHE DENIES HITTING HER HEAD OR OTHER ACUTE COMPLAINTS.  THE PATIENT STATES THAT SHE RECENTLY SAW CARDIOLOGY AND HAD AN EVENT MONITOR PLACED.  SHE STATES THAT EARLIER ON TONIGHT, THE MONITOR WAS BLINKING RED.  SHE ISN'T AWARE AS TO WHY.  SHE DENIES PASSING OUT WHEN THE RED BLINKING TOOK PLACE.  SHE IS REQUESTING INFORMATION AS TO WHY HER MONITOR IS BLINKING RED.       Review of patient's allergies indicates:   Allergen Reactions    Latex, natural rubber      Past Medical History:   Diagnosis Date    Abnormal Pap smear age 30    Pos. HPV,     Abnormal Pap smear of cervix     Acute right-sided low back pain with right-sided sciatica 10/18/2022    Addiction to drug     ADHD (attention deficit hyperactivity disorder)     Arthritis     Bipolar disorder     COPD (chronic obstructive pulmonary disease)     Depression     Diabetes mellitus     Fatigue     Hallucination     History of psychiatric hospitalization     Hx of psychiatric care     OCD (obsessive compulsive disorder)     Psychiatric problem     PTSD (post-traumatic stress disorder)     Sleep difficulties     Substance abuse     Suicide attempt     Therapy      Past Surgical History:   Procedure Laterality Date    bariatric sleeve N/A 2020     SECTION  ;     DILATION AND CURETTAGE OF UTERUS  2016    ENDOMETRIAL ABLATION  2016    KNEE SURGERY Right     hardware-bone from  hip to repair    TUBAL LIGATION  2004     Family History   Problem Relation Age of Onset    No Known Problems Paternal Grandfather     No Known Problems Paternal Grandmother     Diabetes Maternal Grandmother     Coronary artery disease Maternal Grandfather     Diabetes Maternal Grandfather     Hypertension Father     Colon cancer Father 60    Anxiety disorder Sister     Depression Sister     Drug abuse Sister     No Known Problems Maternal Aunt     No Known Problems Maternal Uncle     Breast cancer Paternal Aunt     No Known Problems Paternal Uncle     No Known Problems Cousin      labor Neg Hx     Ovarian cancer Neg Hx      Social History     Tobacco Use    Smoking status: Every Day     Packs/day: 1.00     Years: 25.00     Pack years: 25.00     Types: Cigarettes     Start date: 1997    Smokeless tobacco: Never    Tobacco comments:     told about brochure and smoking clinic program   Substance Use Topics    Alcohol use: Yes     Comment: Socially-2 times a week-4-5 beers    Drug use: Not Currently     Types: Amphetamines, Benzodiazepines     Review of Systems   Respiratory: Negative.     Cardiovascular: Negative.    Gastrointestinal: Negative.    Musculoskeletal: Negative.    Neurological: Negative.    Psychiatric/Behavioral: Negative.       Physical Exam     Initial Vitals [23 2309]   BP Pulse Resp Temp SpO2   104/67 100 18 96.8 °F (36 °C) 98 %      MAP       --         Physical Exam    Nursing note and vitals reviewed.  Constitutional: She appears well-developed and well-nourished.   HENT:   Head: Normocephalic and atraumatic.   Right Ear: External ear normal.   Left Ear: External ear normal.   Nose: Nose normal.   Mouth/Throat: Oropharynx is clear and moist.   Eyes: EOM are normal. Pupils are equal, round, and reactive to light.   Neck: Neck supple.   Normal range of motion.  Cardiovascular:  Normal rate, regular rhythm and normal heart sounds.           Pulmonary/Chest: Breath sounds normal.    Abdominal: Abdomen is soft.   Musculoskeletal:         General: Normal range of motion.      Cervical back: Normal range of motion and neck supple.     Neurological: She is alert and oriented to person, place, and time.   Skin: Skin is warm. Capillary refill takes less than 2 seconds.   Psychiatric: She has a normal mood and affect. Her behavior is normal. Judgment and thought content normal.       ED Course   Procedures  Labs Reviewed - No data to display  EKG Readings: (Independently Interpreted)   EKG REVEALED NSR WITH A RATE OF 92.       Imaging Results    None          Medications - No data to display  Medical Decision Making:   Differential Diagnosis:   SYNCOPE, ANXIETY/DEPRESSION, UTI  ED Management:  44 YO FEMALE WHO COMES IN TODAY DUE TO HER CARDIAC MONITOR BLINKING RED.  SHE ISN'T SURE AS TO WHY  IT'S BLINKING.  EKG IS NEGATIVE FOR ANY ACUTE PATHOLOGY.  THE PATIENT DENIES ANY CURRENT SYNCOPAL   EVENTS OR OTHER ACUTE COMPLAINTS.  HOME TODAY.  SHE WAS INSTRUCTED TO FOLLOW UP WITH CARDIOLOGY ON DISCHARGE.  HOME TODAY.                         Clinical Impression:   Final diagnoses:  [R55] Syncope  [R55] Near syncope (Primary)        ED Disposition Condition    Discharge Stable          ED Prescriptions    None       Follow-up Information    None          Zulay Guo MD  07/22/23 0004       Zulay Guo MD  07/22/23 0011

## 2023-07-24 NOTE — TELEPHONE ENCOUNTER
----- Message from Marina Mead sent at 10/20/2022  8:51 AM CDT -----  Contact: Sophia/ROO[facility]EL[facility] @ [facility]  Joe Henson  MRN: [id number]  : 1980  PCP: Estela Munguia  Home Phone      [phone]  Work Phone      Not on file.  Mobile          [phone]      MESSAGE:   Joe nurse in [facility] @ [facility], left voicemail stating she needs to speak with pt's provider.     Phone:  [phone]       Patient signed ANDRADE for Dx Letter

## 2023-08-09 DIAGNOSIS — J44.9 CHRONIC OBSTRUCTIVE PULMONARY DISEASE, UNSPECIFIED COPD TYPE: ICD-10-CM

## 2023-08-09 RX ORDER — ALBUTEROL SULFATE 90 UG/1
2 AEROSOL, METERED RESPIRATORY (INHALATION) EVERY 6 HOURS PRN
Qty: 18 G | Refills: 0 | Status: SHIPPED | OUTPATIENT
Start: 2023-08-09 | End: 2023-09-06

## 2023-08-21 PROBLEM — N39.0 UTI (URINARY TRACT INFECTION): Status: RESOLVED | Noted: 2023-05-22 | Resolved: 2023-08-21

## 2023-08-21 PROBLEM — Z13.9 ENCOUNTER FOR MEDICAL SCREENING EXAMINATION: Status: RESOLVED | Noted: 2023-05-22 | Resolved: 2023-08-21

## 2023-08-28 ENCOUNTER — PATIENT OUTREACH (OUTPATIENT)
Dept: ADMINISTRATIVE | Facility: HOSPITAL | Age: 43
End: 2023-08-28
Payer: MEDICARE

## 2023-08-28 NOTE — PROGRESS NOTES
Chart reviewed, immunization record updated.  No new results noted on [facility] or [facility] web site.  Care Everywhere updated.   Patient care coordination note  LOV with PCP 3/22/2023.  Left detailed message for patient to return call to discuss Colorectal Cancer Screening.  [place] Statin Use for Persons with Diabetes, [employer], [employer] and [employer] Reports, 8.21.23(message sent to provider)

## 2023-08-30 DIAGNOSIS — E66.9 OBESITY (BMI 30-39.9): ICD-10-CM

## 2023-08-30 RX ORDER — SEMAGLUTIDE 1.34 MG/ML
1 INJECTION, SOLUTION SUBCUTANEOUS
Qty: 3 ML | Refills: 3 | Status: SHIPPED | OUTPATIENT
Start: 2023-08-30 | End: 2023-11-02

## 2023-08-30 NOTE — TELEPHONE ENCOUNTER
----- Message from Ventura Cote sent at 2023  1:38 PM CDT -----  Contact: Patient  Joe Henson  MRN: [id number]  : 1980  PCP: Estela Munguia  Home Phone      [phone]  Work Phone      Not on file.  Mobile          [phone]      MESSAGE:   Pt requesting refill or new Rx.   Is this a refill or new RX:  refill  RX name and strength: Ozempic 1 mg  Last office visit: 23  Is this a 30-day or 90-day RX:  ???  Pharmacy name and location:  [facility]  Comments:      Phone:  [phone]    PCP: Ezra

## 2023-09-05 DIAGNOSIS — J44.9 CHRONIC OBSTRUCTIVE PULMONARY DISEASE, UNSPECIFIED COPD TYPE: ICD-10-CM

## 2023-09-05 NOTE — TELEPHONE ENCOUNTER
LOV 03/22/2023    patient requesting refill for   albuterol (PROVENTIL/VENTOLIN HFA) 90 mcg/actuation inhaler 18 g           RX pending   pharmacy confirmed  please advise

## 2023-09-06 RX ORDER — ALBUTEROL SULFATE 90 UG/1
2 AEROSOL, METERED RESPIRATORY (INHALATION) EVERY 6 HOURS PRN
Qty: 8.5 G | Refills: 0 | Status: SHIPPED | OUTPATIENT
Start: 2023-09-06 | End: 2024-04-03

## 2023-10-12 ENCOUNTER — HOSPITAL ENCOUNTER (EMERGENCY)
Facility: HOSPITAL | Age: 43
Discharge: LEFT BEFORE DISCHARGE | End: 2023-10-12
Attending: SURGERY
Payer: MEDICARE

## 2023-10-12 VITALS
HEART RATE: 110 BPM | RESPIRATION RATE: 18 BRPM | SYSTOLIC BLOOD PRESSURE: 114 MMHG | OXYGEN SATURATION: 98 % | TEMPERATURE: 98 F | WEIGHT: 188.25 LBS | DIASTOLIC BLOOD PRESSURE: 78 MMHG | BODY MASS INDEX: 32.32 KG/M2

## 2023-10-12 DIAGNOSIS — G89.29 CHRONIC MIDLINE LOW BACK PAIN WITHOUT SCIATICA: ICD-10-CM

## 2023-10-12 DIAGNOSIS — M54.50 CHRONIC MIDLINE LOW BACK PAIN WITHOUT SCIATICA: ICD-10-CM

## 2023-10-12 DIAGNOSIS — Z53.21 ELOPED FROM EMERGENCY DEPARTMENT: ICD-10-CM

## 2023-10-12 DIAGNOSIS — Z53.29 LEFT AGAINST MEDICAL ADVICE: Primary | ICD-10-CM

## 2023-10-12 LAB
AMPHET+METHAMPHET UR QL: NEGATIVE
B-HCG UR QL: NEGATIVE
BARBITURATES UR QL SCN>200 NG/ML: NEGATIVE
BENZODIAZ UR QL SCN>200 NG/ML: ABNORMAL
BILIRUB UR QL STRIP: NEGATIVE
BZE UR QL SCN: NEGATIVE
CANNABINOIDS UR QL SCN: NEGATIVE
CLARITY UR: CLEAR
COLOR UR: YELLOW
CREAT UR-MCNC: 33.9 MG/DL (ref 15–325)
GLUCOSE UR QL STRIP: NEGATIVE
HGB UR QL STRIP: ABNORMAL
KETONES UR QL STRIP: NEGATIVE
LEUKOCYTE ESTERASE UR QL STRIP: NEGATIVE
METHADONE UR QL SCN>300 NG/ML: NEGATIVE
NITRITE UR QL STRIP: NEGATIVE
OPIATES UR QL SCN: NEGATIVE
PCP UR QL SCN>25 NG/ML: NEGATIVE
PH UR STRIP: 6 [PH] (ref 5–8)
PROT UR QL STRIP: NEGATIVE
SP GR UR STRIP: 1.01 (ref 1–1.03)
TOXICOLOGY INFORMATION: ABNORMAL
URN SPEC COLLECT METH UR: ABNORMAL
UROBILINOGEN UR STRIP-ACNC: NEGATIVE EU/DL

## 2023-10-12 PROCEDURE — 81003 URINALYSIS AUTO W/O SCOPE: CPT | Mod: 59 | Performed by: SURGERY

## 2023-10-12 PROCEDURE — 80307 DRUG TEST PRSMV CHEM ANLYZR: CPT | Performed by: SURGERY

## 2023-10-12 PROCEDURE — 81025 URINE PREGNANCY TEST: CPT | Performed by: SURGERY

## 2023-10-12 PROCEDURE — 25000003 PHARM REV CODE 250: Performed by: NURSE PRACTITIONER

## 2023-10-12 PROCEDURE — 99284 EMERGENCY DEPT VISIT MOD MDM: CPT

## 2023-10-12 PROCEDURE — 63600175 PHARM REV CODE 636 W HCPCS: Performed by: NURSE PRACTITIONER

## 2023-10-12 PROCEDURE — 96372 THER/PROPH/DIAG INJ SC/IM: CPT | Performed by: NURSE PRACTITIONER

## 2023-10-12 RX ORDER — CYCLOBENZAPRINE HCL 10 MG
10 TABLET ORAL
Status: COMPLETED | OUTPATIENT
Start: 2023-10-12 | End: 2023-10-12

## 2023-10-12 RX ORDER — KETOROLAC TROMETHAMINE 30 MG/ML
15 INJECTION, SOLUTION INTRAMUSCULAR; INTRAVENOUS
Status: COMPLETED | OUTPATIENT
Start: 2023-10-12 | End: 2023-10-12

## 2023-10-12 RX ORDER — DEXAMETHASONE SODIUM PHOSPHATE 4 MG/ML
8 INJECTION, SOLUTION INTRA-ARTICULAR; INTRALESIONAL; INTRAMUSCULAR; INTRAVENOUS; SOFT TISSUE
Status: COMPLETED | OUTPATIENT
Start: 2023-10-12 | End: 2023-10-12

## 2023-10-12 RX ADMIN — KETOROLAC TROMETHAMINE 15 MG: 30 INJECTION, SOLUTION INTRAMUSCULAR at 03:10

## 2023-10-12 RX ADMIN — CYCLOBENZAPRINE 10 MG: 10 TABLET, FILM COATED ORAL at 03:10

## 2023-10-12 RX ADMIN — DEXAMETHASONE SODIUM PHOSPHATE 8 MG: 4 INJECTION, SOLUTION INTRA-ARTICULAR; INTRALESIONAL; INTRAMUSCULAR; INTRAVENOUS; SOFT TISSUE at 03:10

## 2023-10-12 NOTE — ED PROVIDER NOTES
Encounter Date: 10/12/2023       History     Chief Complaint   Patient presents with    Back Pain     Pt reports to the ed with c/o sciatic pain x 2 days. Denies urinary s/s     Joe Henson is a 43 y.o. female with PMH of ADHD, DM, PTSD, OCD, arthritis who presents to the ED for evaluation of back pain. 2 days ho L sided sciatic nerve pain that she reports is chronic with occasional flare ups. No new or recent injury or trauma. No associated urinary symptoms. Pain is aching, exacerbated by movement, currently 7/10 in severity. No numbness/tingling to BLEs.  No saddle anesthesia or bowel or bladder dysfunction        Review of patient's allergies indicates:   Allergen Reactions    Latex, natural rubber      Past Medical History:   Diagnosis Date    Abnormal Pap smear age 30    Pos. HPV,     Abnormal Pap smear of cervix     Acute right-sided low back pain with right-sided sciatica 10/18/2022    Addiction to drug     ADHD (attention deficit hyperactivity disorder)     Arthritis     Bipolar disorder     COPD (chronic obstructive pulmonary disease)     Depression     Diabetes mellitus     Fatigue     Hallucination     History of psychiatric hospitalization     Hx of psychiatric care     OCD (obsessive compulsive disorder)     Psychiatric problem     PTSD (post-traumatic stress disorder)     Sleep difficulties     Substance abuse     Suicide attempt     Therapy      Past Surgical History:   Procedure Laterality Date    bariatric sleeve N/A 2020     SECTION  ;     DILATION AND CURETTAGE OF UTERUS  2016    ENDOMETRIAL ABLATION  2016    KNEE SURGERY Right     hardware-bone from hip to repair    TUBAL LIGATION  2004     Family History   Problem Relation Age of Onset    No Known Problems Paternal Grandfather     No Known Problems Paternal Grandmother     Diabetes Maternal Grandmother     Coronary artery disease Maternal Grandfather     Diabetes Maternal Grandfather     Hypertension  Father     Colon cancer Father 60    Anxiety disorder Sister     Depression Sister     Drug abuse Sister     No Known Problems Maternal Aunt     No Known Problems Maternal Uncle     Breast cancer Paternal Aunt     No Known Problems Paternal Uncle     No Known Problems Cousin      labor Neg Hx     Ovarian cancer Neg Hx      Social History     Tobacco Use    Smoking status: Every Day     Current packs/day: 1.00     Average packs/day: 1 pack/day for 25.9 years (25.9 ttl pk-yrs)     Types: Cigarettes     Start date: 1997    Smokeless tobacco: Never    Tobacco comments:     told about brochure and smoking clinic program   Substance Use Topics    Alcohol use: Yes     Comment: Socially-2 times a week-4-5 beers    Drug use: Not Currently     Types: Amphetamines, Benzodiazepines     Review of Systems   Constitutional:  Negative for fever.   HENT:  Negative for sore throat.    Respiratory:  Negative for chest tightness and shortness of breath.    Cardiovascular:  Negative for chest pain.   Gastrointestinal:  Negative for abdominal pain and nausea.   Genitourinary:  Negative for dysuria, frequency and urgency.   Musculoskeletal:  Positive for back pain.   Skin:  Negative for rash.   Neurological:  Negative for dizziness, weakness, light-headedness and numbness.   Hematological:  Does not bruise/bleed easily.       Physical Exam     Initial Vitals [10/12/23 1449]   BP Pulse Resp Temp SpO2   114/78 110 18 98 °F (36.7 °C) 98 %      MAP       --         Physical Exam    Nursing note and vitals reviewed.  Constitutional: She appears well-developed and well-nourished.   HENT:   Head: Normocephalic and atraumatic.   Right Ear: Tympanic membrane, external ear and ear canal normal. Tympanic membrane is not erythematous. No middle ear effusion.   Left Ear: Tympanic membrane, external ear and ear canal normal. Tympanic membrane is not erythematous.  No middle ear effusion.   Nose: Nose normal.   Mouth/Throat: Uvula is  midline, oropharynx is clear and moist and mucous membranes are normal. Mucous membranes are not pale and not dry.   Eyes: Conjunctivae and EOM are normal. Pupils are equal, round, and reactive to light.   Neck: Neck supple.   Normal range of motion.  Cardiovascular:  Normal rate, regular rhythm, normal heart sounds and intact distal pulses.           Pulmonary/Chest: Effort normal and breath sounds normal. She has no decreased breath sounds. She has no wheezes. She has no rhonchi. She has no rales.   Abdominal: Abdomen is soft. Bowel sounds are normal. There is no abdominal tenderness.   Musculoskeletal:      Cervical back: Normal, normal range of motion and neck supple.      Thoracic back: Normal.      Lumbar back: Tenderness present. Decreased range of motion.     Neurological: She is alert and oriented to person, place, and time. She has normal strength. She displays normal reflexes. No cranial nerve deficit or sensory deficit.   Skin: Skin is warm and dry. Capillary refill takes less than 2 seconds. No rash noted.   Psychiatric: She has a normal mood and affect. Her behavior is normal. Judgment and thought content normal.         ED Course   Procedures  Labs Reviewed   URINALYSIS, REFLEX TO URINE CULTURE - Abnormal; Notable for the following components:       Result Value    Occult Blood UA Trace (*)     All other components within normal limits    Narrative:     Specimen Source->Urine   DRUG SCREEN PANEL, URINE EMERGENCY - Abnormal; Notable for the following components:    Benzodiazepines Presumptive Positive (*)     All other components within normal limits    Narrative:     Specimen Source->Urine   PREGNANCY TEST, URINE RAPID    Narrative:     Specimen Source->Urine          Imaging Results              X-Ray Lumbar Spine Ap And Lateral (In process)                      Medications   dexAMETHasone injection 8 mg (8 mg Intramuscular Given 10/12/23 5418)   cyclobenzaprine tablet 10 mg (10 mg Oral Given  10/12/23 1504)   ketorolac injection 15 mg (15 mg Intramuscular Given 10/12/23 1505)     Medical Decision Making  Evaluation of a 43-year-old female with chronic lower back pain now with left-sided sciatica.  No new injuries or trauma.  She has no spinous process tenderness on exam, left paraspinous muscle tenderness.  No evidence of cauda equina syndrome.    Differential diagnosis includes lumbar radiculopathy, degenerative disc disease, facet arthropathy, UTI, sciatica    --patient eloped from ER prior to lumbar x-ray being performed.  Diagnostic evaluation was not complete.    Amount and/or Complexity of Data Reviewed  Labs: ordered. Decision-making details documented in ED Course.  Radiology: ordered.    Risk  Prescription drug management.               ED Course as of 10/12/23 1708   Thu Oct 12, 2023   1632 Took over care of patient at this time awaiting x-ray of lumbar spine results. [RG]      ED Course User Index  [RG] Hitesh Betancur NP                    Clinical Impression:   Final diagnoses:  [Z53.21] Eloped from emergency department (Primary)        ED Disposition Condition    Eloped                 Hitesh Betancur NP  10/12/23 1708

## 2023-10-13 ENCOUNTER — TELEPHONE (OUTPATIENT)
Dept: NEUROLOGY | Facility: CLINIC | Age: 43
End: 2023-10-13
Payer: MEDICARE

## 2023-10-13 NOTE — TELEPHONE ENCOUNTER
----- Message from Susan Flores sent at 10/13/2023  9:33 AM CDT -----  PT called stating that she was seen in er yesterday for sciatica pain and left AMA before any other tests can be ran. States had urine done would like this reviewed and to see if you suggest anything else. PT states not in pain today.

## 2023-10-18 ENCOUNTER — PATIENT OUTREACH (OUTPATIENT)
Dept: ADMINISTRATIVE | Facility: HOSPITAL | Age: 43
End: 2023-10-18
Payer: MEDICARE

## 2023-10-18 DIAGNOSIS — Z12.11 ENCOUNTER FOR COLORECTAL CANCER SCREENING: Primary | ICD-10-CM

## 2023-10-18 DIAGNOSIS — Z12.12 ENCOUNTER FOR COLORECTAL CANCER SCREENING: Primary | ICD-10-CM

## 2023-10-18 NOTE — PROGRESS NOTES
Informed pt she is only eligible for C-Scope due to Father's illness with Colon Ca. States she does not want colonoscopy at this time, may do it later in life. Agreed to Cologuard at this time. Order placed, Instructed that if positive, will have to do Colonoscopy. CHELO.

## 2023-10-30 ENCOUNTER — TELEPHONE (OUTPATIENT)
Dept: FAMILY MEDICINE | Facility: CLINIC | Age: 43
End: 2023-10-30
Payer: MEDICARE

## 2023-10-30 DIAGNOSIS — E66.9 OBESITY (BMI 30-39.9): ICD-10-CM

## 2023-10-30 NOTE — TELEPHONE ENCOUNTER
----- Message from Ventura Cote sent at 10/30/2023  8:26 AM CDT -----  Contact: Patient  Joe Henson  MRN: [id number]  : 1980  PCP: Estela Munguia  Home Phone      [phone]  Work Phone      Not on file.  Mobile          [phone]      MESSAGE: has been on Ozempic 1 mg for a wile --requesting increase -- please advise    Call [phone]    PCP: Ezra

## 2023-11-02 RX ORDER — SEMAGLUTIDE 2.68 MG/ML
2 INJECTION, SOLUTION SUBCUTANEOUS
Qty: 3 ML | Refills: 5 | Status: SHIPPED | OUTPATIENT
Start: 2023-11-02 | End: 2024-04-03

## 2023-11-03 ENCOUNTER — HOSPITAL ENCOUNTER (EMERGENCY)
Facility: HOSPITAL | Age: 43
Discharge: HOME | End: 2023-11-03
Attending: STUDENT IN AN ORGANIZED HEALTH CARE EDUCATION/TRAINING PROGRAM
Payer: MEDICARE

## 2023-11-03 ENCOUNTER — TELEPHONE (OUTPATIENT)
Dept: FAMILY MEDICINE | Facility: CLINIC | Age: 43
End: 2023-11-03
Payer: MEDICARE

## 2023-11-03 VITALS
WEIGHT: 186.81 LBS | TEMPERATURE: 98 F | SYSTOLIC BLOOD PRESSURE: 119 MMHG | DIASTOLIC BLOOD PRESSURE: 75 MMHG | HEIGHT: 64 IN | OXYGEN SATURATION: 98 % | HEART RATE: 91 BPM | RESPIRATION RATE: 20 BRPM | BODY MASS INDEX: 31.89 KG/M2

## 2023-11-03 DIAGNOSIS — G89.29 CHRONIC BILATERAL LOW BACK PAIN WITH RIGHT-SIDED SCIATICA: Primary | ICD-10-CM

## 2023-11-03 DIAGNOSIS — M54.41 CHRONIC BILATERAL LOW BACK PAIN WITH RIGHT-SIDED SCIATICA: Primary | ICD-10-CM

## 2023-11-03 LAB
B-HCG UR QL: NEGATIVE
BILIRUB UR QL STRIP: NEGATIVE
CLARITY UR: CLEAR
COLOR UR: YELLOW
GLUCOSE UR QL STRIP: NEGATIVE
HGB UR QL STRIP: ABNORMAL
KETONES UR QL STRIP: NEGATIVE
LEUKOCYTE ESTERASE UR QL STRIP: NEGATIVE
NITRITE UR QL STRIP: NEGATIVE
PH UR STRIP: 6 [PH] (ref 5–8)
PROT UR QL STRIP: NEGATIVE
SP GR UR STRIP: <=1.005 (ref 1–1.03)
URN SPEC COLLECT METH UR: ABNORMAL
UROBILINOGEN UR STRIP-ACNC: NEGATIVE EU/DL

## 2023-11-03 PROCEDURE — 81003 URINALYSIS AUTO W/O SCOPE: CPT | Performed by: STUDENT IN AN ORGANIZED HEALTH CARE EDUCATION/TRAINING PROGRAM

## 2023-11-03 PROCEDURE — 99284 EMERGENCY DEPT VISIT MOD MDM: CPT

## 2023-11-03 PROCEDURE — 81025 URINE PREGNANCY TEST: CPT | Performed by: NURSE PRACTITIONER

## 2023-11-03 PROCEDURE — 96372 THER/PROPH/DIAG INJ SC/IM: CPT | Performed by: NURSE PRACTITIONER

## 2023-11-03 PROCEDURE — 63600175 PHARM REV CODE 636 W HCPCS: Performed by: NURSE PRACTITIONER

## 2023-11-03 PROCEDURE — 25000003 PHARM REV CODE 250: Performed by: NURSE PRACTITIONER

## 2023-11-03 RX ORDER — LIDOCAINE 50 MG/G
1 PATCH TOPICAL
Status: DISCONTINUED | OUTPATIENT
Start: 2023-11-03 | End: 2023-11-03 | Stop reason: HOSPADM

## 2023-11-03 RX ORDER — METHOCARBAMOL 500 MG/1
1000 TABLET, FILM COATED ORAL 3 TIMES DAILY
Qty: 30 TABLET | Refills: 0 | Status: SHIPPED | OUTPATIENT
Start: 2023-11-03 | End: 2023-11-08

## 2023-11-03 RX ORDER — METHYLPREDNISOLONE 4 MG/1
TABLET ORAL
Qty: 1 EACH | Refills: 0 | Status: SHIPPED | OUTPATIENT
Start: 2023-11-03 | End: 2023-11-24

## 2023-11-03 RX ORDER — KETOROLAC TROMETHAMINE 30 MG/ML
30 INJECTION, SOLUTION INTRAMUSCULAR; INTRAVENOUS
Status: COMPLETED | OUTPATIENT
Start: 2023-11-03 | End: 2023-11-03

## 2023-11-03 RX ORDER — CYCLOBENZAPRINE HCL 10 MG
10 TABLET ORAL
Status: COMPLETED | OUTPATIENT
Start: 2023-11-03 | End: 2023-11-03

## 2023-11-03 RX ADMIN — CYCLOBENZAPRINE 10 MG: 10 TABLET, FILM COATED ORAL at 03:11

## 2023-11-03 RX ADMIN — LIDOCAINE 1 PATCH: 50 PATCH CUTANEOUS at 03:11

## 2023-11-03 RX ADMIN — KETOROLAC TROMETHAMINE 30 MG: 30 INJECTION, SOLUTION INTRAMUSCULAR; INTRAVENOUS at 03:11

## 2023-11-03 NOTE — TELEPHONE ENCOUNTER
----- Message from Maico Ny sent at 11/3/2023  8:43 AM CDT -----  Contact: self  Joe Henson  MRN: [id number]  : 1980  PCP: Estela Munguia  Home Phone      [phone]  Work Phone      Not on file.  Mobile          [phone]      MESSAGE:   Patient is wanting a apt today, stating her cyatic nerve is acting up.    [phone]

## 2023-11-03 NOTE — ED PROVIDER NOTES
Encounter Date: 11/3/2023       History     Chief Complaint   Patient presents with    Pain in Bilatera Buttocks     Joe Henson is a 43 y.o. female with PMH of COPD, DM, depression, PTSD who presents to the ED for evaluation of lower back pain. Chronic lower back pain with no new injuries, worse for the past 2 days. Pain is sharp with radiation to bilateral lower legs, worse on the R side. Pain currently rated 8/10 in severity. No saddle anesthesia, UTI symptoms or numbness/tingling.     The history is provided by the patient.     Review of patient's allergies indicates:   Allergen Reactions    Latex, natural rubber      Past Medical History:   Diagnosis Date    Abnormal Pap smear age 30    Pos. HPV,     Abnormal Pap smear of cervix     Acute right-sided low back pain with right-sided sciatica 10/18/2022    Addiction to drug     ADHD (attention deficit hyperactivity disorder)     Arthritis     Bipolar disorder     COPD (chronic obstructive pulmonary disease)     Depression     Diabetes mellitus     Fatigue     Hallucination     History of psychiatric hospitalization     Hx of psychiatric care     OCD (obsessive compulsive disorder)     Psychiatric problem     PTSD (post-traumatic stress disorder)     Sleep difficulties     Substance abuse     Suicide attempt     Therapy      Past Surgical History:   Procedure Laterality Date    bariatric sleeve N/A 2020     SECTION  ;     DILATION AND CURETTAGE OF UTERUS  2016    ENDOMETRIAL ABLATION  2016    KNEE SURGERY Right     hardware-bone from hip to repair    TUBAL LIGATION       Family History   Problem Relation Age of Onset    No Known Problems Paternal Grandfather     No Known Problems Paternal Grandmother     Diabetes Maternal Grandmother     Coronary artery disease Maternal Grandfather     Diabetes Maternal Grandfather     Hypertension Father     Colon cancer Father 60    Anxiety disorder Sister     Depression Sister      Drug abuse Sister     No Known Problems Maternal Aunt     No Known Problems Maternal Uncle     Breast cancer Paternal Aunt     No Known Problems Paternal Uncle     No Known Problems Cousin      labor Neg Hx     Ovarian cancer Neg Hx      Social History     Tobacco Use    Smoking status: Every Day     Current packs/day: 1.00     Average packs/day: 1 pack/day for 25.9 years (25.9 ttl pk-yrs)     Types: Cigarettes     Start date: 1997    Smokeless tobacco: Never    Tobacco comments:     told about brochure and smoking clinic program   Substance Use Topics    Alcohol use: Yes     Comment: Socially-2 times a week-4-5 beers    Drug use: Not Currently     Types: Amphetamines, Benzodiazepines     Review of Systems   Constitutional:  Negative for fever.   HENT:  Negative for sore throat.    Respiratory:  Negative for chest tightness and shortness of breath.    Cardiovascular:  Negative for chest pain.   Gastrointestinal:  Negative for abdominal pain and nausea.   Genitourinary:  Negative for dysuria, frequency and urgency.   Musculoskeletal:  Positive for back pain.   Skin:  Negative for rash.   Neurological:  Negative for dizziness, weakness, light-headedness and numbness.   Hematological:  Does not bruise/bleed easily.       Physical Exam     Initial Vitals [23 1447]   BP Pulse Resp Temp SpO2   125/74 98 18 97.8 °F (36.6 °C) 97 %      MAP       --         Physical Exam    Nursing note and vitals reviewed.  Constitutional: She appears well-developed and well-nourished.   HENT:   Head: Normocephalic and atraumatic.   Right Ear: Tympanic membrane, external ear and ear canal normal. Tympanic membrane is not erythematous. No middle ear effusion.   Left Ear: Tympanic membrane, external ear and ear canal normal. Tympanic membrane is not erythematous.  No middle ear effusion.   Nose: Nose normal.   Mouth/Throat: Uvula is midline, oropharynx is clear and moist and mucous membranes are normal. Mucous membranes are  not pale and not dry.   Eyes: Conjunctivae and EOM are normal. Pupils are equal, round, and reactive to light.   Neck: Neck supple.   Normal range of motion.  Cardiovascular:  Normal rate, regular rhythm, normal heart sounds and intact distal pulses.           Pulmonary/Chest: Effort normal and breath sounds normal. She has no decreased breath sounds. She has no wheezes. She has no rhonchi. She has no rales.   Abdominal: Abdomen is soft. Bowel sounds are normal. There is no abdominal tenderness.   Musculoskeletal:      Cervical back: Normal, normal range of motion and neck supple.      Thoracic back: Normal.      Lumbar back: Tenderness present. Decreased range of motion.     Neurological: She is alert and oriented to person, place, and time. She has normal strength. She displays normal reflexes. No cranial nerve deficit or sensory deficit.   Skin: Skin is warm and dry. Capillary refill takes less than 2 seconds. No rash noted.   Psychiatric: She has a normal mood and affect. Her behavior is normal. Judgment and thought content normal.         ED Course   Procedures  Labs Reviewed   URINALYSIS, REFLEX TO URINE CULTURE - Abnormal; Notable for the following components:       Result Value    Specific Gravity, UA <=1.005 (*)     Occult Blood UA Trace (*)     All other components within normal limits    Narrative:     Specimen Source->Urine   PREGNANCY TEST, URINE RAPID    Narrative:     Specimen Source->Urine          Imaging Results    None          Medications   LIDOcaine 5 % patch 1 patch (1 patch Transdermal Patch Applied 11/3/23 1504)   cyclobenzaprine tablet 10 mg (10 mg Oral Given 11/3/23 1505)   ketorolac injection 30 mg (30 mg Intramuscular Given 11/3/23 1504)     Medical Decision Making  Evaluation of a 43-year-old female with chronic lower back pain, worse for the past several days.  No sensory deficits on exam.  Reviewed previous MRI from March 2023.  No new injuries or falls.    Differential diagnosis  includes musculoskeletal pain, lumbar radiculopathy, sciatic nerve pain, degenerative disc disease    Problems Addressed:  Chronic bilateral low back pain with right-sided sciatica: acute illness or injury     Details: Chronic lower back pain; Toradol and Flexeril given in the ED.  Will discharge home with close follow-up with pain management.    Amount and/or Complexity of Data Reviewed  Labs: ordered. Decision-making details documented in ED Course.     Details: UA without signs of infection    Risk  Prescription drug management.  Risk Details: Stable for DC home. Patient/caregiver voices understanding and feels comfortable with discharge plan.      The patient acknowledges that close follow up with medical provider is required. Instructed to follow up with PCP within 2 days. Patient was given specific return precautions. The patient agrees to comply with all instruction and directions given in the ER.                                 Clinical Impression:   Final diagnoses:  [M54.41, G89.29] Chronic bilateral low back pain with right-sided sciatica (Primary)        ED Disposition Condition    Discharge Stable          ED Prescriptions       Medication Sig Dispense Start Date End Date Auth. Provider    methocarbamoL (ROBAXIN) 500 MG Tab Take 2 tablets (1,000 mg total) by mouth 3 (three) times daily. for 5 days 30 tablet 11/3/2023 11/8/2023 Sailaja Stinson NP    methylPREDNISolone (MEDROL DOSEPACK) 4 mg tablet Take as directed 1 each 11/3/2023 11/24/2023 Sailaja Stinson NP          Follow-up Information       Follow up With Specialties Details Why Contact Estela Espinoza NP Family Medicine Schedule an appointment as soon as possible for a visit in 2 days  [address] [address]  [phone]               Sailaja Stinson NP  11/03/23 9174

## 2023-11-03 NOTE — ED TRIAGE NOTES
C/o pain to bilateral buttocks radiating  down posterior legs since yesterday, but worse in right side. Denies trauma.

## 2023-11-06 ENCOUNTER — TELEPHONE (OUTPATIENT)
Dept: FAMILY MEDICINE | Facility: CLINIC | Age: 43
End: 2023-11-06
Payer: MEDICARE

## 2023-11-06 NOTE — TELEPHONE ENCOUNTER
----- Message from Maico Ny sent at 2023  9:35 AM CST -----  Contact: self  Joe Henson  MRN: [id number]  : 1980  PCP: Estela Munguia  Home Phone      [phone]  Work Phone      Not on file.  Mobile          [phone]      MESSAGE:   Patient is requesting if they can go up 2 mg on her ozempic      [phone]

## 2023-11-09 ENCOUNTER — PATIENT OUTREACH (OUTPATIENT)
Dept: EMERGENCY MEDICINE | Facility: HOSPITAL | Age: 43
End: 2023-11-09
Payer: MEDICARE

## 2023-11-09 NOTE — PROGRESS NOTES
Andreina Saeed, Patient Care Assistant  ED Navigator  Emergency Department    Project: [facility] ED Navigator  Role: Community Health Worker    Date: 11/09/2023  Patient Name: Joe Henson  MRN: [id number]  PCP: Estela Munguia NP    Assessment:     Joe Henson is a 43 y.o. female who has presented to ED for pain bilateral buttocks. Patient has visited the ED 2 times in the past 3 months. Patient did contact PCP.     ED Navigator Initial Assessment    ED Navigator Enrollment Documentation  Consent to Services  Does patient consent to completing the assessment?: Yes  Contact  Method of Initial Contact: Phone  Transportation  Does the patient have issues with Transportation?: No  Does the patient have transportation to and from healthcare appointments?: Yes  Insurance Coverage  Do you have coverage/adequate coverage?: Yes  Type/kind of coverage: [employer]  Is patient able to afford co-pays/deductibles?: Yes  Is patient able to afford HME or supplies?: Yes  Does patient have an established [facility] PCP?: Yes  Able to access?: Yes  Does the patient have a lack of adequate coverage?: No  Specialist Appointment  Did the patient come to the ED to see a specialist?: No  Does the patient have a pending specialist referral?: Yes  Does the patient have a specialist appointment made?: No  PCP Follow Up Appointment  Has the patient had an appointment with a primary care provider in the past year?: Yes  Approximate date: 3/22/23  Provider: Estela Munguia NP  Does the patient have a follow up appontment with a PCP?: No  When was the last time you saw your PCP?: 3/22/23  Why does the patient not have a follow up scheduled?: Other (see comments) (Comment: waiting to schedule an appt. when can take off work.)  Medications  Is patient able to afford medication?: Yes  Is patient unable to get medication due to lack of transportation?: No  Psychological  Does the patient have psycho-social concerns?: Yes  What  concerns does the patient have?: Anxiety and/or Depression (Comment: being treated.)  Food  Does the patient have concerns about food?: No  Communication/Education  Does the patient have limited English proficiency/English not primary language?: No  Does patient have low literacy and/or low health literacy?: Yes  Does patient have concerns with care?: No  Does patient have dissatisfaction with care?: No  Other Financial Concerns  Does the patient have immediate financial distress?: No  Does the patient have general financial concerns?: No  Other Social Barriers/Concerns  Does the patient have any additional barriers or concerns?: None  Primary Barrier  Barriers identified: Psychological barrier (mistrust, anxiety, etc.), Structural barrier (service availability, waiting times, etc.)  Root Cause of ED Utilization: Chronic Conditions  Plan to address Chronic Conditions: Encourage patient to contact PCP/specialist for follow up per ED discharge instructions  Next steps: Provided Education  Was education/educational materials provided surrounding PCP services/creating a medical home?: Yes Was education verbal or written?: Verbal, Written     Was education/educational materials provided surrounding low cost, healthy foods?: Yes Was education verbal or written?: Written     Was education/educational materials provided surrounding other items? If so, use comment to explain.: Yes Was education verbal or written?: Written   Plan: Provided information for [facility] On [facility] 24/7 Nurse triage line, [phone] or [facility] ([phone])  Expected Date of Follow Up 1: 12/28/23  Additional Documentation: Pt is doing well. Pt expressed when she stands is when she is in pain. Pt is waiting to make an appointment with her PCP whenever she figures out when she can take off work. Pt did not need help with any appts. Pt is seen by a provider for mental health issues. Pt mentioned she is currently wanting to quit smoking; pt  declined smoking cessation information and stated she would get information from  provider. Pt did not want assistance with anything today. Pt provided [email or web address] as her e-mail address. Pt is agreeable to a follow-up call in 6 weeks or so.    ED navigator ensured there was nothing she could help patient with today. ED navigator offered to help with appointments and provide smoking cessation information. ED navigator provided patient with the following via e-mail: the [facility] On [facility] 24/7 Nurse triage line, [phone] or [facility] ([phone]) contact information and education on (The Right Care at the Right Level information, [facility] Virtual Visit information, and Heart healthy diet tips). ED navigator reminded patient to reach out if there is ever anything she can assist with. ED navigator will follow-up with patient on/around 12/28/2023.    Andreina Saeed  ED Navigator- [facility]  [phone]            Social History     Socioeconomic History    Marital status: Single    Number of children: 2   Occupational History     Employer: [employer]   Tobacco Use    Smoking status: Every Day     Current packs/day: 1.00     Average packs/day: 1 pack/day for 25.9 years (25.9 ttl pk-yrs)     Types: Cigarettes     Start date: 11/28/1997    Smokeless tobacco: Never    Tobacco comments:     told about brochure and smoking clinic program   Substance and Sexual Activity    Alcohol use: Yes     Comment: Socially-2 times a week-4-5 beers    Drug use: Not Currently     Types: Amphetamines, Benzodiazepines    Sexual activity: Yes     Partners: Male     Birth control/protection: Surgical, See Surgical Hx     Comment: - has a boyfriend   Other Topics Concern    Patient feels they ought to cut down on drinking/drug use No    Patient annoyed by others criticizing their drinking/drug use No    Patient has felt bad or guilty about drinking/drug use No    Patient has had a drink/used drugs as an  eye opener in the AM No     Social Determinants of Health     Financial Resource Strain: Low Risk  (11/9/2023)    Overall Financial Resource Strain (CARDIA)     Difficulty of Paying Living Expenses: Not hard at all   Food Insecurity: No Food Insecurity (11/9/2023)    Hunger Vital Sign     Worried About Running Out of Food in the Last Year: Never true     Ran Out of Food in the Last Year: Never true   Transportation Needs: No Transportation Needs (11/9/2023)    PRAPARE - Transportation     Lack of Transportation (Medical): No     Lack of Transportation (Non-Medical): No   Physical Activity: Inactive (11/9/2023)    Exercise Vital Sign     Days of Exercise per Week: 0 days     Minutes of Exercise per Session: 0 min   Stress: Stress Concern Present (11/9/2023)    [place] [employer] - Occupational Stress Questionnaire     Feeling of Stress : Very much   Social Connections: Moderately Isolated (11/9/2023)    Social Connection and Isolation Panel [NHANES]     Frequency of Communication with Friends and Family: More than three times a week     Frequency of Social Gatherings with Friends and Family: More than three times a week     Attends Amish Services: Never     Attends Club or Organization Meetings: Never     Marital Status: Living with partner   Housing Stability: Low Risk  (11/9/2023)    Housing Stability Vital Sign     Unable to Pay for Housing in the Last Year: No     Number of Places Lived in the Last Year: 1     Unstable Housing in the Last Year: No       Plan:     Pt is doing well. Pt expressed when she stands is when she is in pain. Pt is waiting to make an appointment with her PCP whenever she figures out when she can take off work. Pt did not need help with any appts. Pt is seen by a provider for mental health issues. Pt mentioned she is currently wanting to quit smoking; pt declined smoking cessation information and stated she would get information from MH provider. Pt did not want  assistance with anything today. Pt provided [email or web address] as her e-mail address. Pt is agreeable to a follow-up call in 6 weeks or so.    ED navigator ensured there was nothing she could help patient with today. ED navigator offered to help with appointments and provide smoking cessation information. ED navigator provided patient with the following via e-mail: the [employer] On Call 24/7 Nurse triage line, [phone] or [facility]-866-[facility] ([phone]) contact information and education on (The Right Care at the Right Level information, [facility] Virtual Visit information, and Heart healthy diet tips). ED navigator reminded patient to reach out if there is ever anything she can assist with. ED navigator will follow-up with patient on/around 12/28/2023.    Andreina Saeed  ED Navigator- [place]  [phone]

## 2023-12-19 DIAGNOSIS — G47.30 SLEEP APNEA, UNSPECIFIED: Primary | ICD-10-CM

## 2023-12-20 ENCOUNTER — HOSPITAL ENCOUNTER (OUTPATIENT)
Dept: SLEEP MEDICINE | Facility: HOSPITAL | Age: 43
Discharge: HOME | End: 2023-12-20
Attending: NURSE PRACTITIONER
Payer: MEDICARE

## 2023-12-20 DIAGNOSIS — G47.30 SLEEP APNEA, UNSPECIFIED: ICD-10-CM

## 2023-12-20 PROCEDURE — 95810 POLYSOM 6/> YRS 4/> PARAM: CPT

## 2023-12-22 PROBLEM — G47.30 SLEEP APNEA, UNSPECIFIED: Status: ACTIVE | Noted: 2023-12-22

## 2023-12-27 ENCOUNTER — TELEPHONE (OUTPATIENT)
Dept: FAMILY MEDICINE | Facility: CLINIC | Age: 43
End: 2023-12-27
Payer: MEDICARE

## 2023-12-27 NOTE — TELEPHONE ENCOUNTER
Spoke with patient and informed her to contact insurance to see if medication is covered without DM diagnosis. Patient states understanding and will call back with answer.

## 2023-12-27 NOTE — TELEPHONE ENCOUNTER
Patient returned call and states that as of 2024 mounjaro is covered with insurance.     Please advise.

## 2023-12-27 NOTE — TELEPHONE ENCOUNTER
----- Message from Maico Ny sent at 2023  9:12 AM CST -----  Contact: self  Joe Henson  MRN: [id number]  : 1980  PCP: Estela Munguia  Home Phone      [phone]  Work Phone      Not on file.  Mobile          [phone]      MESSAGE:   Patient wants to know if she can switch to Monjauro instead of the Ozempic, and if so will she have to come in for appt? Please advise        [phone]

## 2023-12-28 ENCOUNTER — PATIENT OUTREACH (OUTPATIENT)
Dept: EMERGENCY MEDICINE | Facility: HOSPITAL | Age: 43
End: 2023-12-28
Payer: MEDICARE

## 2023-12-28 NOTE — PROGRESS NOTES
Pt is doing good, as stated. Pt stated she has everything she is needing.    ED navigator ensured there was nothing she could help patient with. ED navigator reminded patient to reach out if there is ever anything she can assist with. ED navigator will follow-up with patient on/around 2/29/2024.    Andreina Saeed  ED Navigator- [place]  [phone]

## 2024-01-02 ENCOUNTER — TELEPHONE (OUTPATIENT)
Dept: FAMILY MEDICINE | Facility: CLINIC | Age: 44
End: 2024-01-02
Payer: COMMERCIAL

## 2024-01-02 NOTE — TELEPHONE ENCOUNTER
----- Message from Maico Ny sent at 2024  8:37 AM CST -----  Contact: self  Joe Brandale  MRN: [id number]  : 1980  PCP: Estela Munguia  Home Phone      [phone]  Work Phone      Not on file.  Mobile          [phone]      MESSAGE:   Patient says Fredi is covered by insurance instead of sending it to [facility], please sent it to [facility]      [phone]

## 2024-01-02 NOTE — TELEPHONE ENCOUNTER
Patient is asking for Mounjaro to be sent to [facility]. Her LOV was 3/22/23 and no showed for her last 2 appts. Please advise, thank you.

## 2024-01-04 NOTE — TELEPHONE ENCOUNTER
I don't know who has been giving it to her. I haven't seen her since March of 2023. And I see Ozempic on her med list, no Mounjaro

## 2024-01-04 NOTE — TELEPHONE ENCOUNTER
Spoke with pt--discussed changing from Ozempic to Mounjaro. Advised her that it is best if she makes an appt with Rosalba to discussed. She is out of town and will call back to make an appt.

## 2024-01-08 ENCOUNTER — TELEPHONE (OUTPATIENT)
Dept: FAMILY MEDICINE | Facility: CLINIC | Age: 44
End: 2024-01-08
Payer: COMMERCIAL

## 2024-01-08 NOTE — TELEPHONE ENCOUNTER
----- Message from Ventura Cote sent at 2024 10:11 AM CST -----  Contact: Patient  Joe Henson  MRN: [id number]  : 1980  PCP: Estela Munguia  Home Phone      [phone]  Work Phone      Not on file.  Mobile          [phone]      MESSAGE:  PA required on Ozempic -- asks that we call her insurance @ [phone] to obtain    Call [phone]    PCP: Ezra

## 2024-01-08 NOTE — TELEPHONE ENCOUNTER
Spoke with pt--advised her that the prior auth was done on ozempic and denied b/c dx was obesity. Sent her a copy of the denial letter through my chart msg.

## 2024-02-26 ENCOUNTER — HOSPITAL ENCOUNTER (EMERGENCY)
Facility: HOSPITAL | Age: 44
Discharge: PSYCHIATRIC OR SUBSTANCE USE FACILITY | End: 2024-02-27
Attending: SURGERY

## 2024-02-26 DIAGNOSIS — T42.4X2A INTENTIONAL BENZODIAZEPINE OVERDOSE, INITIAL ENCOUNTER: Primary | ICD-10-CM

## 2024-02-26 DIAGNOSIS — T50.901A OVERDOSE: ICD-10-CM

## 2024-02-26 DIAGNOSIS — T50.901A DRUG OVERDOSE: ICD-10-CM

## 2024-02-26 DIAGNOSIS — R53.83 FATIGUE: ICD-10-CM

## 2024-02-26 DIAGNOSIS — T43.011A: ICD-10-CM

## 2024-02-26 DIAGNOSIS — T50.902A INTENTIONAL OVERDOSE: ICD-10-CM

## 2024-02-26 DIAGNOSIS — R45.851 SUICIDAL IDEATION: ICD-10-CM

## 2024-02-26 DIAGNOSIS — R94.31 PROLONGATION OF QRS COMPLEX ON ELECTROCARDIOGRAPHY: ICD-10-CM

## 2024-02-26 DIAGNOSIS — T65.91XA INGESTION OF SUBSTANCE: ICD-10-CM

## 2024-02-26 LAB
ALBUMIN SERPL BCP-MCNC: 3.9 G/DL (ref 3.5–5.2)
ALP SERPL-CCNC: 118 U/L (ref 55–135)
ALT SERPL W/O P-5'-P-CCNC: 16 U/L (ref 10–44)
AMPHET+METHAMPHET UR QL: NEGATIVE
ANION GAP SERPL CALC-SCNC: 13 MMOL/L (ref 8–16)
APAP SERPL-MCNC: <3 UG/ML (ref 10–20)
AST SERPL-CCNC: 15 U/L (ref 10–40)
B-HCG UR QL: NEGATIVE
BARBITURATES UR QL SCN>200 NG/ML: NEGATIVE
BASOPHILS # BLD AUTO: 0.03 K/UL (ref 0–0.2)
BASOPHILS NFR BLD: 0.2 % (ref 0–1.9)
BENZODIAZ UR QL SCN>200 NG/ML: NEGATIVE
BILIRUB SERPL-MCNC: 0.2 MG/DL (ref 0.1–1)
BILIRUB UR QL STRIP: NEGATIVE
BUN SERPL-MCNC: 11 MG/DL (ref 6–20)
BZE UR QL SCN: NEGATIVE
CALCIUM SERPL-MCNC: 9.3 MG/DL (ref 8.7–10.5)
CANNABINOIDS UR QL SCN: NEGATIVE
CHLORIDE SERPL-SCNC: 106 MMOL/L (ref 95–110)
CK SERPL-CCNC: 72 U/L (ref 20–180)
CLARITY UR: CLEAR
CO2 SERPL-SCNC: 21 MMOL/L (ref 23–29)
COLOR UR: YELLOW
CREAT SERPL-MCNC: 0.9 MG/DL (ref 0.5–1.4)
CREAT UR-MCNC: 80.3 MG/DL (ref 15–325)
DIFFERENTIAL METHOD BLD: ABNORMAL
EOSINOPHIL # BLD AUTO: 0 K/UL (ref 0–0.5)
EOSINOPHIL NFR BLD: 0 % (ref 0–8)
ERYTHROCYTE [DISTWIDTH] IN BLOOD BY AUTOMATED COUNT: 13.9 % (ref 11.5–14.5)
EST. GFR  (NO RACE VARIABLE): >60 ML/MIN/1.73 M^2
ETHANOL SERPL-MCNC: <10 MG/DL
GLUCOSE SERPL-MCNC: 151 MG/DL (ref 70–110)
GLUCOSE UR QL STRIP: NEGATIVE
HCT VFR BLD AUTO: 40.4 % (ref 37–48.5)
HGB BLD-MCNC: 13.5 G/DL (ref 12–16)
HGB UR QL STRIP: NEGATIVE
IMM GRANULOCYTES # BLD AUTO: 0.09 K/UL (ref 0–0.04)
IMM GRANULOCYTES NFR BLD AUTO: 0.5 % (ref 0–0.5)
KETONES UR QL STRIP: NEGATIVE
LEUKOCYTE ESTERASE UR QL STRIP: NEGATIVE
LYMPHOCYTES # BLD AUTO: 1 K/UL (ref 1–4.8)
LYMPHOCYTES NFR BLD: 6.1 % (ref 18–48)
MCH RBC QN AUTO: 28.7 PG (ref 27–31)
MCHC RBC AUTO-ENTMCNC: 33.4 G/DL (ref 32–36)
MCV RBC AUTO: 86 FL (ref 82–98)
METHADONE UR QL SCN>300 NG/ML: NEGATIVE
MONOCYTES # BLD AUTO: 0.6 K/UL (ref 0.3–1)
MONOCYTES NFR BLD: 3.5 % (ref 4–15)
NEUTROPHILS # BLD AUTO: 15.2 K/UL (ref 1.8–7.7)
NEUTROPHILS NFR BLD: 89.7 % (ref 38–73)
NITRITE UR QL STRIP: NEGATIVE
NRBC BLD-RTO: 0 /100 WBC
OPIATES UR QL SCN: NEGATIVE
PCP UR QL SCN>25 NG/ML: NEGATIVE
PH UR STRIP: 7 [PH] (ref 5–8)
PLATELET # BLD AUTO: 431 K/UL (ref 150–450)
PMV BLD AUTO: 9.9 FL (ref 9.2–12.9)
POTASSIUM SERPL-SCNC: 4 MMOL/L (ref 3.5–5.1)
PROT SERPL-MCNC: 7.8 G/DL (ref 6–8.4)
PROT UR QL STRIP: NEGATIVE
RBC # BLD AUTO: 4.7 M/UL (ref 4–5.4)
SALICYLATES SERPL-MCNC: <5 MG/DL (ref 15–30)
SODIUM SERPL-SCNC: 140 MMOL/L (ref 136–145)
SP GR UR STRIP: 1.02 (ref 1–1.03)
TOXICOLOGY INFORMATION: NORMAL
TSH SERPL DL<=0.005 MIU/L-ACNC: 2.23 UIU/ML (ref 0.4–4)
URN SPEC COLLECT METH UR: NORMAL
UROBILINOGEN UR STRIP-ACNC: NEGATIVE EU/DL
WBC # BLD AUTO: 16.99 K/UL (ref 3.9–12.7)

## 2024-02-26 PROCEDURE — 99900035 HC TECH TIME PER 15 MIN (STAT)

## 2024-02-26 PROCEDURE — 93005 ELECTROCARDIOGRAM TRACING: CPT

## 2024-02-26 PROCEDURE — 82077 ASSAY SPEC XCP UR&BREATH IA: CPT | Performed by: SURGERY

## 2024-02-26 PROCEDURE — 27000221 HC OXYGEN, UP TO 24 HOURS

## 2024-02-26 PROCEDURE — 94760 N-INVAS EAR/PLS OXIMETRY 1: CPT

## 2024-02-26 PROCEDURE — 85025 COMPLETE CBC W/AUTO DIFF WBC: CPT | Performed by: SURGERY

## 2024-02-26 PROCEDURE — 99900031 HC PATIENT EDUCATION (STAT)

## 2024-02-26 PROCEDURE — 81003 URINALYSIS AUTO W/O SCOPE: CPT | Mod: 59 | Performed by: SURGERY

## 2024-02-26 PROCEDURE — 96365 THER/PROPH/DIAG IV INF INIT: CPT

## 2024-02-26 PROCEDURE — 82550 ASSAY OF CK (CPK): CPT | Performed by: SURGERY

## 2024-02-26 PROCEDURE — 84443 ASSAY THYROID STIM HORMONE: CPT | Performed by: SURGERY

## 2024-02-26 PROCEDURE — 25000003 PHARM REV CODE 250: Performed by: SURGERY

## 2024-02-26 PROCEDURE — 96375 TX/PRO/DX INJ NEW DRUG ADDON: CPT

## 2024-02-26 PROCEDURE — 80143 DRUG ASSAY ACETAMINOPHEN: CPT | Performed by: SURGERY

## 2024-02-26 PROCEDURE — 81025 URINE PREGNANCY TEST: CPT | Performed by: SURGERY

## 2024-02-26 PROCEDURE — 80179 DRUG ASSAY SALICYLATE: CPT | Performed by: SURGERY

## 2024-02-26 PROCEDURE — 80307 DRUG TEST PRSMV CHEM ANLYZR: CPT | Performed by: SURGERY

## 2024-02-26 PROCEDURE — 80053 COMPREHEN METABOLIC PANEL: CPT | Performed by: SURGERY

## 2024-02-26 PROCEDURE — 93010 ELECTROCARDIOGRAM REPORT: CPT | Mod: ,,, | Performed by: INTERNAL MEDICINE

## 2024-02-26 RX ORDER — SODIUM BICARBONATE 1 MEQ/ML
50 SYRINGE (ML) INTRAVENOUS
Status: COMPLETED | OUTPATIENT
Start: 2024-02-26 | End: 2024-02-26

## 2024-02-26 RX ORDER — SODIUM BICARBONATE 1 MEQ/ML
SYRINGE (ML) INTRAVENOUS
Status: COMPLETED
Start: 2024-02-26 | End: 2024-02-26

## 2024-02-26 RX ORDER — ALPRAZOLAM 1 MG/1
1 TABLET ORAL 2 TIMES DAILY
COMMUNITY
End: 2024-03-05 | Stop reason: HOSPADM

## 2024-02-26 RX ORDER — HALOPERIDOL 5 MG/ML
5 INJECTION INTRAMUSCULAR EVERY 4 HOURS PRN
Status: DISCONTINUED | OUTPATIENT
Start: 2024-02-26 | End: 2024-02-26

## 2024-02-26 RX ORDER — LORAZEPAM 2 MG/ML
2 INJECTION INTRAMUSCULAR EVERY 4 HOURS PRN
Status: DISCONTINUED | OUTPATIENT
Start: 2024-02-26 | End: 2024-02-26

## 2024-02-26 RX ORDER — DIPHENHYDRAMINE HYDROCHLORIDE 50 MG/ML
50 INJECTION INTRAMUSCULAR; INTRAVENOUS EVERY 4 HOURS PRN
Status: DISCONTINUED | OUTPATIENT
Start: 2024-02-26 | End: 2024-02-26

## 2024-02-26 RX ADMIN — SODIUM BICARBONATE 50 MEQ: 84 INJECTION, SOLUTION INTRAVENOUS at 09:02

## 2024-02-26 RX ADMIN — SODIUM BICARBONATE: 84 INJECTION, SOLUTION INTRAVENOUS at 10:02

## 2024-02-27 VITALS
HEIGHT: 64 IN | RESPIRATION RATE: 18 BRPM | BODY MASS INDEX: 31.76 KG/M2 | TEMPERATURE: 99 F | SYSTOLIC BLOOD PRESSURE: 145 MMHG | OXYGEN SATURATION: 96 % | WEIGHT: 186 LBS | DIASTOLIC BLOOD PRESSURE: 68 MMHG | HEART RATE: 102 BPM

## 2024-02-27 PROBLEM — R45.851 SUICIDAL IDEATION: Status: ACTIVE | Noted: 2024-02-27

## 2024-02-27 PROBLEM — T42.4X2A: Status: ACTIVE | Noted: 2024-02-27

## 2024-02-27 PROBLEM — T43.012A: Status: ACTIVE | Noted: 2024-02-27

## 2024-02-27 LAB
ALLENS TEST: ABNORMAL
DELSYS: ABNORMAL
FIO2: 21 (ref 21–100)
HCO3 UR-SCNC: 29.5 MMOL/L (ref 22–26)
OHS QRS DURATION: 118 MS
OHS QTC CALCULATION: 474 MS
PCO2 BLDA: 37 MMHG (ref 35–45)
PH SMN: 7.51 [PH] (ref 7.35–7.45)
PO2 BLDA: 54 MMHG (ref 75–100)
POC BE: 6.2 MMOL/L (ref -2–2)
POC COHB: 1.4 % (ref 0–3)
POC METHB: 0.9 % (ref 0–1.5)
POC O2HB ARTERIAL: 89.7 % (ref 94–100)
POC SATURATED O2: 91.7 % (ref 90–100)
POC TCO2: 30.6 MMOL/L
POC THB: 12.1 G/DL (ref 12–18)
SITE: ABNORMAL

## 2024-02-27 PROCEDURE — 99291 CRITICAL CARE FIRST HOUR: CPT

## 2024-02-27 PROCEDURE — 99900035 HC TECH TIME PER 15 MIN (STAT)

## 2024-02-27 PROCEDURE — S4991 NICOTINE PATCH NONLEGEND: HCPCS | Performed by: EMERGENCY MEDICINE

## 2024-02-27 PROCEDURE — 25000003 PHARM REV CODE 250: Performed by: EMERGENCY MEDICINE

## 2024-02-27 PROCEDURE — 94761 N-INVAS EAR/PLS OXIMETRY MLT: CPT

## 2024-02-27 PROCEDURE — 25000003 PHARM REV CODE 250: Performed by: SURGERY

## 2024-02-27 PROCEDURE — 36600 WITHDRAWAL OF ARTERIAL BLOOD: CPT

## 2024-02-27 PROCEDURE — 96366 THER/PROPH/DIAG IV INF ADDON: CPT

## 2024-02-27 PROCEDURE — 96372 THER/PROPH/DIAG INJ SC/IM: CPT | Performed by: EMERGENCY MEDICINE

## 2024-02-27 PROCEDURE — 63600175 PHARM REV CODE 636 W HCPCS: Performed by: EMERGENCY MEDICINE

## 2024-02-27 PROCEDURE — 93005 ELECTROCARDIOGRAM TRACING: CPT

## 2024-02-27 PROCEDURE — 93010 ELECTROCARDIOGRAM REPORT: CPT | Mod: ,,, | Performed by: INTERNAL MEDICINE

## 2024-02-27 PROCEDURE — 82803 BLOOD GASES ANY COMBINATION: CPT | Performed by: SURGERY

## 2024-02-27 PROCEDURE — 93010 ELECTROCARDIOGRAM REPORT: CPT | Mod: 76,,, | Performed by: INTERNAL MEDICINE

## 2024-02-27 PROCEDURE — 96375 TX/PRO/DX INJ NEW DRUG ADDON: CPT

## 2024-02-27 PROCEDURE — C9399 UNCLASSIFIED DRUGS OR BIOLOG: HCPCS | Performed by: SURGERY

## 2024-02-27 PROCEDURE — 96374 THER/PROPH/DIAG INJ IV PUSH: CPT | Mod: 59

## 2024-02-27 PROCEDURE — 27000221 HC OXYGEN, UP TO 24 HOURS

## 2024-02-27 PROCEDURE — 99900031 HC PATIENT EDUCATION (STAT)

## 2024-02-27 RX ORDER — IBUPROFEN 200 MG
1 TABLET ORAL
Status: DISCONTINUED | OUTPATIENT
Start: 2024-02-27 | End: 2024-02-27 | Stop reason: HOSPADM

## 2024-02-27 RX ORDER — LORAZEPAM 0.5 MG/1
0.5 TABLET ORAL EVERY 30 MIN PRN
Status: DISCONTINUED | OUTPATIENT
Start: 2024-02-27 | End: 2024-02-27 | Stop reason: HOSPADM

## 2024-02-27 RX ORDER — HALOPERIDOL 5 MG/ML
5 INJECTION INTRAMUSCULAR
Status: COMPLETED | OUTPATIENT
Start: 2024-02-27 | End: 2024-02-27

## 2024-02-27 RX ORDER — ZIPRASIDONE MESYLATE 20 MG/ML
20 INJECTION, POWDER, LYOPHILIZED, FOR SOLUTION INTRAMUSCULAR
Status: DISCONTINUED | OUTPATIENT
Start: 2024-02-27 | End: 2024-02-27

## 2024-02-27 RX ORDER — MIDAZOLAM HYDROCHLORIDE 1 MG/ML
4 INJECTION INTRAMUSCULAR; INTRAVENOUS
Status: DISCONTINUED | OUTPATIENT
Start: 2024-02-27 | End: 2024-02-27

## 2024-02-27 RX ORDER — DEXMEDETOMIDINE HYDROCHLORIDE 4 UG/ML
0-1.4 INJECTION INTRAVENOUS CONTINUOUS
Status: DISCONTINUED | OUTPATIENT
Start: 2024-02-27 | End: 2024-02-27 | Stop reason: HOSPADM

## 2024-02-27 RX ORDER — MIDAZOLAM HYDROCHLORIDE 1 MG/ML
2 INJECTION INTRAMUSCULAR; INTRAVENOUS
Status: COMPLETED | OUTPATIENT
Start: 2024-02-27 | End: 2024-02-27

## 2024-02-27 RX ORDER — LORAZEPAM 2 MG/ML
0.5 INJECTION INTRAMUSCULAR EVERY 30 MIN PRN
Status: DISCONTINUED | OUTPATIENT
Start: 2024-02-27 | End: 2024-02-27 | Stop reason: HOSPADM

## 2024-02-27 RX ORDER — DIPHENHYDRAMINE HYDROCHLORIDE 50 MG/ML
25 INJECTION INTRAMUSCULAR; INTRAVENOUS
Status: DISCONTINUED | OUTPATIENT
Start: 2024-02-27 | End: 2024-02-27

## 2024-02-27 RX ORDER — DIPHENHYDRAMINE HYDROCHLORIDE 50 MG/ML
50 INJECTION INTRAMUSCULAR; INTRAVENOUS
Status: COMPLETED | OUTPATIENT
Start: 2024-02-27 | End: 2024-02-27

## 2024-02-27 RX ORDER — LORAZEPAM 2 MG/ML
2 INJECTION INTRAMUSCULAR
Status: COMPLETED | OUTPATIENT
Start: 2024-02-27 | End: 2024-02-27

## 2024-02-27 RX ADMIN — LORAZEPAM 0.5 MG: 2 INJECTION INTRAMUSCULAR; INTRAVENOUS at 03:02

## 2024-02-27 RX ADMIN — LORAZEPAM 2 MG: 2 INJECTION INTRAMUSCULAR; INTRAVENOUS at 11:02

## 2024-02-27 RX ADMIN — DIPHENHYDRAMINE HYDROCHLORIDE 50 MG: 50 INJECTION INTRAMUSCULAR; INTRAVENOUS at 10:02

## 2024-02-27 RX ADMIN — LORAZEPAM 0.5 MG: 2 INJECTION INTRAMUSCULAR; INTRAVENOUS at 06:02

## 2024-02-27 RX ADMIN — HALOPERIDOL LACTATE 5 MG: 5 INJECTION, SOLUTION INTRAMUSCULAR at 10:02

## 2024-02-27 RX ADMIN — NICOTINE 1 PATCH: 21 PATCH, EXTENDED RELEASE TRANSDERMAL at 10:02

## 2024-02-27 RX ADMIN — DEXMEDETOMIDINE HYDROCHLORIDE 0.2 MCG/KG/HR: 4 INJECTION INTRAVENOUS at 07:02

## 2024-02-27 RX ADMIN — LORAZEPAM 0.5 MG: 2 INJECTION INTRAMUSCULAR; INTRAVENOUS at 05:02

## 2024-02-27 RX ADMIN — MIDAZOLAM HYDROCHLORIDE 2 MG: 1 INJECTION, SOLUTION INTRAMUSCULAR; INTRAVENOUS at 10:02

## 2024-02-27 RX ADMIN — SODIUM CHLORIDE 2000 ML: 9 INJECTION, SOLUTION INTRAVENOUS at 12:02

## 2024-02-27 RX ADMIN — LORAZEPAM 0.5 MG: 0.5 TABLET ORAL at 01:02

## 2024-02-27 NOTE — ED TRIAGE NOTES
"43 y.o. female presents to ER ED 05/ED 05   Chief Complaint   Patient presents with    Drug Overdose     Pt presents to ED with AASI after drug overdose. Pt reportedly took approximately eighteen 1 mg Xanax tablets and forty-two 100 mg Amitriptyline tablets. Pt reports that it was an "accidental overdose" and denies SI, HI, AVH. AMS also noted.    8  "

## 2024-02-27 NOTE — ED NOTES
Spoke to Amelia with [place] [facility] at [phone]   When Xanax wears off new symptoms might appear such as sedation, tachycardia and hallucinations, seizure risk, respiratory failure and hypotension, QRS prolongation with ventricular dysrhythmia   Does not recommend charcoal, contraindicated.  If HR elevates or agitation give benzos   Airway might be needed  Give bicarb QRS is greater than 100. Dosing for Bicarb 1-2 amps IV push and then drip.  PH of 7.45-7.55 is goal.  Early intubation is advised in patient's with mental status changes.  General psych labs, tox screen.

## 2024-02-27 NOTE — ED NOTES
Patient pulled her IV out. Patient hollering and threatening to leave, security at bedside. MD notified.

## 2024-02-27 NOTE — ED NOTES
Received call from transfer center stating patient has a bed @ [facility]. Report number is [phone]

## 2024-02-27 NOTE — PROVIDER PROGRESS NOTES - EMERGENCY DEPT.
"Encounter Date: 2/26/2024    ED Physician Progress Notes          Restraint Note         BP (!) 127/94   Pulse 90   Temp 98.1 °F (36.7 °C) (Oral)   Resp 18   Ht 5' 4" (1.626 m)   Wt 84.4 kg (186 lb)   SpO2 96%   Breastfeeding No   BMI 31.93 kg/m²     Time: 4:42 PM    Patient's immediate situation: combative, difficult to redirect, screaming and cursing at other patient and staff, attempting to escape    Reaction to intervention: no response to verbal directions or IM ativan.    Medications/behavioral condition: Given IM ativan, redirected verbally multiple times.  Behavior is erratic and patient non compliant.  She is a threat to herself, to other patients and to staff    Restraint status: continue active restraints.        "

## 2024-02-27 NOTE — PROVIDER PROGRESS NOTES - EMERGENCY DEPT.
ER Physician Progress/Interval Note     This patient has been stable overnight while awaiting transfer center consultation  Patient took excess amitriptyline & Xanax, on a bicarbonate drip at this time  Patient also got IV fluids last night with stable blood pressure on review today  Patient had an uneventful night in the emergency room; mentation is better  The plan going forward is to transfer for ICU with Cardiology consultation       Hitesh Gilliam M.D. 5:17 AM 2/27/2024

## 2024-02-27 NOTE — ED PROVIDER NOTES
"  I assumed care of the patient during shift change.  She presented lethargic with amitriptyline and xanax overdose.  Patient started on bicarp drip.  Upon my shift, she was alert and oriented.  Breakfast given.  PEC completed.    Yessy Barboza MD  Emergency Medicine        Patient became agitated and started cursing at staff and other patient in room.  Midazolam and Benadryl IV ordered.  Code white called.  Dispo pending medical clearance.  She has been PECed.      Yessy Barboza MD  02/27/24 1032      Restraint Note         /74   Pulse (!) 112   Temp 98.4 °F (36.9 °C)   Resp (!) 22   Ht 5' 4" (1.626 m)   Wt 84.4 kg (186 lb)   SpO2 98%   Breastfeeding No   BMI 31.93 kg/m²     Time: 12:40 PM    Patient's immediate situation: pulled out IV and will not stay in bed    Reaction to intervention: redirected multiple times, given benadryl and midazolam    Medications/behavioral condition: psychosis, gravely disabled    Restraint status: active      Yessy Barboza MD  Emergency Medicine  2/26/2024. 12:41           Yessy Barboza MD  02/27/24 1242    "

## 2024-02-27 NOTE — PLAN OF CARE
Outside Transfer Acceptance Note / Regional Referral Center    Referring facility: [facility]  Referring provider: Dr. Barboza  Accepting facility: [facility]  Accepting provider: Faina DUNLAP  Admitting provider: TBD  Reason for transfer:  Intentional overdose BZD and TCA needing cardiology input  Transfer diagnosis: Intention overdose to cause self harm, BZD and TCA  Transfer specialty requested: Psychiatry  Cardiology  Transfer specialty notified: Yes  Transfer level: NUMBER 1-5: 2  Bed type requested: ICU  Isolation status: No active isolations   Admission class or status: IP- Psych  Emergency      Narrative   43F mixed HLD, GORDON, history of gastric surgery ,BPD, PTSD, depression with SI, presents after intentional overdose BZD and TCA. Patient presented after taking excess amitriptyline and xanax at home, with intent to self half.  Patient reports taking almost 40 amitriptyline and 20 xanax, with pill count completed. Patient has been afebrile and HDS in the ED. It appears there was an initial oxygen requirement that has now dissipated and O2 sat is appropriate on RA. Labs QRS previous 96, initial ECG was 118 now most recent 114. Labs notable for WBC 17 and HCO3 of 21. UTox interestingly pan negative. Poison control was notified and recommendation was for bicarbonate infusion.  Per guidelines, if QRS >100 msec, challenge with intravenous sodium bicarbonate (2 to 3 mEq/kg up to 150 mEq IV push) and assess for QRS narrowing. If QRS narrows, begin continuous infusion (150 mEq of sodium bicarbonate in 1 liter of D5W to run at 250 mL/hour in adults). Patient was cooperative in the ED initially though this AM the patient became agitated and started cursing at staff and other patient in room.  Midazolam and benadryl IV were ordered to effect. A code white had been called. She has been PECed.  Cardiology at [facility] is aware and will consult on the case, with primary admission to ICU. Patient  will require medical clearance prior to transfer to [facility].    Objective     Vitals: Temp: 98.4 °F (36.9 °C) (02/26/24 2011)  Pulse: 94 (02/27/24 0900)  Resp: 16 (02/27/24 0630)  BP: 118/68 (02/27/24 0900)  SpO2: 99 % (02/27/24 0900)  Recent Labs: All pertinent labs within the past 24 hours have been reviewed.  Recent imaging: See above   Airway:     Vent settings:         IV access:        Peripheral IV - Single Lumen 02/26/24 2022 20 G Left Hand (Active)   Site Assessment Clean;Dry;Intact;No drainage;No warmth 02/26/24 2022   Extremity Assessment Distal to IV No abnormal discoloration;No redness;No swelling 02/26/24 2022   Line Status Flushed;Saline locked 02/26/24 2022   Dressing Status Clean;Dry;Intact 02/26/24 2022   Dressing Intervention Integrity maintained 02/26/24 2022     Infusions: See above  Allergies:   Review of patient's allergies indicates:   Allergen Reactions    Latex, natural rubber       NPO: No    Anticoagulation:   Anticoagulants       None             Instructions      [facility]  Admit to Hospital Medicine  Upon patient arrival to floor, please contact Hospital Medicine on call.

## 2024-02-27 NOTE — ED PROVIDER NOTES
"Encounter Date: 2024       History     Chief Complaint   Patient presents with    Drug Overdose     Pt presents to ED with AASI after drug overdose. Pt reportedly took approximately eighteen 1 mg Xanax tablets and forty-two 100 mg Amitriptyline tablets. Pt reports that it was an "accidental overdose" and denies SI, HI, AVH. AMS also noted.      History of Present Illness  Joe Henson is a 43 y.o. female that presents after intentional overdose  Patient presented after taking excess amitriptyline & Xanax at home, suicide attempt  Patient presents after taking almost 40 amitriptyline & 20 Xanax with a pill count today  Patient has longstanding issues with suicidal ideation depression on ER evaluation  Patient was not angry, patient is not violent, answering all questions on arrival today    The history is provided by the patient.     Review of patient's allergies indicates:   Allergen Reactions    Latex, natural rubber      Past Medical History:   Diagnosis Date    Abnormal Pap smear age 30    Pos. HPV,     Abnormal Pap smear of cervix     Acute right-sided low back pain with right-sided sciatica 10/18/2022    Addiction to drug     ADHD (attention deficit hyperactivity disorder)     Arthritis     Bipolar disorder     COPD (chronic obstructive pulmonary disease)     Depression     Diabetes mellitus     Fatigue     Hallucination     History of psychiatric hospitalization     Hx of psychiatric care     OCD (obsessive compulsive disorder)     Psychiatric problem     PTSD (post-traumatic stress disorder)     Sleep difficulties     Substance abuse     Suicide attempt     Therapy      Past Surgical History:   Procedure Laterality Date    bariatric sleeve N/A 2020     SECTION  ;     DILATION AND CURETTAGE OF UTERUS  2016    ENDOMETRIAL ABLATION  2016    KNEE SURGERY Right     hardware-bone from hip to repair    TUBAL LIGATION       Family History   Problem Relation Age of " Onset    No Known Problems Paternal Grandfather     No Known Problems Paternal Grandmother     Diabetes Maternal Grandmother     Coronary artery disease Maternal Grandfather     Diabetes Maternal Grandfather     Hypertension Father     Colon cancer Father 60    Anxiety disorder Sister     Depression Sister     Drug abuse Sister     No Known Problems Maternal Aunt     No Known Problems Maternal Uncle     Breast cancer Paternal Aunt     No Known Problems Paternal Uncle     No Known Problems Cousin      labor Neg Hx     Ovarian cancer Neg Hx      Social History     Tobacco Use    Smoking status: Every Day     Current packs/day: 1.00     Average packs/day: 1 pack/day for 26.2 years (26.2 ttl pk-yrs)     Types: Cigarettes     Start date: 1997    Smokeless tobacco: Never    Tobacco comments:     told about brochure and smoking clinic program   Substance Use Topics    Alcohol use: Yes     Comment: Socially-2 times a week-4-5 beers    Drug use: Not Currently     Types: Amphetamines, Benzodiazepines     Review of Systems   Unable to perform ROS: Acuity of condition       Physical Exam     Initial Vitals   BP Pulse Resp Temp SpO2   24   110/77 103 (!) 21 98.4 °F (36.9 °C) (!) 89 %      MAP       --                Physical Exam    Nursing note and vitals reviewed.  Constitutional: Vital signs are normal. She appears well-developed and well-nourished. She is cooperative.   HENT:   Head: Normocephalic and atraumatic.   Eyes: Conjunctivae, EOM and lids are normal. Pupils are equal, round, and reactive to light.   Neck: Trachea normal and phonation normal. Neck supple. No JVD present.   Normal range of motion.   Full passive range of motion without pain.     Cardiovascular:  Normal rate, regular rhythm, S1 normal, S2 normal, normal heart sounds, intact distal pulses and normal pulses.           Pulmonary/Chest: Effort normal and breath sounds normal.    Abdominal: Abdomen is soft and flat. Bowel sounds are normal.   Musculoskeletal:         General: Normal range of motion.      Cervical back: Full passive range of motion without pain, normal range of motion and neck supple.     Neurological: She is alert and oriented to person, place, and time. She has normal strength.   Skin: Skin is warm, dry and intact. Capillary refill takes less than 2 seconds.         ED Course   Procedures  Labs Reviewed   COMPREHENSIVE METABOLIC PANEL - Abnormal; Notable for the following components:       Result Value    CO2 21 (*)     Glucose 151 (*)     All other components within normal limits   CBC W/ AUTO DIFFERENTIAL - Abnormal; Notable for the following components:    WBC 16.99 (*)     Gran # (ANC) 15.2 (*)     Immature Grans (Abs) 0.09 (*)     Gran % 89.7 (*)     Lymph % 6.1 (*)     Mono % 3.5 (*)     All other components within normal limits   ACETAMINOPHEN LEVEL - Abnormal; Notable for the following components:    Acetaminophen (Tylenol), Serum <3.0 (*)     All other components within normal limits   SALICYLATE LEVEL - Abnormal; Notable for the following components:    Salicylate Lvl <5.0 (*)     All other components within normal limits   CK   URINALYSIS, REFLEX TO URINE CULTURE    Narrative:     Specimen Source->Urine   DRUG SCREEN PANEL, URINE EMERGENCY    Narrative:     Specimen Source->Urine   TSH   ALCOHOL,MEDICAL (ETHANOL)   PREGNANCY TEST, URINE RAPID    Narrative:     Specimen Source->Urine     EKG Readings: (Independently Interpreted)   No STEMI  Normal sinus rhythm  Wide QRS interval at 120 MS  No obvious signs of arrhythmia  No definitive ST elevation  Hitesh Gilliam M.D. 10:37 PM 2/26/2024           Imaging Results    None          Medications   sodium bicarbonate 150 mEq in dextrose 5 % (D5W) 1,000 mL infusion (has no administration in time range)   sodium bicarbonate 8.4 % (1 mEq/mL) injection (has no administration in time range)   sodium bicarbonate 8.4 % (1  mEq/mL) injection (has no administration in time range)   sodium bicarbonate 8.4 % (1 mEq/mL) injection 50 mEq (50 mEq Intravenous Given 2/26/24 2151)   sodium bicarbonate 8.4 % (1 mEq/mL) injection 50 mEq (50 mEq Intravenous Given 2/26/24 2144)     Medical Decision Making  43-year-old female presents after intentional overdose on Xanax/amitriptyline  Differential includes suicidal ideation, intentional overdose, arrhythmia, intoxicated    Problems Addressed:  Accidental amitriptyline overdose, initial encounter: complicated acute illness or injury  Fatigue: complicated acute illness or injury  Intentional benzodiazepine overdose, initial encounter: complicated acute illness or injury  Intentional overdose: complicated acute illness or injury  Prolongation of QRS complex on electrocardiography: complicated acute illness or injury  Suicidal ideation: complicated acute illness or injury    Amount and/or Complexity of Data Reviewed  External Data Reviewed: notes.  Labs: ordered. Decision-making details documented in ED Course.  Radiology: ordered and independent interpretation performed.  ECG/medicine tests: ordered and independent interpretation performed.    ED Management & Risks of Complication, Morbidity, & Mortality:  Patient with stable lab work in the emergency room today, (+) UDS noted  QRS intervals increase the greater than 100 based on multiple EKGs  IV fluids initiated, IV bicarbonate drip after bolus for the arrhythmia today  Patient was maintaining her airway & a good blood pressure on reassessment  Will seek ICU placement & cardiology evaluation for intentional overdose    Critical Care ED Physician Time (minutes):  -- Performed by: Hitesh Gilliam M.D.  -- Date/Time: 10:40 PM 2/26/2024   -- Direct Patient Care (Face Time): 15  -- Additional History from Records or Taking Additional History: 15  -- Ordering, Reviewing, and Interpreting Diagnostic Studies: 15  -- Total Time in Documentation: 15  --  Consultation with Other Physicians: 15  -- Consultation with Family Related to Condition: 15  -- Total Critical Care Time: 75  -- Critical care was necessary to treat intentional overdose  -- Critical care was time spent personally by me on the following activities:   -- discussions with consultants regarding treatment plan today  -- development of treatment plan with patient & their family  -- examination of patient, ordering and performing treatments   -- review of radiographic studies, re-evaluation of pt's condition  -- review of labs and evaluation of response to treatment      Clinical Impression:  Final diagnoses:  [R53.83] Fatigue  [T50.902A] Intentional overdose  [T42.4X2A] Intentional benzodiazepine overdose, initial encounter (Primary)  [R94.31] Prolongation of QRS complex on electrocardiography  [R45.851] Suicidal ideation  [T43.011A] Accidental amitriptyline overdose, initial encounter          ED Disposition Condition    Transfer to Another Facility Hitesh Hargrove MD  02/26/24 5923

## 2024-02-27 NOTE — ED NOTES
Report called to icu @  [facility] spoke with ANGEL LUIS Guerrero. Per Yolanda patient needs iv access .

## 2024-02-27 NOTE — ED NOTES
Amelia from poison controlled called for update on patient.   Does not recommend benadryl.   Recommends treatment with benzos IV. Give every 15-20 minutes until pt resting calmly.

## 2024-02-28 LAB
OHS QRS DURATION: 102 MS
OHS QRS DURATION: 114 MS
OHS QTC CALCULATION: 488 MS
OHS QTC CALCULATION: 677 MS

## 2024-02-28 NOTE — ED NOTES
PATIENT TRANSPORTED WITH CHERIE LARRY . [facility] DEX[facility]OMIDINE TO [facility]. REPORT CALLED TO  UPDATE  ANGEL LUIS HAMEED @[facility] OF PATIENT STATUS. CALLED TRANSFER CENTER TO UPDATE PATIENT STATUS. CALLED POISON CONTROL TO UPDATED ON PATIENT STATUS TRANSFER.

## 2024-02-28 NOTE — ED NOTES
Dr. Gilliam discontinued violent restraints @ 1808 pm and changed order to non violent restraints.

## 2024-02-28 NOTE — ED NOTES
PATIENT YELLING SCREAMING THREATENING STAFF AND CLIMBING OUT THE BED. PATIENT WAS EDUCATED TO CALM DOWN PATIENT OFFERED TO WATCH TV AND OFFERED BATHROOM USAGE. PATIENT IS CURRENTLY HAVING STATED SHE IS SEEING

## 2024-03-07 ENCOUNTER — PATIENT OUTREACH (OUTPATIENT)
Dept: EMERGENCY MEDICINE | Facility: HOSPITAL | Age: 44
End: 2024-03-07
Payer: COMMERCIAL

## 2024-03-07 NOTE — PROGRESS NOTES
Pt is unreachable for 2nd F/U. ED navigator will follow-up with patient on/around 4/11/2024 for attempt at 3rd.    Andreina Seaed  ED Navigator  [phone]

## 2024-04-02 DIAGNOSIS — J44.9 CHRONIC OBSTRUCTIVE PULMONARY DISEASE, UNSPECIFIED COPD TYPE: ICD-10-CM

## 2024-04-03 ENCOUNTER — OFFICE VISIT (OUTPATIENT)
Dept: FAMILY MEDICINE | Facility: CLINIC | Age: 44
End: 2024-04-03

## 2024-04-03 ENCOUNTER — CLINICAL SUPPORT (OUTPATIENT)
Dept: FAMILY MEDICINE | Facility: CLINIC | Age: 44
End: 2024-04-03

## 2024-04-03 VITALS
SYSTOLIC BLOOD PRESSURE: 120 MMHG | HEIGHT: 64 IN | OXYGEN SATURATION: 99 % | HEART RATE: 105 BPM | BODY MASS INDEX: 32.23 KG/M2 | WEIGHT: 188.81 LBS | DIASTOLIC BLOOD PRESSURE: 80 MMHG | RESPIRATION RATE: 18 BRPM

## 2024-04-03 DIAGNOSIS — J44.9 CHRONIC OBSTRUCTIVE PULMONARY DISEASE, UNSPECIFIED COPD TYPE: ICD-10-CM

## 2024-04-03 DIAGNOSIS — E11.9 TYPE 2 DIABETES MELLITUS WITHOUT COMPLICATION, WITHOUT LONG-TERM CURRENT USE OF INSULIN: Primary | ICD-10-CM

## 2024-04-03 DIAGNOSIS — E11.9 TYPE 2 DIABETES MELLITUS WITHOUT COMPLICATION, WITHOUT LONG-TERM CURRENT USE OF INSULIN: ICD-10-CM

## 2024-04-03 PROCEDURE — 99213 OFFICE O/P EST LOW 20 MIN: CPT | Mod: S$PBB,,, | Performed by: NURSE PRACTITIONER

## 2024-04-03 PROCEDURE — 99999PBSHW PR PBB SHADOW TECHNICAL ONLY FILED TO HB: Mod: PBBFAC,,,

## 2024-04-03 PROCEDURE — 99999 PR PBB SHADOW E&M-EST. PATIENT-LVL IV: CPT | Mod: PBBFAC,,, | Performed by: NURSE PRACTITIONER

## 2024-04-03 PROCEDURE — 99214 OFFICE O/P EST MOD 30 MIN: CPT | Mod: PBBFAC | Performed by: NURSE PRACTITIONER

## 2024-04-03 PROCEDURE — 83036 HEMOGLOBIN GLYCOSYLATED A1C: CPT | Performed by: NURSE PRACTITIONER

## 2024-04-03 PROCEDURE — 36415 COLL VENOUS BLD VENIPUNCTURE: CPT | Mod: PBBFAC

## 2024-04-03 RX ORDER — ALPRAZOLAM 1 MG/1
1 TABLET ORAL 3 TIMES DAILY
COMMUNITY

## 2024-04-03 RX ORDER — FLUTICASONE FUROATE AND VILANTEROL 200; 25 UG/1; UG/1
1 POWDER RESPIRATORY (INHALATION) DAILY
Qty: 60 EACH | Refills: 5 | Status: SHIPPED | OUTPATIENT
Start: 2024-04-03

## 2024-04-03 RX ORDER — AMITRIPTYLINE HYDROCHLORIDE 100 MG/1
200 TABLET ORAL NIGHTLY
COMMUNITY
Start: 2024-03-09

## 2024-04-03 RX ORDER — ALBUTEROL SULFATE 90 UG/1
2 AEROSOL, METERED RESPIRATORY (INHALATION) EVERY 6 HOURS PRN
Qty: 18 G | Refills: 5 | Status: SHIPPED | OUTPATIENT
Start: 2024-04-03

## 2024-04-03 RX ORDER — SEMAGLUTIDE 2.68 MG/ML
2 INJECTION, SOLUTION SUBCUTANEOUS
Qty: 3 ML | Refills: 5 | Status: SHIPPED | OUTPATIENT
Start: 2024-04-03 | End: 2025-04-03

## 2024-04-03 NOTE — TELEPHONE ENCOUNTER
LOV 03/22/2023      Patient requesting refill for   albuterol (PROVENTIL/VENTOLIN HFA) 90 mcg/actuation inhaler 8.5 g 0 9/6/2023         Rx pending   Pharmacy confirmed  Please advise

## 2024-04-03 NOTE — PROGRESS NOTES
Subjective:       Patient ID: Joe Henson is a 43 y.o. female.    Chief Complaint: Annual Exam (Pt is here for wellness visit)    Here for check up. Sees mental health for her bipolar disorder.      Review of Systems   Constitutional: Negative.  Negative for appetite change, fatigue and fever.   HENT: Negative.  Negative for congestion, ear pain and sore throat.    Eyes: Negative.  Negative for visual disturbance.   Respiratory: Negative.  Negative for cough, shortness of breath and wheezing.    Cardiovascular: Negative.    Gastrointestinal: Negative.  Negative for abdominal pain, diarrhea, nausea and vomiting.   Endocrine: Negative.    Genitourinary: Negative.  Negative for difficulty urinating and urgency.   Musculoskeletal: Negative.  Negative for arthralgias and myalgias.   Skin: Negative.  Negative for color change.   Allergic/Immunologic: Negative.    Neurological: Negative.  Negative for dizziness and headaches.   Hematological: Negative.    Psychiatric/Behavioral: Negative.  Negative for sleep disturbance.    All other systems reviewed and are negative.      Objective:      Physical Exam  Vitals and nursing note reviewed.   Constitutional:       General: She is not in acute distress.     Appearance: Normal appearance. She is well-developed.   HENT:      Head: Normocephalic and atraumatic.   Eyes:      Pupils: Pupils are equal, round, and reactive to light.   Cardiovascular:      Rate and Rhythm: Normal rate and regular rhythm.      Pulses: Normal pulses.      Heart sounds: Normal heart sounds. No murmur heard.  Pulmonary:      Effort: Pulmonary effort is normal. No respiratory distress.      Breath sounds: Normal breath sounds.   Abdominal:      Tenderness: There is no right CVA tenderness or left CVA tenderness.   Musculoskeletal:         General: Normal range of motion.      Cervical back: Normal range of motion and neck supple.   Skin:     General: Skin is warm and dry.   Neurological:      Mental  Status: She is alert and oriented to person, place, and time.   Psychiatric:         Mood and Affect: Mood normal.         Assessment:       1. Type 2 diabetes mellitus without complication, without long-term current use of insulin    2. Chronic obstructive pulmonary disease, unspecified COPD type        Plan:     1. Type 2 diabetes mellitus without complication, without long-term current use of insulin  -     Hemoglobin A1C; Future; Expected date: 04/03/2024  -     semaglutide (OZEMPIC) 2 mg/dose (8 mg/3 mL) PnIj; Inject 2 mg into the skin every 7 days.  Dispense: 3 mL; Refill: 5    2. Chronic obstructive pulmonary disease, unspecified COPD type  -     fluticasone furoate-vilanteroL (BREO ELLIPTA) 200-25 mcg/dose DsDv diskus inhaler; Inhale 1 puff into the lungs once daily. Controller  Dispense: 60 each; Refill: 5     RTC 6 months for recheck

## 2024-04-04 LAB
ESTIMATED AVG GLUCOSE: 114 MG/DL (ref 68–131)
HBA1C MFR BLD: 5.6 % (ref 4–5.6)

## 2024-04-11 ENCOUNTER — PATIENT OUTREACH (OUTPATIENT)
Dept: EMERGENCY MEDICINE | Facility: HOSPITAL | Age: 44
End: 2024-04-11
Payer: COMMERCIAL

## 2024-04-11 NOTE — PROGRESS NOTES
Pt is doing good, as stated. Pt identified that she just went to all her appts. Pt has no needs today.    ED navigator ensured there was nothing she could help patient with. ED navigator reminded patient to reach out if there is ever anything she can assist with. ED navigator will follow-up with patient on/around 7/9/2024.    Andreina Saeed  ED Navigator  [phone]

## 2024-07-09 ENCOUNTER — PATIENT OUTREACH (OUTPATIENT)
Dept: EMERGENCY MEDICINE | Facility: HOSPITAL | Age: 44
End: 2024-07-09
Payer: COMMERCIAL

## 2024-07-09 NOTE — PROGRESS NOTES
Pt is unreachable for additional F/U. ED navigator will follow-up with patient on/around 8/6/2024 for another attempt.    Andreina Saeed  ED Navigator  [phone]

## 2024-07-18 ENCOUNTER — PATIENT OUTREACH (OUTPATIENT)
Dept: ADMINISTRATIVE | Facility: HOSPITAL | Age: 44
End: 2024-07-18
Payer: COMMERCIAL

## 2024-07-18 DIAGNOSIS — Z12.31 BREAST CANCER SCREENING BY MAMMOGRAM: Primary | ICD-10-CM

## 2024-07-18 NOTE — PROGRESS NOTES
[facility] Gap List, Colorectal Cancer Screening.  Chart reviewed, immunization record updated.  No new results noted on [facility] or [facility] web site.  Care Everywhere updated.   Patient care coordination note  LOV with PCP 4/3/2024.  MMG order placed.  Attempted to contact patient to discuss Colorectal Cancer Screening, MMG and scheduling eAWV, unable to contact. (Wireless customer you are calling is not available)

## 2024-08-27 ENCOUNTER — PATIENT OUTREACH (OUTPATIENT)
Dept: ADMINISTRATIVE | Facility: HOSPITAL | Age: 44
End: 2024-08-27
Payer: COMMERCIAL

## 2024-08-27 NOTE — PROGRESS NOTES
Colorectal Cancer Screening Gap Report  Chart reviewed, immunization record updated.  No new results noted on [facility] or [email or web address] web site.  Care Everywhere updated.   Patient care coordination note  LOV with PCP 4/3/2024.  Attempted to contact patient to discuss Colorectal Cancer Screening and scheduling MMG and eAWV.

## 2024-08-29 ENCOUNTER — PATIENT OUTREACH (OUTPATIENT)
Dept: EMERGENCY MEDICINE | Facility: HOSPITAL | Age: 44
End: 2024-08-29
Payer: COMMERCIAL

## 2024-08-29 NOTE — PROGRESS NOTES
Pt has been unreachable. Encounter will be closed at this time.    Andreina Saeed  ED Navigator  [phone]

## 2024-10-17 ENCOUNTER — PATIENT OUTREACH (OUTPATIENT)
Dept: ADMINISTRATIVE | Facility: HOSPITAL | Age: 44
End: 2024-10-17
Payer: COMMERCIAL

## 2025-04-16 NOTE — PLAN OF CARE
Problem: Patient Care Overview (Adult)  Goal: Plan of Care Review  Outcome: Ongoing (interventions implemented as appropriate)  Plan of care reviewed with patient, patient agrees with plan.  Compliant with medications, accepting meals and snacks.  Isolates in room and stays in bed most of day.  Clear pathways and clutter-free environment maintained. Gait steady, no falls.  Promoted an individualized safety plan, effective coping strategies, and motivators to resolve depression and suicidal ideations.  Will continue to monitor for safety.         [Normal Development] : Normal Development [None] : none [Cuts most foods with a knife] : cuts most foods with a knife [Ties shoes] : ties shoes [Is dry day and night] : is dry day and night [Chooses preferred foods] : chooses preferred foods [Starts/continues conversation with peers] : starts/continues conversation with peers [Plays and interacts with at least one] : plays and interacts with at least one "best friend" [Tells a story with a beginning,] : tells a story with a beginning, a middle, and an end [Masters all consonant sounds and] : masters all consonant sounds and combinations, such as "d" or "ch" [Counts 10 objects] : counts 10 objects [Can do simple addition and] : can do simple addition and subtraction with objects [Hops on one foot 3 to 4 times] : hops on one foot 3 to 4 times [Prints 3 or more simple words] : prints 3 or more simple words without copying [Writes first and last name in] : writes first and last name in uppercase or lowercase letters